# Patient Record
Sex: FEMALE | Race: BLACK OR AFRICAN AMERICAN | Employment: OTHER | URBAN - METROPOLITAN AREA
[De-identification: names, ages, dates, MRNs, and addresses within clinical notes are randomized per-mention and may not be internally consistent; named-entity substitution may affect disease eponyms.]

---

## 2017-01-12 ENCOUNTER — OFFICE VISIT (OUTPATIENT)
Dept: FAMILY MEDICINE CLINIC | Age: 62
End: 2017-01-12

## 2017-01-12 VITALS
BODY MASS INDEX: 34.41 KG/M2 | TEMPERATURE: 97.8 F | DIASTOLIC BLOOD PRESSURE: 88 MMHG | HEIGHT: 62 IN | WEIGHT: 187 LBS | SYSTOLIC BLOOD PRESSURE: 134 MMHG | RESPIRATION RATE: 16 BRPM | OXYGEN SATURATION: 96 % | HEART RATE: 78 BPM

## 2017-01-12 DIAGNOSIS — Z91.199 MEDICALLY NONCOMPLIANT: ICD-10-CM

## 2017-01-12 DIAGNOSIS — R73.03 PREDIABETES: Primary | ICD-10-CM

## 2017-01-12 DIAGNOSIS — I10 ESSENTIAL HYPERTENSION: ICD-10-CM

## 2017-01-12 DIAGNOSIS — Z23 ENCOUNTER FOR IMMUNIZATION: ICD-10-CM

## 2017-01-12 DIAGNOSIS — R91.8 PULMONARY NODULES: ICD-10-CM

## 2017-01-12 DIAGNOSIS — E03.9 ACQUIRED HYPOTHYROIDISM: ICD-10-CM

## 2017-01-12 DIAGNOSIS — Z87.891 FORMER SMOKER: ICD-10-CM

## 2017-01-12 DIAGNOSIS — E04.1 NODULAR THYROID DISEASE: ICD-10-CM

## 2017-01-12 RX ORDER — LEVOTHYROXINE SODIUM 25 UG/1
25 TABLET ORAL
Qty: 30 TAB | Refills: 1 | Status: SHIPPED | OUTPATIENT
Start: 2017-01-12 | End: 2017-03-16

## 2017-01-12 NOTE — MR AVS SNAPSHOT
Visit Information Date & Time Provider Department Dept. Phone Encounter #  
 1/12/2017  9:15 AM Shira Chapin, [address] [id number] Follow-up Instructions Return in about 9 weeks (around 3/16/2017) for thyroid. Labs due in 8 weeks. .  
  
Your Appointments 1/24/2017  8:00 AM  
Office Visit with Lo Guallpa MD  
[facility] [address] [facility]) Appt Note: return in 2  
 [address] [address]  
[phone] [facility] [id number] Upcoming Health Maintenance Date Due  
 FOOT EXAM Q1 [birth year]/8/1965 EYE EXAM RETINAL OR DILATED Q1 12/8/1965 HEMOGLOBIN A1C Q6M 6/13/2017 BREAST CANCER SCRN MAMMOGRAM 6/29/2017 MICROALBUMIN Q1 12/13/2017 LIPID PANEL Q1 12/13/2017 COLONOSCOPY 3/2/2019 DTaP/Tdap/Td series (2 - Td) 6/9/2026 Allergies as of 1/12/2017  Review Complete On: 1/12/2017 By: Jeb De La Cruz LPN Severity Noted Reaction Type Reactions Ace Inhibitors  12/14/2010    Cough Aspirin  01/20/2010    Other (comments) GI bleeding & pain  
 Nsaids (Non-steroidal Anti-inflammatory Drug)  09/26/2012    Other (comments) Makes her stomach bleed Current Immunizations  Reviewed on 6/9/2016 Name Date Influenza Vaccine 11/13/2014, 1/7/2014,  Incomplete Influenza Vaccine (Quad) PF 1/12/2017, 10/14/2015 Pneumococcal Vaccine (Unspecified Type) 11/14/2014, 10/1/2011 Not reviewed this visit You Were Diagnosed With   
  
 Codes Comments Prediabetes    -  Primary ICD-10-CM: R73.03 
ICD-9-CM: 790.29 Essential hypertension     ICD-10-CM: I10 
ICD-9-CM: 401.9 Acquired hypothyroidism     ICD-10-CM: E03.9 ICD-9-CM: 244.9 Nodular thyroid disease     ICD-10-CM: E04.1 ICD-9-CM: 241.0 Pulmonary nodules     ICD-10-CM: R91.8 ICD-9-CM: 793.19 Former smoker     ICD-10-CM: K17.324 ICD-9-CM: V15.82   
 Encounter for immunization     ICD-10-CM: N24 ICD-9-CM: V03.89 Medically noncompliant     ICD-10-CM: Z91.19 ICD-9-CM: V15.81 Vitals BP Pulse Temp Resp Height(growth percentile) Weight(growth percentile) 134/88 (BP 1 Location: Left arm, BP Patient Position: Sitting) 78 97.8 °F (36.6 °C) (Oral) 16 5' 2\" (1.575 m) 187 lb (84.8 kg) SpO2 BMI OB Status Smoking Status 96% 34.2 kg/m2 Hysterectomy Former Smoker Vitals History BMI and BSA Data Body Mass Index Body Surface Area  
 34.2 kg/m 2 1.93 m 2 Preferred Pharmacy Pharmacy Name [facility] [address] [place] [address] [phone] Your Updated Medication List  
  
   
This list is accurate as of: 1/12/17  9:54 AM.  Always use your most recent med list.  
  
  
  
  
 ACIPHEX 20 mg tablet Generic drug:  RABEprazole Take 20 mg by mouth daily. albuterol 90 mcg/actuation inhaler Commonly known as:  VENTOLIN HFA INHALE 2 PUFFS EVERY 4 HOURS AS NEEDED FOR WHEEZING  FOR SHORTNESS OFBREATH  
  
 albuterol-ipratropium 2.5 mg-0.5 mg/3 ml Nebu Commonly known as:  DUO-NEB  
3 mL by Nebulization route every six (6) hours as needed. b complex vitamins tablet Take 1 tablet by mouth daily. candesartan-hydroCHLOROthiazide 32-12.5 mg per tablet Commonly known as:  ATACAND HCT  
TAKE ONE TABLET BY MOUTH ONCE DAILY CARAFATE 100 mg/mL suspension Generic drug:  sucralfate Take 1 tsp by mouth four (4) times daily. CENTRUM COMPLETE 18-0.4 mg Tab Generic drug:  Multivit-Iron-Min-Folic Acid Take 1 Tab by mouth daily. cholecalciferol 1,000 unit Cap Commonly known as:  VITAMIN D3 Take 1,000 Units by mouth daily. diazePAM 5 mg tablet Commonly known as:  VALIUM  
1 in the late afternoon, early evening to help with muscle spasms, as needed. One at night to help with muscle spasms, pain and sleep, as needed fluticasone-vilanterol 100-25 mcg/dose inhaler Commonly known as:  BREO ELLIPTA Take 1 Puff by inhalation daily. Indications: ASTHMA PREVENTION  
  
 levothyroxine 25 mcg tablet Commonly known as:  SYNTHROID Take 1 Tab by mouth Daily (before breakfast). MIRALAX 17 gram packet Generic drug:  polyethylene glycol Take 17 g by mouth daily. mometasone 50 mcg/actuation nasal spray Commonly known as:  NASONEX  
2 Sprays by Both Nostrils route daily. montelukast 10 mg tablet Commonly known as:  SINGULAIR Take 1 Tab by mouth daily. oxyCODONE IR 15 mg immediate release tablet Commonly known as:  Chares Divine Take 1 Tab by mouth four (4) times daily as needed for Pain for up to 30 days. Max Daily Amount: 60 mg. PRILOSEC PO Take 20 mg by mouth daily. VOLTAREN 1 % Gel Generic drug:  diclofenac Apply 4 g to affected area four (4) times daily. Prescriptions Sent to Pharmacy Refills  
 levothyroxine (SYNTHROID) 25 mcg tablet 1 Sig: Take 1 Tab by mouth Daily (before breakfast). Class: Normal  
 [facility]: [facility] [address] Ph #: [phone] Route: Oral  
  
We Performed the Following ADMIN INFLUENZA VIRUS VAC [ [facility]] INFLUENZA VIRUS VAC QUAD,SPLIT,PRESV FREE SYRINGE 3/> YRS IM [id number] CPT(R)] Follow-up Instructions Return in about 9 weeks (around 3/16/2017) for thyroid. Labs due in 8 weeks. [facility] To-Do List   
 01/12/2017 Lab:  TSH 3RD GENERATION   
  
 01/12/2017 Imaging:  US THYROID/PARATHYROID/SOFT TISS Patient Instructions 1 - Please see your lung doctor due to your abnormal CT scan showing nodules and due to your recent pneumonia. 2 - Change to new dose of thyroid medication. 3 - Schedule your thyroid ultrasound due to nodules. 4 - Repeat labs for your thyroid in 8 weeks. 5 - I want to see you for follow-up in 9 weeks to see that all he loose ends are tied up. Introducing [facility] SERVICES! Dear Mark Dubois: Thank you for requesting a [employer] account. Our records indicate that you already have an active [employer] account. You can access your account anytime at [email or web address]. [email or web address] Did you know that you can access your hospital and ER discharge instructions at any time in [employer])? You can also review all of your test results from your hospital stay or ER visit. Additional Information If you have questions, please visit the Frequently Asked Questions section of the [employer] website at [email or web address] Remember, [employer]) is NOT to be used for urgent needs. For medical emergencies, dial 911. Now available from your iPhone and Android! Please provide this summary of care documentation to your next provider. Your primary care clinician is listed as [facility]. If you have any questions after today's visit, please call [phone].

## 2017-01-12 NOTE — PROGRESS NOTES
Chief Complaint   Patient presents with    Results     review    Diabetes    GERD    Thyroid Problem     per patient she has not been taking Levothyroxine inconsistently       1. Have you been to the ER, urgent care clinic since your last visit? Hospitalized since your last visit? Yes, 12/11/16 [facility] for pneumonia    2. Have you seen or consulted any other health care providers outside of the [address] [place] [address] since your last visit? Include any pap smears or colon screening. Yes, 11/28/16 Dr. Ena Armstrong (pain management)    Eye exam: 1/10/17 @ [facility] (note requested today)    Physician order obtained. Patient completed adult immunization consent form. Allergies, contraindications and recommendations reviewed with patient. Seasonal influenza vaccine administered IM left deltoid. Patient tolerated well. Patient remained in office for 15 minutes after injection and no adverse reactions were noted.

## 2017-01-12 NOTE — PATIENT INSTRUCTIONS
1 - Please see your lung doctor due to your abnormal CT scan showing nodules and due to your recent pneumonia. 2 - Change to new dose of thyroid medication. 3 - Schedule your thyroid ultrasound due to nodules. 4 - Repeat labs for your thyroid in 8 weeks. 5 - I want to see you for follow-up in 9 weeks to see that all he loose ends are tied up.

## 2017-01-16 NOTE — PROGRESS NOTES
SUBJECTIVE    Chief Complaint   Patient presents with    Results     review    Diabetes    GERD    Thyroid Problem     per patient she has not been taking Levothyroxine inconsistently      Patient here for follow-up. She has been largely noncompliant in the past and has had several cancellations and no-shows. She is here for follow-up. Patient presents for follow-up on their hypertension, prediabetes, and hypothyroidism. She reports inconsistent use of her synthroid due to side effects that are not present when she skips it. She takes it a few times per week. She is requesting a smaller dose perhaps. She does not believe it to be the size of the pill. She is fairly certain her side effects are not due to other medications. We also reviewed their cardiac risk factors. We reviewed their cardiac risk factors. In the past she admitted that she often times misses other meds as well but this history (besides the thyroid medication) was not present today. The patient denies any chest pain or chest tightness. Denies any dyspnea upon exertion. The patient did not report actively any headaches, blurred vision, polyuria, polydipsia, or polyphagia. She had a recent bout of pneumonia and never did any follow-up appts with her pulmonologist or with us. She had concerning findings on her films - an infiltrate (that needs to be tracked to resolution) and pulmonary nodules. According to ER notes, they suggested follow-up. Her ER findings also suggested thyroid enlargement / nodules for which follow-up was suggested by the radiologist.    ROS:  History obtained from the patient  · General: negative for - chills, fever, weight changes  · HEENT:  Has had voice changes and has seen ENT in the past.  She states she would not like to go back to the ENT she saw.     · Respiratory: no cough, shortness of breath, or wheezing   · Cardiovascular: no chest pain, palpitations, or dyspnea on exertion  · Gastrointestinal: no abdominal pain, N/V, change in bowel habits, or black or bloody stools. History of colon ca. OBJECTIVE  Blood pressure 134/88, pulse 78, temperature 97.8 °F (36.6 °C), temperature source Oral, resp. rate 16, height 5' 2\" (1.575 m), weight 187 lb (84.8 kg), SpO2 96 %. General:  alert, cooperative, well appearing, in no apparent distress. Neck:  Thyroid appears slightly larger on the right. CV:  The heart sounds are regular in rate and rhythm. There is a normal S1 and S2. There or no murmurs. Lungs: Inspiratory and expiratory efforts are full and unlabored. Lung sounds are clear and equal to auscultation throughout all lung fields without wheezing, rales, or rhonchi. Extremities: There is no edema. The feet are without lesions or ulcerations. Skin: no rashes, no jaundice. Psych: normal affect. Mood good. Oriented x 3. Judgement and insight intact. Results for Arlen Lea (MRN [id number]) as of 1/16/2017 08:28   Ref. Range 12/11/2016 09:40   WBC Latest Ref Range: 4.6 - 13.2 K/uL 10.1   RBC Latest Ref Range: 4.20 - 5.30 M/uL 4.00 (L)   HGB Latest Ref Range: 12.0 - 16.0 g/dL 11.0 (L)   HCT Latest Ref Range: 35.0 - 45.0 % 35.0   MCV Latest Ref Range: 74.0 - 97.0 FL 87.5   MCH Latest Ref Range: 24.0 - 34.0 PG 27.5   MCHC Latest Ref Range: 31.0 - 37.0 g/dL 31.4   RDW Latest Ref Range: 11.6 - 14.5 % 15.7 (H)   PLATELET Latest Ref Range: 135 - 420 K/uL 296   MPV Latest Ref Range: 9.2 - 11.8 FL 10.1   NEUTROPHILS Latest Ref Range: 40 - 73 % 82 (H)   LYMPHOCYTES Latest Ref Range: 21 - 52 % 9 (L)   MONOCYTES Latest Ref Range: 3 - 10 % 8   EOSINOPHILS Latest Ref Range: 0 - 5 % 1   BASOPHILS Latest Ref Range: 0 - 2 % 0   DF Latest Units:   AUTOMATED   ABS. NEUTROPHILS Latest Ref Range: 1.8 - 8.0 K/UL 8.3 (H)   ABS. LYMPHOCYTES Latest Ref Range: 0.9 - 3.6 K/UL 0.9   ABS. MONOCYTES Latest Ref Range: 0.05 - 1.2 K/UL 0.8   ABS.  EOSINOPHILS Latest Ref Range: 0.0 - 0.4 K/UL 0.1   ABS. BASOPHILS Latest Ref Range: 0.0 - 0.06 K/UL 0.0   D DIMER Latest Ref Range: <0.46 ug/ml(FEU) 0.88 (H)   Sodium Latest Ref Range: 136 - 145 mmol/L 144   Potassium Latest Ref Range: 3.5 - 5.5 mmol/L 3.6   Chloride Latest Ref Range: 100 - 108 mmol/L 106   CO2 Latest Ref Range: 21 - 32 mmol/L 30   Anion gap Latest Ref Range: 3.0 - 18 mmol/L 8   Glucose Latest Ref Range: 74 - 99 mg/dL 134 (H)   BUN Latest Ref Range: 7.0 - 18 MG/DL 6 (L)   Creatinine Latest Ref Range: 0.6 - 1.3 MG/DL 0.65   BUN/Creatinine ratio Latest Ref Range: 12 - 20   9 (L)   Calcium Latest Ref Range: 8.5 - 10.1 MG/DL 8.6   GFR est non-AA Latest Ref Range: >60 ml/min/1.73m2 >60   GFR est AA Latest Ref Range: >60 ml/min/1.73m2 >60   Troponin-I, Qt. Latest Ref Range: 0.0 - 0.045 NG/ML <0.02     Results for Isi Gathers (MRN [id number]) as of 1/16/2017 08:28   Ref. Range 12/13/2016 09:37   Triglyceride Latest Ref Range: <150 MG/DL 46   Cholesterol, total Latest Ref Range: <200 MG/   HDL Cholesterol Latest Ref Range: 40 - 60 MG/ (H)   CHOL/HDL Ratio Latest Ref Range: 0 - 5.0   1.8   LDL, calculated Latest Ref Range: 0 - 100 MG/DL 68.8   VLDL, calculated Latest Units: MG/DL 9.2   Hemoglobin A1c, (calculated) Latest Ref Range: 4.2 - 5.6 % 6.2 (H)   Creatinine, urine Latest Ref Range: 30 - 125 mg/dL 195.00 (H)   Microalbumin,urine random Latest Ref Range: 0 - 3.0 MG/DL 26.10 (H)   Microalbumin/Creat. Ratio Latest Ref Range: 0 - 30 mg/g 134 (H)   TSH Latest Ref Range: 0.36 - 3.74 uIU/mL 1.95     Final result (Exam End: 12/11/2016 11:36 AM) Open        Study Result      CT chest pulmonary embolism protocol      INDICATION: Chest pain. Shortness of breath. Question pulmonary embolism. Wheezing, shortness of breath for a week. Coughing up bright blood and last 2  days. Asthma.  History of colon cancer     TECHNIQUE: Thin collimation axial images obtained through the level of the  pulmonary arteries with additional imaging through the chest following the  uneventful administration of nonionic intravenous contrast. Images  reconstructed into three dimensional coronal and sagittal projections for  complete evaluation of the tortuous and overlapping pulmonary vascular  structures and to reduce patient radiation dose. All CT scans at this facility  are performed using dose optimization technique as appropriate to a performed  exam, to include on automated exposure control, adjustment of the mA and/or KV  according to patient's size (including appropriate matching for site-specific  examinations), or use of iterative reconstruction technique.     COMPARISON: None     FINDINGS:     Airways are patent. There is a 5 mm pulmonary nodule in the left lower lobe  (axial image 32. There is a 5 mm pulmonary nodule in the left lower lobe on  axial image 39. These appear to been present since 2012 abdomen CT. There is a  small area space opacity in the left upper lobe and possible scattered opacities  at the left lung base. There are extensive scattered airspace opacities and  consolidation in the right upper and middle lobe. There are scattered airspace  opacities in the right lower lobe. Findings are most suggestive of pneumonia. Pulmonary hemorrhage could also have this appearance. Multiple small nodular  opacities are noted scattered throughout the right lung. These are likely  related to airspace disease. Follow-up chest CT in 3 months is recommended to  exclude pulmonary nodules. No pleural effusion. Visualized osseous structures  appear intact. Heterogeneous nodular enlarged thyroid gland. Further evaluation with thyroid  ultrasound is recommended. No axillary adenopathy. No thoracic aortic aneurysm or dissection. Postsurgical  changes of the stomach are noted. Visualized infradiaphragmatic structures are  unremarkable. Indeterminate study for pulmonary embolus with poor opacification  of the segmental and subsegmental branches.  No definite pulmonary embolus in the  main, right or left pulmonary arteries. No gross pulmonary embolus in the lobar  branches.     IMPRESSION  Impression:     No evidence of pulmonary embolism or thoracic aortic dissection.     Extensive airspace opacities and consolidation throughout the right lung. Possible minimal scattered opacities in the left lung. Findings are most  suggestive of pneumonia. Pulmonary hemorrhage or contusion could also have this  appearance. Multiple small nodular opacities throughout the right lung are likely related to  airspace disease. Follow-up chest CT in 3 months is recommended to exclude  pulmonary nodules.     Enlarged heterogeneous nodular thyroid gland. Further evaluation with thyroid  ultrasound is recommended. ASSESSMENT / PLAN    ICD-10-CM ICD-9-CM    1. Prediabetes R73.03 790.29    2. Essential hypertension I10 401.9    3. Acquired hypothyroidism E03.9 244.9 US THYROID/PARATHYROID/SOFT TISS      TSH 3RD GENERATION   4. Nodular thyroid disease E04.1 241.0 US THYROID/PARATHYROID/SOFT TISS   5. Pulmonary nodules R91.8 793.19    6. Former smoker Z87.891 V15.82    7. Encounter for immunization Z23 V03.89 INFLUENZA VIRUS VAC QUAD,SPLIT,PRESV FREE SYRINGE 3/> YRS IM      ADMIN INFLUENZA VIRUS VAC   8. Medically noncompliant Z91.19 V15.81      Diabetes - Reviewed labs. Suggested health lifestyle / diet. HTN -  Refills as needed. DASH diet. Hypothyroidism - normal TSH with low compliance. We will reduce the dose per her request and check labs in 8 weeks. Nodular thyroid- imaging ordered. May need to see ENT depending on results. Asthma / COPD / pulmonary nodules / former smoker - strongly advise follow-up with pulmonologist. I emphasized the importance of always having ER follow-up and the risks she is putting herself in by not reviewing testing done in the ER after the fact. H/O colon ca - encourage follow-up with GI. Flu vaccine administered.     I had an in depth discussion with her about her noncompliance and my care philosophy. I am a bit more strict in that I want her to be more accountable and engaged in her healthcare so that she does not miss anything preventable and that all harmful diagnoses are worked up appropriately. If she continues to have no-shows and poor compliance I have advised I may discharged her from my care. She appreciated my honesty and contracts for more engagement. All chart history elements were reviewed by me at the time of the visit even though marked at time of note closure. Patient understands our medical plan. Patient has provided input and agrees with goals. Alternatives have been explained and offered. All questions answered. The patient is to call if condition worsens or fails to improve. Follow-up Disposition:  Return in about 9 weeks (around 3/16/2017) for thyroid. Labs due in 8 weeks. Soila Car

## 2017-01-24 ENCOUNTER — OFFICE VISIT (OUTPATIENT)
Dept: PAIN MANAGEMENT | Age: 62
End: 2017-01-24

## 2017-01-24 VITALS
DIASTOLIC BLOOD PRESSURE: 101 MMHG | BODY MASS INDEX: 34.6 KG/M2 | SYSTOLIC BLOOD PRESSURE: 185 MMHG | HEIGHT: 62 IN | HEART RATE: 80 BPM | WEIGHT: 188 LBS

## 2017-01-24 DIAGNOSIS — M51.37 DDD (DEGENERATIVE DISC DISEASE), LUMBOSACRAL: ICD-10-CM

## 2017-01-24 DIAGNOSIS — Z98.890 H/O LUMBOSACRAL SPINE SURGERY: ICD-10-CM

## 2017-01-24 DIAGNOSIS — Z79.899 ENCOUNTER FOR LONG-TERM (CURRENT) USE OF HIGH-RISK MEDICATION: ICD-10-CM

## 2017-01-24 DIAGNOSIS — G89.29 CHRONIC LOW BACK PAIN, UNSPECIFIED BACK PAIN LATERALITY, WITH SCIATICA PRESENCE UNSPECIFIED: Primary | ICD-10-CM

## 2017-01-24 DIAGNOSIS — F41.1 GAD (GENERALIZED ANXIETY DISORDER): ICD-10-CM

## 2017-01-24 DIAGNOSIS — M54.5 CHRONIC LOW BACK PAIN, UNSPECIFIED BACK PAIN LATERALITY, WITH SCIATICA PRESENCE UNSPECIFIED: Primary | ICD-10-CM

## 2017-01-24 DIAGNOSIS — M47.892 OTHER OSTEOARTHRITIS OF SPINE, CERVICAL REGION: ICD-10-CM

## 2017-01-24 DIAGNOSIS — M54.2 CERVICALGIA: ICD-10-CM

## 2017-01-24 DIAGNOSIS — M47.896 OTHER OSTEOARTHRITIS OF SPINE, LUMBAR REGION: ICD-10-CM

## 2017-01-24 DIAGNOSIS — M43.10 SPONDYLOLISTHESIS, GRADE 1: ICD-10-CM

## 2017-01-24 LAB
ALCOHOL UR POC: NORMAL
AMPHETAMINES UR POC: NORMAL
BARBITURATES UR POC: NORMAL
BENZODIAZEPINES UR POC: NORMAL
BUPRENORPHINE UR POC: NORMAL
CANNABINOIDS UR POC: NORMAL
CARISOPRODOL UR POC: NORMAL
COCAINE UR POC: NORMAL
FENTANYL UR POC: NORMAL
MDMA/ECSTASY UR POC: NORMAL
METHADONE UR POC: NORMAL
METHAMPHETAMINE UR POC: NORMAL
METHYLPHENIDATE UR POC: NORMAL
OPIATES UR POC: NORMAL
OXYCODONE UR POC: NORMAL
PHENCYCLIDINE UR POC: NORMAL
PROPOXYPHENE UR POC: NORMAL
TRAMADOL UR POC: NORMAL
TRICYCLICS UR POC: NORMAL

## 2017-01-24 RX ORDER — DIAZEPAM 5 MG/1
TABLET ORAL
Qty: 60 TAB | Refills: 1 | Status: SHIPPED | OUTPATIENT
Start: 2017-01-24 | End: 2017-03-23 | Stop reason: SDUPTHER

## 2017-01-24 RX ORDER — LIDOCAINE 50 MG/G
1 PATCH TOPICAL EVERY 12 HOURS
Qty: 1 EACH | Refills: 3 | Status: SHIPPED | OUTPATIENT
Start: 2017-01-24 | End: 2017-03-23 | Stop reason: SDUPTHER

## 2017-01-24 RX ORDER — OXYCODONE HYDROCHLORIDE 15 MG/1
15 TABLET ORAL
Qty: 120 TAB | Refills: 0 | Status: SHIPPED | OUTPATIENT
Start: 2017-01-24 | End: 2017-03-23 | Stop reason: SDUPTHER

## 2017-01-24 RX ORDER — TRAMADOL HYDROCHLORIDE 200 MG/1
200 TABLET, EXTENDED RELEASE ORAL DAILY
Qty: 30 TAB | Refills: 1 | Status: SHIPPED | OUTPATIENT
Start: 2017-01-24 | End: 2017-03-23 | Stop reason: SDUPTHER

## 2017-01-24 RX ORDER — PREGABALIN 75 MG/1
75 CAPSULE ORAL 2 TIMES DAILY
Qty: 60 CAP | Refills: 2 | Status: SHIPPED | OUTPATIENT
Start: 2017-01-24 | End: 2017-03-23 | Stop reason: SDUPTHER

## 2017-01-24 RX ORDER — OXYCODONE HYDROCHLORIDE 15 MG/1
15 TABLET ORAL
Qty: 120 TAB | Refills: 0 | Status: SHIPPED | OUTPATIENT
Start: 2017-02-23 | End: 2017-03-23 | Stop reason: SDUPTHER

## 2017-01-24 NOTE — PROGRESS NOTES
Nursing Notes    Patient presents to the office today in follow-up. Patient rates her pain at 0/10 on the numerical pain scale. Reviewed medications with counts as follows:    Rx Date filled Qty Dispensed Pill Count Last Dose Short   Valium 5 mg 01/11/17 60 41 today no   Tramadol  mg 01/11/17 30 19 Last night  no   Oxycodone 15 mg  01/12/17 120 73 today no                      POC UDS was performed in office today    Any new labs or imaging since last appointment? NO    Have you been to an emergency room (ER) or urgent care clinic since your last visit? NO            Have you been hospitalized since your last visit? NO     If yes, where, when, and reason for visit? Have you seen or consulted any other health care providers outside of the [facility]  since your last visit? NO     If yes, where, when, and reason for visit? HM deferred to pcp.

## 2017-01-24 NOTE — PROGRESS NOTES
HISTORY OF PRESENT ILLNESS  Adriana Maurice is a 64 y.o. female. HPI Comments: Meds help with pain control and quality of life. No new side effects reported today. No new medical problems reported today. Visit survey reviewed, and will be scanned. Recent Average pain level (out of 10). -- 8  Chief complaint, low back pain, neck pain, history of shoulder pain  Chronic pain  Using oxycodone 15 mg 4 times a day as needed  Extended release tramadol 200 mg once a day  Diazepam 5 mg in the evening as needed to help with muscle spasms and sleep  Lyrica 75 mg twice a day  Lidoderm patches  Compound cream from her rheumatologist  80% complete relief in the past 30 days  Medication helps improve general activity, mood, sleep, enjoyment of life             Review of Systems   Constitutional: Positive for malaise/fatigue. Negative for fever. HENT: Negative for sore throat. Eyes: Negative for blurred vision and double vision. Respiratory: Positive for shortness of breath. Negative for cough. Cardiovascular: Negative for chest pain and leg swelling. Gastrointestinal: Positive for heartburn. Negative for nausea. Genitourinary: Negative for dysuria and urgency. Musculoskeletal: Positive for back pain, falls and joint pain. Skin: Negative for itching and rash. Neurological: Negative for dizziness, seizures and headaches. Endo/Heme/Allergies: Negative for environmental allergies. Does not bruise/bleed easily. Psychiatric/Behavioral: Positive for depression. Negative for suicidal ideas. The patient is nervous/anxious and has insomnia. Physical Exam   Constitutional: She appears well-developed and well-nourished. She is cooperative. She does not have a sickly appearance. HENT:   Head: Normocephalic and atraumatic. Right Ear: External ear normal. No drainage. Left Ear: External ear normal. No drainage. Nose: Nose normal.   Eyes: Lids are normal. Right eye exhibits no discharge.  Left eye exhibits no discharge. Right conjunctiva has no hemorrhage. Left conjunctiva has no hemorrhage. Neck: Neck supple. No tracheal deviation present. No thyroid mass present. Pulmonary/Chest: Effort normal. No respiratory distress. Neurological: She is alert. No cranial nerve deficit. Skin: Skin is intact. No rash noted. Psychiatric: Her speech is normal. Her affect is not angry. She does not express inappropriate judgment. Nursing note and vitals reviewed. ASSESSMENT and PLAN  Encounter Diagnoses   Name Primary?  Chronic low back pain, unspecified back pain laterality, with sciatica presence unspecified Yes    DDD (degenerative disc disease), lumbosacral     Spondylolisthesis, grade 1     H/O lumbosacral spine surgery     Other osteoarthritis of spine, lumbar region     Cervicalgia     Other osteoarthritis of spine, cervical region     CAIO (generalized anxiety disorder)    No signs of addiction or diversion. The primary Dxes. ,including pain are controlled. Pain/Pain control/Meds and Quality Of Life have been reviewed. Nonpharmacologic therapy and non-opioid pharmacologic therapy are always considered. If opioid therapy is prescribed, this is only if the expected benefits for both pain and function are anticipated to outweigh risks. Possible changes to treatment plan considered. Support/education given as needed. Today-medications are as listed. No significant changes to medications. Follow up -- 2 months.  --Urine test or oral swab today. Also, the prescription monitoring program was reviewed today. The majority of today's visit was spent counseling and coordinating care. Total visit time-40 minutes. -Dragon medical dictation software was used for portions of this report. Unintended errors may occur.

## 2017-01-24 NOTE — MR AVS SNAPSHOT
Visit Information Date & Time Provider Department Dept. Phone Encounter #  
 1/24/2017  8:00 AM Tu Damon MD [address] for Pain Management [phone] Follow-up Instructions Return in about 2 months (around 3/24/2017). Follow-up and Disposition History Your Appointments 2/6/2017  8:30 AM  
Follow Up with Lion Garcias MD  
[address] ([facility]) Appt Note: FROM 3/10/16  
 [address] [place] [address], [address] [address]  
[phone]  
  
   
 [address] [place] [address], [address] [facility] [place] [address]  
  
    
 3/16/2017 11:00 AM  
ROUTINE CARE with Antoinette Escalante MD  
[facility] ([facility]) Appt Note: routine f/u 9wks [address] [facility] [address]  
[address] [facility] [place] [facility] [address] [facility] [address] Upcoming Health Maintenance Date Due  
 FOOT EXAM Q1 12[birth year]8/1965 EYE EXAM RETINAL OR DILATED Q1 12/8/1965 HEMOGLOBIN A1C Q6M 6/13/2017 BREAST CANCER SCRN MAMMOGRAM 6/29/2017 MICROALBUMIN Q1 12/13/2017 LIPID PANEL Q1 12/13/2017 COLONOSCOPY 3/2/2019 DTaP/Tdap/Td series (2 - Td) 6/9/2026 Allergies as of 1/24/2017  Review Complete On: 1/24/2017 By: Tu Damon MD  
  
 Severity Noted Reaction Type Reactions Ace Inhibitors  12/14/2010    Cough Aspirin  01/20/2010    Other (comments) GI bleeding & pain  
 Nsaids (Non-steroidal Anti-inflammatory Drug)  09/26/2012    Other (comments) Makes her stomach bleed Current Immunizations  Reviewed on 6/9/2016 Name Date Influenza Vaccine 11/13/2014, 1/7/2014,  Incomplete Influenza Vaccine (Quad) PF 1/12/2017, 10/14/2015 Pneumococcal Vaccine (Unspecified Type) 11/14/2014, 10/1/2011 Not reviewed this visit You Were Diagnosed With   
  
 Codes Comments Chronic low back pain, unspecified back pain laterality, with sciatica presence unspecified    -  Primary ICD-10-CM: M54.5, G89.29 ICD-9-CM: 724.2, 338.29   
 DDD (degenerative disc disease), lumbosacral     ICD-10-CM: M51.37 
ICD-9-CM: 722.52 Spondylolisthesis, grade 1     ICD-10-CM: M43.10 ICD-9-CM: 738.4 H/O lumbosacral spine surgery     ICD-10-CM: Z98.890 ICD-9-CM: V15.29 Other osteoarthritis of spine, lumbar region     ICD-10-CM: M47.896 Cervicalgia     ICD-10-CM: M54.2 ICD-9-CM: 723.1 Other osteoarthritis of spine, cervical region     ICD-10-CM: L49.736 CAIO (generalized anxiety disorder)     ICD-10-CM: F41.1 ICD-9-CM: 300.02 Vitals BP Pulse Height(growth percentile) Weight(growth percentile) BMI OB Status (!) 185/101 80 5' 2\" (1.575 m) 188 lb (85.3 kg) 34.39 kg/m2 Hysterectomy Smoking Status Former Smoker Vitals History BMI and BSA Data Body Mass Index Body Surface Area  
 34.39 kg/m 2 1.93 m 2 Preferred Pharmacy Pharmacy Name [facility] [address] [phone] Your Updated Medication List  
  
   
This list is accurate as of: 1/24/17  8:36 AM.  Always use your most recent med list.  
  
  
  
  
 ACIPHEX 20 mg tablet Generic drug:  RABEprazole Take 20 mg by mouth daily. albuterol 90 mcg/actuation inhaler Commonly known as:  VENTOLIN HFA INHALE 2 PUFFS EVERY 4 HOURS AS NEEDED FOR WHEEZING  FOR SHORTNESS OFBREATH  
  
 albuterol-ipratropium 2.5 mg-0.5 mg/3 ml Nebu Commonly known as:  DUO-NEB  
3 mL by Nebulization route every six (6) hours as needed. b complex vitamins tablet Take 1 tablet by mouth daily. candesartan-hydroCHLOROthiazide 32-12.5 mg per tablet Commonly known as:  ATACAND HCT  
TAKE ONE TABLET BY MOUTH ONCE DAILY CARAFATE 100 mg/mL suspension Generic drug:  sucralfate Take 1 tsp by mouth four (4) times daily. CENTRUM COMPLETE 18-0.4 mg Tab Generic drug:  Multivit-Iron-Min-Folic Acid Take 1 Tab by mouth daily. cholecalciferol 1,000 unit Cap Commonly known as:  VITAMIN D3 Take 1,000 Units by mouth daily. diazePAM 5 mg tablet Commonly known as:  VALIUM  
1 in the late afternoon, early evening to help with muscle spasms, as needed. One at night to help with muscle spasms, pain and sleep, as needed  
  
 fluticasone-vilanterol 100-25 mcg/dose inhaler Commonly known as:  BREO ELLIPTA Take 1 Puff by inhalation daily. Indications: ASTHMA PREVENTION  
  
 levothyroxine 25 mcg tablet Commonly known as:  SYNTHROID Take 1 Tab by mouth Daily (before breakfast). lidocaine 5 % Commonly known as:  LIDODERM  
1 Patch by TransDERmal route every twelve (12) hours for 30 days. Apply patch to the affected area for 12 hours a day and remove for 12 hours a day. MIRALAX 17 gram packet Generic drug:  polyethylene glycol Take 17 g by mouth daily. mometasone 50 mcg/actuation nasal spray Commonly known as:  NASONEX  
2 Sprays by Both Nostrils route daily. montelukast 10 mg tablet Commonly known as:  SINGULAIR Take 1 Tab by mouth daily. * oxyCODONE IR 15 mg immediate release tablet Commonly known as:  Chares Divine Take 1 Tab by mouth four (4) times daily as needed for Pain for up to 30 days. Max Daily Amount: 60 mg.  
  
 * oxyCODONE IR 15 mg immediate release tablet Commonly known as:  Chares Divine Take 1 Tab by mouth four (4) times daily as needed for Pain for up to 30 days. Max Daily Amount: 60 mg. Start taking on:  2/23/2017  
  
 pregabalin 75 mg capsule Commonly known as:  Margarie Chuck Take 1 Cap by mouth two (2) times a day for 30 days. Max Daily Amount: 150 mg. PRILOSEC PO Take 20 mg by mouth daily. traMADol 200 mg tablet Commonly known as:  ULTRAM-ER  
 Take 1 Tab by mouth daily for 30 days. Max Daily Amount: 200 mg. VOLTAREN 1 % Gel Generic drug:  diclofenac Apply 4 g to affected area four (4) times daily. * Notice: This list has 2 medication(s) that are the same as other medications prescribed for you. Read the directions carefully, and ask your doctor or other care provider to review them with you. Prescriptions Printed Refills  
 diazePAM (VALIUM) 5 mg tablet 1 Si in the late afternoon, early evening to help with muscle spasms, as needed. One at night to help with muscle spasms, pain and sleep, as needed Class: Print  
 oxyCODONE IR (ROXICODONE) 15 mg immediate release tablet 0 Sig: Take 1 Tab by mouth four (4) times daily as needed for Pain for up to 30 days. Max Daily Amount: 60 mg.  
 Class: Print Route: Oral  
 traMADol (ULTRAM-ER) 200 mg tablet 1 Sig: Take 1 Tab by mouth daily for 30 days. Max Daily Amount: 200 mg. Class: Print Route: Oral  
 oxyCODONE IR (ROXICODONE) 15 mg immediate release tablet 0 Starting on: 2017 Sig: Take 1 Tab by mouth four (4) times daily as needed for Pain for up to 30 days. Max Daily Amount: 60 mg.  
 Class: Print Route: Oral  
 pregabalin (LYRICA) 75 mg capsule 2 Sig: Take 1 Cap by mouth two (2) times a day for 30 days. Max Daily Amount: 150 mg.  
 Class: Print Route: Oral  
 lidocaine (LIDODERM) 5 % 3 Si Patch by TransDERmal route every twelve (12) hours for 30 days. Apply patch to the affected area for 12 hours a day and remove for 12 hours a day. Class: Print Route: TransDERmal  
  
Follow-up Instructions Return in about 2 months (around 3/24/2017). To-Do List   
 2017 7:00 AM  
  Appointment with HANANE US RM 2 at [address] [place] [address] ([phone]) OUTSIDE FILMS  - Any outside films related to the study being scheduled should be brought with you on the day of the exam.  If this cannot be done there may be a delay in the reading of the study. MEDICATIONS  - Patient must bring a complete list of all medications currently taking to include prescriptions, over-the-counter meds, herbals, vitamins & any dietary supplements Introducing \[facility]\"" & HEALTH SERVICES! Dear Shahana Mercedes: Thank you for requesting a [employer] account. Our records indicate that you already have an active My[employer] account. You can access your account anytime at [email or web address] Did you know that you can access your hospital and ER discharge instructions at any time in [employer]? You can also review all of your test results from your hospital stay or ER visit. Additional Information If you have questions, please visit the Frequently Asked Questions section of the My[employer] website at [email or web address] Remember, MyBuilder is NOT to be used for urgent needs. For medical emergencies, dial 911. Now available from your iPhone and Android! Please provide this summary of care documentation to your next provider. Your primary care clinician is listed as Yoana Mayorga. If you have any questions after today's visit, please call [phone].

## 2017-01-26 ENCOUNTER — HOSPITAL ENCOUNTER (OUTPATIENT)
Dept: ULTRASOUND IMAGING | Age: 62
Discharge: HOME | End: 2017-01-26
Attending: FAMILY MEDICINE
Payer: MEDICARE

## 2017-01-26 DIAGNOSIS — E03.9 ACQUIRED HYPOTHYROIDISM: ICD-10-CM

## 2017-01-26 DIAGNOSIS — E04.1 NODULAR THYROID DISEASE: ICD-10-CM

## 2017-01-26 PROCEDURE — 76536 US EXAM OF HEAD AND NECK: CPT

## 2017-01-27 DIAGNOSIS — E04.1 THYROID NODULE: ICD-10-CM

## 2017-01-27 DIAGNOSIS — E04.9 ENLARGED THYROID: Primary | ICD-10-CM

## 2017-01-27 NOTE — PROGRESS NOTES
Nurse to advise thyroid is enlarged and should follow-up with ENT. Referral placed to Dr. Mirta Cutler.

## 2017-01-27 NOTE — PROGRESS NOTES
[facility] [phone] ([place]) and [phone] for patient to call [facility].

## 2017-01-31 ENCOUNTER — DOCUMENTATION ONLY (OUTPATIENT)
Dept: FAMILY MEDICINE CLINIC | Age: 62
End: 2017-01-31

## 2017-01-31 NOTE — PROGRESS NOTES
Doctor: Dr. Dsouza Graft   Specialty:ENT   Address/telephone #: [address] [address] [address] [phone]  Appointment date/time: 2/15/17 @ 1:30 pm    Called patient several times to advise of results and specialist appointment. No return phone call. Letter sent to patient with result and specialist appointment information.

## 2017-02-06 ENCOUNTER — TELEPHONE (OUTPATIENT)
Dept: PULMONOLOGY | Age: 62
End: 2017-02-06

## 2017-02-06 ENCOUNTER — TELEPHONE (OUTPATIENT)
Dept: FAMILY MEDICINE CLINIC | Age: 62
End: 2017-02-06

## 2017-02-06 NOTE — TELEPHONE ENCOUNTER
Patient called this morning. She is requesting to speak to nurse [facility] regarding letter received stating her thyroid ultrasound showed her thyroid is enlarged. Please call patient back at your earliest convenience.

## 2017-02-06 NOTE — TELEPHONE ENCOUNTER
Pt miss her appt this a.m states that she's not feeling well. Pt states that she need to speak with nurse.  pls call pt [phone]

## 2017-02-06 NOTE — TELEPHONE ENCOUNTER
Patient over sleep this am and missed appt with Dr. Rafaela Mi. Has been almost 1 year since seen. Patient requesting appt. Appt given for 2/9/17.

## 2017-02-06 NOTE — TELEPHONE ENCOUNTER
Spoke with patient. She confirms that she received letter in the mail but she will have to reschedule appointment for ENT. Patient states \"my daughter has a tumor in her stomach. It looks as if she is 3 months pregnant. I leave on 2/13/17 to fly to [place] to be there for her. She will have to be on bedrest after surgery. I don't have a return date yet. \"    Contacted ENT and moved patient's appointment up to 2/9/17 @ 1:15 pm.  Patient states she will take the appt but she has to see pulmonology the same day at 2:30. Contact info given to patient to call ENT to reschedule.

## 2017-02-07 ENCOUNTER — TELEPHONE (OUTPATIENT)
Dept: FAMILY MEDICINE CLINIC | Age: 62
End: 2017-02-07

## 2017-02-07 NOTE — TELEPHONE ENCOUNTER
Left message via voicemail for Adriana to inform her that we have contacted  ENT ([facility] Dr. Delmi Villarreal) [phone] spoke with Sae Bah). Who states that Adriana was  offered several appointments to accommodate her schedule. Adriana has canceled both appointments scheduled by [facility]. Adriana was advised to contact [facility].

## 2017-02-07 NOTE — TELEPHONE ENCOUNTER
Pt states that she has to go to [facility] to help her daughter after her surgery. Pt states that she tried to get an appt with the ENT sooner but was unable to. She will schedule an appt when she gets back. She will keep her appt with Dr Rodney Perry on Thursday. FYI.

## 2017-02-09 ENCOUNTER — OFFICE VISIT (OUTPATIENT)
Dept: PULMONOLOGY | Age: 62
End: 2017-02-09

## 2017-02-09 VITALS
OXYGEN SATURATION: 97 % | WEIGHT: 188 LBS | HEART RATE: 83 BPM | HEIGHT: 62 IN | RESPIRATION RATE: 16 BRPM | DIASTOLIC BLOOD PRESSURE: 76 MMHG | TEMPERATURE: 98.3 F | BODY MASS INDEX: 34.6 KG/M2 | SYSTOLIC BLOOD PRESSURE: 158 MMHG

## 2017-02-09 DIAGNOSIS — J44.9 ASTHMA-COPD OVERLAP SYNDROME (HCC): Primary | ICD-10-CM

## 2017-02-09 DIAGNOSIS — Z87.898 HISTORY OF MULTIPLE PULMONARY NODULES: ICD-10-CM

## 2017-02-09 DIAGNOSIS — J18.9 COMMUNITY ACQUIRED PNEUMONIA: ICD-10-CM

## 2017-02-09 RX ORDER — ALBUTEROL SULFATE 90 UG/1
AEROSOL, METERED RESPIRATORY (INHALATION)
Qty: 3 INHALER | Refills: 3 | Status: SHIPPED | OUTPATIENT
Start: 2017-02-09 | End: 2017-10-31 | Stop reason: SDUPTHER

## 2017-02-09 RX ORDER — FLUTICASONE FUROATE AND VILANTEROL 100; 25 UG/1; UG/1
1 POWDER RESPIRATORY (INHALATION) DAILY
Qty: 3 INHALER | Refills: 3 | Status: SHIPPED | OUTPATIENT
Start: 2017-02-09 | End: 2017-11-03 | Stop reason: SINTOL

## 2017-02-09 RX ORDER — IPRATROPIUM BROMIDE AND ALBUTEROL SULFATE 2.5; .5 MG/3ML; MG/3ML
3 SOLUTION RESPIRATORY (INHALATION)
Qty: 120 NEBULE | Refills: 3 | Status: SHIPPED | OUTPATIENT
Start: 2017-02-09 | End: 2017-06-06 | Stop reason: SDUPTHER

## 2017-02-09 NOTE — MR AVS SNAPSHOT
Visit Information Date & Time Provider Department Dept. Phone Encounter #  
 2/9/2017  2:30 PM MD Thuy Lackey [facility] [place] [phone] Your Appointments 3/16/2017 11:00 AM  
ROUTINE CARE with Tapan Thomas MD  
[facility] ([facility]) Appt Note: routine f/u 9wks [address] [facility] [address] [place] [address] Drive [address] [place] [address]  
  
    
 3/23/2017  9:00 AM  
Follow Up with Golden Lieberman MD  
[facility]) Appt Note: Return in about 2 months (around 3/24/2017). [address] [address]  
[phone] [facility] [phone] Upcoming Health Maintenance Date Due HEMOGLOBIN A1C Q6M 6/13/2017 BREAST CANCER SCRN MAMMOGRAM 6/29/2017 MICROALBUMIN Q1 12/13/2017 LIPID PANEL Q1 12/13/2017 EYE EXAM RETINAL OR DILATED Q1 1/10/2018 FOOT EXAM Q1 1/12/2018 COLONOSCOPY 3/2/2019 DTaP/Tdap/Td series (2 - Td) 6/9/2026 Allergies as of 2/9/2017  Review Complete On: 2/9/2017 By: Roro Lema MD  
  
 Severity Noted Reaction Type Reactions Ace Inhibitors  12/14/2010    Cough Aspirin  01/20/2010    Other (comments) GI bleeding & pain  
 Nsaids (Non-steroidal Anti-inflammatory Drug)  09/26/2012    Other (comments) Makes her stomach bleed Current Immunizations  Reviewed on 6/9/2016 Name Date Influenza Vaccine 11/13/2014, 1/7/2014,  Incomplete Influenza Vaccine (Quad) PF 1/12/2017, 10/14/2015 Pneumococcal Vaccine (Unspecified Type) 11/14/2014, 10/1/2011 Not reviewed this visit You Were Diagnosed With   
  
 Codes Comments [facility]hma[facility]COPD overlap syndrome ([facility]    -  Primary ICD-10-CM: J44.9 ICD-9-CM: 493.20 History of multiple pulmonary nodules     ICD-10-CM: Z87.898 ICD-9-CM: V12.69   
 Community acquired pneumonia     ICD-10-CM: J18.9 ICD-9-CM: 918 Vitals BP Pulse Temp Resp Height(growth percentile) Weight(growth percentile) 158/76 (BP 1 Location: Left arm, BP Patient Position: Sitting) 83 98.3 °F (36.8 °C) (Oral) 16 5' 2\" (1.575 m) 188 lb (85.3 kg) SpO2 BMI OB Status Smoking Status 97% 34.39 kg/m2 Hysterectomy Former Smoker BMI and BSA Data Body Mass Index Body Surface Area  
 34.39 kg/m 2 1.93 m 2 Preferred Pharmacy [facility], [address] [place] [address],[address] [place] [address] [phone] Your Updated Medication List  
  
   
This list is accurate as of: 2/9/17  2:40 PM.  Always use your most recent med list.  
  
  
  
  
 ACIPHEX 20 mg tablet Generic drug:  RABEprazole Take 20 mg by mouth daily. albuterol 90 mcg/actuation inhaler Commonly known as:  VENTOLIN HFA INHALE 2 PUFFS EVERY 4 HOURS AS NEEDED FOR WHEEZING  FOR SHORTNESS OFBREATH  
  
 albuterol-ipratropium 2.5 mg-0.5 mg/3 ml Nebu Commonly known as:  DUO-NEB  
3 mL by Nebulization route every six (6) hours as needed. b complex vitamins tablet Take 1 tablet by mouth daily. candesartan-hydroCHLOROthiazide 32-12.5 mg per tablet Commonly known as:  ATACAND HCT  
TAKE ONE TABLET BY MOUTH ONCE DAILY CARAFATE 100 mg/mL suspension Generic drug:  sucralfate Take 1 tsp by mouth four (4) times daily. CENTRUM COMPLETE 18-0.4 mg Tab Generic drug:  Multivit-Iron-Min-Folic Acid Take 1 Tab by mouth daily. cholecalciferol 1,000 unit Cap Commonly known as:  VITAMIN D3 Take 1,000 Units by mouth daily. diazePAM 5 mg tablet Commonly known as:  VALIUM  
1 in the late afternoon, early evening to help with muscle spasms, as needed. One at night to help with muscle spasms, pain and sleep, as needed  
  
 fluticasone-vilanterol 100-25 mcg/dose inhaler Commonly known as:  BREO ELLIPTA Take 1 Puff by inhalation daily. Indications: ASTHMA PREVENTION  
  
 levothyroxine 25 mcg tablet Commonly known as:  SYNTHROID Take 1 Tab by mouth Daily (before breakfast). lidocaine 5 % Commonly known as:  LIDODERM  
1 Patch by TransDERmal route every twelve (12) hours for 30 days. Apply patch to the affected area for 12 hours a day and remove for 12 hours a day. MIRALAX 17 gram packet Generic drug:  polyethylene glycol Take 17 g by mouth daily. mometasone 50 mcg/actuation nasal spray Commonly known as:  NASONEX  
2 Sprays by Both Nostrils route daily. montelukast 10 mg tablet Commonly known as:  SINGULAIR Take 1 Tab by mouth daily. * oxyCODONE IR 15 mg immediate release tablet Commonly known as:  Darin Rattler Take 1 Tab by mouth four (4) times daily as needed for Pain for up to 30 days. Max Daily Amount: 60 mg.  
  
 * oxyCODONE IR 15 mg immediate release tablet Commonly known as:  Darin Rattler Take 1 Tab by mouth four (4) times daily as needed for Pain for up to 30 days. Max Daily Amount: 60 mg. Start taking on:  2/23/2017  
  
 pregabalin 75 mg capsule Commonly known as:  Edman Speaker Take 1 Cap by mouth two (2) times a day for 30 days. Max Daily Amount: 150 mg. PRILOSEC PO Take 20 mg by mouth daily. traMADol 200 mg tablet Commonly known as:  ULTRAM-ER Take 1 Tab by mouth daily for 30 days. Max Daily Amount: 200 mg. VOLTAREN 1 % Gel Generic drug:  diclofenac Apply 4 g to affected area four (4) times daily. * Notice: This list has 2 medication(s) that are the same as other medications prescribed for you. Read the directions carefully, and ask your doctor or other care provider to review them with you. Prescriptions Printed Refills  
 fluticasone-vilanterol (BREO ELLIPTA) 100-25 mcg/dose inhaler 3 Sig: Take 1 Puff by inhalation daily. Indications: ASTHMA PREVENTION Class: Print Route: Inhalation albuterol (VENTOLIN HFA) 90 mcg/actuation inhaler 3 Sig: INHALE 2 PUFFS EVERY 4 HOURS AS NEEDED FOR WHEEZING  FOR SHORTNESS OFBREATH Class: Print  
 albuterol-ipratropium (DUO-NEB) 2.5 mg-0.5 mg/3 ml nebu 3 Sig: 3 mL by Nebulization route every six (6) hours as needed. Class: Print Route: Nebulization To-Do List   
 02/10/2017 Imaging:  CT CHEST WO CONT   
  
 08/09/2017 Procedures: AMB POC SPIROMETRY Introducing [facility] & HEALTH SERVICES! Dear [place]: Thank you for requesting a Socialtext account. Our records indicate that you already have an active Socialtext account. You can access your account anytime at [email or web address] Did you know that you can access your hospital and ER discharge instructions at any time in Socialtext? You can also review all of your test results from your hospital stay or ER visit. Additional Information If you have questions, please visit the Frequently Asked Questions section of the Socialtext website at [email or web address] Remember, Socialtext is NOT to be used for urgent needs. For medical emergencies, dial 911. Now available from your iPhone and Android! Please provide this summary of care documentation to your next provider. Your primary care clinician is listed as Betty Clancy. If you have any questions after today's visit, please call [phone].

## 2017-02-09 NOTE — PROGRESS NOTES
Ms. Grant has a reminder for a \"due or due soon\" health maintenance. I have asked that she contact her primary care provider for follow-up on this health maintenance.

## 2017-02-09 NOTE — PROGRESS NOTES
HISTORY OF PRESENT ILLNESS  Martha Silveira is a 64 y.o. female. HPI Comments: Follow  up for asthma with COPD. and pneumonia. Pt recently diagnosed with community acquired pneumonia, see CT below. Pt is now back to baseline function after completing a course of antibiotics and steroids. Pt complains of episodic SOB and wheezing usually relieved by Albuterol rescue, last use was weeks ago. She has noticed dramatically improved asthma control after starting Breo, such that only exacerbation since the last visit was 12/2016 when she had pneumonia. Pt's only other complaint is recurrent voice changes, more evident and leading to Dx of goiter. Denies stridor or dysphagia. Pt does not smoke but has extensive 2nd hand smoke exposure from her . Review of Systems   Constitutional: Positive for malaise/fatigue. Negative for chills, diaphoresis, fever and weight loss. HENT: Negative for congestion, ear discharge, ear pain, hearing loss, nosebleeds, sore throat and tinnitus. Hoarseness   Eyes: Negative for blurred vision, double vision, photophobia, pain, discharge and redness. Respiratory: Positive for cough and wheezing. Negative for hemoptysis, sputum production and stridor. Cardiovascular: Negative for palpitations, orthopnea, claudication, leg swelling and PND. Gastrointestinal: Negative for blood in stool, constipation, diarrhea, heartburn, melena, nausea and vomiting. Genitourinary: Negative for dysuria, flank pain, frequency, hematuria and urgency. Musculoskeletal: Negative for back pain, falls, joint pain, myalgias and neck pain. Skin: Negative for itching and rash. Neurological: Negative for dizziness, tingling, tremors, sensory change, speech change, focal weakness, seizures and weakness. Endo/Heme/Allergies: Negative for environmental allergies. Does not bruise/bleed easily.    Psychiatric/Behavioral: Negative for depression, hallucinations, memory loss, substance abuse and suicidal ideas. The patient is nervous/anxious. The patient does not have insomnia. Past Medical History   Diagnosis Date    Abdominal pain     Arthritis     Asthma 1/20/2010     Dr. Kristi Rivero [facility])      stage 2 colon ca    Chemotherapy     Chronic low back pain 7/12/2010    Chronic lung disease     COPD (chronic obstructive pulmonary disease) ([facility] [address] 1/20/2010     Dr. Cruz  FH: colon cancer 1/20/2010    GERD (gastroesophageal reflux disease)     Gout     H/O colon cancer, stage II      s/p resection, last colonoscopy 11/11- q3 yrs    H/O colon cancer, stage II     History of multiple pulmonary nodules 1/20/2010    Hoarse 2015     candida, sees ENT    HTN (hypertension) 1/20/2010    Hyperlipemia 1/20/2010    Hypothyroid 1/20/2010    IBS (irritable bowel syndrome)     Liver disease      cysts on liver    Mammogram declined     Nephropathy, membranous 1/20/2010    Neuropathy 1/20/2010    Nicotine use disorder 1/20/2010    Prediabetes     PUD (peptic ulcer disease)     Pulmonary nodule     Rectocele 1/20/2010    S/P cataract extraction 1/3/2011    Thyroid disease      hypothyroid    Unspecified sleep apnea      does not use CPAP    Vitamin D deficiency 1/20/2010     Current Outpatient Prescriptions on File Prior to Visit   Medication Sig Dispense Refill    diazePAM (VALIUM) 5 mg tablet 1 in the late afternoon, early evening to help with muscle spasms, as needed. One at night to help with muscle spasms, pain and sleep, as needed 60 Tab 1    oxyCODONE IR (ROXICODONE) 15 mg immediate release tablet Take 1 Tab by mouth four (4) times daily as needed for Pain for up to 30 days. Max Daily Amount: 60 mg. 120 Tab 0    traMADol (ULTRAM-ER) 200 mg tablet Take 1 Tab by mouth daily for 30 days. Max Daily Amount: 200 mg. 30 Tab 1    pregabalin (LYRICA) 75 mg capsule Take 1 Cap by mouth two (2) times a day for 30 days.  Max Daily Amount: 150 mg. 60 Cap 2    lidocaine (LIDODERM) 5 % 1 Patch by TransDERmal route every twelve (12) hours for 30 days. Apply patch to the affected area for 12 hours a day and remove for 12 hours a day. 1 Each 3    levothyroxine (SYNTHROID) 25 mcg tablet Take 1 Tab by mouth Daily (before breakfast). 30 Tab 1    candesartan-hydrochlorothiazide (ATACAND HCT) 32-12.5 mg per tablet TAKE ONE TABLET BY MOUTH ONCE DAILY 90 Tab 0    cholecalciferol (VITAMIN D3) 1,000 unit cap Take 1,000 Units by mouth daily.  montelukast (SINGULAIR) 10 mg tablet Take 1 Tab by mouth daily. 90 Tab 3    mometasone (NASONEX) 50 mcg/actuation nasal spray 2 Sprays by Both Nostrils route daily. 3 Container 3    sucralfate (CARAFATE) 100 mg/mL suspension Take 1 tsp by mouth four (4) times daily.  b complex vitamins tablet Take 1 tablet by mouth daily.  Multivit-Iron-Min-Folic Acid (CENTRUM COMPLETE) 18-0.4 mg tab Take 1 Tab by mouth daily.  diclofenac (VOLTAREN) 1 % topical gel Apply 4 g to affected area four (4) times daily.  OMEPRAZOLE (PRILOSEC PO) Take 20 mg by mouth daily.  RABEprazole (ACIPHEX) 20 mg tablet Take 20 mg by mouth daily.  polyethylene glycol (MIRALAX) 17 gram packet Take 17 g by mouth daily.  [START ON 2/23/2017] oxyCODONE IR (ROXICODONE) 15 mg immediate release tablet Take 1 Tab by mouth four (4) times daily as needed for Pain for up to 30 days. Max Daily Amount: 60 mg. 120 Tab 0     No current facility-administered medications on file prior to visit.       Allergies   Allergen Reactions    Ace Inhibitors Cough    Aspirin Other (comments)     GI bleeding & pain    Nsaids (Non-Steroidal Anti-Inflammatory Drug) Other (comments)     Makes her stomach bleed       Social History     Social History    Marital status:      Spouse name: N/A    Number of children: N/A    Years of education: N/A     Occupational History     Not Employed     Social History Main Topics    Smoking status: Former Smoker Packs/day: 0.50     Years: 18.00     Types: Cigarettes     Quit date: 8/27/1990    Smokeless tobacco: Never Used      Comment: per patient she has not smoked in over 30 years    Alcohol use 0.0 oz/week     0 Standard drinks or equivalent per week      Comment: rare    Drug use: No    Sexual activity: Yes     Partners: Male     Birth control/ protection: None, Surgical     Other Topics Concern    Not on file     Social History Narrative     Blood pressure 158/76, pulse 83, temperature 98.3 °F (36.8 °C), temperature source Oral, resp. rate 16, height 5' 2\" (1.575 m), weight 85.3 kg (188 lb), SpO2 97 %. Physical Exam   Constitutional: She is oriented to person, place, and time. She appears well-developed. No distress. overweight   HENT:   Head: Normocephalic and atraumatic. Nose: Nose normal.   Mouth/Throat: No oropharyngeal exudate. Eyes: Conjunctivae are normal. Pupils are equal, round, and reactive to light. Right eye exhibits no discharge. Left eye exhibits no discharge. No scleral icterus. Neck: No JVD present. No tracheal deviation present. Thyromegaly: both lobes. Cardiovascular: Normal rate, regular rhythm, normal heart sounds and intact distal pulses. Exam reveals no gallop and no friction rub. No murmur heard. Pulmonary/Chest: No stridor. Abdominal: Soft. She exhibits no mass. There is no tenderness. Musculoskeletal: She exhibits no edema or tenderness. Lymphadenopathy:     She has no cervical adenopathy. Neurological: She is alert and oriented to person, place, and time. Skin: Skin is warm and dry. No rash noted. She is not diaphoretic. No erythema. Psychiatric: She has a normal mood and affect. Her behavior is normal. Judgment and thought content normal.     CT Results (most recent):    Results from Hospital Encounter encounter on 12/11/16   CTA CHEST W WO CONT   Narrative CT chest pulmonary embolism protocol     INDICATION: Chest pain. Shortness of breath.  Question pulmonary embolism. Wheezing, shortness of breath for a week. Coughing up bright blood and last 2  days. Asthma. History of colon cancer    TECHNIQUE: Thin collimation axial images obtained through the level of the  pulmonary arteries with additional imaging through the chest following the  uneventful administration of nonionic intravenous contrast.  Images  reconstructed into three dimensional coronal and sagittal projections for  complete evaluation of the tortuous and overlapping pulmonary vascular  structures and to reduce patient radiation dose. All CT scans at this facility  are performed using dose optimization technique as appropriate to a performed  exam, to include on automated exposure control, adjustment of the mA and/or KV  according to patient's size (including appropriate matching for site-specific  examinations), or use of iterative reconstruction technique. COMPARISON: None    FINDINGS:    Airways are patent. There is a 5 mm pulmonary nodule in the left lower lobe  (axial image 32. There is a 5 mm pulmonary nodule in the left lower lobe on  axial image 39. These appear to been present since 2012 abdomen CT. There is a  small area space opacity in the left upper lobe and possible scattered opacities  at the left lung base. There are extensive scattered airspace opacities and  consolidation in the right upper and middle lobe. There are scattered airspace  opacities in the right lower lobe. Findings are most suggestive of pneumonia. Pulmonary hemorrhage could also have this appearance. Multiple small nodular  opacities are noted scattered throughout the right lung. These are likely  related to airspace disease. Follow-up chest CT in 3 months is recommended to  exclude pulmonary nodules. No pleural effusion. Visualized osseous structures  appear intact. Heterogeneous nodular enlarged thyroid gland. Further evaluation with thyroid  ultrasound is recommended. No axillary adenopathy.  No thoracic aortic aneurysm or dissection. Postsurgical  changes of the stomach are noted. Visualized infradiaphragmatic structures are  unremarkable. Indeterminate study for pulmonary embolus with poor opacification  of the segmental and subsegmental branches. No definite pulmonary embolus in the  main, right or left pulmonary arteries. No gross pulmonary embolus in the lobar  branches. Impression Impression:    No evidence of pulmonary embolism or thoracic aortic dissection. Extensive airspace opacities and consolidation throughout the right lung. Possible minimal scattered opacities in the left lung. Findings are most  suggestive of pneumonia. Pulmonary hemorrhage or contusion could also have this  appearance. Multiple small nodular opacities throughout the right lung are likely related to  airspace disease. Follow-up chest CT in 3 months is recommended to exclude  pulmonary nodules. Enlarged heterogeneous nodular thyroid gland. Further evaluation with thyroid  ultrasound is recommended. ASSESSMENT and PLAN  Encounter Diagnoses   Name Primary?  Asthma-COPD overlap syndrome ([address] Yes    History of multiple pulmonary nodules     Community acquired pneumonia      Pt to be seen by ENT for possible thyroidectomy. No pulmonary contraindication to this surgery. CT reviewed, will repeat to document resolution of infiltrates and to follow nodules. Continue current medications for now as listed. Refills printed. RTC 6 months, will call pt with CT results.

## 2017-03-15 ENCOUNTER — HOSPITAL ENCOUNTER (OUTPATIENT)
Dept: LAB | Age: 62
Discharge: HOME | End: 2017-03-15
Payer: MEDICARE

## 2017-03-15 DIAGNOSIS — E03.9 ACQUIRED HYPOTHYROIDISM: ICD-10-CM

## 2017-03-15 LAB — TSH SERPL DL<=0.05 MIU/L-ACNC: 23.5 UIU/ML (ref 0.36–3.74)

## 2017-03-15 PROCEDURE — 84443 ASSAY THYROID STIM HORMONE: CPT | Performed by: FAMILY MEDICINE

## 2017-03-15 PROCEDURE — 36415 COLL VENOUS BLD VENIPUNCTURE: CPT | Performed by: FAMILY MEDICINE

## 2017-03-16 ENCOUNTER — OFFICE VISIT (OUTPATIENT)
Dept: FAMILY MEDICINE CLINIC | Age: 62
End: 2017-03-16

## 2017-03-16 VITALS
RESPIRATION RATE: 16 BRPM | HEART RATE: 82 BPM | WEIGHT: 186 LBS | BODY MASS INDEX: 34.23 KG/M2 | DIASTOLIC BLOOD PRESSURE: 100 MMHG | TEMPERATURE: 98.2 F | HEIGHT: 62 IN | SYSTOLIC BLOOD PRESSURE: 158 MMHG | OXYGEN SATURATION: 97 %

## 2017-03-16 DIAGNOSIS — E04.1 THYROID NODULE: ICD-10-CM

## 2017-03-16 DIAGNOSIS — R73.01 IFG (IMPAIRED FASTING GLUCOSE): ICD-10-CM

## 2017-03-16 DIAGNOSIS — I10 ESSENTIAL HYPERTENSION: Primary | ICD-10-CM

## 2017-03-16 DIAGNOSIS — E03.9 ACQUIRED HYPOTHYROIDISM: ICD-10-CM

## 2017-03-16 DIAGNOSIS — J44.9 CHRONIC OBSTRUCTIVE PULMONARY DISEASE, UNSPECIFIED COPD TYPE (HCC): ICD-10-CM

## 2017-03-16 RX ORDER — CANDESARTAN CILEXETIL AND HYDROCHLOROTHIAZIDE 32; 12.5 MG/1; MG/1
TABLET ORAL
Qty: 90 TAB | Refills: 0 | Status: SHIPPED | OUTPATIENT
Start: 2017-03-16 | End: 2017-05-09 | Stop reason: SDUPTHER

## 2017-03-16 RX ORDER — LEVOTHYROXINE SODIUM 75 UG/1
75 TABLET ORAL
Qty: 90 TAB | Refills: 0 | Status: SHIPPED | OUTPATIENT
Start: 2017-03-16 | End: 2017-05-09 | Stop reason: SDUPTHER

## 2017-03-16 NOTE — PROGRESS NOTES
Chief Complaint   Patient presents with    Thyroid Problem    Results     TSH     1. Have you been to the ER, urgent care clinic since your last visit? Hospitalized since your last visit? No    2. Have you seen or consulted any other health care providers outside of the [address] [place] [address] since your last visit? Include any pap smears or colon screening.  No

## 2017-03-16 NOTE — MR AVS SNAPSHOT
Visit Information Date & Time Provider Department Dept. Phone Encounter #  
 3/16/2017 11:00 AM Rachel Frazier, [address] [id number] Follow-up Instructions Return in about 6 weeks (around 4/27/2017) for thyroid and prediabetes. Non-fasting labs due 3-7 days prior to appointment. Your Appointments 3/23/2017  9:00 AM  
Follow Up with Ministerio Guerra MD  
[address] [place] [address] for Pain Management [facility] Appt Note: Return in about 2 months (around 3/24/2017). [address] [address]  
[phone] [facility] [id number] Upcoming Health Maintenance Date Due  
 BREAST CANCER SCRN MAMMOGRAM 6/29/2017 HEMOGLOBIN A1C Q6M 6/13/2017 MICROALBUMIN Q1 12/13/2017 LIPID PANEL Q1 12/13/2017 EYE EXAM RETINAL OR DILATED Q1 1/10/2018 FOOT EXAM Q1 1/12/2018 COLONOSCOPY 3/2/2019 DTaP/Tdap/Td series (2 - Td) 6/9/2026 Allergies as of 3/16/2017  Review Complete On: 3/16/2017 By: Rachel Frazier MD  
  
 Severity Noted Reaction Type Reactions Ace Inhibitors  12/14/2010    Cough Aspirin  01/20/2010    Other (comments) GI bleeding & pain  
 Nsaids (Non-steroidal Anti-inflammatory Drug)  09/26/2012    Other (comments) Makes her stomach bleed Current Immunizations  Reviewed on 6/9/2016 Name Date Influenza Vaccine 11/13/2014, 1/7/2014,  Incomplete Influenza Vaccine (Quad) PF 1/12/2017, 10/14/2015 Pneumococcal Vaccine (Unspecified Type) 11/14/2014, 10/1/2011 Not reviewed this visit You Were Diagnosed With   
  
 Codes Comments Essential hypertension    -  Primary ICD-10-CM: I10 
ICD-9-CM: 401.9 Acquired hypothyroidism     ICD-10-CM: E03.9 ICD-9-CM: 505. 9 Chronic obstructive pulmonary disease, unspecified COPD type ([facility]     ICD-10-CM: J44.9 ICD-9-CM: 991 Thyroid nodule     ICD-10-CM: E04.1 ICD-9-CM: 241.0 IFG (impaired fasting glucose)     ICD-10-CM: R73.01 
ICD-9-CM: 790.21 Vitals BP Pulse Temp Resp Height(growth percentile) Weight(growth percentile) (!) 158/100 (BP 1 Location: Left arm, BP Patient Position: Sitting) 82 98.2 °F (36.8 °C) (Oral) 16 5' 2\" (1.575 m) 186 lb (84.4 kg) SpO2 BMI OB Status Smoking Status 97% 34.02 kg/m2 Hysterectomy Former Smoker BMI and BSA Data Body Mass Index Body Surface Area 34.02 kg/m 2 1.92 m 2 Preferred Pharmacy Pharmacy Name [facility] [place], [address] [place] [address] [phone] Your Updated Medication List  
  
   
This list is accurate as of: 3/16/17 11:40 AM.  Always use your most recent med list.  
  
  
  
  
 ACIPHEX 20 mg tablet Generic drug:  RABEprazole Take 20 mg by mouth daily. albuterol 90 mcg/actuation inhaler Commonly known as:  VENTOLIN HFA INHALE 2 PUFFS EVERY 4 HOURS AS NEEDED FOR WHEEZING  FOR SHORTNESS OFBREATH  
  
 albuterol-ipratropium 2.5 mg-0.5 mg/3 ml Nebu Commonly known as:  DUO-NEB  
3 mL by Nebulization route every six (6) hours as needed. b complex vitamins tablet Take 1 tablet by mouth daily. candesartan-hydroCHLOROthiazide 32-12.5 mg per tablet Commonly known as:  ATACAND HCT  
TAKE ONE TABLET BY MOUTH ONCE DAILY CARAFATE 100 mg/mL suspension Generic drug:  sucralfate Take 1 tsp by mouth four (4) times daily. CENTRUM COMPLETE 18-0.4 mg Tab Generic drug:  Multivit-Iron-Min-Folic Acid Take 1 Tab by mouth daily. cholecalciferol 1,000 unit Cap Commonly known as:  VITAMIN D3 Take 1,000 Units by mouth daily. diazePAM 5 mg tablet Commonly known as:  VALIUM  
1 in the late afternoon, early evening to help with muscle spasms, as needed. One at night to help with muscle spasms, pain and sleep, as needed  
  
 fluticasone-vilanterol 100-25 mcg/dose inhaler Commonly known as:  BREO ELLIPTA Take 1 Puff by inhalation daily. Indications: ASTHMA PREVENTION  
  
 levothyroxine 75 mcg tablet Commonly known as:  SYNTHROID Take 1 Tab by mouth Daily (before breakfast). MIRALAX 17 gram packet Generic drug:  polyethylene glycol Take 17 g by mouth daily. mometasone 50 mcg/actuation nasal spray Commonly known as:  NASONEX  
2 Sprays by Both Nostrils route daily. montelukast 10 mg tablet Commonly known as:  SINGULAIR Take 1 Tab by mouth daily. oxyCODONE IR 15 mg immediate release tablet Commonly known as:  [place] Take 1 Tab by mouth four (4) times daily as needed for Pain for up to 30 days. Max Daily Amount: 60 mg. PRILOSEC PO Take 20 mg by mouth daily. VOLTAREN 1 % Gel Generic drug:  diclofenac Apply 4 g to affected area four (4) times daily. Prescriptions Printed Refills  
 candesartan-hydroCHLOROthiazide (ATACAND HCT) 32-12.5 mg per tablet 0 Sig: TAKE ONE TABLET BY MOUTH ONCE DAILY Class: Print  
 levothyroxine (SYNTHROID) 75 mcg tablet 0 Sig: Take 1 Tab by mouth Daily (before breakfast). Class: Print Route: Oral  
  
Follow-up Instructions Return in about 6 weeks (around 4/27/2017) for thyroid and prediabetes. Non-fasting labs due 3-7 days prior to appointment. To-Do List   
 03/16/2017 Lab:  HEMOGLOBIN A1C WITH EAG   
  
 03/16/2017 Lab:  TSH 3RD GENERATION   
  
 03/22/2017 9:30 AM  
  Appointment with HBV CT RM 1 at [facility] RAD CT ([phone]) GENERAL INSTRUCTIONS  This study does not require you to drink contrast prior to your study. RELATED STUDY INFORMATION  Bring any films, CDs, and reports related with you on the day of your exam.  This only includes studies done outside of [facility], [facility], [facility], and [facility]. QUESTIONS  Notify the CT Department if you have any questions concerning your study.   [place] - [phone] [facility] - [phone] [facility] - [phone] [facility] [phone] Patient Instructions DASH Diet: Care Instructions Your Care Instructions The DASH diet is an eating plan that can help lower your blood pressure. DASH stands for Dietary Approaches to Stop Hypertension. Hypertension is high blood pressure. The DASH diet focuses on eating foods that are high in calcium, potassium, and magnesium. These nutrients can lower blood pressure. The foods that are highest in these nutrients are fruits, vegetables, low-fat dairy products, nuts, seeds, and legumes. But taking calcium, potassium, and magnesium supplements instead of eating foods that are high in those nutrients does not have the same effect. The DASH diet also includes whole grains, fish, and poultry. The DASH diet is one of several lifestyle changes your doctor may recommend to lower your high blood pressure. Your doctor may also want you to decrease the amount of sodium in your diet. Lowering sodium while following the DASH diet can lower blood pressure even further than just the DASH diet alone. Follow-up care is a key part of your treatment and safety. Be sure to make and go to all appointments, and call your doctor if you are having problems. It's also a good idea to know your test results and keep a list of the medicines you take. How can you care for yourself at home? Following the DASH diet · Eat 4 to 5 servings of fruit each day. A serving is 1 medium-sized piece of fruit, ½ cup chopped or canned fruit, 1/4 cup dried fruit, or 4 ounces (½ cup) of fruit juice. Choose fruit more often than fruit juice. · Eat 4 to 5 servings of vegetables each day. A serving is 1 cup of lettuce or raw leafy vegetables, ½ cup of chopped or cooked vegetables, or 4 ounces (½ cup) of vegetable juice. Choose vegetables more often than vegetable juice. · Get 2 to 3 servings of low-fat and fat-free dairy each day.  A serving is 8 ounces of milk, 1 cup of yogurt, or 1 ½ ounces of cheese. · Eat 6 to 8 servings of grains each day. A serving is 1 slice of bread, 1 ounce of dry cereal, or ½ cup of cooked rice, pasta, or cooked cereal. Try to choose whole-grain products as much as possible. · Limit lean meat, poultry, and fish to 2 servings each day. A serving is 3 ounces, about the size of a deck of cards. · Eat 4 to 5 servings of nuts, seeds, and legumes (cooked dried beans, lentils, and split peas) each week. A serving is 1/3 cup of nuts, 2 tablespoons of seeds, or ½ cup of cooked beans or peas. · Limit fats and oils to 2 to 3 servings each day. A serving is 1 teaspoon of vegetable oil or 2 tablespoons of salad dressing. · Limit sweets and added sugars to 5 servings or less a week. A serving is 1 tablespoon jelly or jam, ½ cup sorbet, or 1 cup of lemonade. · Eat less than 2,300 milligrams (mg) of sodium a day. If you limit your sodium to 1,500 mg a day, you can lower your blood pressure even more. Tips for success · Start small. Do not try to make dramatic changes to your diet all at once. You might feel that you are missing out on your favorite foods and then be more likely to not follow the plan. Make small changes, and stick with them. Once those changes become habit, add a few more changes. · Try some of the following: ¨ Make it a goal to eat a fruit or vegetable at every meal and at snacks. This will make it easy to get the recommended amount of fruits and vegetables each day. ¨ Try yogurt topped with fruit and nuts for a snack or healthy dessert. ¨ Add lettuce, tomato, cucumber, and onion to sandwiches. ¨ Combine a ready-made pizza crust with low-fat mozzarella cheese and lots of vegetable toppings. Try using tomatoes, squash, spinach, broccoli, carrots, cauliflower, and onions. ¨ Have a variety of cut-up vegetables with a low-fat dip as an appetizer instead of chips and dip. ¨ Sprinkle sunflower seeds or chopped almonds over salads. Or try adding chopped walnuts or almonds to cooked vegetables. ¨ Try some vegetarian meals using beans and peas. Add garbanzo or kidney beans to salads. Make burritos and tacos with mashed li beans or black beans. Where can you learn more? Go to http://danae-naif.info/. Enter O626 in the search box to learn more about \"DASH Diet: Care Instructions. \" Current as of: March 23, 2016 Content Version: 11.1 © 4304-8135 [employer] Care instructions adapted under license by [employer] (which disclaims liability or warranty for this information). If you have questions about a medical condition or this instruction, always ask your healthcare professional. [address] any warranty or liability for your use of this information. Introducing [facility]! Dear Alda Collier: Thank you for requesting a [employer] account. Our records indicate that you already have an active [employer] account. You can access your account anytime at [email or web address] Did you know that you can access your hospital and ER discharge instructions at any time in [employer]? You can also review all of your test results from your hospital stay or ER visit. Additional Information If you have questions, please visit the Frequently Asked Questions section of the [employer] website at [email or web address] Remember, [employer] is NOT to be used for urgent needs. For medical emergencies, dial 911. Now available from your iPhone and Android! Please provide this summary of care documentation to your next provider. Your primary care clinician is listed as Leidy Bhandari. If you have any questions after today's visit, please call [phone].

## 2017-03-20 NOTE — PROGRESS NOTES
SUBJECTIVE    Chief Complaint   Patient presents with    Thyroid Problem    Results     TSH      Patient here for follow-up. Patient presents for follow-up on their hypertension, prediabetes, and hypothyroidism. We also reviewed their cardiac risk factors. We reviewed their cardiac risk factors. The patient has not been taking medications as prescribed. In the past she said she often times missed meds. She is not taking Synthroid because she is fearful it causes her a side effect of cough. She has seen the ENT for her thyroid nodules. The patient denies any chest pain or chest tightness. Denies any dyspnea upon exertion. Denies any polyuria, polydipsia, or polyphagia. OBJECTIVE  Blood pressure (!) 158/100, pulse 82, temperature 98.2 °F (36.8 °C), temperature source Oral, resp. rate 16, height 5' 2\" (1.575 m), weight 186 lb (84.4 kg), SpO2 97 %. General:  alert, cooperative, well appearing, in no apparent distress. CV:  The heart sounds are regular in rate and rhythm. There is a normal S1 and S2. There or no murmurs. Lungs: Inspiratory and expiratory efforts are full and unlabored. Lung sounds are clear and equal to auscultation throughout all lung fields without wheezing, rales, or rhonchi. Extremities: There is no edema. The feet are without lesions or ulcerations. Skin: no rashes, no jaundice. Psych: normal affect. Mood good. Oriented x 3. Judgement and insight intact. Results for orders placed or performed during the hospital encounter of 03/15/17   TSH 3RD GENERATION   Result Value Ref Range    TSH 23.50 (H) 0.36 - 3.74 uIU/mL     ASSESSMENT / PLAN    ICD-10-CM ICD-9-CM    1. Essential hypertension I10 401.9 candesartan-hydroCHLOROthiazide (ATACAND HCT) 32-12.5 mg per tablet   2. Acquired hypothyroidism E03.9 244.9 levothyroxine (SYNTHROID) 75 mcg tablet      TSH 3RD GENERATION   3. Chronic obstructive pulmonary disease, unspecified COPD type ([facility] [address] J44.9 496    4. Thyroid nodule E04.1 241.0    5. IFG (impaired fasting glucose) R73.01 790.21 HEMOGLOBIN A1C WITH EAG     HTN - mild elevation today. She is reportedly under a lot of stress so we will check this again at her next visit. Refills as needed. DASH diet. Hypothyroidism - elevated TSH. Encourage compliance with Synthroid. IFG -  Diet and exercise as tolerated. Asthma / COPD - continue follow-up with pulmonologist.     Thyroid nodules - cont follow-up with ENT. H/O colon ca - encourage follow-up with GI. All chart history elements were reviewed by me at the time of the visit even though marked at time of note closure. Patient understands our medical plan. Patient has provided input and agrees with goals. Alternatives have been explained and offered. All questions answered. The patient is to call if condition worsens or fails to improve. Follow-up Disposition:  Return in about 6 weeks (around 4/27/2017) for thyroid and prediabetes. Non-fasting labs due 3-7 days prior to appointment.

## 2017-03-23 ENCOUNTER — OFFICE VISIT (OUTPATIENT)
Dept: PAIN MANAGEMENT | Age: 62
End: 2017-03-23

## 2017-03-23 VITALS — SYSTOLIC BLOOD PRESSURE: 171 MMHG | DIASTOLIC BLOOD PRESSURE: 97 MMHG | HEART RATE: 72 BPM

## 2017-03-23 DIAGNOSIS — M51.37 DDD (DEGENERATIVE DISC DISEASE), LUMBOSACRAL: ICD-10-CM

## 2017-03-23 DIAGNOSIS — Z98.890 H/O LUMBOSACRAL SPINE SURGERY: ICD-10-CM

## 2017-03-23 DIAGNOSIS — M47.896 OTHER OSTEOARTHRITIS OF SPINE, LUMBAR REGION: ICD-10-CM

## 2017-03-23 DIAGNOSIS — F41.1 GAD (GENERALIZED ANXIETY DISORDER): ICD-10-CM

## 2017-03-23 DIAGNOSIS — M54.17 LUMBOSACRAL NEURITIS: ICD-10-CM

## 2017-03-23 DIAGNOSIS — G89.4 CHRONIC PAIN SYNDROME: ICD-10-CM

## 2017-03-23 DIAGNOSIS — M54.5 CHRONIC LOW BACK PAIN, UNSPECIFIED BACK PAIN LATERALITY, WITH SCIATICA PRESENCE UNSPECIFIED: Primary | ICD-10-CM

## 2017-03-23 DIAGNOSIS — M43.10 SPONDYLOLISTHESIS, GRADE 1: ICD-10-CM

## 2017-03-23 DIAGNOSIS — G89.29 CHRONIC LOW BACK PAIN, UNSPECIFIED BACK PAIN LATERALITY, WITH SCIATICA PRESENCE UNSPECIFIED: Primary | ICD-10-CM

## 2017-03-23 DIAGNOSIS — M54.2 CERVICALGIA: ICD-10-CM

## 2017-03-23 RX ORDER — DIAZEPAM 5 MG/1
TABLET ORAL
Qty: 60 TAB | Refills: 1 | Status: SHIPPED | OUTPATIENT
Start: 2017-03-23 | End: 2017-05-17 | Stop reason: SDUPTHER

## 2017-03-23 RX ORDER — OXYCODONE HYDROCHLORIDE 15 MG/1
15 TABLET ORAL
Qty: 120 TAB | Refills: 0 | Status: SHIPPED | OUTPATIENT
Start: 2017-03-23 | End: 2017-05-17 | Stop reason: SDUPTHER

## 2017-03-23 RX ORDER — TRAMADOL HYDROCHLORIDE 200 MG/1
200 TABLET, EXTENDED RELEASE ORAL DAILY
Qty: 30 TAB | Refills: 1 | Status: SHIPPED | OUTPATIENT
Start: 2017-03-23 | End: 2017-05-17 | Stop reason: SDUPTHER

## 2017-03-23 RX ORDER — PREGABALIN 75 MG/1
75 CAPSULE ORAL 2 TIMES DAILY
Qty: 60 CAP | Refills: 2 | Status: SHIPPED | OUTPATIENT
Start: 2017-03-23 | End: 2017-05-17 | Stop reason: SDUPTHER

## 2017-03-23 RX ORDER — LIDOCAINE 50 MG/G
1 PATCH TOPICAL EVERY 12 HOURS
Qty: 1 EACH | Refills: 3 | Status: SHIPPED | OUTPATIENT
Start: 2017-03-23 | End: 2017-09-07 | Stop reason: SDUPTHER

## 2017-03-23 RX ORDER — OXYCODONE HYDROCHLORIDE 15 MG/1
15 TABLET ORAL
Qty: 120 TAB | Refills: 0 | Status: SHIPPED | OUTPATIENT
Start: 2017-04-22 | End: 2017-05-17 | Stop reason: SDUPTHER

## 2017-03-23 RX ORDER — NALOXONE HYDROCHLORIDE 4 MG/.1ML
4 SPRAY NASAL AS NEEDED
Qty: 4 MG | Refills: 0 | Status: SHIPPED | OUTPATIENT
Start: 2017-03-23 | End: 2018-05-09

## 2017-03-23 RX ORDER — DIAZEPAM 5 MG/1
TABLET ORAL
Qty: 60 TAB | Refills: 1 | Status: SHIPPED | OUTPATIENT
Start: 2017-03-23 | End: 2017-03-23 | Stop reason: SDUPTHER

## 2017-03-23 NOTE — MR AVS SNAPSHOT
Visit Information Date & Time Provider Department Dept. Phone Encounter #  
 3/23/2017  9:00 AM Landy Henry MD [address] [place] [address] Pain Management [phone] [id number] Follow-up Instructions Return in about 2 months (around 5/23/2017). Follow-up and Disposition History Your Appointments 5/1/2017 10:45 AM  
ROUTINE CARE with Evelina Edwards MD  
[address] [facility] ([address]) Appt Note: 6 week followup 8 [facility] [address] [facility] [address]  
[address] [facility] [address] [facility] [address] Upcoming Health Maintenance Date Due  
 BREAST CANCER SCRN MAMMOGRAM 6/29/2017 HEMOGLOBIN A1C Q6M 6/13/2017 MICROALBUMIN Q1 12/13/2017 LIPID PANEL Q1 12/13/2017 EYE EXAM RETINAL OR DILATED Q1 1/10/2018 FOOT EXAM Q1 1/12/2018 COLONOSCOPY 3/2/2019 DTaP/Tdap/Td series (2 - Td) 6/9/2026 Allergies as of 3/23/2017  Review Complete On: 3/23/2017 By: Landy Henry MD  
  
 Severity Noted Reaction Type Reactions Ace Inhibitors  12/14/2010    Cough Aspirin  01/20/2010    Other (comments) GI bleeding & pain  
 Nsaids (Non-steroidal Anti-inflammatory Drug)  09/26/2012    Other (comments) Makes her stomach bleed Current Immunizations  Reviewed on 6/9/2016 Name Date Influenza Vaccine 11/13/2014, 1/7/2014,  Incomplete Influenza Vaccine (Quad) PF 1/12/2017, 10/14/2015 Pneumococcal Vaccine (Unspecified Type) 11/14/2014, 10/1/2011 Not reviewed this visit You Were Diagnosed With   
  
 Codes Comments Chronic low back pain, unspecified back pain laterality, with sciatica presence unspecified    -  Primary ICD-10-CM: M54.5, G89.29 ICD-9-CM: 724.2, 338.29   
 DDD (degenerative disc disease), lumbosacral     ICD-10-CM: M51.37 
ICD-9-CM: 722.52 Spondylolisthesis, grade 1     ICD-10-CM: M43.10 ICD-9-CM: 738.4 H/O lumbosacral spine surgery     ICD-10-CM: Z98.890 ICD-9-CM: V15.29 Other osteoarthritis of spine, lumbar region     ICD-10-CM: M47.896 Cervicalgia     ICD-10-CM: M54.2 ICD-9-CM: 723.1 Chronic pain syndrome     ICD-10-CM: G89.4 ICD-9-CM: 338.4 CAIO (generalized anxiety disorder)     ICD-10-CM: F41.1 ICD-9-CM: 300.02 Lumbosacral neuritis     ICD-10-CM: M54.17 ICD-9-CM: 724.4 Vitals BP Pulse OB Status Smoking Status (!) 171/97 (BP 1 Location: Right arm, BP Patient Position: Sitting) 72 Hysterectomy Former Smoker Vitals History Preferred Pharmacy Pharmacy Name [facility], [address] [place] [address],[facility] [address] [place] [address] [phone] Your Updated Medication List  
  
   
This list is accurate as of: 3/23/17  9:41 AM.  Always use your most recent med list.  
  
  
  
  
 ACIPHEX 20 mg tablet Generic drug:  RABEprazole Take 20 mg by mouth daily. albuterol 90 mcg/actuation inhaler Commonly known as:  VENTOLIN HFA INHALE 2 PUFFS EVERY 4 HOURS AS NEEDED FOR WHEEZING  FOR SHORTNESS OFBREATH  
  
 albuterol-ipratropium 2.5 mg-0.5 mg/3 ml Nebu Commonly known as:  DUO-NEB  
3 mL by Nebulization route every six (6) hours as needed. b complex vitamins tablet Take 1 tablet by mouth daily. candesartan-hydroCHLOROthiazide 32-12.5 mg per tablet Commonly known as:  ATACAND HCT  
TAKE ONE TABLET BY MOUTH ONCE DAILY CARAFATE 100 mg/mL suspension Generic drug:  sucralfate Take 1 tsp by mouth four (4) times daily. CENTRUM COMPLETE 18-0.4 mg Tab Generic drug:  Multivit-Iron-Min-Folic Acid Take 1 Tab by mouth daily. cholecalciferol 1,000 unit Cap Commonly known as:  VITAMIN D3 Take 1,000 Units by mouth daily. diazePAM 5 mg tablet Commonly known as:  VALIUM  
1 bid prn to help with muscle spasms, as needed. fluticasone-vilanterol 100-25 mcg/dose inhaler Commonly known as:  BREO ELLIPTA Take 1 Puff by inhalation daily. Indications: ASTHMA PREVENTION  
  
 levothyroxine 75 mcg tablet Commonly known as:  SYNTHROID Take 1 Tab by mouth Daily (before breakfast). lidocaine 5 % Commonly known as:  LIDODERM  
1 Patch by TransDERmal route every twelve (12) hours for 30 days. Apply patch to the affected area for 12 hours a day and remove for 12 hours a day. MIRALAX 17 gram packet Generic drug:  polyethylene glycol Take 17 g by mouth daily. mometasone 50 mcg/actuation nasal spray Commonly known as:  NASONEX  
2 Sprays by Both Nostrils route daily. montelukast 10 mg tablet Commonly known as:  SINGULAIR Take 1 Tab by mouth daily. naloxone 4 mg/actuation Spry Commonly known as:  NARCAN  
4 mg by Nasal route as needed. * oxyCODONE IR 15 mg immediate release tablet Commonly known as:  [place] Take 1 Tab by mouth four (4) times daily as needed for Pain for up to 30 days. Max Daily Amount: 60 mg.  
  
 * oxyCODONE IR 15 mg immediate release tablet Commonly known as:  [place]ban [place] Take 1 Tab by mouth four (4) times daily as needed for Pain for up to 30 days. Max Daily Amount: 60 mg. Start taking on:  4/22/2017  
  
 pregabalin 75 mg capsule Commonly known as:  Bevelry Bard Take 1 Cap by mouth two (2) times a day for 30 days. Max Daily Amount: 150 mg. PRILOSEC PO Take 20 mg by mouth daily. traMADol 200 mg tablet Commonly known as:  ULTRAM-ER Take 1 Tab by mouth daily for 30 days. Max Daily Amount: 200 mg. VOLTAREN 1 % Gel Generic drug:  diclofenac Apply 4 g to affected area four (4) times daily. * Notice: This list has 2 medication(s) that are the same as other medications prescribed for you. Read the directions carefully, and ask your doctor or other care provider to review them with you. Prescriptions Printed Refills oxyCODONE IR (ROXICODONE) 15 mg immediate release tablet 0 Sig: Take 1 Tab by mouth four (4) times daily as needed for Pain for up to 30 days. Max Daily Amount: 60 mg.  
 Class: Print Route: Oral  
 oxyCODONE IR (ROXICODONE) 15 mg immediate release tablet 0 Starting on: 2017 Sig: Take 1 Tab by mouth four (4) times daily as needed for Pain for up to 30 days. Max Daily Amount: 60 mg.  
 Class: Print Route: Oral  
 traMADol (ULTRAM-ER) 200 mg tablet 1 Sig: Take 1 Tab by mouth daily for 30 days. Max Daily Amount: 200 mg. Class: Print Route: Oral  
 pregabalin (LYRICA) 75 mg capsule 2 Sig: Take 1 Cap by mouth two (2) times a day for 30 days. Max Daily Amount: 150 mg.  
 Class: Print Route: Oral  
 lidocaine (LIDODERM) 5 % 3 Si Patch by TransDERmal route every twelve (12) hours for 30 days. Apply patch to the affected area for 12 hours a day and remove for 12 hours a day. Class: Print Route: TransDERmal  
 naloxone (NARCAN) 4 mg/actuation spry 0 Si mg by Nasal route as needed. Class: Print Route: Nasal  
 diazePAM (VALIUM) 5 mg tablet 1 Si bid prn to help with muscle spasms, as needed. Class: Print Follow-up Instructions Return in about 2 months (around 2017). To-Do List   
 2017 10:00 AM  
  Appointment with [facility] CT RM 1 at [facility] CT ([phone]) GENERAL INSTRUCTIONS  This study does not require you to drink contrast prior to your study. RELATED STUDY INFORMATION  Bring any films, CDs, and reports related with you on the day of your exam.  This only includes studies done outside of [address] [place] [address], [facility], [facility]jeev and Anahy Ross. QUESTIONS  Notify the CT Department if you have any questions concerning your study. [facility]jeev - [phone] [facility] Anahy Ross - [phone] Introducing \[facility] [place] [facility]\"" & HEALTH SERVICES! Dear Madonna Payton: Thank you for requesting a [employer] account. Our records indicate that you already have an active [employer] account. You can access your account anytime at [email or web address] Did you know that you can access your hospital and ER discharge instructions at any time in [employer]? You can also review all of your test results from your hospital stay or ER visit. Additional Information If you have questions, please visit the Frequently Asked Questions section of the [employer] website at [email or web address] Remember, [employer] is NOT to be used for urgent needs. For medical emergencies, dial 911. Now available from your iPhone and Android! Please provide this summary of care documentation to your next provider. Your primary care clinician is listed as Rommel Betancourt. If you have any questions after today's visit, please call [phone].

## 2017-03-23 NOTE — PROGRESS NOTES
HISTORY OF PRESENT ILLNESS  Artemio Cadena is a 64 y.o. female. HPI Comments: Meds help with pain control and quality of life. No new side effects reported today. Visit survey reviewed and will be scanned. Recent average level of pain(out of 10)-7  -Chief complaint low back pain  Recently not much problems with her shoulder bursitis  Chronic pain  Using tramadol extended release, 200 mg once a day  Oxycodone 15 mg 4 times a day as needed  Diazepam 5 mg, this was prescribed 1 3 times a day as needed. She uses this to help with muscle tightness, spasm, back pain. At times it leaves her sleepy. She typically uses up to 2 a day not 3. I will change the prescription. Also, she is continuing to take classes and the diazepam interferes with her ability to concentrate. Using Lidoderm patch, Lyrica 75 mg twice a day  Compound cream from Dr. Jada Prescott  She reports a recent follow-up with her spine surgeon. She states she was told that the hardware was intact, no further recommendations. They encouraged her to follow-up with our clinic. 80% complete relief in the past 30 days  Medication helps improve general activity, mood, walking, sleep                Review of Systems   Constitutional: Positive for malaise/fatigue. Negative for fever. HENT: Negative for sore throat. Eyes: Negative for blurred vision and double vision. Respiratory: Positive for shortness of breath. Negative for cough. Cardiovascular: Negative for chest pain and leg swelling. Gastrointestinal: Positive for heartburn. Negative for nausea. Genitourinary: Negative for dysuria and urgency. Musculoskeletal: Positive for back pain, falls and joint pain. Skin: Negative for itching and rash. Neurological: Negative for dizziness, seizures and headaches. Endo/Heme/Allergies: Negative for environmental allergies. Does not bruise/bleed easily. Psychiatric/Behavioral: Positive for depression. Negative for suicidal ideas.  The patient is nervous/anxious and has insomnia. Physical Exam   Constitutional: She appears well-developed and well-nourished. She is cooperative. She does not have a sickly appearance. HENT:   Head: Normocephalic and atraumatic. Right Ear: External ear normal. No drainage. Left Ear: External ear normal. No drainage. Nose: Nose normal.   Eyes: Lids are normal. Right eye exhibits no discharge. Left eye exhibits no discharge. Right conjunctiva has no hemorrhage. Left conjunctiva has no hemorrhage. Neck: Neck supple. No tracheal deviation present. No thyroid mass present. Pulmonary/Chest: Effort normal. No respiratory distress. Neurological: She is alert. No cranial nerve deficit. Skin: Skin is intact. No rash noted. Psychiatric: Her speech is normal. Her affect is not angry. She does not express inappropriate judgment. Nursing note and vitals reviewed. ASSESSMENT and PLAN  Encounter Diagnoses   Name Primary?  Chronic low back pain, unspecified back pain laterality, with sciatica presence unspecified Yes    DDD (degenerative disc disease), lumbosacral     Spondylolisthesis, grade 1     H/O lumbosacral spine surgery     Other osteoarthritis of spine, lumbar region     Cervicalgia     Chronic pain syndrome     CAIO (generalized anxiety disorder)     Lumbosacral neuritis    No indicators for medication misuse. Because the patient's current regimen places him/her at increased risk for possible overdose, a prescription for naloxone nasal spray is being provided. The patient understands that this medication is only to be used in the setting of a possible overdose and that inadvertent use of this medication could precipitate overt withdrawal.  I reviewed Narcan with the patient and she was given our instruction sheet. Pain Meds and Quality Of Life have been reviewed. Nonpharmacologic therapy and non-opioid pharmacologic therapy were considered.   If opioid therapy is prescribed, this is only if the expected benefits are anticipated to outweigh risks. Possible changes to treatment plan considered. Support/education given as needed. Today-medications are as listed. No significant changes to medications. Follow up -- 2 months.

## 2017-03-23 NOTE — PROGRESS NOTES
Nursing Notes    Patient presents to the office today in follow-up. Patient rates her pain at 9/10 on the numerical pain scale. Reviewed medications with counts as follows:    Rx Date filled Qty Dispensed Pill Count Last Dose Short   TRAMADOL 200MG 3/11/2017 30 19 3/22/2017 NO   DIAZEPAM 5MG 3/11/2017 90 93 3/22/2017 NO   OXYCODONE 15MG 3/11/2017 120 82 3/23/2017 NO                          Comments:     POC UDS was not performed in office today    Any new labs or imaging since last appointment? NO    Have you been to an emergency room (ER) or urgent care clinic since your last visit? NO            Have you been hospitalized since your last visit? NO     If yes, where, when, and reason for visit? Have you seen or consulted any other health care providers outside of the [address] [place] [address]  since your last visit? NO     If yes, where, when, and reason for visit?

## 2017-03-28 ENCOUNTER — HOSPITAL ENCOUNTER (OUTPATIENT)
Dept: CT IMAGING | Age: 62
Discharge: HOME | End: 2017-03-28
Attending: INTERNAL MEDICINE
Payer: MEDICARE

## 2017-03-28 DIAGNOSIS — Z87.898 HISTORY OF MULTIPLE PULMONARY NODULES: ICD-10-CM

## 2017-03-28 DIAGNOSIS — J18.9 COMMUNITY ACQUIRED PNEUMONIA: ICD-10-CM

## 2017-03-28 PROCEDURE — 71250 CT THORAX DX C-: CPT

## 2017-03-29 RX ORDER — MONTELUKAST SODIUM 10 MG/1
TABLET ORAL
Qty: 90 TAB | Refills: 3 | Status: SHIPPED | OUTPATIENT
Start: 2017-03-29 | End: 2019-04-15 | Stop reason: SDUPTHER

## 2017-03-30 ENCOUNTER — TELEPHONE (OUTPATIENT)
Dept: PULMONOLOGY | Age: 62
End: 2017-03-30

## 2017-03-30 ENCOUNTER — TELEPHONE (OUTPATIENT)
Dept: FAMILY MEDICINE CLINIC | Age: 62
End: 2017-03-30

## 2017-03-30 NOTE — TELEPHONE ENCOUNTER
Called pt and discussed CT results, including new small effusion. Will call Dr. Vincent Todd and inform him of findings.

## 2017-03-30 NOTE — TELEPHONE ENCOUNTER
Pt states that she had a cat scan done on her chest. She was told that there were not any nodules but there is fluid around her heart and she would like to know what Dr Susanne Buck would like her to do. She was very upset and in tears. Please advise.

## 2017-03-31 NOTE — TELEPHONE ENCOUNTER
Spoke with patient. She was advised that Dr. Adriano Hou is  looking into who to send her to for further work-up. She voices understanding.

## 2017-03-31 NOTE — TELEPHONE ENCOUNTER
Please advise patient that I am looking into who to send her to for further work-up. I have a call in the cardiology for starters.

## 2017-04-04 NOTE — TELEPHONE ENCOUNTER
I have heard from cardiology. I recommend that she sees me this week so we can discuss management and order the recommended testing.

## 2017-04-05 DIAGNOSIS — J45.30 MILD PERSISTENT ASTHMA WITHOUT COMPLICATION: ICD-10-CM

## 2017-04-05 DIAGNOSIS — J44.9 COPD (CHRONIC OBSTRUCTIVE PULMONARY DISEASE) (HCC): ICD-10-CM

## 2017-04-05 RX ORDER — MOMETASONE FUROATE 50 MCG
AEROSOL, SPRAY WITH PUMP (GRAM) NASAL
Qty: 1 CONTAINER | Refills: 2 | Status: SHIPPED | OUTPATIENT
Start: 2017-04-05 | End: 2018-04-19 | Stop reason: SDUPTHER

## 2017-04-06 ENCOUNTER — OFFICE VISIT (OUTPATIENT)
Dept: FAMILY MEDICINE CLINIC | Age: 62
End: 2017-04-06

## 2017-04-06 VITALS
WEIGHT: 181.25 LBS | TEMPERATURE: 98.5 F | HEIGHT: 61 IN | HEART RATE: 72 BPM | RESPIRATION RATE: 16 BRPM | SYSTOLIC BLOOD PRESSURE: 158 MMHG | DIASTOLIC BLOOD PRESSURE: 88 MMHG | OXYGEN SATURATION: 98 % | BODY MASS INDEX: 34.22 KG/M2

## 2017-04-06 DIAGNOSIS — I10 ESSENTIAL HYPERTENSION: ICD-10-CM

## 2017-04-06 DIAGNOSIS — I31.39 PERICARDIAL EFFUSION: Primary | ICD-10-CM

## 2017-04-06 RX ORDER — AMLODIPINE BESYLATE 10 MG/1
10 TABLET ORAL DAILY
Qty: 30 TAB | Refills: 0 | Status: SHIPPED | OUTPATIENT
Start: 2017-04-06 | End: 2017-04-06 | Stop reason: CLARIF

## 2017-04-06 RX ORDER — AMLODIPINE BESYLATE 10 MG/1
10 TABLET ORAL DAILY
Qty: 90 TAB | Refills: 0 | Status: SHIPPED | OUTPATIENT
Start: 2017-04-06 | End: 2017-05-09 | Stop reason: SDUPTHER

## 2017-04-06 NOTE — MR AVS SNAPSHOT
Visit Information Date & Time Provider Department Dept. Phone Encounter #  
 4/6/2017  1:15 PM Elizabeth Beckman, [address] [id number] Follow-up Instructions Return Monday, May 1, 2017 10:45 AM for routine care (already scheduled). Your Appointments 5/1/2017 10:45 AM  
ROUTINE CARE with Elizabeth Beckman MD  
[facility] ([facility]) Appt Note: [facility] [address] [facility] [address]  
[address] [facility] [address] [facility] [address] 5/17/2017  9:00 AM  
Follow Up with Lane Hendrix MD  
[facility]) Appt Note: return in [address] [place] [id number]  
[phone] [facility] [address]39 [id number] Upcoming Health Maintenance Date Due  
 BREAST CANCER SCRN MAMMOGRAM 6/29/2017 HEMOGLOBIN A1C Q6M 6/13/2017 MICROALBUMIN Q1 12/13/2017 LIPID PANEL Q1 12/13/2017 EYE EXAM RETINAL OR DILATED Q1 1/10/2018 FOOT EXAM Q1 1/12/2018 COLONOSCOPY 3/2/2019 DTaP/Tdap/Td series (2 - Td) 6/9/2026 Allergies as of 4/6/2017  Review Complete On: 4/6/2017 By: Elizabeth Beckman MD  
  
 Severity Noted Reaction Type Reactions Ace Inhibitors  12/14/2010    Cough Aspirin  01/20/2010    Other (comments) GI bleeding & pain  
 Nsaids (Non-steroidal Anti-inflammatory Drug)  09/26/2012    Other (comments) Makes her stomach bleed Current Immunizations  Reviewed on 4/6/2017 Name Date Influenza Vaccine 11/13/2014, 1/7/2014,  Incomplete Influenza Vaccine (Quad) PF 1/12/2017, 10/14/2015 Pneumococcal Vaccine (Unspecified Type) 11/14/2014, 10/1/2011 Reviewed by Dorothea Álvarez on 4/6/2017 at 12:56 PM  
You Were Diagnosed With   
  
 Codes Comments Essential hypertension    -  Primary ICD-10-CM: I10 
ICD-9-CM: 401.9 Pericardial effusion     ICD-10-CM: I31.3 ICD-9-CM: 423.9 Vitals BP Pulse Temp Resp Height(growth percentile) Weight(growth percentile) 158/88 (BP 1 Location: Left arm, BP Patient Position: Sitting) 72 98.5 °F (36.9 °C) (Oral) 16 5' 1\" (1.549 m) 181 lb 4 oz (82.2 kg) SpO2 BMI OB Status Smoking Status 98% 34.25 kg/m2 Hysterectomy Former Smoker Vitals History BMI and BSA Data Body Mass Index Body Surface Area  
 34.25 kg/m 2 1.88 m 2 Preferred Pharmacy Pharmacy Name [facility]  Lyudmila Leonard, [address] [place] [address] [phone] Your Updated Medication List  
  
   
This list is accurate as of: 4/6/17  1:35 PM.  Always use your most recent med list.  
  
  
  
  
 ACIPHEX 20 mg tablet Generic drug:  RABEprazole Take 20 mg by mouth daily. albuterol 90 mcg/actuation inhaler Commonly known as:  VENTOLIN HFA INHALE 2 PUFFS EVERY 4 HOURS AS NEEDED FOR WHEEZING  FOR SHORTNESS OFBREATH  
  
 albuterol-ipratropium 2.5 mg-0.5 mg/3 ml Nebu Commonly known as:  DUO-NEB  
3 mL by Nebulization route every six (6) hours as needed. amLODIPine 10 mg tablet Commonly known as:  Grace Arnt Take 1 Tab by mouth daily. b complex vitamins tablet Take 1 tablet by mouth daily. candesartan-hydroCHLOROthiazide 32-12.5 mg per tablet Commonly known as:  ATACAND HCT  
TAKE ONE TABLET BY MOUTH ONCE DAILY CARAFATE 100 mg/mL suspension Generic drug:  sucralfate Take 1 tsp by mouth four (4) times daily. CENTRUM COMPLETE 18-0.4 mg Tab Generic drug:  Multivit-Iron-Min-Folic Acid Take 1 Tab by mouth daily. cholecalciferol 1,000 unit Cap Commonly known as:  VITAMIN D3 Take 1,000 Units by mouth daily. diazePAM 5 mg tablet Commonly known as:  VALIUM  
1 bid prn to help with muscle spasms, as needed. fluticasone-vilanterol 100-25 mcg/dose inhaler Commonly known as:  BREO ELLIPTA Take 1 Puff by inhalation daily. Indications: ASTHMA PREVENTION  
  
 levothyroxine 75 mcg tablet Commonly known as:  SYNTHROID Take 1 Tab by mouth Daily (before breakfast). lidocaine 5 % Commonly known as:  LIDODERM  
1 Patch by TransDERmal route every twelve (12) hours for 30 days. Apply patch to the affected area for 12 hours a day and remove for 12 hours a day. MIRALAX 17 gram packet Generic drug:  polyethylene glycol Take 17 g by mouth daily. montelukast 10 mg tablet Commonly known as:  SINGULAIR  
TAKE ONE TABLET BY MOUTH ONCE DAILY  
  
 naloxone 4 mg/actuation Spry Commonly known as:  NARCAN  
4 mg by Nasal route as needed. NASONEX 50 mcg/actuation nasal spray Generic drug:  mometasone USE 2 SPRAYS INTO EACH NOSTRIL ONCE DAILY  
  
 * oxyCODONE IR 15 mg immediate release tablet Commonly known as:  Elsa Meadows Take 1 Tab by mouth four (4) times daily as needed for Pain for up to 30 days. Max Daily Amount: 60 mg.  
  
 * oxyCODONE IR 15 mg immediate release tablet Commonly known as:  Elsa Meadows Take 1 Tab by mouth four (4) times daily as needed for Pain for up to 30 days. Max Daily Amount: 60 mg. Start taking on:  4/22/2017  
  
 pregabalin 75 mg capsule Commonly known as:  [address] Take 1 Cap by mouth two (2) times a day for 30 days. Max Daily Amount: 150 mg. PRILOSEC PO Take 20 mg by mouth daily. traMADol 200 mg tablet Commonly known as:  ULTRAM-ER Take 1 Tab by mouth daily for 30 days. Max Daily Amount: 200 mg. VOLTAREN 1 % Gel Generic drug:  diclofenac Apply 4 g to affected area four (4) times daily. * Notice: This list has 2 medication(s) that are the same as other medications prescribed for you. Read the directions carefully, and ask your doctor or other care provider to review them with you. Prescriptions Sent to Pharmacy  Refills  
 amLODIPine (NORVASC) 10 mg tablet 0  
 Sig: Take 1 Tab by mouth daily. Class: Normal  
 Pharmacy: [facility] [address] #: [phone] Route: Oral  
  
Follow-up Instructions Return Monday, May 1, 2017 10:45 AM for routine care (already scheduled). To-Do List   
 04/06/2017 Cardiac Services:  2D ECHO LIMITED ADULT Patient Instructions DASH Diet: Care Instructions Your Care Instructions The DASH diet is an eating plan that can help lower your blood pressure. DASH stands for Dietary Approaches to Stop Hypertension. Hypertension is high blood pressure. The DASH diet focuses on eating foods that are high in calcium, potassium, and magnesium. These nutrients can lower blood pressure. The foods that are highest in these nutrients are fruits, vegetables, low-fat dairy products, nuts, seeds, and legumes. But taking calcium, potassium, and magnesium supplements instead of eating foods that are high in those nutrients does not have the same effect. The DASH diet also includes whole grains, fish, and poultry. The DASH diet is one of several lifestyle changes your doctor may recommend to lower your high blood pressure. Your doctor may also want you to decrease the amount of sodium in your diet. Lowering sodium while following the DASH diet can lower blood pressure even further than just the DASH diet alone. Follow-up care is a key part of your treatment and safety. Be sure to make and go to all appointments, and call your doctor if you are having problems. It's also a good idea to know your test results and keep a list of the medicines you take. How can you care for yourself at home? Following the DASH diet · Eat 4 to 5 servings of fruit each day. A serving is 1 medium-sized piece of fruit, ½ cup chopped or canned fruit, 1/4 cup dried fruit, or 4 ounces (½ cup) of fruit juice. Choose fruit more often than fruit juice. · Eat 4 to 5 servings of vegetables each day. A serving is 1 cup of lettuce or raw leafy vegetables, ½ cup of chopped or cooked vegetables, or 4 ounces (½ cup) of vegetable juice. Choose vegetables more often than vegetable juice. · Get 2 to 3 servings of low-fat and fat-free dairy each day. A serving is 8 ounces of milk, 1 cup of yogurt, or 1 ½ ounces of cheese. · Eat 6 to 8 servings of grains each day. A serving is 1 slice of bread, 1 ounce of dry cereal, or ½ cup of cooked rice, pasta, or cooked cereal. Try to choose whole-grain products as much as possible. · Limit lean meat, poultry, and fish to 2 servings each day. A serving is 3 ounces, about the size of a deck of cards. · Eat 4 to 5 servings of nuts, seeds, and legumes (cooked dried beans, lentils, and split peas) each week. A serving is 1/3 cup of nuts, 2 tablespoons of seeds, or ½ cup of cooked beans or peas. · Limit fats and oils to 2 to 3 servings each day. A serving is 1 teaspoon of vegetable oil or 2 tablespoons of salad dressing. · Limit sweets and added sugars to 5 servings or less a week. A serving is 1 tablespoon jelly or jam, ½ cup sorbet, or 1 cup of lemonade. · Eat less than 2,300 milligrams (mg) of sodium a day. If you limit your sodium to 1,500 mg a day, you can lower your blood pressure even more. Tips for success · Start small. Do not try to make dramatic changes to your diet all at once. You might feel that you are missing out on your favorite foods and then be more likely to not follow the plan. Make small changes, and stick with them. Once those changes become habit, add a few more changes. · Try some of the following: ¨ Make it a goal to eat a fruit or vegetable at every meal and at snacks. This will make it easy to get the recommended amount of fruits and vegetables each day. ¨ Try yogurt topped with fruit and nuts for a snack or healthy dessert. ¨ Add lettuce, tomato, cucumber, and onion to sandwiches. ¨ Combine a ready-made pizza crust with low-fat mozzarella cheese and lots of vegetable toppings. Try using tomatoes, squash, spinach, broccoli, carrots, cauliflower, and onions. ¨ Have a variety of cut-up vegetables with a low-fat dip as an appetizer instead of chips and dip. ¨ Sprinkle sunflower seeds or chopped almonds over salads. Or try adding chopped walnuts or almonds to cooked vegetables. ¨ Try some vegetarian meals using beans and peas. Add garbanzo or kidney beans to salads. Make burritos and tacos with mashed li beans or black beans. Where can you learn more? Go to http://danae-naif.info/. Enter N143 in the search box to learn more about \"DASH Diet: Care Instructions. \" Current as of: March 23, 2016 Content Version: 11.2 © 9566-1631 [employer]. Care instructions adapted under license by [employer] (which disclaims liability or warranty for this information). If you have questions about a medical condition or this instruction, always ask your healthcare professional. [place] [address] any warranty or liability for your use of this information. Introducing [facility]! Dear Tresa Gomes: Thank you for requesting a [employer] account. Our records indicate that you already have an active [employer] account. You can access your account anytime at [email or web address] Did you know that you can access your hospital and ER discharge instructions at any time in [employer]? You can also review all of your test results from your hospital stay or ER visit. Additional Information If you have questions, please visit the Frequently Asked Questions section of the [employer] website at [email or web address] Remember, [employer] is NOT to be used for urgent needs. For medical emergencies, dial 911. Now available from your iPhone and Android! Please provide this summary of care documentation to your next provider. Your primary care clinician is listed as Emelia Handy. If you have any questions after today's visit, please call [phone].

## 2017-04-06 NOTE — PROGRESS NOTES
SUBJECTIVE  Chief Complaint   Patient presents with    Results     discuss CT Scan Results     Patient presents for follow up on their abnormal chest CT showing an incidental new pleural effusion. She has longstanding difficult to control hypertension. We reviewed their cardiac risk factors. The patient has been taking medications as prescribed regularly and denies any side effects with the medication. The patient denies any chest pain or chest tightness. Denies any dyspnea upon exertion that is new from her baseline due to asthma. OBJECTIVE    Blood pressure 158/88, pulse 72, temperature 98.5 °F (36.9 °C), temperature source Oral, resp. rate 16, height 5' 1\" (1.549 m), weight 181 lb 4 oz (82.2 kg), SpO2 98 %. General:  Alert, cooperative, well appearing, in no apparent distress. CV:  The heart sounds are regular in rate and rhythm. There is a normal S1 and S2. There or no murmurs. Lungs: Inspiratory and expiratory efforts are full and unlabored. Ext: no swelling. Psych: normal affect. Mood good. Oriented x 3. Judgement and insight intact. ASSESSMENT / PLAN    ICD-10-CM ICD-9-CM    1. Pericardial effusion I31.3 423.9 2D ECHO LIMITED ADULT   2. Essential hypertension I10 401.9      Pleural effusion - 2d echo ordered. We will refer to cardiology if needed. HTN - uncontrolled. Add Norvasc 10mg daily. DASH diet handout. All chart history elements were reviewed by me at the time of the visit even though marked at time of note closure. Patient understands our medical plan. Patient has provided input and agrees with goals. Alternatives have been explained and offered. All questions answered. The patient is to call if condition worsens or fails to improve. Follow-up Disposition:  Return Monday, May 1, 2017 10:45 AM for routine care (already scheduled).

## 2017-04-06 NOTE — PROGRESS NOTES
1. Have you been to the ER, urgent care clinic since your last visit? Hospitalized since your last visit? No    2. Have you seen or consulted any other health care providers outside of the [address] [place] [address] since your last visit? Include any pap smears or colon screening.  No     Depression screening completed on 03/23/2017

## 2017-04-06 NOTE — PATIENT INSTRUCTIONS

## 2017-04-27 ENCOUNTER — HOSPITAL ENCOUNTER (OUTPATIENT)
Dept: LAB | Age: 62
Discharge: HOME | End: 2017-04-27
Payer: MEDICARE

## 2017-04-27 LAB
EST. AVERAGE GLUCOSE BLD GHB EST-MCNC: 143 MG/DL
HBA1C MFR BLD: 6.6 % (ref 4.2–5.6)
TSH SERPL DL<=0.05 MIU/L-ACNC: 1.68 UIU/ML (ref 0.36–3.74)

## 2017-04-27 PROCEDURE — 84443 ASSAY THYROID STIM HORMONE: CPT | Performed by: FAMILY MEDICINE

## 2017-04-27 PROCEDURE — 83036 HEMOGLOBIN GLYCOSYLATED A1C: CPT | Performed by: FAMILY MEDICINE

## 2017-04-27 PROCEDURE — 36415 COLL VENOUS BLD VENIPUNCTURE: CPT | Performed by: FAMILY MEDICINE

## 2017-05-01 ENCOUNTER — TELEPHONE (OUTPATIENT)
Dept: FAMILY MEDICINE CLINIC | Age: 62
End: 2017-05-01

## 2017-05-01 NOTE — TELEPHONE ENCOUNTER
Nurse to advise that we are appreciative for the information. We look forward to seeing her next week.

## 2017-05-01 NOTE — TELEPHONE ENCOUNTER
Pt states that she missed her appt today because her ride didn't show up and her car has broke down. She states that it was too late to call [employer]. She has rescheduled for 5/8/2017. She is requesting that Dr Patel Maldonado not drop her from the practice.

## 2017-05-09 ENCOUNTER — OFFICE VISIT (OUTPATIENT)
Dept: FAMILY MEDICINE CLINIC | Age: 62
End: 2017-05-09

## 2017-05-09 VITALS
SYSTOLIC BLOOD PRESSURE: 130 MMHG | BODY MASS INDEX: 34.74 KG/M2 | HEIGHT: 61 IN | RESPIRATION RATE: 16 BRPM | HEART RATE: 73 BPM | OXYGEN SATURATION: 99 % | WEIGHT: 184 LBS | TEMPERATURE: 97.7 F | DIASTOLIC BLOOD PRESSURE: 74 MMHG

## 2017-05-09 DIAGNOSIS — E53.8 VITAMIN B12 DEFICIENCY: ICD-10-CM

## 2017-05-09 DIAGNOSIS — E04.2 MULTINODULAR THYROID: ICD-10-CM

## 2017-05-09 DIAGNOSIS — E55.9 VITAMIN D DEFICIENCY: ICD-10-CM

## 2017-05-09 DIAGNOSIS — I10 ESSENTIAL HYPERTENSION: ICD-10-CM

## 2017-05-09 DIAGNOSIS — I31.39 PERICARDIAL EFFUSION: ICD-10-CM

## 2017-05-09 DIAGNOSIS — E11.9 CONTROLLED TYPE 2 DIABETES MELLITUS WITHOUT COMPLICATION, WITHOUT LONG-TERM CURRENT USE OF INSULIN (HCC): ICD-10-CM

## 2017-05-09 DIAGNOSIS — E03.9 ACQUIRED HYPOTHYROIDISM: Primary | ICD-10-CM

## 2017-05-09 RX ORDER — CANDESARTAN CILEXETIL AND HYDROCHLOROTHIAZIDE 32; 12.5 MG/1; MG/1
TABLET ORAL
Qty: 90 TAB | Refills: 1 | Status: SHIPPED | OUTPATIENT
Start: 2017-05-09 | End: 2018-06-13 | Stop reason: SDUPTHER

## 2017-05-09 RX ORDER — AMLODIPINE BESYLATE 10 MG/1
10 TABLET ORAL DAILY
Qty: 90 TAB | Refills: 1 | Status: SHIPPED | OUTPATIENT
Start: 2017-05-09 | End: 2018-06-13 | Stop reason: SDUPTHER

## 2017-05-09 RX ORDER — LEVOTHYROXINE SODIUM 75 UG/1
75 TABLET ORAL
Qty: 90 TAB | Refills: 1 | Status: SHIPPED | OUTPATIENT
Start: 2017-05-09 | End: 2017-11-09 | Stop reason: SDUPTHER

## 2017-05-09 NOTE — MR AVS SNAPSHOT
Visit Information Date & Time Provider Department Dept. Phone Encounter #  
 5/9/2017 10:15 AM Madan Reyes, [address] [id number] Follow-up Instructions Return in about 1 month (around 6/9/2017) for Medicare Wellness exam.  Follow up in 6 months for routine care (fasting labs due prior to appt). Your Appointments 5/17/2017  9:00 AM  
Follow Up with Justyn Garg MD  
[facility] [address] [facility]) Appt Note: return in 2  
 [address] [address]  
[phone] [facility] [id number] Upcoming Health Maintenance Date Due  
 BREAST CANCER SCRN MAMMOGRAM 6/29/2017 INFLUENZA AGE 9 TO ADULT 8/1/2017 COLONOSCOPY 3/2/2019 DTaP/Tdap/Td series (2 - Td) 6/9/2026 Allergies as of 5/9/2017  Review Complete On: 5/9/2017 By: Madan Reyes MD  
  
 Severity Noted Reaction Type Reactions Ace Inhibitors  12/14/2010    Cough Aspirin  01/20/2010    Other (comments) GI bleeding & pain  
 Nsaids (Non-steroidal Anti-inflammatory Drug)  09/26/2012    Other (comments) Makes her stomach bleed Current Immunizations  Reviewed on 5/9/2017 Name Date Influenza Vaccine 11/13/2014, 1/7/2014,  Incomplete Influenza Vaccine (Quad) PF 1/12/2017, 10/14/2015 Pneumococcal Vaccine (Unspecified Type) 11/14/2014, 10/1/2011 Reviewed by Stan Sandhu on 5/9/2017 at 10:02 AM  
You Were Diagnosed With   
  
 Codes Comments Acquired hypothyroidism    -  Primary ICD-10-CM: E03.9 ICD-9-CM: 244.9 Multinodular thyroid     ICD-10-CM: E04.2 ICD-9-CM: 241.1 Controlled type 2 diabetes mellitus without complication, without long-term current use of insulin ([facility]     ICD-10-CM: E11.9 ICD-9-CM: 250.00 Essential hypertension     ICD-10-CM: I10 
ICD-9-CM: 401.9 Pericardial effusion     ICD-10-CM: I31.3 ICD-9-CM: 423.9 Vitamin D deficiency     ICD-10-CM: E55.9 ICD-9-CM: 268.9 Vitamin B12 deficiency     ICD-10-CM: E53.8 ICD-9-CM: 266.2 Vitals BP Pulse Temp Resp Height(growth percentile) Weight(growth percentile) 130/74 73 97.7 °F (36.5 °C) (Oral) 16 5' 1\" (1.549 m) 184 lb (83.5 kg) SpO2 BMI OB Status Smoking Status 99% 34.77 kg/m2 Hysterectomy Former Smoker Vitals History BMI and BSA Data Body Mass Index Body Surface Area 34.77 kg/m 2 1.9 m 2 Preferred Pharmacy Pharmacy Name Phone [facility] - [address] [facility] [place] [address] [phone] Your Updated Medication List  
  
   
This list is accurate as of: 5/9/17 10:52 AM.  Always use your most recent med list.  
  
  
  
  
 ACIPHEX 20 mg tablet Generic drug:  RABEprazole Take 20 mg by mouth daily. albuterol 90 mcg/actuation inhaler Commonly known as:  VENTOLIN HFA INHALE 2 PUFFS EVERY 4 HOURS AS NEEDED FOR WHEEZING  FOR SHORTNESS OFBREATH  
  
 albuterol-ipratropium 2.5 mg-0.5 mg/3 ml Nebu Commonly known as:  DUO-NEB  
3 mL by Nebulization route every six (6) hours as needed. amLODIPine 10 mg tablet Commonly known as:  Claudell Hesselbach Take 1 Tab by mouth daily. b complex vitamins tablet Take 1 tablet by mouth daily. candesartan-hydroCHLOROthiazide 32-12.5 mg per tablet Commonly known as:  ATACAND HCT  
TAKE ONE TABLET BY MOUTH ONCE DAILY CARAFATE 100 mg/mL suspension Generic drug:  sucralfate Take 1 tsp by mouth four (4) times daily. CENTRUM COMPLETE 18-0.4 mg Tab Generic drug:  Multivit-Iron-Min-Folic Acid Take 1 Tab by mouth daily. cholecalciferol 1,000 unit Cap Commonly known as:  VITAMIN D3 Take 1,000 Units by mouth daily. diazePAM 5 mg tablet Commonly known as:  VALIUM  
1 bid prn to help with muscle spasms, as needed. fluticasone-vilanterol 100-25 mcg/dose inhaler Commonly known as:  BREO ELLIPTA Take 1 Puff by inhalation daily. Indications: ASTHMA PREVENTION  
  
 levothyroxine 75 mcg tablet Commonly known as:  SYNTHROID Take 1 Tab by mouth Daily (before breakfast). MIRALAX 17 gram packet Generic drug:  polyethylene glycol Take 17 g by mouth daily. montelukast 10 mg tablet Commonly known as:  SINGULAIR  
TAKE ONE TABLET BY MOUTH ONCE DAILY  
  
 naloxone 4 mg/actuation Spry Commonly known as:  NARCAN  
4 mg by Nasal route as needed. NASONEX 50 mcg/actuation nasal spray Generic drug:  mometasone USE 2 SPRAYS INTO EACH NOSTRIL ONCE DAILY  
  
 oxyCODONE IR 15 mg immediate release tablet Commonly known as:  John Muster Take 1 Tab by mouth four (4) times daily as needed for Pain for up to 30 days. Max Daily Amount: 60 mg. PRILOSEC PO Take 20 mg by mouth daily. VOLTAREN 1 % Gel Generic drug:  diclofenac Apply 4 g to affected area four (4) times daily. Prescriptions Printed Refills  
 levothyroxine (SYNTHROID) 75 mcg tablet 1 Sig: Take 1 Tab by mouth Daily (before breakfast). Class: Print Route: Oral  
 candesartan-hydroCHLOROthiazide (ATACAND HCT) 32-12.5 mg per tablet 1 Sig: TAKE ONE TABLET BY MOUTH ONCE DAILY Class: Print  
 amLODIPine (NORVASC) 10 mg tablet 1 Sig: Take 1 Tab by mouth daily. Class: Print Route: Oral  
  
Follow-up Instructions Return in about 1 month (around 6/9/2017) for Medicare Wellness exam.  Follow up in 6 months for routine care (fasting labs due prior to appt). To-Do List   
 05/09/2017 ECHO:  2D ECHO LIMTED ADULT W OR WO CONTR   
  
 05/09/2017 Lab:  CBC W/O DIFF   
  
 05/09/2017 Lab:  HEMOGLOBIN A1C W/O EAG   
  
 05/09/2017 Lab:  LIPID PANEL   
  
 05/09/2017 Lab:  METABOLIC PANEL, COMPREHENSIVE   
  
 05/09/2017 Lab:  MICROALBUMIN, UR, RAND W/ MICROALBUMIN/CREA RATIO   
  
 05/09/2017 Lab:  TSH 3RD GENERATION   
  
 05/09/2017 Lab: VITAMIN B12   
  
 05/09/2017 Lab:  VITAMIN D, 25 HYDROXY   
  
 05/16/2017 1:30 PM  
  Appointment with [facility]- IE33 MACHINE (WT ) at [facility] NON-INVASIVE CARD ([phone]) Age Limit for ALL Heart procedures @ all [facility] facilities: 18 yrs and older only. Under the age of 25, refer to [address] ([phone]). Wt Limit: 350lbs. This study requires patient to bring a written physician's order or MD office may fax the order to Central Scheduling at [phone]. Patient needs to bring a current list of all medications. No preparation is required for this study. Patients should report 15 minutes prior to their appointment time to the [address]. Patient Instructions DASH Diet: Care Instructions Your Care Instructions The DASH diet is an eating plan that can help lower your blood pressure. DASH stands for Dietary Approaches to Stop Hypertension. Hypertension is high blood pressure. The DASH diet focuses on eating foods that are high in calcium, potassium, and magnesium. These nutrients can lower blood pressure. The foods that are highest in these nutrients are fruits, vegetables, low-fat dairy products, nuts, seeds, and legumes. But taking calcium, potassium, and magnesium supplements instead of eating foods that are high in those nutrients does not have the same effect. The DASH diet also includes whole grains, fish, and poultry. The DASH diet is one of several lifestyle changes your doctor may recommend to lower your high blood pressure. Your doctor may also want you to decrease the amount of sodium in your diet. Lowering sodium while following the DASH diet can lower blood pressure even further than just the DASH diet alone. Follow-up care is a key part of your treatment and safety.  Be sure to make and go to all appointments, and call your doctor if you are having problems. It's also a good idea to know your test results and keep a list of the medicines you take. How can you care for yourself at home? Following the DASH diet · Eat 4 to 5 servings of fruit each day. A serving is 1 medium-sized piece of fruit, ½ cup chopped or canned fruit, 1/4 cup dried fruit, or 4 ounces (½ cup) of fruit juice. Choose fruit more often than fruit juice. · Eat 4 to 5 servings of vegetables each day. A serving is 1 cup of lettuce or raw leafy vegetables, ½ cup of chopped or cooked vegetables, or 4 ounces (½ cup) of vegetable juice. Choose vegetables more often than vegetable juice. · Get 2 to 3 servings of low-fat and fat-free dairy each day. A serving is 8 ounces of milk, 1 cup of yogurt, or 1 ½ ounces of cheese. · Eat 6 to 8 servings of grains each day. A serving is 1 slice of bread, 1 ounce of dry cereal, or ½ cup of cooked rice, pasta, or cooked cereal. Try to choose whole-grain products as much as possible. · Limit lean meat, poultry, and fish to 2 servings each day. A serving is 3 ounces, about the size of a deck of cards. · Eat 4 to 5 servings of nuts, seeds, and legumes (cooked dried beans, lentils, and split peas) each week. A serving is 1/3 cup of nuts, 2 tablespoons of seeds, or ½ cup of cooked beans or peas. · Limit fats and oils to 2 to 3 servings each day. A serving is 1 teaspoon of vegetable oil or 2 tablespoons of salad dressing. · Limit sweets and added sugars to 5 servings or less a week. A serving is 1 tablespoon jelly or jam, ½ cup sorbet, or 1 cup of lemonade. · Eat less than 2,300 milligrams (mg) of sodium a day. If you limit your sodium to 1,500 mg a day, you can lower your blood pressure even more. Tips for success · Start small. Do not try to make dramatic changes to your diet all at once. You might feel that you are missing out on your favorite foods and then be more likely to not follow the plan.  Make small changes, and stick with them. Once those changes become habit, add a few more changes. · Try some of the following: ¨ Make it a goal to eat a fruit or vegetable at every meal and at snacks. This will make it easy to get the recommended amount of fruits and vegetables each day. ¨ Try yogurt topped with fruit and nuts for a snack or healthy dessert. ¨ Add lettuce, tomato, cucumber, and onion to sandwiches. ¨ Combine a ready-made pizza crust with low-fat mozzarella cheese and lots of vegetable toppings. Try using tomatoes, squash, spinach, broccoli, carrots, cauliflower, and onions. ¨ Have a variety of cut-up vegetables with a low-fat dip as an appetizer instead of chips and dip. ¨ Sprinkle sunflower seeds or chopped almonds over salads. Or try adding chopped walnuts or almonds to cooked vegetables. ¨ Try some vegetarian meals using beans and peas. Add garbanzo or kidney beans to salads. Make burritos and tacos with mashed li beans or black beans. Where can you learn more? Go to http://danae-naif.info/. Enter F595 in the search box to learn more about \"DASH Diet: Care Instructions. \" Current as of: March 23, 2016 Content Version: 11.2 © 8037-6353 [employer]. Care instructions adapted under license by [employer] (which disclaims liability or warranty for this information). If you have questions about a medical condition or this instruction, always ask your healthcare professional. [address] [place] [address] any warranty or liability for your use of this information. Introducing [facility]! Dear Jeramy Thomas: Thank you for requesting a Secure-24 account. Our records indicate that you already have an active Secure-24 account. You can access your account anytime at [email or web address] Did you know that you can access your hospital and ER discharge instructions at any time in Secure-24?   You can also review all of your test results from your hospital stay or ER visit. Additional Information If you have questions, please visit the Frequently Asked Questions section of the In[employer] website at [email or web address] Remember, In[employer]Box is NOT to be used for urgent needs. For medical emergencies, dial 911. Now available from your iPhone and Android! Please provide this summary of care documentation to your next provider. Your primary care clinician is listed as Priscilla Vigil. If you have any questions after today's visit, please call [phone].

## 2017-05-09 NOTE — PATIENT INSTRUCTIONS

## 2017-05-09 NOTE — PROGRESS NOTES
SUBJECTIVE    Chief Complaint   Patient presents with    Diabetes    Hypertension    Results     discuss lab results      Patient here for follow-up. Patient presents for follow-up on a few untied loose ends with respect to her chronic illnesses. We added an antihypertensive which she says she is 100% compliant with. She denies any side effects. We also reviewed their cardiac risk factors. We reviewed their cardiac risk factors. She has a small pericardial effusion but has yet to have her 2D ECHO. She says she never got a call. She is now taking Synthroid and denies side effects like she did in the past.  She says that she has had more energy. Her repeat labs now show a normal TSH. She has seen the ENT for her thyroid nodules and is due for repeat imaging in Nov 2017. She says she is occasionally checking her blood sugars. She says that they are okay. The patient denies any chest pain or chest tightness. Denies any dyspnea upon exertion. Denies any polyuria, polydipsia, or polyphagia. OBJECTIVE  Blood pressure 130/74, pulse 73, temperature 97.7 °F (36.5 °C), temperature source Oral, resp. rate 16, height 5' 1\" (1.549 m), weight 184 lb (83.5 kg), SpO2 99 %. General:  alert, cooperative, well appearing, in no apparent distress. CV:  The heart sounds are regular in rate and rhythm. There is a normal S1 and S2. There or no murmurs. Lungs: Inspiratory and expiratory efforts are full and unlabored. Lung sounds are clear and equal to auscultation throughout all lung fields without wheezing, rales, or rhonchi. Extremities: There is no edema. The feet are without lesions or ulcerations. Skin: no rashes, no jaundice. Psych: normal affect. Mood good. Oriented x 3. Judgement and insight intact.      Results for orders placed or performed during the hospital encounter of 04/27/17   TSH 3RD GENERATION   Result Value Ref Range    TSH 1.68 0.36 - 3.74 uIU/mL   HEMOGLOBIN A1C Result Value Ref Range    Hemoglobin A1c 6.6 (H) 4.2 - 5.6 %    Est. average glucose 143 mg/dL     ASSESSMENT / PLAN    ICD-10-CM ICD-9-CM    1. Acquired hypothyroidism E03.9 244.9 TSH 3RD GENERATION      levothyroxine (SYNTHROID) 75 mcg tablet   2. Multinodular thyroid E04.2 241.1    3. Controlled type 2 diabetes mellitus without complication, without long-term current use of insulin (HCC) E11.9 250.00 CBC W/O DIFF      METABOLIC PANEL, COMPREHENSIVE      LIPID PANEL      MICROALBUMIN, UR, RAND W/ MICROALBUMIN/CREA RATIO      HEMOGLOBIN A1C W/O EAG   4. Essential hypertension I10 401.9 CBC W/O DIFF      METABOLIC PANEL, COMPREHENSIVE      LIPID PANEL      candesartan-hydroCHLOROthiazide (ATACAND HCT) 32-12.5 mg per tablet   5. Pericardial effusion I31.3 423.9 2D ECHO LIMTED ADULT W OR WO CONTR      CANCELED: 2D ECHO COMPLETE ADULT (TTE)   6. Vitamin D deficiency E55.9 268.9 VITAMIN D, 25 HYDROXY   7. Vitamin B12 deficiency E53.8 266.2 VITAMIN B12     Reviewed labs. Hypothyroidism - now normal TSH. Encourage compliance with Synthroid. Multinodular thyroid - repeat imaging with ENT in November. I reminded her of this again. Diabetes -  Diet and exercise as tolerated. Low carb dieting. Monitor home blood sugars. A1c in 6 months. HTN -  Continue current care. Refills as needed. Diet and exercise. Pericardial effusion - ECHO reordered. Vit B12 and D deficiencies - repeat labs in 6 months. All chart history elements were reviewed by me at the time of the visit even though marked at time of note closure. Patient understands our medical plan. Patient has provided input and agrees with goals. Alternatives have been explained and offered. All questions answered. The patient is to call if condition worsens or fails to improve.      Follow-up Disposition:  Return in about 1 month (around 6/9/2017) for Medicare Wellness exam.  Follow up in 6 months for routine care (fasting labs due prior to appt).

## 2017-05-09 NOTE — PROGRESS NOTES
Chief Complaint   Patient presents with    Diabetes    Hypertension    Results     discuss lab results     1. Have you been to the ER, urgent care clinic since your last visit? Hospitalized since your last visit? No    2. Have you seen or consulted any other health care providers outside of the [address] [place] [address] since your last visit? Include any pap smears or colon screening.  No

## 2017-05-16 ENCOUNTER — HOSPITAL ENCOUNTER (OUTPATIENT)
Dept: NON INVASIVE DIAGNOSTICS | Age: 62
Discharge: HOME | End: 2017-05-16
Attending: FAMILY MEDICINE
Payer: MEDICARE

## 2017-05-16 DIAGNOSIS — I31.39 PERICARDIAL EFFUSION: ICD-10-CM

## 2017-05-16 PROCEDURE — 93306 TTE W/DOPPLER COMPLETE: CPT

## 2017-05-17 ENCOUNTER — OFFICE VISIT (OUTPATIENT)
Dept: PAIN MANAGEMENT | Age: 62
End: 2017-05-17

## 2017-05-17 VITALS — DIASTOLIC BLOOD PRESSURE: 79 MMHG | RESPIRATION RATE: 17 BRPM | HEART RATE: 74 BPM | SYSTOLIC BLOOD PRESSURE: 153 MMHG

## 2017-05-17 DIAGNOSIS — Z98.890 H/O LUMBOSACRAL SPINE SURGERY: ICD-10-CM

## 2017-05-17 DIAGNOSIS — M25.511 CHRONIC RIGHT SHOULDER PAIN: ICD-10-CM

## 2017-05-17 DIAGNOSIS — L90.5 SCAR TISSUE: ICD-10-CM

## 2017-05-17 DIAGNOSIS — G89.29 CHRONIC LOW BACK PAIN, UNSPECIFIED BACK PAIN LATERALITY, WITH SCIATICA PRESENCE UNSPECIFIED: Primary | ICD-10-CM

## 2017-05-17 DIAGNOSIS — M51.37 DDD (DEGENERATIVE DISC DISEASE), LUMBOSACRAL: ICD-10-CM

## 2017-05-17 DIAGNOSIS — Z79.899 ENCOUNTER FOR LONG-TERM (CURRENT) USE OF MEDICATIONS: ICD-10-CM

## 2017-05-17 DIAGNOSIS — G89.29 CHRONIC RIGHT SHOULDER PAIN: ICD-10-CM

## 2017-05-17 DIAGNOSIS — Z98.84 HISTORY OF GASTRIC BYPASS: ICD-10-CM

## 2017-05-17 DIAGNOSIS — M79.601 RIGHT ARM PAIN: ICD-10-CM

## 2017-05-17 DIAGNOSIS — M54.17 LUMBOSACRAL NEURITIS: ICD-10-CM

## 2017-05-17 DIAGNOSIS — M54.2 CERVICALGIA: ICD-10-CM

## 2017-05-17 DIAGNOSIS — M43.10 SPONDYLOLISTHESIS, GRADE 1: ICD-10-CM

## 2017-05-17 DIAGNOSIS — M54.5 CHRONIC LOW BACK PAIN, UNSPECIFIED BACK PAIN LATERALITY, WITH SCIATICA PRESENCE UNSPECIFIED: Primary | ICD-10-CM

## 2017-05-17 DIAGNOSIS — F41.1 GAD (GENERALIZED ANXIETY DISORDER): ICD-10-CM

## 2017-05-17 LAB
ALCOHOL UR POC: NORMAL
AMPHETAMINES UR POC: NEGATIVE
BARBITURATES UR POC: NEGATIVE
BENZODIAZEPINES UR POC: NORMAL
BUPRENORPHINE UR POC: NORMAL
CANNABINOIDS UR POC: NEGATIVE
CARISOPRODOL UR POC: NORMAL
COCAINE UR POC: NEGATIVE
FENTANYL UR POC: NORMAL
MDMA/ECSTASY UR POC: NEGATIVE
METHADONE UR POC: NEGATIVE
METHAMPHETAMINE UR POC: NEGATIVE
METHYLPHENIDATE UR POC: NEGATIVE
OPIATES UR POC: NEGATIVE
OXYCODONE UR POC: NORMAL
PHENCYCLIDINE UR POC: NEGATIVE
PROPOXYPHENE UR POC: NORMAL
TRAMADOL UR POC: NORMAL
TRICYCLICS UR POC: NEGATIVE

## 2017-05-17 RX ORDER — PREGABALIN 75 MG/1
75 CAPSULE ORAL 2 TIMES DAILY
Qty: 60 CAP | Refills: 2 | Status: SHIPPED | OUTPATIENT
Start: 2017-05-17 | End: 2017-07-12 | Stop reason: SDUPTHER

## 2017-05-17 RX ORDER — OXYCODONE HYDROCHLORIDE 15 MG/1
15 TABLET ORAL
Qty: 120 TAB | Refills: 0 | Status: SHIPPED | OUTPATIENT
Start: 2017-06-16 | End: 2017-07-12 | Stop reason: SDUPTHER

## 2017-05-17 RX ORDER — TRAMADOL HYDROCHLORIDE 200 MG/1
200 TABLET, EXTENDED RELEASE ORAL DAILY
Qty: 30 TAB | Refills: 1 | Status: SHIPPED | OUTPATIENT
Start: 2017-05-17 | End: 2017-07-12 | Stop reason: SDUPTHER

## 2017-05-17 RX ORDER — OXYCODONE HYDROCHLORIDE 15 MG/1
15 TABLET ORAL
Qty: 120 TAB | Refills: 0 | Status: SHIPPED | OUTPATIENT
Start: 2017-05-17 | End: 2017-07-12 | Stop reason: SDUPTHER

## 2017-05-17 RX ORDER — DIAZEPAM 5 MG/1
TABLET ORAL
Qty: 60 TAB | Refills: 1 | Status: SHIPPED | OUTPATIENT
Start: 2017-05-17 | End: 2017-07-12 | Stop reason: SDUPTHER

## 2017-05-17 NOTE — PROGRESS NOTES
HISTORY OF PRESENT ILLNESS  Lanny Dandy is a 64 y.o. female. HPI Comments: Meds help with pain control and quality of life. No new side effects reported today. Visit survey reviewed and will be scanned.  reviewed. Recent average level of pain(out of 10)-7  Chief complaint, low back pain, neck pain, neuropathic pain to her hands and left arm  Chronic pain  Using oxycodone 15 mg 4 times a day as needed  Diazepam 5 mg twice a day as needed for spasms and pain  Tramadol extended release 200 mg every day  She uses Lyrica 75 mg twice a day as needed. She uses this for the neuropathic pain on an as-needed basis. Some days she will not use it during the day because it does leave her sleepy. Gabapentin also left her sleepy. When she uses it it is helpful and effective. 80% complete relief in the past 30 days  Medication helps improve general activity, mood, walking, sleep, enjoyment of life      Measuring clinical outcomes of chronic pain patients. This was reviewed today. The survey will be scanned. Please see the survey for details. Total score-7      Review of Systems   Constitutional: Positive for malaise/fatigue. Negative for fever. HENT: Negative for sore throat. Eyes: Negative for blurred vision and double vision. Respiratory: Positive for shortness of breath. Negative for cough. Cardiovascular: Negative for chest pain and leg swelling. Gastrointestinal: Positive for heartburn. Negative for nausea. Genitourinary: Negative for dysuria and urgency. Musculoskeletal: Positive for back pain, falls and joint pain. Skin: Negative for itching and rash. Neurological: Negative for dizziness, seizures and headaches. Endo/Heme/Allergies: Negative for environmental allergies. Does not bruise/bleed easily. Psychiatric/Behavioral: Positive for depression. Negative for suicidal ideas. The patient is nervous/anxious and has insomnia.         Physical Exam   Constitutional: She appears well-developed and well-nourished. She is cooperative. She does not have a sickly appearance. HENT:   Head: Normocephalic and atraumatic. Right Ear: External ear normal. No drainage. Left Ear: External ear normal. No drainage. Nose: Nose normal.   Eyes: Lids are normal. Right eye exhibits no discharge. Left eye exhibits no discharge. Right conjunctiva has no hemorrhage. Left conjunctiva has no hemorrhage. Neck: Neck supple. No tracheal deviation present. No thyroid mass present. Pulmonary/Chest: Effort normal. No respiratory distress. Neurological: She is alert. No cranial nerve deficit. Skin: Skin is intact. No rash noted. Psychiatric: Her speech is normal. Her affect is not angry. She does not express inappropriate judgment. Nursing note and vitals reviewed. ASSESSMENT and PLAN  Encounter Diagnoses   Name Primary?  Chronic low back pain, unspecified back pain laterality, with sciatica presence unspecified Yes    Encounter for long-term (current) use of medications     DDD (degenerative disc disease), lumbosacral     Spondylolisthesis, grade 1     H/O lumbosacral spine surgery     Cervicalgia     Scar tissue     History of gastric bypass     CAIO (generalized anxiety disorder)     Lumbosacral neuritis     Chronic right shoulder pain     Right arm pain    No indicators for recent medication misuse.  reviewed. Pain Meds and Quality Of Life have been reviewed. Nonpharmacologic therapy and non-opioid pharmacologic therapy were considered. If opioid therapy is prescribed, this is only if the expected benefits are anticipated to outweigh risks. Possible changes to treatment plan considered. Support/education given as needed. Today-medications are as listed. No significant changes to medications. Follow up -- 2 months.    --Urine test or oral swab today. Also, the prescription monitoring program was reviewed today. The majority of today's visit was spent counseling and coordinating care. Total visit time-40 minutes. -Dragon medical dictation software was used for portions of this report. Unintended errors may occur.

## 2017-05-17 NOTE — PROGRESS NOTES
Nursing Notes    Patient presents to the office today in follow-up. Reviewed medications with counts as follows:    Rx Date filled Qty Dispensed Pill Count Last Dose Short   Tramadol er 200 mg 5/14/17 30 27 This am no   Oxycodone 15 mg 5/7/17 120 76 yesterday no   Diazepam 5 mg 5/7/17 60 49 This am No. 1 refill remaining   Ms. Lucila Meza has a reminder for a \"due or due soon\" health maintenance. I have asked that she contact her primary care provider for follow-up on this health maintenance. POC UDS was performed in office today    Any new labs or imaging since last appointment? YES  EKG    Have you been to an emergency room (ER) or urgent care clinic since your last visit? NO            Have you been hospitalized since your last visit? NO     If yes, where, when, and reason for visit? Have you seen or consulted any other health care providers outside of the [address] [place] [address]  since your last visit?   YES     If yes, where, when, and reason for visit?   pcp

## 2017-05-17 NOTE — MR AVS SNAPSHOT
Visit Information Date & Time Provider Department Dept. Phone Encounter #  
 5/17/2017  9:00 AM Gilma Montejo MD [facility] [phone] Follow-up Instructions Return in about 2 months (around 7/17/2017). Your Appointments 6/6/2017 10:15 AM  
Office Visit with Phyllis Brown MD  
[address] [facility] ([address]) Appt Note: AWV  [address] [facility] [address] [facility]  
  
    
 11/9/2017  9:30 AM  
ROUTINE CARE with Phyllis Brown MD  
[address] [facility] ([address]) Appt Note: routine f/u 6mo [address] [facility] [address]  
[address] [facility] [address] [facility] [address] Upcoming Health Maintenance Date Due  
 BREAST CANCER SCRN MAMMOGRAM 6/29/2017 INFLUENZA AGE 9 TO ADULT 8/1/2017 HEMOGLOBIN A1C Q6M 10/27/2017 MICROALBUMIN Q1 12/13/2017 LIPID PANEL Q1 12/13/2017 EYE EXAM RETINAL OR DILATED Q1 1/10/2018 FOOT EXAM Q1 1/12/2018 COLONOSCOPY 3/2/2019 DTaP/Tdap/Td series (2 - Td) 6/9/2026 Allergies as of 5/17/2017  Review Complete On: 5/17/2017 By: Gilma Montejo MD  
  
 Severity Noted Reaction Type Reactions Ace Inhibitors  12/14/2010    Cough Aspirin  01/20/2010    Other (comments) GI bleeding & pain  
 Nsaids (Non-steroidal Anti-inflammatory Drug)  09/26/2012    Other (comments) Makes her stomach bleed Current Immunizations  Reviewed on 5/9/2017 Name Date Influenza Vaccine 11/13/2014, 1/7/2014,  Incomplete Influenza Vaccine (Quad) PF 1/12/2017, 10/14/2015 Pneumococcal Vaccine (Unspecified Type) 11/14/2014, 10/1/2011 Not reviewed this visit You Were Diagnosed With   
  
 Codes Comments Chronic low back pain, unspecified back pain laterality, with sciatica presence unspecified    -  Primary ICD-10-CM: M54.5, G89.29 ICD-9-CM: 724.2, 338.29 Encounter for long-term (current) use of medications     ICD-10-CM: Z79.899 ICD-9-CM: V58.69   
 DDD (degenerative disc disease), lumbosacral     ICD-10-CM: M51.37 
ICD-9-CM: 722.52 Spondylolisthesis, grade 1     ICD-10-CM: M43.10 ICD-9-CM: 738.4 H/O lumbosacral spine surgery     ICD-10-CM: Z98.890 ICD-9-CM: V15.29 Cervicalgia     ICD-10-CM: M54.2 ICD-9-CM: 723.1 Scar tissue     ICD-10-CM: L90.5 ICD-9-CM: 709.2 History of gastric bypass     ICD-10-CM: Z98.890 ICD-9-CM: V45.86 CAIO (generalized anxiety disorder)     ICD-10-CM: F41.1 ICD-9-CM: 300.02 Lumbosacral neuritis     ICD-10-CM: M54.17 ICD-9-CM: 724.4 Chronic right shoulder pain     ICD-10-CM: M25.511, G89.29 ICD-9-CM: 719.41, 338.29 Right arm pain     ICD-10-CM: E08.181 ICD-9-CM: 729.5 Vitals BP Pulse Resp OB Status Smoking Status 153/79 76 17 Hysterectomy Former Smoker Vitals History Preferred Pharmacy Pharmacy Name [facility] [address] [place] [address] [phone] Your Updated Medication List  
  
   
This list is accurate as of: 5/17/17  9:24 AM.  Always use your most recent med list.  
  
  
  
  
 ACIPHEX 20 mg tablet Generic drug:  RABEprazole Take 20 mg by mouth daily. albuterol 90 mcg/actuation inhaler Commonly known as:  VENTOLIN HFA INHALE 2 PUFFS EVERY 4 HOURS AS NEEDED FOR WHEEZING  FOR SHORTNESS OFBREATH  
  
 albuterol-ipratropium 2.5 mg-0.5 mg/3 ml Nebu Commonly known as:  DUO-NEB  
3 mL by Nebulization route every six (6) hours as needed. amLODIPine 10 mg tablet Commonly known as:  Lennox Muñoz Take 1 Tab by mouth daily. b complex vitamins tablet Take 1 tablet by mouth daily. candesartan-hydroCHLOROthiazide 32-12.5 mg per tablet Commonly known as:  ATACAND HCT  
TAKE ONE TABLET BY MOUTH ONCE DAILY CARAFATE 100 mg/mL suspension Generic drug:  sucralfate Take 1 tsp by mouth four (4) times daily. CENTRUM COMPLETE 18-0.4 mg Tab Generic drug:  Multivit-Iron-Min-Folic Acid Take 1 Tab by mouth daily. cholecalciferol 1,000 unit Cap Commonly known as:  VITAMIN D3 Take 1,000 Units by mouth daily. diazePAM 5 mg tablet Commonly known as:  VALIUM  
1 bid prn to help with muscle spasms, as needed. fluticasone-vilanterol 100-25 mcg/dose inhaler Commonly known as:  BREO ELLIPTA Take 1 Puff by inhalation daily. Indications: ASTHMA PREVENTION  
  
 levothyroxine 75 mcg tablet Commonly known as:  SYNTHROID Take 1 Tab by mouth Daily (before breakfast). MIRALAX 17 gram packet Generic drug:  polyethylene glycol Take 17 g by mouth daily. montelukast 10 mg tablet Commonly known as:  SINGULAIR  
TAKE ONE TABLET BY MOUTH ONCE DAILY  
  
 naloxone 4 mg/actuation Spry Commonly known as:  NARCAN  
4 mg by Nasal route as needed. NASONEX 50 mcg/actuation nasal spray Generic drug:  mometasone USE 2 SPRAYS INTO EACH NOSTRIL ONCE DAILY  
  
 * oxyCODONE IR 15 mg immediate release tablet Commonly known as:  Gar Spine Take 1 Tab by mouth four (4) times daily as needed for Pain for up to 30 days. Max Daily Amount: 60 mg.  
  
 * oxyCODONE IR 15 mg immediate release tablet Commonly known as:  Gar Spine Take 1 Tab by mouth four (4) times daily as needed for Pain for up to 30 days. Max Daily Amount: 60 mg. Start taking on:  6/16/2017  
  
 pregabalin 75 mg capsule Commonly known as:  Yoel Sever Take 1 Cap by mouth two (2) times a day for 30 days. Max Daily Amount: 150 mg. PRILOSEC PO Take 20 mg by mouth daily. traMADol 200 mg tablet Commonly known as:  ULTRAM-ER Take 1 Tab by mouth daily for 30 days. Max Daily Amount: 200 mg. VOLTAREN 1 % Gel Generic drug:  diclofenac Apply 4 g to affected area four (4) times daily. * Notice: This list has 2 medication(s) that are the same as other medications prescribed for you. Read the directions carefully, and ask your doctor or other care provider to review them with you. Prescriptions Printed Refills  
 oxyCODONE IR (ROXICODONE) 15 mg immediate release tablet 0 Sig: Take 1 Tab by mouth four (4) times daily as needed for Pain for up to 30 days. Max Daily Amount: 60 mg.  
 Class: Print Route: Oral  
 diazePAM (VALIUM) 5 mg tablet 1 Si bid prn to help with muscle spasms, as needed. Class: Print  
 oxyCODONE IR (ROXICODONE) 15 mg immediate release tablet 0 Starting on: 2017 Sig: Take 1 Tab by mouth four (4) times daily as needed for Pain for up to 30 days. Max Daily Amount: 60 mg.  
 Class: Print Route: Oral  
 traMADol (ULTRAM-ER) 200 mg tablet 1 Sig: Take 1 Tab by mouth daily for 30 days. Max Daily Amount: 200 mg. Class: Print Route: Oral  
 pregabalin (LYRICA) 75 mg capsule 2 Sig: Take 1 Cap by mouth two (2) times a day for 30 days. Max Daily Amount: 150 mg.  
 Class: Print Route: Oral  
  
We Performed the Following AMB POC DRUG SCREEN () [ [facility]] DRUG SCREEN [[id number] Custom] Follow-up Instructions Return in about 2 months (around 2017). Introducing hospitals & [facility] SERVICES! Dear Kristine Felix: Thank you for requesting a [employer] account. Our records indicate that you already have an active [employer] account. You can access your account anytime at [email or web address] Did you know that you can access your hospital and ER discharge instructions at any time in [employer]? You can also review all of your test results from your hospital stay or ER visit. Additional Information If you have questions, please visit the Frequently Asked Questions section of the [employer] website at [email or web address] Remember, [employer] is NOT to be used for urgent needs. For medical emergencies, dial 911. Now available from your iPhone and Android! Please provide this summary of care documentation to your next provider. Your primary care clinician is listed as Hannah Black. If you have any questions after today's visit, please call [phone].

## 2017-06-06 ENCOUNTER — OFFICE VISIT (OUTPATIENT)
Dept: FAMILY MEDICINE CLINIC | Age: 62
End: 2017-06-06

## 2017-06-06 VITALS
DIASTOLIC BLOOD PRESSURE: 80 MMHG | TEMPERATURE: 98.3 F | HEART RATE: 72 BPM | WEIGHT: 181 LBS | HEIGHT: 61 IN | BODY MASS INDEX: 34.17 KG/M2 | OXYGEN SATURATION: 98 % | RESPIRATION RATE: 16 BRPM | SYSTOLIC BLOOD PRESSURE: 136 MMHG

## 2017-06-06 DIAGNOSIS — Z71.89 ADVANCED DIRECTIVES, COUNSELING/DISCUSSION: ICD-10-CM

## 2017-06-06 DIAGNOSIS — Z13.31 SCREENING FOR DEPRESSION: ICD-10-CM

## 2017-06-06 DIAGNOSIS — Z53.20 MAMMOGRAM DECLINED: ICD-10-CM

## 2017-06-06 DIAGNOSIS — Z00.00 ROUTINE GENERAL MEDICAL EXAMINATION AT A HEALTH CARE FACILITY: Primary | ICD-10-CM

## 2017-06-06 DIAGNOSIS — Z53.20 COLONOSCOPY REFUSED: ICD-10-CM

## 2017-06-06 RX ORDER — IPRATROPIUM BROMIDE 0.5 MG/2.5ML
SOLUTION RESPIRATORY (INHALATION)
Qty: 360 ML | Refills: 3 | Status: SHIPPED | OUTPATIENT
Start: 2017-06-06 | End: 2017-06-06 | Stop reason: SDUPTHER

## 2017-06-06 RX ORDER — IPRATROPIUM BROMIDE AND ALBUTEROL SULFATE 2.5; .5 MG/3ML; MG/3ML
3 SOLUTION RESPIRATORY (INHALATION)
Qty: 120 NEBULE | Refills: 3 | Status: SHIPPED | OUTPATIENT
Start: 2017-06-06 | End: 2018-08-03 | Stop reason: SDUPTHER

## 2017-06-06 RX ORDER — ALBUTEROL SULFATE 0.83 MG/ML
2.5 SOLUTION RESPIRATORY (INHALATION)
Qty: 360 EACH | Refills: 3 | Status: SHIPPED | OUTPATIENT
Start: 2017-06-06 | End: 2017-06-06 | Stop reason: SDUPTHER

## 2017-06-06 RX ORDER — IPRATROPIUM BROMIDE AND ALBUTEROL SULFATE 2.5; .5 MG/3ML; MG/3ML
3 SOLUTION RESPIRATORY (INHALATION)
Qty: 120 NEBULE | Refills: 3 | Status: CANCELLED | OUTPATIENT
Start: 2017-06-06

## 2017-06-06 NOTE — PATIENT INSTRUCTIONS
Medicare Wellness Visit, Female    The best way to live healthy is to have a healthy lifestyle by eating a well-balanced diet, exercising regularly, limiting alcohol and stopping smoking. Regular physical exams and screening tests are another way to keep healthy. Preventive exams provided by your health care provider can find health problems before they become diseases or illnesses. Preventive services including immunizations, screening tests, monitoring and exams can help you take care of your own health. All people over age 72 should have a pneumovax  and and a prevnar shot to prevent pneumonia. These are once in a lifetime unless you and your provider decide differently. All people over 65 should have a yearly flu shot and a tetanus vaccine every 10 years. A bone mass density to screen for osteoporosis or thinning of the bones should be done every 2 years after 65. Screening for diabetes mellitus with a blood sugar test should be done every year. Glaucoma is a disease of the eye due to increased ocular pressure that can lead to blindness and it should be done every year by an eye professional.    Cardiovascular screening tests that check for elevated lipids (fatty part of blood) which can lead to heart disease and strokes should be done every 5 years. Colorectal screening that evaluates for blood or polyps in your colon should be done yearly as a stool test or every five years as a flexible sigmoidoscope or every 10 years as a colonoscopy up to age 76. Breast cancer screening with a mammogram is recommended biennially  for women age 54-69. Screening for cervical cancer with a pap smear and pelvic exam is recommended for women after age 72 years every 2 years up to age 79 or when the provider and patient decide to stop. If there is a history of cervical abnormalities or other increased risk for cancer then the test is recommended yearly.     Hepatitis C screening is also recommended for anyone born between [birth year] through [address]. A shingles vaccine is also recommended once in a lifetime after age 61. Your Medicare Wellness Exam is recommended annually. Here is a list of your current Health Maintenance items with a due date:  Health Maintenance Due   Topic Date Due    Breast Cancer Screening  06/29/2017          Advance Care Planning: Care Instructions  Your Care Instructions  It can be hard to live with an illness that cannot be cured. But if your health is getting worse, you may want to make decisions about end-of-life care. Planning for the end of your life does not mean that you are giving up. It is a way to make sure that your wishes are met. Clearly stating your wishes can make it easier for your loved ones. Making plans while you are still able may also ease your mind and make your final days less stressful and more meaningful. Follow-up care is a key part of your treatment and safety. Be sure to make and go to all appointments, and call your doctor if you are having problems. It's also a good idea to know your test results and keep a list of the medicines you take. What can you do to plan for the end of life? · You can bring these issues up with your doctor. You do not need to wait until your doctor starts the conversation. You might start with \"I would not be willing to live with . Lizzeth Ambrosio \" When you complete this sentence it helps your doctor understand your wishes. · Talk openly and honestly with your doctor. This is the best way to understand the decisions you will need to make as your health changes. Know that you can always change your mind. · Ask your doctor about commonly used life-support measures. These include tube feedings, breathing machines, and fluids given through a vein (IV). Understanding these treatments will help you decide whether you want them. · You may choose to have these life-supporting treatments for a limited time.  This allows a trial period to see whether they will help you. You may also decide that you want your doctor to take only certain measures to keep you alive. It is important to spell out these conditions so that your doctor and family understand them. · Talk to your doctor about how long you are likely to live. He or she may be able to give you an idea of what usually happens with your specific illness. · Think about preparing papers that state your wishes. This way there will not be any confusion about what you want. You can change your instructions at any time. Which papers should you prepare? Advance directives are legal papers that tell doctors how you want to be cared for at the end of your life. You do not need a  to write these papers. Ask your doctor or your state [facility] department for information on how to write your advance directives. They may have the forms for each of these types of papers. Make sure your doctor has a copy of these on file, and give a copy to a family member or close friend. · Consider a do-not-resuscitate order (DNR). This order asks that no extra treatments be done if your heart stops or you stop breathing. Extra treatments may include cardiopulmonary resuscitation (CPR), electrical shock to restart your heart, or a machine to breathe for you. If you decide to have a DNR order, ask your doctor to explain and write it. Place the order in your home where everyone can easily see it. · Consider a living will. A living will explains your wishes about life support and other treatments at the end of your life if you become unable to speak for yourself. Living craig tell doctors to use or not use treatments that would keep you alive. You must have one or two witnesses or a notary present when you sign this form. · Consider a durable power of  for health care. This allows you to name a person to make decisions about your care if you are not able to. Most people ask a close friend or family member.  Talk to this person about the kinds of treatments you want and those that you do not want. Make sure this person understands your wishes. These legal papers are simple to change. Tell your doctor what you want to change, and ask him or her to make a note in your medical file. Give your family updated copies of the papers. Where can you learn more? Go to http://danae-naif.info/. Enter P184 in the search box to learn more about \"Advance Care Planning: Care Instructions. \"  Current as of: November 17, 2016  Content Version: 11.2  © 8878-9229 [employer]. Care instructions adapted under license by [employer] (which disclaims liability or warranty for this information). If you have questions about a medical condition or this instruction, always ask your healthcare professional. [address] any warranty or liability for your use of this information.     Please keep scheduled appointment on 11- for routine care and have 10 hour fasting labs done 3-7 days prior

## 2017-06-06 NOTE — PROGRESS NOTES
This is a Subsequent Medicare Annual Wellness Visit providing Personalized Prevention Plan Services (PPPS) (Performed 12 months after initial AWV and PPPS )    I have reviewed the patient's medical history in detail and updated the computerized patient record. History     Past Medical History:   Diagnosis Date    Abdominal pain     Arthritis     Asthma 1/20/2010    Dr. Miracle [facility])     stage 2 colon ca    Chemotherapy     Chronic low back pain 7/12/2010    Chronic lung disease     COPD (chronic obstructive pulmonary disease) ([address] 1/20/2010    Dr. Angelica Tesfaye FH: colon cancer 1/20/2010    GERD (gastroesophageal reflux disease)     Gout     H/O colon cancer, stage II     s/p resection, last colonoscopy 11/11- q3 yrs    History of multiple pulmonary nodules 1/20/2010    Hoarse 2015    candida, sees ENT    HTN (hypertension) 1/20/2010    Hyperlipemia 1/20/2010    Hypothyroid 1/20/2010    IBS (irritable bowel syndrome)     Liver disease     cysts on liver    Mammogram declined     Multinodular thyroid     sees ENT.   Due for repeat U/S with ENT in Nov 2017    Nephropathy, membranous 1/20/2010    Neuropathy 1/20/2010    Nicotine use disorder 1/20/2010    Prediabetes     PUD (peptic ulcer disease)     Pulmonary nodule     sees pulmonary    Rectocele 1/20/2010    S/P cataract extraction 1/3/2011    Thyroid disease     hypothyroid    Unspecified sleep apnea     does not use CPAP    Vitamin D deficiency 1/20/2010      Past Surgical History:   Procedure Laterality Date    ABDOMEN SURGERY PROC UNLISTED      colon resection 8/2005- Dr. Bernardino Price- colon ca   Padmini Hollingsworth      gastric bypass 12/2/09    HX GASTRIC BYPASS      HX GI      HX GYN      hysterectomy and BSO in 11/2006    HX HEENT      cataract sx tee    HX HYSTERECTOMY      HX LUMBAR FUSION  June 2013    L4 to L 5    HX TONSILLECTOMY       Current Outpatient Prescriptions   Medication Sig Dispense Refill    oxyCODONE IR (ROXICODONE) 15 mg immediate release tablet Take 1 Tab by mouth four (4) times daily as needed for Pain for up to 30 days. Max Daily Amount: 60 mg. 120 Tab 0    traMADol (ULTRAM-ER) 200 mg tablet Take 1 Tab by mouth daily for 30 days. Max Daily Amount: 200 mg. 30 Tab 1    levothyroxine (SYNTHROID) 75 mcg tablet Take 1 Tab by mouth Daily (before breakfast). 90 Tab 1    candesartan-hydroCHLOROthiazide (ATACAND HCT) 32-12.5 mg per tablet TAKE ONE TABLET BY MOUTH ONCE DAILY 90 Tab 1    amLODIPine (NORVASC) 10 mg tablet Take 1 Tab by mouth daily. 90 Tab 1    montelukast (SINGULAIR) 10 mg tablet TAKE ONE TABLET BY MOUTH ONCE DAILY 90 Tab 3    fluticasone-vilanterol (BREO ELLIPTA) 100-25 mcg/dose inhaler Take 1 Puff by inhalation daily. Indications: ASTHMA PREVENTION 3 Inhaler 3    albuterol (VENTOLIN HFA) 90 mcg/actuation inhaler INHALE 2 PUFFS EVERY 4 HOURS AS NEEDED FOR WHEEZING  FOR SHORTNESS OFBREATH (Patient taking differently: 2 Puffs every six (6) hours as needed. INHALE 2 PUFFS EVERY 4 HOURS AS NEEDED FOR WHEEZING  FOR SHORTNESS OFBREATH) 3 Inhaler 3    cholecalciferol (VITAMIN D3) 1,000 unit cap Take 1,000 Units by mouth daily.  sucralfate (CARAFATE) 100 mg/mL suspension Take 1 tsp by mouth four (4) times daily.  b complex vitamins tablet Take 1 tablet by mouth daily.  Multivit-Iron-Min-Folic Acid (CENTRUM COMPLETE) 18-0.4 mg tab Take 1 Tab by mouth daily.  diclofenac (VOLTAREN) 1 % topical gel Apply 4 g to affected area four (4) times daily.  OMEPRAZOLE (PRILOSEC PO) Take 20 mg by mouth daily.  RABEprazole (ACIPHEX) 20 mg tablet Take 20 mg by mouth daily.  diazePAM (VALIUM) 5 mg tablet 1 bid prn to help with muscle spasms, as needed. 60 Tab 1    [START ON 6/16/2017] oxyCODONE IR (ROXICODONE) 15 mg immediate release tablet Take 1 Tab by mouth four (4) times daily as needed for Pain for up to 30 days.  Max Daily Amount: 60 mg. 120 Tab 0    pregabalin (LYRICA) 75 mg capsule Take 1 Cap by mouth two (2) times a day for 30 days. Max Daily Amount: 150 mg. 60 Cap 2    NASONEX 50 mcg/actuation nasal spray USE 2 SPRAYS INTO EACH NOSTRIL ONCE DAILY 1 Container 2    naloxone (NARCAN) 4 mg/actuation spry 4 mg by Nasal route as needed. 4 mg 0    albuterol-ipratropium (DUO-NEB) 2.5 mg-0.5 mg/3 ml nebu 3 mL by Nebulization route every six (6) hours as needed. 120 Nebule 3    polyethylene glycol (MIRALAX) 17 gram packet Take 17 g by mouth daily.        Allergies   Allergen Reactions    Ace Inhibitors Cough    Aspirin Other (comments)     GI bleeding & pain    Nsaids (Non-Steroidal Anti-Inflammatory Drug) Other (comments)     Makes her stomach bleed       Family History   Problem Relation Age of Onset    Asthma Mother     Diabetes Mother     Hypertension Mother    Simón Yo Elevated Lipids Mother     Elevated Lipids Father     Hypertension Father     Heart Disease Father      chf    Heart Disease Sister      older     Social History   Substance Use Topics    Smoking status: Former Smoker     Packs/day: 0.50     Years: 18.00     Types: Cigarettes     Quit date: 8/27/1990    Smokeless tobacco: Never Used      Comment: per patient she has not smoked in over 30 years    Alcohol use 0.0 oz/week     0 Standard drinks or equivalent per week      Comment: rare     Patient Active Problem List   Diagnosis Code    HTN (hypertension) I10    Asthma J45.909    COPD (chronic obstructive pulmonary disease) ([facility]) J44.9    Neuropathy G62.9    Vitamin D deficiency E55.9    Nephropathy, membranous     History of multiple pulmonary nodules Z87.898    FH: colon cancer Z80.0    Rectocele N81.6    Chronic low back pain M54.5, G89.29    S/P cataract extraction Z98.49    Vitamin B12 deficiency E53.8    H/O colon cancer, stage II Z85.038    DDD (degenerative disc disease), lumbosacral M51.37    Spondylolisthesis, grade 1 M43.10    Encounter for long-term (current) use of other medications Z79.899    H/O lumbosacral spine surgery Z98.890    Chronic pain syndrome G89.4    DJD (degenerative joint disease), lumbar M47.816    Scar tissue L90.5    History of gastric bypass Z98.890    CAP (community acquired pneumonia) J18.9    Asthma exacerbation J45. 0    CAIO (generalized anxiety disorder) F41.1    Lumbosacral neuritis M54.17    Cervicalgia M54.2    Shoulder pain, right M25.511    Cervical spondylosis M47.812    Advanced care planning/counseling discussion Z71.89    Right arm pain M79.601       Depression Risk Factor Screening:     PHQ over the last two weeks 6/6/2017   PHQ Not Done Active Diagnosis of Depression or Bipolar Disorder   Little interest or pleasure in doing things -   Feeling down, depressed or hopeless -   Total Score PHQ 2 -     Alcohol Risk Factor Screening: On any occasion during the past 3 months, have you had more than 3 drinks containing alcohol? No    Do you average more than 7 drinks per week? No      Functional Ability and Level of Safety:     Hearing Loss   none    Activities of Daily Living   Self-care. Requires assistance with: no ADLs    Fall Risk     Fall Risk Assessment, last 12 mths 6/6/2017   Able to walk? Yes   Fall in past 12 months? No     Review of Systems   A comprehensive review of systems was negative except for that written in the HPI. Physical Examination     Evaluation of Cognitive Function:  Mood/affect:  neutral  Appearance: age appropriate and casually dressed  Family member/caregiver input: none present today    Blood pressure 136/80, pulse 72, temperature 98.3 °F (36.8 °C), temperature source Oral, resp. rate 16, height 5' 1\" (1.549 m), weight 181 lb (82.1 kg), SpO2 98 %.     Patient Care Team:  Clary García MD as PCP - General (Family Practice)  Karey Harrison MD (General Surgery)  Mahesh Francois MD (Gastroenterology)  Rivas Reyna MD (Orthopedic Surgery)  Sandra Brenner MD (Pulmonary Disease)  Abdoul Burger MD (Otolaryngology)  Corinne Egan MD (Pain Management)  Caroline Gaytan MD (Rheumatology)  Tal Mojica MD (Ophthalmology)    Advice/Referrals/Counseling   Education and counseling provided:  Are appropriate based on today's review and evaluation    Assessment/Plan       ICD-10-CM ICD-9-CM    1. Routine general medical examination at a health care facility Z00.00 V70.0    2. Advanced directives, counseling/discussion Z71.89 V65.49 ADVANCE CARE PLANNING FIRST 30 MINS   3. Screening for depression Z13.89 V79.0 DEPRESSION SCREEN ANNUAL   4. Mammogram declined Z53.20 V64.2    5. Colonoscopy refused Z53.20 V64.2      Age and sex specific counseling. Screening for depression and alcoholism. She is going to start counseling for depression. She is already set up for this. Advanced directives / end of life planning discussion and packet provided for review at home. See ACP documentation. Care team updated. Medicare recommended screening and prevention test table is reviewed and provided to patient. Screening and prevention is not up to date. She declines mammography and colonoscopy. Patient understands our medical plan. Patient has provided input. All questions answered.

## 2017-06-06 NOTE — PROGRESS NOTES
1. Have you been to the ER, urgent care clinic since your last visit? Hospitalized since your last visit? No    2. Have you seen or consulted any other health care providers outside of the [address] [place] [address] since your last visit? Include any pap smears or colon screening. No     Abuse Screening Questionnaire 6/6/2017   Do you ever feel afraid of your partner? N   Are you in a relationship with someone who physically or mentally threatens you? N   Is it safe for you to go home? Y     Fall Risk Assessment, last 12 mths 6/6/2017   Able to walk? Yes   Fall in past 12 months?  No             PHQ over the last two weeks 3/23/2017   Little interest or pleasure in doing things Nearly every day   Feeling down, depressed or hopeless Nearly every day   Total Score PHQ 2 6

## 2017-06-06 NOTE — MR AVS SNAPSHOT
Visit Information Date & Time Provider Department Dept. Phone Encounter #  
 6/6/2017 10:15 AM Adam Pal, [address] [id number] Follow-up Instructions Return in about 5 months (around 11/9/2017) for outine care and have 10 hour fasting labs done 3-7 days prior . Your Appointments 7/12/2017  8:30 AM  
Follow Up with Tonya Whitfield MD  
[facility] [address] [place] [address]) Appt Note: Return in about 2 months (around 7/17/2017). [address] [id number]  
[phone]  [address] [id number]  
  
    
 11/9/2017  9:30 AM  
ROUTINE CARE with Adam Pal MD  
[facility] ([address]) Appt Note: routine f/u 6mo [address] [facility] [address]  
[address] [facility] [address] [facility] [address] Upcoming Health Maintenance Date Due  
 BREAST CANCER SCRN MAMMOGRAM 6/29/2017 INFLUENZA AGE 9 TO ADULT 8/1/2017 HEMOGLOBIN A1C Q6M 10/27/2017 MICROALBUMIN Q1 12/13/2017 LIPID PANEL Q1 12/13/2017 EYE EXAM RETINAL OR DILATED Q1 1/10/2018 FOOT EXAM Q1 1/12/2018 COLONOSCOPY 3/2/2019 DTaP/Tdap/Td series (2 - Td) 6/9/2026 Allergies as of 6/6/2017  Review Complete On: 6/6/2017 By: Adam Pal MD  
  
 Severity Noted Reaction Type Reactions Ace Inhibitors  12/14/2010    Cough Aspirin  01/20/2010    Other (comments) GI bleeding & pain  
 Nsaids (Non-steroidal Anti-inflammatory Drug)  09/26/2012    Other (comments) Makes her stomach bleed Current Immunizations  Reviewed on 6/6/2017 Name Date Influenza Vaccine 11/13/2014, 1/7/2014 Influenza Vaccine (Quad) PF 1/12/2017, 10/14/2015 Pneumococcal Vaccine (Unspecified Type) 11/14/2014, 10/1/2011  Reviewed by Samy Dubois on 6/6/2017 at 10:07 AM  
 Reviewed by Bonnie Marquez MD on 6/6/2017 at 10:31 AM  
You Were Diagnosed With   
  
 Codes Comments Routine general medical examination at a health care facility    -  Primary ICD-10-CM: Z00.00 ICD-9-CM: V70.0 Advanced directives, counseling/discussion     ICD-10-CM: Z71.89 ICD-9-CM: V65.49 Screening for depression     ICD-10-CM: Z13.89 ICD-9-CM: V79.0 Mammogram declined     ICD-10-CM: Z53.20 ICD-9-CM: V64.2 Colonoscopy refused     ICD-10-CM: Z53.20 ICD-9-CM: V64.2 Vitals BP Pulse Temp Resp Height(growth percentile) Weight(growth percentile) 136/80 (BP 1 Location: Left arm, BP Patient Position: Sitting) 72 98.3 °F (36.8 °C) (Oral) 16 5' 1\" (1.549 m) 181 lb (82.1 kg) SpO2 BMI OB Status Smoking Status 98% 34.2 kg/m2 Hysterectomy Former Smoker Vitals History BMI and BSA Data Body Mass Index Body Surface Area  
 34.2 kg/m 2 1.88 m 2 Preferred Pharmacy Pharm[facility] [address] [place] [address],[facility] [address] [place] [address] [phone] Your Updated Medication List  
  
   
This list is accurate as of: 6/6/17 10:40 AM.  Always use your most recent med list.  
  
  
  
  
 ACIPHEX 20 mg tablet Generic drug:  RABEprazole Take 20 mg by mouth daily. albuterol 90 mcg/actuation inhaler Commonly known as:  VENTOLIN HFA INHALE 2 PUFFS EVERY 4 HOURS AS NEEDED FOR WHEEZING  FOR SHORTNESS OFBREATH  
  
 albuterol-ipratropium 2.5 mg-0.5 mg/3 ml Nebu Commonly known as:  DUO-NEB  
3 mL by Nebulization route every six (6) hours as needed. amLODIPine 10 mg tablet Commonly known as:  Rubin Staggers Take 1 Tab by mouth daily. b complex vitamins tablet Take 1 tablet by mouth daily. candesartan-hydroCHLOROthiazide 32-12.5 mg per tablet Commonly known as:  ATACAND HCT  
TAKE ONE TABLET BY MOUTH ONCE DAILY CARAFATE 100 mg/mL suspension Generic drug:  sucralfate Take 1 tsp by mouth four (4) times daily. CENTRUM COMPLETE 18-0.4 mg Tab Generic drug:  Multivit-Iron-Min-Folic Acid Take 1 Tab by mouth daily. cholecalciferol 1,000 unit Cap Commonly known as:  VITAMIN D3 Take 1,000 Units by mouth daily. diazePAM 5 mg tablet Commonly known as:  VALIUM  
1 bid prn to help with muscle spasms, as needed. fluticasone-vilanterol 100-25 mcg/dose inhaler Commonly known as:  BREO ELLIPTA Take 1 Puff by inhalation daily. Indications: ASTHMA PREVENTION  
  
 levothyroxine 75 mcg tablet Commonly known as:  SYNTHROID Take 1 Tab by mouth Daily (before breakfast). MIRALAX 17 gram packet Generic drug:  polyethylene glycol Take 17 g by mouth daily. montelukast 10 mg tablet Commonly known as:  SINGULAIR  
TAKE ONE TABLET BY MOUTH ONCE DAILY  
  
 naloxone 4 mg/actuation Spry Commonly known as:  NARCAN  
4 mg by Nasal route as needed. NASONEX 50 mcg/actuation nasal spray Generic drug:  mometasone USE 2 SPRAYS INTO EACH NOSTRIL ONCE DAILY  
  
 * oxyCODONE IR 15 mg immediate release tablet Commonly known as:  Nya Lance Take 1 Tab by mouth four (4) times daily as needed for Pain for up to 30 days. Max Daily Amount: 60 mg.  
  
 * oxyCODONE IR 15 mg immediate release tablet Commonly known as:  Neossa Lance Take 1 Tab by mouth four (4) times daily as needed for Pain for up to 30 days. Max Daily Amount: 60 mg. Start taking on:  6/16/2017  
  
 pregabalin 75 mg capsule Commonly known as:  Kadie Home Take 1 Cap by mouth two (2) times a day for 30 days. Max Daily Amount: 150 mg. PRILOSEC PO Take 20 mg by mouth daily. traMADol 200 mg tablet Commonly known as:  ULTRAM-ER Take 1 Tab by mouth daily for 30 days. Max Daily Amount: 200 mg. VOLTAREN 1 % Gel Generic drug:  diclofenac Apply 4 g to affected area four (4) times daily. * Notice:   This list has 2 medication(s) that are the same as other medications prescribed for you. Read the directions carefully, and ask your doctor or other care provider to review them with you. We Performed the Following ADVANCE CARE PLANNING FIRST 30 MINS [84162 CPT(R)] Corrie Lowe [[id number] [facility]] Follow-up Instructions Return in about 5 months (around 11/9/2017) for outine care and have 10 hour fasting labs done 3-7 days prior . Patient Instructions Medicare Wellness Visit, Female The best way to live healthy is to have a healthy lifestyle by eating a well-balanced diet, exercising regularly, limiting alcohol and stopping smoking. Regular physical exams and screening tests are another way to keep healthy. Preventive exams provided by your health care provider can find health problems before they become diseases or illnesses. Preventive services including immunizations, screening tests, monitoring and exams can help you take care of your own health. All people over age 72 should have a pneumovax  and and a prevnar shot to prevent pneumonia. These are once in a lifetime unless you and your provider decide differently. All people over 65 should have a yearly flu shot and a tetanus vaccine every 10 years. A bone mass density to screen for osteoporosis or thinning of the bones should be done every 2 years after 65. Screening for diabetes mellitus with a blood sugar test should be done every year. Glaucoma is a disease of the eye due to increased ocular pressure that can lead to blindness and it should be done every year by an eye professional. 
 
Cardiovascular screening tests that check for elevated lipids (fatty part of blood) which can lead to heart disease and strokes should be done every 5 years.  
 
Colorectal screening that evaluates for blood or polyps in your colon should be done yearly as a stool test or every five years as a flexible sigmoidoscope or every 10 years as a colonoscopy up to age 76. Breast cancer screening with a mammogram is recommended biennially  for women age 54-69. Screening for cervical cancer with a pap smear and pelvic exam is recommended for women after age 72 years every 2 years up to age 79 or when the provider and patient decide to stop. If there is a history of cervical abnormalities or other increased risk for cancer then the test is recommended yearly. Hepatitis C screening is also recommended for anyone born between [address] [place] [address] through [place] [address]. A shingles vaccine is also recommended once in a lifetime after age 61. Your Medicare Wellness Exam is recommended annually. Here is a list of your current Health Maintenance items with a due date: 
Health Maintenance Due Topic Date Due  
 Breast Cancer Screening  06/29/2017 Advance Care Planning: Care Instructions Your Care Instructions It can be hard to live with an illness that cannot be cured. But if your health is getting worse, you may want to make decisions about end-of-life care. Planning for the end of your life does not mean that you are giving up. It is a way to make sure that your wishes are met. Clearly stating your wishes can make it easier for your loved ones. Making plans while you are still able may also ease your mind and make your final days less stressful and more meaningful. Follow-up care is a key part of your treatment and safety. Be sure to make and go to all appointments, and call your doctor if you are having problems. It's also a good idea to know your test results and keep a list of the medicines you take. What can you do to plan for the end of life? · You can bring these issues up with your doctor. You do not need to wait until your doctor starts the conversation. You might start with \"I would not be willing to live with . Roxy Haque \" When you complete this sentence it helps your doctor understand your wishes. · Talk openly and honestly with your doctor. This is the best way to understand the decisions you will need to make as your health changes. Know that you can always change your mind. · Ask your doctor about commonly used life-support measures. These include tube feedings, breathing machines, and fluids given through a vein (IV). Understanding these treatments will help you decide whether you want them. · You may choose to have these life-supporting treatments for a limited time. This allows a trial period to see whether they will help you. You may also decide that you want your doctor to take only certain measures to keep you alive. It is important to spell out these conditions so that your doctor and family understand them. · Talk to your doctor about how long you are likely to live. He or she may be able to give you an idea of what usually happens with your specific illness. · Think about preparing papers that state your wishes. This way there will not be any confusion about what you want. You can change your instructions at any time. Which papers should you prepare? Advance directives are legal papers that tell doctors how you want to be cared for at the end of your life. You do not need a  to write these papers. Ask your doctor or your state [facility] department for information on how to write your advance directives. They may have the forms for each of these types of papers. Make sure your doctor has a copy of these on file, and give a copy to a family member or close friend. · Consider a do-not-resuscitate order (DNR). This order asks that no extra treatments be done if your heart stops or you stop breathing. Extra treatments may include cardiopulmonary resuscitation (CPR), electrical shock to restart your heart, or a machine to breathe for you. If you decide to have a DNR order, ask your doctor to explain and write it. Place the order in your home where everyone can easily see it. · Consider a living will. A living will explains your wishes about life support and other treatments at the end of your life if you become unable to speak for yourself. Living craig tell doctors to use or not use treatments that would keep you alive. You must have one or two witnesses or a notary present when you sign this form. · Consider a durable power of  for health care. This allows you to name a person to make decisions about your care if you are not able to. Most people ask a close friend or family member. Talk to this person about the kinds of treatments you want and those that you do not want. Make sure this person understands your wishes. These legal papers are simple to change. Tell your doctor what you want to change, and ask him or her to make a note in your medical file. Give your family updated copies of the papers. Where can you learn more? Go to http://danae-naif.info/. Enter P184 in the search box to learn more about \"Advance Care Planning: Care Instructions. \" Current as of: November 17, 2016 Content Version: 11.2 © 6752-7368 [employer]. Care instructions adapted under license by [employer] (which disclaims liability or warranty for this information). If you have questions about a medical condition or this instruction, always ask your healthcare professional. [address] any warranty or liability for your use of this information. Please keep scheduled appointment on 11- for routine care and have 10 hour fasting labs done 3-7 days prior Introducing \[facility] [place] [facility]\"" & HEALTH SERVICES! Dear Virgie Smith: Thank you for requesting a [employer] account. Our records indicate that you already have an active [employer] account. You can access your account anytime at [email or web address] Did you know that you can access your hospital and ER discharge instructions at any time in [email or web address]? You can also review all of your test results from your hospital stay or ER visit. Additional Information If you have questions, please visit the Frequently Asked Questions section of the [email or web address] website at [email or web address] Remember, [email or web address] is NOT to be used for urgent needs. For medical emergencies, dial 911. Now available from your iPhone and Android! Please provide this summary of care documentation to your next provider. Your primary care clinician is listed as Priscilla Vigil. If you have any questions after today's visit, please call [phone].

## 2017-06-06 NOTE — ACP (ADVANCE CARE PLANNING)
Advance Care Planning    Advance Care Planning (ACP) Provider Note - Comprehensive     Date of ACP Conversation: 06/06/17  Persons included in Conversation:  patient  Length of ACP Conversation in minutes:  16 minutes    Authorized Decision Maker (if patient is incapable of making informed decisions): This person is:  not yet legally identified but states this will be her son          General ACP for ALL Patients with Decision Making Capacity:   Importance of advance care planning, including choosing a healthcare agent to communicate patient's healthcare decisions if patient lost the ability to make decisions, such as after a sudden illness or accident  Understanding of the healthcare agent role was assessed and information provided    Review of Existing Advance Directive:  none formally exists at this time    For Serious or Chronic Illness:  no serious illnesses at this time. does have some chronic illnesses like diabetes, etc.    Interventions Provided:  Recommended completion of Advance Directive form after review of ACP materials and conversation with prospective healthcare agent   Recommended review of completed ACP document annually or upon change in health status  Provided with ACP reading literature and an application. Offered our nurse navigator as a resource for her as well.

## 2017-06-06 NOTE — TELEPHONE ENCOUNTER
[facility] called, pt usually gets Duoneb. Prescriptions sent in for albuterol sol and iprotropium separate. Can they give pt Duoneb? Please call [phone].

## 2017-06-14 NOTE — TELEPHONE ENCOUNTER
Pt would like to get an refill on (prednisone) states that her chest feels tight.  pls call pt @ [phone] pharmacy she use is [facility] on [address]

## 2017-06-14 NOTE — TELEPHONE ENCOUNTER
Spoke with pt. She has been using INH's, Nebulizer txs and she is till having tightness at times. Feels she needs prednisone. Appt offered for today. Pt declines and wishes for appt tomorrow am. Appt given for early am tomorrow. If sxs worsen to go to ER.

## 2017-06-15 ENCOUNTER — OFFICE VISIT (OUTPATIENT)
Dept: PULMONOLOGY | Age: 62
End: 2017-06-15

## 2017-06-15 VITALS
HEART RATE: 72 BPM | WEIGHT: 185 LBS | SYSTOLIC BLOOD PRESSURE: 140 MMHG | DIASTOLIC BLOOD PRESSURE: 90 MMHG | HEIGHT: 62 IN | BODY MASS INDEX: 34.04 KG/M2 | TEMPERATURE: 98.5 F | RESPIRATION RATE: 20 BRPM | OXYGEN SATURATION: 96 %

## 2017-06-15 DIAGNOSIS — J45.901 ASTHMA EXACERBATION: Primary | ICD-10-CM

## 2017-06-15 DIAGNOSIS — J44.9 CHRONIC OBSTRUCTIVE PULMONARY DISEASE, UNSPECIFIED COPD TYPE (HCC): ICD-10-CM

## 2017-06-15 DIAGNOSIS — Z87.898 HISTORY OF MULTIPLE PULMONARY NODULES: ICD-10-CM

## 2017-06-15 DIAGNOSIS — G89.4 CHRONIC PAIN SYNDROME: ICD-10-CM

## 2017-06-15 DIAGNOSIS — I10 ESSENTIAL HYPERTENSION: ICD-10-CM

## 2017-06-15 RX ORDER — PREDNISONE 10 MG/1
TABLET ORAL
Qty: 18 TAB | Refills: 0 | Status: SHIPPED | OUTPATIENT
Start: 2017-06-15 | End: 2017-08-18 | Stop reason: SDUPTHER

## 2017-06-15 NOTE — MR AVS SNAPSHOT
Visit Information Date & Time Provider Department Dept. Phone Encounter #  
 6/15/2017  8:30 AM Josias Godoy NP [facility] Kevin De Souza [id number] Follow-up Instructions Return in about 4 weeks (around 7/13/2017). Your Appointments 7/12/2017  8:30 AM  
Follow Up with Jerson Hammer MD  
[address] [facility] [address] [place] [address]) Appt Note: Return in about 2 months (around 7/17/2017). [address] [facility] [address]  
[phone] [facility] [address] [address]  
  
    
 11/9/2017  9:30 AM  
ROUTINE CARE with Maki Tam MD  
[facility] ([address]) Appt Note: routine f/u 6mo [address] [facility] [address] [facility] [address]  
[address] [facility] [address] [facility] [address] Upcoming Health Maintenance Date Due  
 BREAST CANCER SCRN MAMMOGRAM 6/29/2017 INFLUENZA AGE 9 TO ADULT 8/1/2017 COLONOSCOPY 3/2/2019 DTaP/Tdap/Td series (2 - Td) 6/9/2026 Allergies as of 6/15/2017  Review Complete On: 6/15/2017 By: Josias Godoy NP Severity Noted Reaction Type Reactions Ace Inhibitors  12/14/2010    Cough Aspirin  01/20/2010    Other (comments) GI bleeding & pain  
 Nsaids (Non-steroidal Anti-inflammatory Drug)  09/26/2012    Other (comments) Makes her stomach bleed Current Immunizations  Reviewed on 6/6/2017 Name Date Influenza Vaccine 11/13/2014, 1/7/2014 Influenza Vaccine (Quad) PF 1/12/2017, 10/14/2015 Pneumococcal Vaccine (Unspecified Type) 11/14/2014, 10/1/2011 Not reviewed this visit You Were Diagnosed With   
  
 Codes Comments Asthma exacerbation    -  Primary ICD-10-CM: W73.484 ICD-9-CM: 147.45 Chronic obstructive pulmonary disease, unspecified COPD type [facility]     ICD-10-CM: J44.9 ICD-9-CM: 985 History of multiple pulmonary nodules     ICD-10-CM: Z87.898 ICD-9-CM: V12.69 Chronic pain syndrome     ICD-10-CM: G89.4 ICD-9-CM: 338.4 Essential hypertension     ICD-10-CM: I10 
ICD-9-CM: 401.9 Vitals BP Pulse Temp Resp Height(growth percentile) Weight(growth percentile) 140/90 (BP 1 Location: Left arm, BP Patient Position: Sitting) 72 98.5 °F (36.9 °C) (Oral) 20 5' 2\" (1.575 m) 185 lb (83.9 kg) SpO2 BMI OB Status Smoking Status 96% 33.84 kg/m2 Hysterectomy Former Smoker BMI and BSA Data Body Mass Index Body Surface Area  
 33.84 kg/m 2 1.92 m 2 Preferred Pharmacy Pharmacy Name Phone [address], [address] [place] [address] 22 [phone] [facility] [phone] Your Updated Medication List  
  
   
This list is accurate as of: 6/15/17  9:04 AM.  Always use your most recent med list.  
  
  
  
  
 ACIPHEX 20 mg tablet Generic drug:  RABEprazole Take 20 mg by mouth daily. albuterol 90 mcg/actuation inhaler Commonly known as:  VENTOLIN HFA INHALE 2 PUFFS EVERY 4 HOURS AS NEEDED FOR WHEEZING  FOR SHORTNESS OFBREATH  
  
 albuterol-ipratropium 2.5 mg-0.5 mg/3 ml Nebu Commonly known as:  DUO-NEB  
3 mL by Nebulization route every six (6) hours as needed. amLODIPine 10 mg tablet Commonly known as:  [place] Take 1 Tab by mouth daily. b complex vitamins tablet Take 1 tablet by mouth daily. candesartan-hydroCHLOROthiazide 32-12.5 mg per tablet Commonly known as:  ATACAND HCT  
TAKE ONE TABLET BY MOUTH ONCE DAILY CARAFATE 100 mg/mL suspension Generic drug:  sucralfate Take 1 tsp by mouth four (4) times daily. CENTRUM COMPLETE 18-0.4 mg Tab Generic drug:  Multivit-Iron-Min-Folic Acid Take 1 Tab by mouth daily. cholecalciferol 1,000 unit Cap Commonly known as:  VITAMIN D3 Take 1,000 Units by mouth daily. diazePAM 5 mg tablet Commonly known as:  VALIUM  
 1 bid prn to help with muscle spasms, as needed. fluticasone-vilanterol 100-25 mcg/dose inhaler Commonly known as:  BREO ELLIPTA Take 1 Puff by inhalation daily. Indications: ASTHMA PREVENTION  
  
 levothyroxine 75 mcg tablet Commonly known as:  SYNTHROID Take 1 Tab by mouth Daily (before breakfast). MIRALAX 17 gram packet Generic drug:  polyethylene glycol Take 17 g by mouth daily. montelukast 10 mg tablet Commonly known as:  SINGULAIR  
TAKE ONE TABLET BY MOUTH ONCE DAILY  
  
 naloxone 4 mg/actuation [place]ry Commonly known as:  NARCAN  
4 mg by Nasal route as needed. NASONEX 50 mcg/actuation nasal spray Generic drug:  mometasone USE 2 SPRAYS INTO EACH NOSTRIL ONCE DAILY  
  
 * oxyCODONE IR 15 mg immediate release tablet Commonly known as:  Huey Bienenstock Take 1 Tab by mouth four (4) times daily as needed for Pain for up to 30 days. Max Daily Amount: 60 mg.  
  
 * oxyCODONE IR 15 mg immediate release tablet Commonly known as:  Huey Bienenstock Take 1 Tab by mouth four (4) times daily as needed for Pain for up to 30 days. Max Daily Amount: 60 mg. Start taking on:  6/16/2017  
  
 predniSONE 10 mg tablet Commonly known as:  DELTASONE  
30 mg po dailyx 3 days 20 mg po daily x 3 days 10 mg po daily x3 days  
  
 pregabalin 75 mg capsule Commonly known as:  Isa  Take 1 Cap by mouth two (2) times a day for 30 days. Max Daily Amount: 150 mg. PRILOSEC PO Take 20 mg by mouth daily. traMADol 200 mg tablet Commonly known as:  ULTRAM-ER Take 1 Tab by mouth daily for 30 days. Max Daily Amount: 200 mg. VOLTAREN 1 % Gel Generic drug:  diclofenac Apply 4 g to affected area four (4) times daily. * Notice: This list has 2 medication(s) that are the same as other medications prescribed for you. Read the directions carefully, and ask your doctor or other care provider to review them with you. Prescriptions Sent to Pharmacy Refills  
 predniSONE (DELTASONE) 10 mg tablet 0 Si mg po dailyx 3 days 20 mg po daily x 3 days 10 mg po daily x3 days Class: Normal  
 Pharmacy: [address] [place] [address] #: [phone] Follow-up Instructions Return in about 4 weeks (around 2017). Patient Instructions Gastroesophageal Reflux Disease (GERD): Care Instructions Your Care Instructions Gastroesophageal reflux disease (GERD) is the backward flow of stomach acid into the esophagus. The esophagus is the tube that leads from your throat to your stomach. A one-way valve prevents the stomach acid from moving up into this tube. When you have GERD, this valve does not close tightly enough. If you have mild GERD symptoms including heartburn, you may be able to control the problem with antacids or over-the-counter medicine. Changing your diet, losing weight, and making other lifestyle changes can also help reduce symptoms. Follow-up care is a key part of your treatment and safety. Be sure to make and go to all appointments, and call your doctor if you are having problems. Its also a good idea to know your test results and keep a list of the medicines you take. How can you care for yourself at home? · Take your medicines exactly as prescribed. Call your doctor if you think you are having a problem with your medicine. · Your doctor may recommend over-the-counter medicine. For mild or occasional indigestion, antacids, such as Tums, Gaviscon, Mylanta, or Maalox, may help. Your doctor also may recommend over-the-counter acid reducers, such as Pepcid AC, Tagamet HB, Zantac 75, or Prilosec. Read and follow all instructions on the label. If you use these medicines often, talk with your doctor. · Change your eating habits. ¨ Its best to eat several small meals instead of two or three large meals. ¨ After you eat, wait 2 to 3 hours before you lie down. ¨ Chocolate, mint, and alcohol can make GERD worse. ¨ Spicy foods, foods that have a lot of acid (like tomatoes and oranges), and coffee can make GERD symptoms worse in some people. If your symptoms are worse after you eat a certain food, you may want to stop eating that food to see if your symptoms get better. · Do not smoke or chew tobacco. Smoking can make GERD worse. If you need help quitting, talk to your doctor about stop-smoking programs and medicines. These can increase your chances of quitting for good. · If you have GERD symptoms at night, raise the head of your bed 6 to 8 inches by putting the frame on blocks or placing a foam wedge under the head of your mattress. (Adding extra pillows does not work.) · Do not wear tight clothing around your middle. · Lose weight if you need to. Losing just 5 to 10 pounds can help. When should you call for help? Call your doctor now or seek immediate medical care if: 
· You have new or different belly pain. · Your stools are black and tarlike or have streaks of blood. Watch closely for changes in your health, and be sure to contact your doctor if: 
· Your symptoms have not improved after 2 days. · Food seems to catch in your throat or chest. 
Where can you learn more? Go to http://danae-naif.info/. Enter U067 in the search box to learn more about \"Gastroesophageal Reflux Disease (GERD): Care Instructions. \" Current as of: August 9, 2016 Content Version: 11.2 © 0372-5969 [employer]. Care instructions adapted under license by [employer] (which disclaims liability or warranty for this information). If you have questions about a medical condition or this instruction, always ask your healthcare professional. [place] [address] any warranty or liability for your use of this information. Introducing [facility]! Dear Dianna Garcia: Thank you for requesting a [employer] account. Our records indicate that you already have an active [employer] account. You can access your account anytime at [email or web address] Did you know that you can access your hospital and ER discharge instructions at any time in [employer]? You can also review all of your test results from your hospital stay or ER visit. Additional Information If you have questions, please visit the Frequently Asked Questions section of the [employer] website at [email or web address] Remember, [employer] is NOT to be used for urgent needs. For medical emergencies, dial 911. Now available from your iPhone and Android! Please provide this summary of care documentation to your next provider. Your primary care clinician is listed as Lotus Short. If you have any questions after today's visit, please call [phone].

## 2017-06-15 NOTE — PROGRESS NOTES
[facility]  Pulmonary, Critical Care, and Sleep Medicine      Pulmonary Office -[address] [facility] Call    Name: Bobby Arita     : 1955     Date: 6/15/2017        Subjective:   06/15/17    Patient is a 64 y.o. female requested to be seen today for asthma exacerbation. She was last seen in the clinic by Dr Prince Zaragoza on 2017 for asthma-COPD overlap syndrome and hx pulmonary nodules, symptoms was compensated/baseline then. Interval history: Today she reports chest tightness started 2 wk ago-noticed after son helps vacuuming the house . She report still with episodic wheezing and SOB-usually after activity such as walking after 1 block, which she report her baseline. She denies nocturnal symptoms, she report take pain med's ( chronic pain) that helps her sleep well at night. She report able to do activities of daily living, but need to paced herself. She report appetite is good, no problem with swallowing solid food and drinking liquids. She denies cough or mucus, hemoptysis, fever or chills, PND or orthopnea. Pt report she's in a lot of stress-currently on the process of divorce with  who is a chain smoker. She report compliance with Breo Ellipta 100/25 1 inh daily, Singulair 10 mg daily. She report use Duo neb via neb almost q 4 hrs lately -for chest tightness but not well controlled. She report use Ventolin a lot lately when she's out.      She report no thyroidectomy done, goiter followed by Endocrinologist.    Smoking hx: Not currently  Pets: None   Home: No molds, carpets ( carpet 4 yrs old-renting)  GERD: yes, being treated-Prilosec/Aciphex-currently asymptomatic, follow by GI     Past Medical History:   Diagnosis Date    Abdominal pain     Arthritis     Asthma 2010    Dr. Bert [facility])     stage 2 colon ca    Chemotherapy     Chronic low back pain 2010    Chronic lung disease     COPD (chronic obstructive pulmonary disease) ([facility] [address] 2010 Dr. Jalen Acosta FH: colon cancer 1/20/2010    GERD (gastroesophageal reflux disease)     Gout     H/O colon cancer, stage II     s/p resection, last colonoscopy 11/11- q3 yrs    History of multiple pulmonary nodules 1/20/2010    Hoarse 2015    candida, sees ENT    HTN (hypertension) 1/20/2010    Hyperlipemia 1/20/2010    Hypothyroid 1/20/2010    IBS (irritable bowel syndrome)     Liver disease     cysts on liver    Mammogram declined     Multinodular thyroid     sees ENT.   Due for repeat U/S with ENT in Nov 2017    Nephropathy, membranous 1/20/2010    Neuropathy 1/20/2010    Nicotine use disorder 1/20/2010    Prediabetes     PUD (peptic ulcer disease)     Pulmonary nodule     sees pulmonary    Rectocele 1/20/2010    S/P cataract extraction 1/3/2011    Thyroid disease     hypothyroid    Unspecified sleep apnea     does not use CPAP    Vitamin D deficiency 1/20/2010     Social History     Social History    Marital status:      Spouse name: N/A    Number of children: N/A    Years of education: N/A     Occupational History     Not Employed     Social History Main Topics    Smoking status: Former Smoker     Packs/day: 0.50     Years: 18.00     Types: Cigarettes     Quit date: 8/27/1990    Smokeless tobacco: Never Used      Comment: per patient she has not smoked in over 30 years    Alcohol use 0.0 oz/week     0 Standard drinks or equivalent per week      Comment: rare    Drug use: No    Sexual activity: Yes     Partners: Male     Birth control/ protection: None, Surgical     Other Topics Concern    Not on file     Social History Narrative      Past Surgical History:   Procedure Laterality Date    ABDOMEN SURGERY PROC UNLISTED      colon resection 8/2005- Dr. Wiley Feliciano- colon ca   Orvil Sox      gastric bypass 12/2/09    HX GASTRIC BYPASS      HX GI      HX GYN      hysterectomy and BSO in 11/2006    HX HEENT      cataract sx tee    HX HYSTERECTOMY      HX LUMBAR FUSION  June 2013    L4 to L 5    HX TONSILLECTOMY          Allergies   Allergen Reactions    Ace Inhibitors Cough    Aspirin Other (comments)     GI bleeding & pain    Nsaids (Non-Steroidal Anti-Inflammatory Drug) Other (comments)     Makes her stomach bleed         Current Outpatient Prescriptions   Medication Sig Dispense Refill    albuterol-ipratropium (DUO-NEB) 2.5 mg-0.5 mg/3 ml nebu 3 mL by Nebulization route every six (6) hours as needed. 120 Nebule 3    diazePAM (VALIUM) 5 mg tablet 1 bid prn to help with muscle spasms, as needed. 60 Tab 1    candesartan-hydroCHLOROthiazide (ATACAND HCT) 32-12.5 mg per tablet TAKE ONE TABLET BY MOUTH ONCE DAILY 90 Tab 1    amLODIPine (NORVASC) 10 mg tablet Take 1 Tab by mouth daily. 90 Tab 1    fluticasone-vilanterol (BREO ELLIPTA) 100-25 mcg/dose inhaler Take 1 Puff by inhalation daily. Indications: ASTHMA PREVENTION 3 Inhaler 3    albuterol (VENTOLIN HFA) 90 mcg/actuation inhaler INHALE 2 PUFFS EVERY 4 HOURS AS NEEDED FOR WHEEZING  FOR SHORTNESS OFBREATH (Patient taking differently: 2 Puffs every six (6) hours as needed. INHALE 2 PUFFS EVERY 4 HOURS AS NEEDED FOR WHEEZING  FOR SHORTNESS OFBREATH) 3 Inhaler 3    oxyCODONE IR (ROXICODONE) 15 mg immediate release tablet Take 1 Tab by mouth four (4) times daily as needed for Pain for up to 30 days. Max Daily Amount: 60 mg. 120 Tab 0    [START ON 6/16/2017] oxyCODONE IR (ROXICODONE) 15 mg immediate release tablet Take 1 Tab by mouth four (4) times daily as needed for Pain for up to 30 days. Max Daily Amount: 60 mg. 120 Tab 0    traMADol (ULTRAM-ER) 200 mg tablet Take 1 Tab by mouth daily for 30 days. Max Daily Amount: 200 mg. 30 Tab 1    pregabalin (LYRICA) 75 mg capsule Take 1 Cap by mouth two (2) times a day for 30 days. Max Daily Amount: 150 mg. 60 Cap 2    levothyroxine (SYNTHROID) 75 mcg tablet Take 1 Tab by mouth Daily (before breakfast).  90 Tab 1    NASONEX 50 mcg/actuation nasal spray USE 2 SPRAYS INTO EACH NOSTRIL ONCE DAILY 1 Container 2    montelukast (SINGULAIR) 10 mg tablet TAKE ONE TABLET BY MOUTH ONCE DAILY 90 Tab 3    naloxone (NARCAN) 4 mg/actuation spry 4 mg by Nasal route as needed. 4 mg 0    cholecalciferol (VITAMIN D3) 1,000 unit cap Take 1,000 Units by mouth daily.  sucralfate (CARAFATE) 100 mg/mL suspension Take 1 tsp by mouth four (4) times daily.  b complex vitamins tablet Take 1 tablet by mouth daily.  Multivit-Iron-Min-Folic Acid (CENTRUM COMPLETE) 18-0.4 mg tab Take 1 Tab by mouth daily.  diclofenac (VOLTAREN) 1 % topical gel Apply 4 g to affected area four (4) times daily.  OMEPRAZOLE (PRILOSEC PO) Take 20 mg by mouth daily.  RABEprazole (ACIPHEX) 20 mg tablet Take 20 mg by mouth daily.  polyethylene glycol (MIRALAX) 17 gram packet Take 17 g by mouth daily. Patient Active Problem List   Diagnosis Code    HTN (hypertension) I10    Asthma J45.909    COPD (chronic obstructive pulmonary disease) ([facility] [address] J44.9    Neuropathy G62.9    Vitamin D deficiency E55.9    Nephropathy, membranous     History of multiple pulmonary nodules Z87.898    FH: colon cancer Z80.0    Rectocele N81.6    Chronic low back pain M54.5, G89.29    S/P cataract extraction Z98.49    Vitamin B12 deficiency E53.8    H/O colon cancer, stage II Z85.038    DDD (degenerative disc disease), lumbosacral M51.37    Spondylolisthesis, grade 1 M43.10    Encounter for long-term (current) use of other medications Z79.899    H/O lumbosacral spine surgery Z98.890    Chronic pain syndrome G89.4    DJD (degenerative joint disease), lumbar M47.816    Scar tissue L90.5    History of gastric bypass Z98.890    CAP (community acquired pneumonia) J18.9    Asthma exacerbation J45. 0    CAIO (generalized anxiety disorder) F41.1    Lumbosacral neuritis M54.17    Cervicalgia M54.2    Shoulder pain, right M25.511    Cervical spondylosis C36.037    Advanced care planning/counseling discussion Z71.89    Right arm pain M79.601        Review of Systems:  Constitutional: No fever, no chills, no weight loss, no night sweats   HEENT: No epistaxis, no nasal drainage, no difficulty in swallowing, no redness in eyes  Respiratory: as above  Cardiovascular: Chest tightness-relieved by Duo neb via neb but not controlled , no palpitations, no chronic leg edema, no syncope  Gastrointestinal: no abd pain, no vomiting, no diarrhea, no bleeding symptoms  Genitourinary: No urinary symptoms or hematuria  Integument/breast: No ulcers or rashes  Musculoskeletal:Neg  Neurological: No focal weakness, no seizures, no headaches  Behvioral/Psych: No anxiety, no depression     Objective:     Visit Vitals    /90 (BP 1 Location: Left arm, BP Patient Position: Sitting)    Pulse 72    Temp 98.5 °F (36.9 °C) (Oral)    Resp 20    Ht 5' 2\" (1.575 m)    Wt 83.9 kg (185 lb)    SpO2 96%    BMI 33.84 kg/m2     Physical Exam:   General: Appear comfortable, no acute distress, BMI 33  HEENT: Pupils reactive, sclera anicteric, EOM intact  Neck: No adenopathy or thyroid swelling, no JVD, supple  CVS: S1S2, no murmurs  RS: Mod AE bilaterally, no tactile fremitus or egophony, no accessory muscle use, BS decrease bilaterally, no wheezing. Abd: Soft, non tender, no hepatosplenomegaly  Neuro: Non focal, awake, alert  Extrm: + leg edema(takes HCTZ), no clubbing or cyanosis.   Skin: No rash, warm and dry    Data review:     Office Visit on 05/17/2017   Component Date Value Ref Range Status    AMPHETAMINES UR POC 05/17/2017 Negative   Final    COCAINE UR POC 05/17/2017 Negative   Final    MDMA/ECSTASY UR POC 05/17/2017 Negative   Final    METHADONE UR POC 05/17/2017 Negative   Final    METHAMPHETAMINE UR POC 05/17/2017 Negative   Final    METHYLPHENIDATE UR POC 05/17/2017 Negative   Final    OPIATES UR POC 05/17/2017 Negative   Final    OXYCODONE UR POC 05/17/2017 Presumptive Positive   Final    PHENCYCLIDINE UR POC 05/17/2017 Negative   Final    TRICYCLICS UR POC 25/40/5958 Negative   Final    BARBITURATES UR POC 05/17/2017 Negative   Final    BENZODIAZEPINES UR POC 05/17/2017 Presumptive Positive   Final    CANNABINOIDS UR POC 05/17/2017 Negative   Final   Hospital Outpatient Visit on 04/27/2017   Component Date Value Ref Range Status    TSH 04/27/2017 1.68  0.36 - 3.74 uIU/mL Final    Hemoglobin A1c 04/27/2017 6.6* 4.2 - 5.6 % Final    Comment: (NOTE)  HbA1C Interpretive Ranges  <5.7              Normal  5.7 - 6.4         Consider Prediabetes  >6.5              Consider Diabetes      Est. average glucose 04/27/2017 143  mg/dL Final    Comment: (NOTE)  The eAG should be interpreted with patient characteristics in mind   since ethnicity, interindividual differences, red cell lifespan,   variation in rates of glycation, etc. may affect the validity of the   calculation.      Hospital Outpatient Visit on 03/15/2017   Component Date Value Ref Range Status    TSH 03/15/2017 23.50* 0.36 - 3.74 uIU/mL Final   Office Visit on 01/24/2017   Component Date Value Ref Range Status    OXYCODONE UR POC 01/24/2017 Presumptive Positive   Final    BENZODIAZEPINES UR POC 01/24/2017 Presumptive Positive   Final       PFT's:  Date FEV1/FVC FEV1 Best FVC best TLC RV VC DLCO   3/10/16 76 86 112                                       Pulmonary Function Test 3/10/2016  FLOWS:  Maximal Mid Expiratory Flow rate is reduced to 35 % predicted  Forced Expiratory Volume in one second is reduced to 76 %  predicted  FEV 1% is reduced     Volumes:  NA    Flow Volume Loop:  Nonspecific obstructive pattern in Flow Volume Loop    Bronchodilator:  Significant improvement with bronchodilator    Diffusion:  NA    Impression:  Mild obstructive defect, Predominately small airways      [address] [facility] [address]  PULMONARY FUNCTION TEST:6/17/2003  COMMENTS:  Examination of the expiratory spirogram reveals a smooth curve and good  effort. The forced expiratory time is greater than six seconds and the  terminal plateau is reached. The data appears to be reproducible. Forced vital capacity is normal. FEV1 and the FEV1/FVC ratio are moderately  reduced. After the administration of an inhaled bronchodilator, there is  significant improvement in forced vital capacity and FEV1. Moderate  airflow obstruction remains. IMPRESSION:  Moderate airflow obstruction that does improve with inhaled  bronchodilator. Imaging:      Results from [place] encounter on 12/11/16   XR CHEST PA LAT   Narrative 2 view chest    HISTORY: Wheezing, shortness of breath for a week. Coughing up blood and last 2  days. Cough with blood-tinged sputum    COMPARISON: Multiple prior studies with the most recent from August 27, 2016    FINDINGS:           Extensive patchy opacity overlies the right mid and lower lung fields. Findings  are worrisome for pneumonia. Follow-up films to document resolution and exclude  underlying mass are recommended. No pleural effusion or pneumothorax. Cardiac  mediastinal silhouette is unremarkable. Left lung is clear. Impression Impression:     Extensive patchy airspace opacities in the right mid and lower lung field  suggestive of pneumonia. Results from Hospital Encounter encounter on 03/28/17   CT CHEST WO CONT   Narrative CT CHEST WITHOUT ENHANCEMENT    INDICATION: History of asthma, COPD, pneumonia completed antibiotics and  steroids with persistent episodic shortness of breath and wheezing. TECHNIQUE: Axial images obtained from the thoracic inlet to the level of the  diaphragm without intravenous contrast. Coronal and sagittal reformatted images  were obtained.     All CT scans at this facility are performed using dose optimization technique as  appropriate to a performed exam, to include automated exposure control,  adjustment of the mA and/or kV according to patient size (including appropriate  matching first site-specific examinations), or use of iterative reconstruction  technique. COMPARISON: CT chest 12/11/2016; 8/30/2006. CHEST FINDINGS:   The evaluation of the mediastinum, hilum and vascular structures is limited in  the absence of intravenous contrast.      Thyroid: Unremarkable in its visualized aspects. Pericardium/ Heart: Decreased density in the cardiac chambers. Multi cardiac  chamber enlargement. New pericardial effusion since prior exam with a transverse  diameter of 15 mm anteriorly, previously 5 mm. Effusion is near water density. No calcified coronary arteriosclerosis. Aorta/ Vessels: No aneurysm. Lymph Nodes: Unremarkable. .    Lungs: Resolved right lung with scattered bronchovascular consolidative and  groundglass opacities. Milder opacities in the left lung have also resolved. Decreased size of a triangular 5 mm nodular density associated with the right  major fissure (series 4, image 34), previously 7 mm, likely a lymph node. Calcified granuloma moderate. Similar 4 mm subpleural nodular density left lower  lobe (41). Decreased density of 0.4 cm groundglass subpleural nodule left lower  lobe (35). Pleura: No effusion. Upper Abdomen: IVC and hepatic veins appear dilated. Similar 0.9 cm  well-circumscribed hypodensity in the right posterior hepatic lobe, too small to  characterize. Similar 0.8 cm right posterior hepatic lobe lesion. Postsurgical  changes partial gastric resection. Diverticular disease. Bilateral symmetric  adrenal thickening. Bones/soft tissues: Unremarkable. Impression IMPRESSION:    1. Resolved findings of pneumonia. 2.  New simple appearing small pericardial effusion. 3.  A few scattered pulmonary nodular densities which have been stable since  2006 compatible with benign etiology. 4.  Multi cardiac chamber enlargement with dilated IVC and hepatic veins is  suggestive of right heart dysfunction. 5.  Diverticulosis.   6. Anemia. Results for orders placed or performed during the hospital encounter of 12/11/16   EKG, 12 LEAD, INITIAL   Result Value Ref Range    Ventricular Rate 70 BPM    Atrial Rate 70 BPM    P-R Interval 130 ms    QRS Duration 102 ms    Q-T Interval 416 ms    QTC Calculation (Bezet) 449 ms    Calculated P Axis 49 degrees    Calculated R Axis -51 degrees    Calculated T Axis 74 degrees    Diagnosis       Normal sinus rhythm  Left anterior fascicular block  Nonspecific T wave abnormality  Abnormal ECG  When compared with ECG of 12-JUN-2013 07:09,  premature atrial complexes are no longer present  T wave inversion no longer evident in Inferior leads  Confirmed by Rosa Cottrell ([id number]) on 12/11/2016 1:14:37 PM     Results for orders placed or performed in visit on 06/14/13   AMB POC EKG ROUTINE W/ 12 LEADS, INTER & REP    Impression    See progress note. US Results (most recent):    Results from [place] encounter on 01/26/17   US THYROID/PARATHYROID/SOFT TISS   Narrative PROCEDURE:  Ultrasound Thyroid Scan. INDICATION:  Thyroid nodules seen on recent CTA chest.    COMPARISON:  CTA chest 12/11/16. FINDINGS:    The thyroid measurements are as follows. Right lobe:  5.7 x 1.9 x 2.6 cm  Left lobe:  5.6 x 2.7 x 2.8 cm  Isthmus:  0.61 cm. Both lobes of the thyroid appear diffusely heterogeneous with numerous nodules  of varying sizes. No dominant thyroid nodule is detected. Both lobes  demonstrate diffusely increased vascular flow. Impression IMPRESSION:    Diffusely heterogeneous thyroid lobes with numerous thyroid nodules of varying  sizes and varying echogenicity. No dominant thyroid nodule. With increased  vascular flow, the diffusely heterogeneous thyroid with numerous nodules may  represent a Hashimoto's thyroiditis with pseudonodules. Recommend correlation  with clinical data.       Lab Results   Component Value Date/Time    D DIMER 0.88 12/11/2016 09:40 AM Ms. Vicky Resendiz has a reminder for a \"due or due soon\" health maintenance. I have asked that she contact her primary care provider for follow-up on this health maintenance. IMPRESSION:   · Asthma/COPD exacerbation likely trigger by exposure to allergens (Vacuuming/ is a smoker) and stress (going thru divorce). · Asthma-COPD overlap syndrome. · Hx of multiple pulmonary nodules-stable per last CT chest 3/28/2017. · Former Smoker, quit long time ago. · Obesity, BMI 33, hx gastric bypass      RECOMMENDATIONS:   · Prednisone Taper,script send. · Continue Breo Ellipta, Duo neb via neb/Ventolin inh PRN, reinforced to gargle mouth after treatment  · Continue Singulair as px. · CT chest 3/28/2017 reviewed showed A few scattered pulmonary nodular densities which have been stable since 2006 compatible with benign etiology. · Warning signs of respiratory distress were reviewed with the patient. · Discussed avoidance of precipitants/allergens. · Pulmonary toilette, encouraged deep breathing and  reinforced to gargle mouth after treatment  · Cont on wt loss. diet and activity as tolerated  · Reviewed all medications and discussed concerns, answered questions. · Follow-up in 4 wk, or come back sooner should new symptoms or problems arise. · Instructed for worsening symptoms and respiratory distress to call 911.        Irma Scott NP

## 2017-07-12 ENCOUNTER — OFFICE VISIT (OUTPATIENT)
Dept: PAIN MANAGEMENT | Age: 62
End: 2017-07-12

## 2017-07-12 VITALS
SYSTOLIC BLOOD PRESSURE: 143 MMHG | WEIGHT: 186 LBS | RESPIRATION RATE: 16 BRPM | HEIGHT: 62 IN | DIASTOLIC BLOOD PRESSURE: 94 MMHG | HEART RATE: 70 BPM | BODY MASS INDEX: 34.23 KG/M2

## 2017-07-12 DIAGNOSIS — L90.5 SCAR TISSUE: ICD-10-CM

## 2017-07-12 DIAGNOSIS — Z98.890 H/O LUMBOSACRAL SPINE SURGERY: ICD-10-CM

## 2017-07-12 DIAGNOSIS — M54.2 CERVICALGIA: ICD-10-CM

## 2017-07-12 DIAGNOSIS — M54.5 CHRONIC LOW BACK PAIN, UNSPECIFIED BACK PAIN LATERALITY, WITH SCIATICA PRESENCE UNSPECIFIED: ICD-10-CM

## 2017-07-12 DIAGNOSIS — M51.37 DDD (DEGENERATIVE DISC DISEASE), LUMBOSACRAL: ICD-10-CM

## 2017-07-12 DIAGNOSIS — M43.10 SPONDYLOLISTHESIS, GRADE 1: ICD-10-CM

## 2017-07-12 DIAGNOSIS — Z98.84 HISTORY OF GASTRIC BYPASS: ICD-10-CM

## 2017-07-12 DIAGNOSIS — G89.29 CHRONIC RIGHT SHOULDER PAIN: Primary | ICD-10-CM

## 2017-07-12 DIAGNOSIS — M47.812 SPONDYLOSIS OF CERVICAL REGION WITHOUT MYELOPATHY OR RADICULOPATHY: ICD-10-CM

## 2017-07-12 DIAGNOSIS — M25.511 CHRONIC RIGHT SHOULDER PAIN: Primary | ICD-10-CM

## 2017-07-12 DIAGNOSIS — G89.29 CHRONIC LOW BACK PAIN, UNSPECIFIED BACK PAIN LATERALITY, WITH SCIATICA PRESENCE UNSPECIFIED: ICD-10-CM

## 2017-07-12 DIAGNOSIS — G89.4 CHRONIC PAIN SYNDROME: ICD-10-CM

## 2017-07-12 RX ORDER — OXYCODONE HYDROCHLORIDE 15 MG/1
15 TABLET ORAL
Qty: 120 TAB | Refills: 0 | Status: SHIPPED | OUTPATIENT
Start: 2017-08-11 | End: 2017-09-07 | Stop reason: SDUPTHER

## 2017-07-12 RX ORDER — OXYCODONE HYDROCHLORIDE 15 MG/1
15 TABLET ORAL
Qty: 120 TAB | Refills: 0 | Status: SHIPPED | OUTPATIENT
Start: 2017-07-12 | End: 2017-09-07 | Stop reason: SDUPTHER

## 2017-07-12 RX ORDER — DIAZEPAM 5 MG/1
TABLET ORAL
Qty: 60 TAB | Refills: 1 | Status: SHIPPED | OUTPATIENT
Start: 2017-07-12 | End: 2017-09-07 | Stop reason: SDUPTHER

## 2017-07-12 RX ORDER — PREGABALIN 75 MG/1
75 CAPSULE ORAL 2 TIMES DAILY
Qty: 60 CAP | Refills: 2 | Status: SHIPPED | OUTPATIENT
Start: 2017-07-12 | End: 2017-09-07 | Stop reason: SDUPTHER

## 2017-07-12 RX ORDER — TRAMADOL HYDROCHLORIDE 200 MG/1
200 TABLET, EXTENDED RELEASE ORAL DAILY
Qty: 30 TAB | Refills: 1 | Status: SHIPPED | OUTPATIENT
Start: 2017-07-12 | End: 2017-09-07 | Stop reason: SDUPTHER

## 2017-07-12 NOTE — PROGRESS NOTES
HISTORY OF PRESENT ILLNESS  Kisha Serrato is a 64 y.o. female. HPI Comments: Meds help with pain control and quality of life. No new side effects reported today. Visit survey reviewed and will be scanned.  reviewed. Recent average level of pain(out of 10)-8  Chief complaint shoulder pain, neck pain, back pain  Chronic pain syndrome  Using extended release tramadol 200 mg once a day  Oxycodone 15 mg 4 times a day as needed  Lyrica 75 mg twice a day  Diazepam 5 mg twice a day as needed for muscle spasms  Medication helps improve general activity, mood, walking, sleep, enjoyment of life          Measuring clinical outcomes of chronic pain patients. This was reviewed today. The survey will be scanned. Please see the survey for details. Total score6      Review of Systems   Constitutional: Positive for malaise/fatigue. Negative for fever. HENT: Negative for sore throat. Eyes: Negative for blurred vision and double vision. Respiratory: Positive for shortness of breath. Negative for cough. Cardiovascular: Negative for chest pain and leg swelling. Gastrointestinal: Positive for heartburn. Negative for nausea. Genitourinary: Negative for dysuria and urgency. Musculoskeletal: Positive for back pain, falls and joint pain. Skin: Negative for itching and rash. Neurological: Negative for dizziness, seizures and headaches. Endo/Heme/Allergies: Negative for environmental allergies. Does not bruise/bleed easily. Psychiatric/Behavioral: Positive for depression. Negative for suicidal ideas. The patient is nervous/anxious and has insomnia. Physical Exam   Constitutional: She appears well-developed and well-nourished. She is cooperative. She does not have a sickly appearance. HENT:   Head: Normocephalic and atraumatic. Right Ear: External ear normal. No drainage. Left Ear: External ear normal. No drainage. Nose: Nose normal.   Eyes: Lids are normal. Right eye exhibits no discharge. Left eye exhibits no discharge. Right conjunctiva has no hemorrhage. Left conjunctiva has no hemorrhage. Neck: Neck supple. No tracheal deviation present. No thyroid mass present. Pulmonary/Chest: Effort normal. No respiratory distress. Neurological: She is alert. No cranial nerve deficit. Skin: Skin is intact. No rash noted. Psychiatric: Her speech is normal. Her affect is not angry. She does not express inappropriate judgment. Nursing note and vitals reviewed. ASSESSMENT and PLAN  Encounter Diagnoses   Name Primary?  Chronic right shoulder pain Yes    Cervicalgia     DDD (degenerative disc disease), lumbosacral     Spondylolisthesis, grade 1     H/O lumbosacral spine surgery     Spondylosis of cervical region without myelopathy or radiculopathy     Chronic pain syndrome     Scar tissue     History of gastric bypass     Chronic low back pain, unspecified back pain laterality, with sciatica presence unspecified    No indicators for recent medication misuse.  reviewed. Pain Meds and Quality Of Life have been reviewed. Nonpharmacologic therapy and non-opioid pharmacologic therapy were considered. If opioid therapy is prescribed, this is only if the expected benefits are anticipated to outweigh risks. Possible changes to treatment plan considered. Support/education given as needed. Today-medications are as listed. No significant changes to medications. Follow up -- 2 months.

## 2017-07-12 NOTE — PROGRESS NOTES
Nursing Notes    Patient presents to the office today in follow-up. Reviewed medications with counts as follows:    Rx Date filled Qty Dispensed Pill Count Last Dose Short   Oxycodone 15 mg 7/3/17 120 93 Last night no   Tramadol er 200 mg 7/3/17 30 23 Last night no   Ms. Becky Lira has a reminder for a \"due or due soon\" health maintenance. I have asked that she contact her primary care provider for follow-up on this health maintenance. POC UDS was not performed in office today    Any new labs or imaging since last appointment? NO    Have you been to an emergency room (ER) or urgent care clinic since your last visit? NO            Have you been hospitalized since your last visit? NO     If yes, where, when, and reason for visit? Have you seen or consulted any other health care providers outside of the [address] [place] [address]  since your last visit? NO     If yes, where, when, and reason for visit?    Pulmonologist

## 2017-07-12 NOTE — MR AVS SNAPSHOT
Visit Information Date & Time Provider Department Dept. Phone Encounter #  
 7/12/2017  8:30 AM Ana Kay MD [address] [facility] [phone] Follow-up Instructions Return in about 2 months (around 9/12/2017). Follow-up and Disposition History Your Appointments 7/13/2017  8:30 AM  
Follow Up with Rosalind Murdock MD  
4600  [address] ([address]) Appt Note: 4wk fu from 06-15-17  
 [address] [place] [address], [address] [address]  
[phone]  
  
   
 [address] [place] [address], [address] [place] [address]  
  
    
 11/9/2017  9:30 AM  
ROUTINE CARE with Pete Wilson MD  
920 [facility] ([address]) Appt Note: routine f/u 6mo [address] [facility] [address]  
[address] [facility] [address] Upcoming Health Maintenance Date Due  
 BREAST CANCER SCRN MAMMOGRAM 6/29/2017 INFLUENZA AGE 9 TO ADULT 8/1/2017 COLONOSCOPY 3/2/2019 DTaP/Tdap/Td series (2 - Td) 6/9/2026 Allergies as of 7/12/2017  Review Complete On: 7/12/2017 By: Ana Kay MD  
  
 Severity Noted Reaction Type Reactions Ace Inhibitors  12/14/2010    Cough Aspirin  01/20/2010    Other (comments) GI bleeding & pain  
 Nsaids (Non-steroidal Anti-inflammatory Drug)  09/26/2012    Other (comments) Makes her stomach bleed Current Immunizations  Reviewed on 6/6/2017 Name Date Influenza Vaccine 11/13/2014, 1/7/2014 Influenza Vaccine (Quad) PF 1/12/2017, 10/14/2015 Pneumococcal Vaccine (Unspecified Type) 11/14/2014, 10/1/2011 Not reviewed this visit You Were Diagnosed With   
  
 Codes Comments Chronic right shoulder pain    -  Primary ICD-10-CM: M25.511, G89.29 ICD-9-CM: 719.41, 338.29 Cervicalgia     ICD-10-CM: M54.2 ICD-9-CM: 723.1 DDD (degenerative disc disease), lumbosacral     ICD-10-CM: M51.37 
ICD-9-CM: 722.52 Spondylolisthesis, grade 1     ICD-10-CM: M43.10 ICD-9-CM: 738.4 H/O lumbosacral spine surgery     ICD-10-CM: Z98.890 ICD-9-CM: V15.29 Spondylosis of cervical region without myelopathy or radiculopathy     ICD-10-CM: M47.812 ICD-9-CM: 721.0 Chronic pain syndrome     ICD-10-CM: G89.4 ICD-9-CM: 338.4 Scar tissue     ICD-10-CM: L90.5 ICD-9-CM: 709.2 History of gastric bypass     ICD-10-CM: Z98.890 ICD-9-CM: V45.86 Chronic low back pain, unspecified back pain laterality, with sciatica presence unspecified     ICD-10-CM: M54.5, G89.29 ICD-9-CM: 724.2, 338.29 Vitals BP Pulse Resp Height(growth percentile) Weight(growth percentile) BMI  
 (!) 143/94 70 16 5' 2\" (1.575 m) 186 lb (84.4 kg) 34.02 kg/m2 OB Status Smoking Status Hysterectomy Former Smoker Vitals History BMI and BSA Data Body Mass Index Body Surface Area 34.02 kg/m 2 1.92 m 2 Preferred Pharmacy Pharmacy Name Phone [address], [place] [address] [address] [facility] [phone] Your Updated Medication List  
  
   
This list is accurate as of: 7/12/17  8:53 AM.  Always use your most recent med list.  
  
  
  
  
 ACIPHEX 20 mg tablet Generic drug:  RABEprazole Take 20 mg by mouth daily. albuterol 90 mcg/actuation inhaler Commonly known as:  VENTOLIN HFA INHALE 2 PUFFS EVERY 4 HOURS AS NEEDED FOR WHEEZING  FOR SHORTNESS OFBREATH  
  
 albuterol-ipratropium 2.5 mg-0.5 mg/3 ml Nebu Commonly known as:  DUO-NEB  
3 mL by Nebulization route every six (6) hours as needed. amLODIPine 10 mg tablet Commonly known as:  Yen Winchester Take 1 Tab by mouth daily. b complex vitamins tablet Take 1 tablet by mouth daily. candesartan-hydroCHLOROthiazide 32-12.5 mg per tablet Commonly known as:  ATACAND HCT  
 TAKE ONE TABLET BY MOUTH ONCE DAILY CARAFATE 100 mg/mL suspension Generic drug:  sucralfate Take 1 tsp by mouth four (4) times daily. CENTRUM COMPLETE 18-0.4 mg Tab Generic drug:  Multivit-Iron-Min-Folic Acid Take 1 Tab by mouth daily. cholecalciferol 1,000 unit Cap Commonly known as:  VITAMIN D3 Take 1,000 Units by mouth daily. diazePAM 5 mg tablet Commonly known as:  VALIUM  
1 bid prn to help with muscle spasms, as needed. fluticasone-vilanterol 100-25 mcg/dose inhaler Commonly known as:  BREO ELLIPTA Take 1 Puff by inhalation daily. Indications: ASTHMA PREVENTION  
  
 levothyroxine 75 mcg tablet Commonly known as:  SYNTHROID Take 1 Tab by mouth Daily (before breakfast). MIRALAX 17 gram packet Generic drug:  polyethylene glycol Take 17 g by mouth daily. montelukast 10 mg tablet Commonly known as:  SINGULAIR  
TAKE ONE TABLET BY MOUTH ONCE DAILY  
  
 naloxone 4 mg/actuation Spry Commonly known as:  NARCAN  
4 mg by Nasal route as needed. NASONEX 50 mcg/actuation nasal spray Generic drug:  mometasone USE 2 SPRAYS INTO EACH NOSTRIL ONCE DAILY  
  
 * oxyCODONE IR 15 mg immediate release tablet Commonly known as:  Uday Kays Take 1 Tab by mouth four (4) times daily as needed for Pain for up to 30 days. Max Daily Amount: 60 mg.  
  
 * oxyCODONE IR 15 mg immediate release tablet Commonly known as:  Uday Kays Take 1 Tab by mouth four (4) times daily as needed for Pain for up to 30 days. Max Daily Amount: 60 mg. Start taking on:  8/11/2017  
  
 predniSONE 10 mg tablet Commonly known as:  DELTASONE  
30 mg po dailyx 3 days 20 mg po daily x 3 days 10 mg po daily x3 days  
  
 pregabalin 75 mg capsule Commonly known as:  Brooks Dub Take 1 Cap by mouth two (2) times a day for 30 days. Max Daily Amount: 150 mg. PRILOSEC PO Take 20 mg by mouth daily. traMADol 200 mg tablet Commonly known as:  ULTRAM-ER  
 Take 1 Tab by mouth daily for 30 days. Max Daily Amount: 200 mg. VOLTAREN 1 % Gel Generic drug:  diclofenac Apply 4 g to affected area four (4) times daily. * Notice: This list has 2 medication(s) that are the same as other medications prescribed for you. Read the directions carefully, and ask your doctor or other care provider to review them with you. Prescriptions Printed Refills  
 diazePAM (VALIUM) 5 mg tablet 1 Si bid prn to help with muscle spasms, as needed. Class: Print  
 oxyCODONE IR (ROXICODONE) 15 mg immediate release tablet 0 Sig: Take 1 Tab by mouth four (4) times daily as needed for Pain for up to 30 days. Max Daily Amount: 60 mg.  
 Class: Print Route: Oral  
 oxyCODONE IR (ROXICODONE) 15 mg immediate release tablet 0 Starting on: 2017 Sig: Take 1 Tab by mouth four (4) times daily as needed for Pain for up to 30 days. Max Daily Amount: 60 mg.  
 Class: Print Route: Oral  
 traMADol (ULTRAM-ER) 200 mg tablet 1 Sig: Take 1 Tab by mouth daily for 30 days. Max Daily Amount: 200 mg. Class: Print Route: Oral  
 pregabalin (LYRICA) 75 mg capsule 2 Sig: Take 1 Cap by mouth two (2) times a day for 30 days. Max Daily Amount: 150 mg.  
 Class: Print Route: Oral  
  
Follow-up Instructions Return in about 2 months (around 2017). Introducing [facility]! Dear Bev Holcomb: Thank you for requesting a Private Outlet account. Our records indicate that you already have an active Private Outlet account. You can access your account anytime at [email or web address] Did you know that you can access your hospital and ER discharge instructions at any time in Private Outlet? You can also review all of your test results from your hospital stay or ER visit. Additional Information If you have questions, please visit the Frequently Asked Questions section of the Private Outlet website at [email or web address] Remember, MyChart is NOT to be used for urgent needs. For medical emergencies, dial 911. Now available from your iPhone and Android! Please provide this summary of care documentation to your next provider. Your primary care clinician is listed as Vita Kulkarni. If you have any questions after today's visit, please call [phone].

## 2017-08-18 ENCOUNTER — TELEPHONE (OUTPATIENT)
Dept: PULMONOLOGY | Age: 62
End: 2017-08-18

## 2017-08-18 ENCOUNTER — OFFICE VISIT (OUTPATIENT)
Dept: PULMONOLOGY | Age: 62
End: 2017-08-18

## 2017-08-18 VITALS
HEART RATE: 74 BPM | SYSTOLIC BLOOD PRESSURE: 160 MMHG | HEIGHT: 62 IN | TEMPERATURE: 97.5 F | OXYGEN SATURATION: 97 % | BODY MASS INDEX: 35.15 KG/M2 | DIASTOLIC BLOOD PRESSURE: 80 MMHG | RESPIRATION RATE: 18 BRPM | WEIGHT: 191 LBS

## 2017-08-18 DIAGNOSIS — J44.9 CHRONIC OBSTRUCTIVE PULMONARY DISEASE, UNSPECIFIED COPD TYPE (HCC): ICD-10-CM

## 2017-08-18 DIAGNOSIS — Z87.898 HISTORY OF MULTIPLE PULMONARY NODULES: Primary | ICD-10-CM

## 2017-08-18 DIAGNOSIS — J45.901 ASTHMA EXACERBATION: ICD-10-CM

## 2017-08-18 RX ORDER — PREDNISONE 10 MG/1
TABLET ORAL
Qty: 18 TAB | Refills: 0 | Status: SHIPPED | OUTPATIENT
Start: 2017-08-18 | End: 2017-10-04 | Stop reason: SDUPTHER

## 2017-08-18 RX ORDER — AZITHROMYCIN 250 MG/1
TABLET, FILM COATED ORAL
Qty: 6 TAB | Refills: 0 | Status: SHIPPED | OUTPATIENT
Start: 2017-08-18 | End: 2017-10-04 | Stop reason: SDUPTHER

## 2017-08-18 RX ORDER — NYSTATIN 100000 [USP'U]/ML
SUSPENSION ORAL
Qty: 60 ML | Refills: 0 | Status: SHIPPED | OUTPATIENT
Start: 2017-08-18 | End: 2017-11-03 | Stop reason: SDUPTHER

## 2017-08-18 NOTE — TELEPHONE ENCOUNTER
Pt states that she's very SOB having problems breathing states that she has \"mold\" in her house and think that is why she's having problems breathing.

## 2017-08-18 NOTE — PROGRESS NOTES
[facility]  Pulmonary, Critical Care, and Sleep Medicine      Pulmonary Office -Sick call    Name: Shelley Chandler     : 1955     Date: 2017        Subjective:   17    Patient is a 64 y.o. female requested to be seen today for  Chief Complaint   Patient presents with    Wheezing   She was last seen in the office 6/15/2017 for asthma exacerbation, treated with taper prednisone with improvement. Interval history:  She report intermittent wheezing and chest tightness not control due something in the house- she brought a container with cloudy liquid and blackish colored small solid stuff-she report collected in the house linen closet and under the kitchen leaking. She reported to the rental property-nothing being done. She reported health dept and city . She reports await to move out once she finds a rental property. She report chronic MORGAN-her baseline. She report she's jian in her bedroom due to added humidifier and air circulator device, but report once she goes out to go to other rooms in the house she will start wheezing with chest tightness. She denies cough or mucus or hemoptysis and no PND. She report chronic LE edema-on HCTZ,  She report appetite is ok, no dysphagia or choking. She report using Duo neb via neb q 4 HR for the last few day, and ventolin inhaler when she's not home. She report no recent travel or sick contact.       Past Medical History:   Diagnosis Date    Abdominal pain     Arthritis     Asthma 2010    Dr. Digna Serrano [facility])     stage 2 colon ca    Chemotherapy     Chronic low back pain 2010    Chronic lung disease     COPD (chronic obstructive pulmonary disease) [facility] [address] 2010    Dr. Rachel Craft FH: colon cancer 2010    GERD (gastroesophageal reflux disease)     Gout     H/O colon cancer, stage II     s/p resection, last colonoscopy - q3 yrs    History of multiple pulmonary nodules 1/20/2010    Hoarse 2015    candida, sees ENT    HTN (hypertension) 1/20/2010    Hyperlipemia 1/20/2010    Hypothyroid 1/20/2010    IBS (irritable bowel syndrome)     Liver disease     cysts on liver    Mammogram declined     Multinodular thyroid     sees ENT.   Due for repeat U/S with ENT in Nov 2017    Nephropathy, membranous 1/20/2010    Neuropathy ([address] 1/20/2010    Nicotine use disorder 1/20/2010    Prediabetes     PUD (peptic ulcer disease)     Pulmonary nodule     sees pulmonary    Rectocele 1/20/2010    S/P cataract extraction 1/3/2011    Thyroid disease     hypothyroid    Unspecified sleep apnea     does not use CPAP    Vitamin D deficiency 1/20/2010     Social History     Social History    Marital status:      Spouse name: N/A    Number of children: N/A    Years of education: N/A     Occupational History     Not Employed     Social History Main Topics    Smoking status: Former Smoker     Packs/day: 0.50     Years: 18.00     Types: Cigarettes     Quit date: 8/27/1990    Smokeless tobacco: Never Used      Comment: per patient she has not smoked in over 30 years    Alcohol use 0.0 oz/week     0 Standard drinks or equivalent per week      Comment: rare    Drug use: No    Sexual activity: Yes     Partners: Male     Birth control/ protection: None, Surgical     Other Topics Concern    Not on file     Social History Narrative      Past Surgical History:   Procedure Laterality Date    ABDOMEN SURGERY PROC UNLISTED      colon resection 8/2005- Dr. Petty Santiago- colon ca   Jeovanny Barrera      gastric bypass 12/2/09    HX GASTRIC BYPASS      HX GI      HX GYN      hysterectomy and BSO in 11/2006    HX HEENT      cataract sx tee    HX HYSTERECTOMY      HX LUMBAR FUSION  June 2013    L4 to L 5    HX TONSILLECTOMY          Allergies   Allergen Reactions    Ace Inhibitors Cough    Aspirin Other (comments)     GI bleeding & pain    Nsaids (Non-Steroidal Anti-Inflammatory Drug) Other (comments)     Makes her stomach bleed         Current Outpatient Prescriptions   Medication Sig Dispense Refill    predniSONE (DELTASONE) 10 mg tablet 30 mg po dailyx 3 days 20 mg po daily x 3 days 10 mg po daily x3 days  Indications: trush 18 Tab 0    nystatin (MYCOSTATIN) 100,000 unit/mL suspension 5 ml QID po,  swish and swallow  Indications: ORAL CANDIDIASIS 60 mL 0    azithromycin (ZITHROMAX) 250 mg tablet 2 tabs po today then 1 tab po daily for 4 days. 6 Tab 0    albuterol-ipratropium (DUO-NEB) 2.5 mg-0.5 mg/3 ml nebu 3 mL by Nebulization route every six (6) hours as needed. 120 Nebule 3    levothyroxine (SYNTHROID) 75 mcg tablet Take 1 Tab by mouth Daily (before breakfast). 90 Tab 1    candesartan-hydroCHLOROthiazide (ATACAND HCT) 32-12.5 mg per tablet TAKE ONE TABLET BY MOUTH ONCE DAILY 90 Tab 1    amLODIPine (NORVASC) 10 mg tablet Take 1 Tab by mouth daily. 90 Tab 1    montelukast (SINGULAIR) 10 mg tablet TAKE ONE TABLET BY MOUTH ONCE DAILY 90 Tab 3    fluticasone-vilanterol (BREO ELLIPTA) 100-25 mcg/dose inhaler Take 1 Puff by inhalation daily. Indications: ASTHMA PREVENTION 3 Inhaler 3    albuterol (VENTOLIN HFA) 90 mcg/actuation inhaler INHALE 2 PUFFS EVERY 4 HOURS AS NEEDED FOR WHEEZING  FOR SHORTNESS OFBREATH (Patient taking differently: 2 Puffs every six (6) hours as needed. INHALE 2 PUFFS EVERY 4 HOURS AS NEEDED FOR WHEEZING  FOR SHORTNESS OFBREATH) 3 Inhaler 3    cholecalciferol (VITAMIN D3) 1,000 unit cap Take 1,000 Units by mouth daily.  sucralfate (CARAFATE) 100 mg/mL suspension Take 1 tsp by mouth four (4) times daily.  b complex vitamins tablet Take 1 tablet by mouth daily.  OMEPRAZOLE (PRILOSEC PO) Take 20 mg by mouth daily.  RABEprazole (ACIPHEX) 20 mg tablet Take 20 mg by mouth daily.  polyethylene glycol (MIRALAX) 17 gram packet Take 17 g by mouth daily.       diazePAM (VALIUM) 5 mg tablet 1 bid prn to help with muscle spasms, as needed. 60 Tab 1    oxyCODONE IR (ROXICODONE) 15 mg immediate release tablet Take 1 Tab by mouth four (4) times daily as needed for Pain for up to 30 days. Max Daily Amount: 60 mg. 120 Tab 0    NASONEX 50 mcg/actuation nasal spray USE 2 SPRAYS INTO EACH NOSTRIL ONCE DAILY 1 Container 2    naloxone (NARCAN) 4 mg/actuation spry 4 mg by Nasal route as needed. 4 mg 0    Multivit-Iron-Min-Folic Acid (CENTRUM COMPLETE) 18-0.4 mg tab Take 1 Tab by mouth daily.  diclofenac (VOLTAREN) 1 % topical gel Apply 4 g to affected area four (4) times daily. Patient Active Problem List   Diagnosis Code    HTN (hypertension) I10    Asthma J45.909    COPD (chronic obstructive pulmonary disease) ([facility] [address] J44.9    Neuropathy (HCC) G62.9    Vitamin D deficiency E55.9    Nephropathy, membranous     History of multiple pulmonary nodules Z87.898    FH: colon cancer Z80.0    Rectocele N81.6    Chronic low back pain M54.5, G89.29    S/P cataract extraction Z98.49    Vitamin B12 deficiency E53.8    H/O colon cancer, stage II Z85.038    DDD (degenerative disc disease), lumbosacral M51.37    Spondylolisthesis, grade 1 M43.10    Encounter for long-term (current) use of other medications Z79.899    H/O lumbosacral spine surgery Z98.890    Chronic pain syndrome G89.4    DJD (degenerative joint disease), lumbar M47.816    Scar tissue L90.5    History of gastric bypass Z98.890    CAP (community acquired pneumonia) J18.9    Asthma exacerbation J45. 0    CAIO (generalized anxiety disorder) F41.1    Lumbosacral neuritis M54.17    Cervicalgia M54.2    Shoulder pain, right M25.511    Cervical spondylosis M47.812    Advanced care planning/counseling discussion Z71.89    Right arm pain M79.601        Review of Systems:  Constitutional: No fever, no chills, no weight loss, no night sweats   HEENT: No epistaxis, no nasal drainage, no difficulty in swallowing, no redness in eyes  Respiratory: as above  Cardiovascular: chest tightness,  no palpitations, chronic leg edema, no syncope  Gastrointestinal: no abd pain, no vomiting, no diarrhea, no bleeding symptoms  Genitourinary: No urinary symptoms or hematuria  Integument/breast: No ulcers or rashes  Musculoskeletal:Neg  Neurological: No focal weakness, no seizures, no headaches  Behvioral/Psych: No anxiety, no depression     Objective:     Visit Vitals    /80    Pulse 74    Temp 97.5 °F (36.4 °C) (Oral)    Resp 18    Ht 5' 2\" (1.575 m)    Wt 86.6 kg (191 lb)    SpO2 97%    BMI 34.93 kg/m2        Physical Exam:   General: Appear comfortable, no acute distress  HEENT: Pupils reactive, sclera anicteric, EOM intact  Neck: No adenopathy or thyroid swelling, no JVD, supple  CVS: S1S2, no murmurs  RS: Mod AE bilaterally, no tactile fremitus or egophony, no accessory muscle use, Bilateral insp/exp wheezing and decrease BS.   Abd: Soft, non tender, no hepatosplenomegaly  Neuro: Non focal, awake, alert  Extrm: + leg edema, no clubbing or cyanosis  Skin: No rash, warm and dry    Data review:     Office Visit on 05/17/2017   Component Date Value Ref Range Status    AMPHETAMINES UR POC 05/17/2017 Negative   Final    COCAINE UR POC 05/17/2017 Negative   Final    MDMA/ECSTASY UR POC 05/17/2017 Negative   Final    METHADONE UR POC 05/17/2017 Negative   Final    METHAMPHETAMINE UR POC 05/17/2017 Negative   Final    METHYLPHENIDATE UR POC 05/17/2017 Negative   Final    OPIATES UR POC 05/17/2017 Negative   Final    OXYCODONE UR POC 05/17/2017 Presumptive Positive   Final    PHENCYCLIDINE UR POC 05/17/2017 Negative   Final    TRICYCLICS UR POC 52/63/2177 Negative   Final    BARBITURATES UR POC 05/17/2017 Negative   Final    BENZODIAZEPINES UR POC 05/17/2017 Presumptive Positive   Final    CANNABINOIDS UR POC 05/17/2017 Negative   Final   Hospital Outpatient Visit on 04/27/2017   Component Date Value Ref Range Status    TSH 04/27/2017 1.68  0.36 - 3.74 uIU/mL Final    Hemoglobin A1c 04/27/2017 6.6* 4.2 - 5.6 % Final    Comment: (NOTE)  HbA1C Interpretive Ranges  <5.7              Normal  5.7 - 6.4         Consider Prediabetes  >6.5              Consider Diabetes      Est. average glucose 04/27/2017 143  mg/dL Final    Comment: (NOTE)  The eAG should be interpreted with patient characteristics in mind   since ethnicity, interindividual differences, red cell lifespan,   variation in rates of glycation, etc. may affect the validity of the   calculation. Hospital Outpatient Visit on 03/15/2017   Component Date Value Ref Range Status    TSH 03/15/2017 23.50* 0.36 - 3.74 uIU/mL Final       PFT's:  Date FEV1/FVC FEV1 Best FVC best TLC RV VC DLCO   3/10/16 76 86 112                                       Pulmonary Function Test 3/10/2016  FLOWS:  Maximal Mid Expiratory Flow rate is reduced to 35 % predicted  Forced Expiratory Volume in one second is reduced to 76 %  predicted  FEV 1% is reduced     Volumes:  NA    Flow Volume Loop:  Nonspecific obstructive pattern in Flow Volume Loop    Bronchodilator:  Significant improvement with bronchodilator    Diffusion:  NA    Impression:  Mild obstructive defect, Predominately small airways        [facility]  PULMONARY FUNCTION TEST:6/17/2003  COMMENTS:  Examination of the expiratory spirogram reveals a smooth curve and good  effort. The forced expiratory time is greater than six seconds and the  terminal plateau is reached. The data appears to be reproducible. Forced vital capacity is normal. FEV1 and the FEV1/FVC ratio are moderately  reduced. After the administration of an inhaled bronchodilator, there is  significant improvement in forced vital capacity and FEV1. Moderate  airflow obstruction remains. IMPRESSION:  Moderate airflow obstruction that does improve with inhaled  bronchodilator.      Imaging:  I have personally reviewed the patients radiographs and have reviewed the reports:       Results from Hospital Encounter encounter on 12/11/16   XR CHEST PA LAT   Narrative 2 view chest    HISTORY: Wheezing, shortness of breath for a week. Coughing up blood and last 2  days. Cough with blood-tinged sputum    COMPARISON: Multiple prior studies with the most recent from August 27, 2016    FINDINGS:           Extensive patchy opacity overlies the right mid and lower lung fields. Findings  are worrisome for pneumonia. Follow-up films to document resolution and exclude  underlying mass are recommended. No pleural effusion or pneumothorax. Cardiac  mediastinal silhouette is unremarkable. Left lung is clear. Impression Impression:     Extensive patchy airspace opacities in the right mid and lower lung field  suggestive of pneumonia. Results from Hospital Encounter encounter on 03/28/17   CT CHEST WO CONT   Narrative CT CHEST WITHOUT ENHANCEMENT    INDICATION: History of asthma, COPD, pneumonia completed antibiotics and  steroids with persistent episodic shortness of breath and wheezing. TECHNIQUE: Axial images obtained from the thoracic inlet to the level of the  diaphragm without intravenous contrast. Coronal and sagittal reformatted images  were obtained. All CT scans at this facility are performed using dose optimization technique as  appropriate to a performed exam, to include automated exposure control,  adjustment of the mA and/or kV according to patient size (including appropriate  matching first site-specific examinations), or use of iterative reconstruction  technique. COMPARISON: CT chest 12/11/2016; 8/30/2006. CHEST FINDINGS:   The evaluation of the mediastinum, hilum and vascular structures is limited in  the absence of intravenous contrast.      Thyroid: Unremarkable in its visualized aspects. Pericardium/ Heart: Decreased density in the cardiac chambers. Multi cardiac  chamber enlargement.  New pericardial effusion since prior exam with a transverse  diameter of 15 mm anteriorly, previously 5 mm. Effusion is near water density. No calcified coronary arteriosclerosis. Aorta/ Vessels: No aneurysm. Lymph Nodes: Unremarkable. .    Lungs: Resolved right lung with scattered bronchovascular consolidative and  groundglass opacities. Milder opacities in the left lung have also resolved. Decreased size of a triangular 5 mm nodular density associated with the right  major fissure (series 4, image 34), previously 7 mm, likely a lymph node. Calcified granuloma moderate. Similar 4 mm subpleural nodular density left lower  lobe (41). Decreased density of 0.4 cm groundglass subpleural nodule left lower  lobe (35). Pleura: No effusion. Upper Abdomen: IVC and hepatic veins appear dilated. Similar 0.9 cm  well-circumscribed hypodensity in the right posterior hepatic lobe, too small to  characterize. Similar 0.8 cm right posterior hepatic lobe lesion. Postsurgical  changes partial gastric resection. Diverticular disease. Bilateral symmetric  adrenal thickening. Bones/soft tissues: Unremarkable. Impression IMPRESSION:    1. Resolved findings of pneumonia. 2.  New simple appearing small pericardial effusion. 3.  A few scattered pulmonary nodular densities which have been stable since  2006 compatible with benign etiology. 4.  Multi cardiac chamber enlargement with dilated IVC and hepatic veins is  suggestive of right heart dysfunction. 5.  Diverticulosis. 6.  Anemia.           Results for orders placed or performed during the hospital encounter of 12/11/16   EKG, 12 LEAD, INITIAL   Result Value Ref Range    Ventricular Rate 70 BPM    Atrial Rate 70 BPM    P-R Interval 130 ms    QRS Duration 102 ms    Q-T Interval 416 ms    QTC Calculation (Bezet) 449 ms    Calculated P Axis 49 degrees    Calculated R Axis -51 degrees    Calculated T Axis 74 degrees    Diagnosis       Normal sinus rhythm  Left anterior fascicular block  Nonspecific T wave abnormality  Abnormal ECG  When compared with ECG of 12-JUN-2013 07:09,  premature atrial complexes are no longer present  T wave inversion no longer evident in Inferior leads  Confirmed by Edd Morley ([id number]) on 12/11/2016 1:14:37 PM     Results for orders placed or performed in visit on 06/14/13   AMB POC EKG ROUTINE W/ 12 LEADS, INTER & REP    Impression    See progress note. Results from [place] encounter on 01/26/17   US THYROID/PARATHYROID/SOFT TISS   Narrative PROCEDURE:  Ultrasound Thyroid Scan. INDICATION:  Thyroid nodules seen on recent CTA chest.    COMPARISON:  CTA chest 12/11/16. FINDINGS:    The thyroid measurements are as follows. Right lobe:  5.7 x 1.9 x 2.6 cm  Left lobe:  5.6 x 2.7 x 2.8 cm  Isthmus:  0.61 cm. Both lobes of the thyroid appear diffusely heterogeneous with numerous nodules  of varying sizes. No dominant thyroid nodule is detected. Both lobes  demonstrate diffusely increased vascular flow. Impression IMPRESSION:    Diffusely heterogeneous thyroid lobes with numerous thyroid nodules of varying  sizes and varying echogenicity. No dominant thyroid nodule. With increased  vascular flow, the diffusely heterogeneous thyroid with numerous nodules may  represent a Hashimoto's thyroiditis with pseudonodules. Recommend correlation  with clinical data. Lab Results   Component Value Date/Time    D DIMER 0.88 12/11/2016 09:40 AM            Ms. Laya Jimenez has a reminder for a \"due or due soon\" health maintenance. I have asked that she contact her primary care provider for follow-up on this health maintenance. IMPRESSION:   · Intermittent Asthma/COPD exacerbation, not controlled likely trigger by continued exposure to allergens ( is a smoker and possible allergens in the house) and stress (going thru divorce-still live with )). · [place] thr[place] likely due [place] [facility]a  · Asthma-COPD overlap syndrome.   · Hx of multiple pulmonary nodules-stable per last CT chest 3/28/2017. · Former Smoker, quit long time ago. · Obesity, BMI 33, hx gastric bypassOPD      RECOMMENDATIONS:     · Prednisone Taper, instructed on s/s hyperglycemia-call PCP for management and monitoring, script send. · Nystatin swish and swallow, instructed on med's actions and adverse reaction, script send  · Z pack-empiric antibiotic for now for hx bronchitis/URI with exacerbation. · Continue Breo Ellipta, Duo neb via neb/Ventolin inh PRN, reinforced to gargle mouth after treatment  · Continue Duo neb via neb/Ventolin inh PRN. · Continue Singulair as px. · CT chest 3/28/2017 reviewed showed A few scattered pulmonary nodular densities which have been stable since 2006 compatible with benign etiology. · Discussed avoidance of precipitants/allergens-pt plans to move out once she finds a place to rent. · Annual preventive vaccination discussed, she report gets yearly Flu vaccine  · Pulmonary toilette, encouraged mouth care, well balanced meal and activity as tolerated  · Plan repeat PFT once symptoms compensated  · Reviewed all medications and discussed concerns, answered questions. · Follow-up as scheduled with Dr Juancho Bell or come back sooner should new symptoms or problems arise.        Janiya Bennett, CHERYL

## 2017-08-18 NOTE — PATIENT INSTRUCTIONS
Candidiasis: Care Instructions  Your Care Instructions  Candidiasis (say \"vbu-nnj-CW-uh-randi\") is a yeast infection. Yeast normally lives in your body. But it can cause problems if your body's defenses don't work as they should. Some medicines can increase your chance of getting a yeast infection. These include antibiotics, steroids, and cancer drugs. And some diseases like AIDS and diabetes can make you more likely to get yeast infections. There are different types of yeast infections. Delfina Ganong is a yeast infection in the mouth. It usually occurs in people with weak immune systems. It causes white patches inside the mouth and throat. Yeast infections of the skin usually occur in skin folds where the skin stays moist. They cause red, oozing patches on your skin. Babies can get these infections under the diaper. People who often wear gloves can get them on their hands. Many women get vaginal yeast infections. They are most common when women take antibiotics. These infections can cause the vagina to itch and burn. They also cause white discharge that looks like cottage cheese. In rare cases, yeast infects the blood. This can cause serious disease. This kind of infection is treated with medicine given through a needle into a vein (IV). After you start treatment, a yeast infection usually goes away quickly. But if your immune system is weak, the infection may come back. Tell your doctor if you get yeast infections often. Follow-up care is a key part of your treatment and safety. Be sure to make and go to all appointments, and call your doctor if you are having problems. It's also a good idea to know your test results and keep a list of the medicines you take. How can you care for yourself at home? · Take your medicines exactly as prescribed. Call your doctor if you think you are having a problem with your medicine. · Use antibiotics only as directed by your doctor. · Eat yogurt with live cultures.  It has bacteria called lactobacillus. It may help prevent some types of yeast infections. · Keep your skin clean and dry. Put powder on moist places. · If you are using a cream or suppository to treat a vaginal yeast infection, don't use condoms or a diaphragm. Use a different type of birth control. · Eat a healthy diet and get regular exercise. This will help keep your immune system strong. When should you call for help? Call your doctor now or seek immediate medical care if:  · You have a fever. · You are pregnant and have signs of a vaginal or urinary tract infection such as:  ¨ Severe itching in your vagina. ¨ Pain during sex or when you urinate. ¨ Unusual discharge from your vagina. ¨ A frequent urge to urinate. ¨ Urine that is cloudy or smells bad. Watch closely for changes in your health, and be sure to contact your doctor if:  · You do not get better as expected. Where can you learn more? Go to http://danae-naif.info/. Enter S736 in the search box to learn more about \"Candidiasis: Care Instructions. \"  Current as of: October 13, 2016  Content Version: 11.3  © 2728-8760 [employer]. Care instructions adapted under license by [employer] (which disclaims liability or warranty for this information). If you have questions about a medical condition or this instruction, always ask your healthcare professional. [address] any warranty or liability for your use of this information.

## 2017-08-18 NOTE — MR AVS SNAPSHOT
Visit Information Date & Time Provider Department Dept. Phone Encounter #  
 8/18/2017  1:00 PM Heaven Smalls NP [address] [phone] [id number] Follow-up Instructions Return in about 2 weeks (around 9/1/2017). Your Appointments 8/31/2017 10:00 AM  
Follow Up with Sergio Sosa MD  
[address]00  [facility] ([facility]) Appt Note: from 8/18/17 sv  
 235 [facility], [address] [address]  
[phone]  
  
   
 [address] [place] [address], 110[address] [facility] [address]  
  
    
 9/7/2017  8:15 AM  
Follow Up with Judith Ibarra MD  
[facility]) Appt Note: Return in about 2 months (around 9/12/2017). [address] [address]  
[phone] [facility] [address] [address]  
  
    
 11/9/2017  9:30 AM  
ROUTINE CARE with Tere Schmitz MD  
2056 [facility] ([facility]) Appt Note: routine f/u 6mo [address] [facility] [address]  
[address] [facility] [address] [facility] [address] Upcoming Health Maintenance Date Due  
 BREAST CANCER SCRN MAMMOGRAM 6/29/2017 INFLUENZA AGE 9 TO ADULT 8/1/2017 COLONOSCOPY 3/2/2019 DTaP/Tdap/Td series (2 - Td) 6/9/2026 Allergies as of 8/18/2017  Review Complete On: 8/18/2017 By: Heaven Smalls NP Severity Noted Reaction Type Reactions Ace Inhibitors  12/14/2010    Cough Aspirin  01/20/2010    Other (comments) GI bleeding & pain  
 Nsaids (Non-steroidal Anti-inflammatory Drug)  09/26/2012    Other (comments) Makes her stomach bleed Current Immunizations  Reviewed on 6/6/2017 Name Date Influenza Vaccine 11/13/2014, 1/7/2014 Influenza Vaccine (Quad) PF 1/12/2017, 10/14/2015 Pneumococcal Vaccine (Unspecified Type) 11/14/2014, 10/1/2011 Not reviewed this visit You Were Diagnosed With   
  
 Codes Comments Asthma exacerbation     ICD-10-CM: V00.628 ICD-9-CM: 725.25 Chronic obstructive pulmonary disease, unspecified COPD type ([facility] 75.)     ICD-10-CM: J44.9 ICD-9-CM: 882 Vitals BP Pulse Temp Resp Height(growth percentile) Weight(growth percentile) 160/80 74 97.5 °F (36.4 °C) (Oral) 18 5' 2\" (1.575 m) 191 lb (86.6 kg) SpO2 BMI OB Status Smoking Status 97% 34.93 kg/m2 Hysterectomy Former Smoker BMI and BSA Data Body Mass Index Body Surface Area 34.93 kg/m 2 1.95 m 2 Preferred Pharmacy Pharmacy Name Phone [address], Nelson Sharma [address] [place] [address] 22 1700 [facility] [phone] Your Updated Medication List  
  
   
This list is accurate as of: 8/18/17  1:44 PM.  Always use your most recent med list.  
  
  
  
  
 ACIPHEX 20 mg tablet Generic drug:  RABEprazole Take 20 mg by mouth daily. albuterol 90 mcg/actuation inhaler Commonly known as:  VENTOLIN HFA INHALE 2 PUFFS EVERY 4 HOURS AS NEEDED FOR WHEEZING  FOR SHORTNESS OFBREATH  
  
 albuterol-ipratropium 2.5 mg-0.5 mg/3 ml Nebu Commonly known as:  DUO-NEB  
3 mL by Nebulization route every six (6) hours as needed. amLODIPine 10 mg tablet Commonly known as:  Bren Condon Take 1 Tab by mouth daily. azithromycin 250 mg tablet Commonly known as:  Re Flatness 2 tabs po today then 1 tab po daily for 4 days. b complex vitamins tablet Take 1 tablet by mouth daily. candesartan-hydroCHLOROthiazide 32-12.5 mg per tablet Commonly known as:  ATACAND HCT  
TAKE ONE TABLET BY MOUTH ONCE DAILY CARAFATE 100 mg/mL suspension Generic drug:  sucralfate Take 1 tsp by mouth four (4) times daily. CENTRUM COMPLETE 18-0.4 mg Tab Generic drug:  Multivit-Iron-Min-Folic Acid Take 1 Tab by mouth daily. cholecalciferol 1,000 unit Cap Commonly known as:  VITAMIN D3 Take 1,000 Units by mouth daily. diazePAM 5 mg tablet Commonly known as:  VALIUM  
1 bid prn to help with muscle spasms, as needed. fluticasone-vilanterol 100-25 mcg/dose inhaler Commonly known as:  BREO ELLIPTA Take 1 Puff by inhalation daily. Indications: ASTHMA PREVENTION  
  
 levothyroxine 75 mcg tablet Commonly known as:  SYNTHROID Take 1 Tab by mouth Daily (before breakfast). MIRALAX 17 gram packet Generic drug:  polyethylene glycol Take 17 g by mouth daily. montelukast 10 mg tablet Commonly known as:  SINGULAIR  
TAKE ONE TABLET BY MOUTH ONCE DAILY  
  
 naloxone 4 mg/actuation Spry Commonly known as:  NARCAN  
4 mg by Nasal route as needed. NASONEX 50 mcg/actuation nasal spray Generic drug:  mometasone USE 2 SPRAYS INTO EACH NOSTRIL ONCE DAILY  
  
 nystatin 100,000 unit/mL suspension Commonly known as:  MYCOSTATIN  
5 ml QID po,  swish and swallow  Indications: ORAL CANDIDIASIS  
  
 oxyCODONE IR 15 mg immediate release tablet Commonly known as:  Conchetta Little Take 1 Tab by mouth four (4) times daily as needed for Pain for up to 30 days. Max Daily Amount: 60 mg.  
  
 predniSONE 10 mg tablet Commonly known as:  DELTASONE  
30 mg po dailyx 3 days 20 mg po daily x 3 days 10 mg po daily x3 days  Indications: trus PRILOSEC PO Take 20 mg by mouth daily. VOLTAREN 1 % Gel Generic drug:  diclofenac Apply 4 g to affected area four (4) times daily. Prescriptions Sent to Pharmacy Refills  
 predniSONE (DELTASONE) 10 mg tablet 0 Si mg po dailyx 3 days 20 mg po daily x 3 days 10 mg po daily x3 days  Indications: trush Class: Normal  
 Pharm[facility]: [facility] Ph #: [phone]  
 nystatin (MYCOSTATIN) 100,000 unit/mL suspension 0  Si ml QID po,  swish and swallow  Indications: ORAL CANDIDIASIS  
 Class: Normal  
 Pharmacy: [facility] [address] [facility] Ph #: [phone]  
 azithromycin (ZITHROMAX) 250 mg tablet 0 Si tabs po today then 1 tab po daily for 4 days. Class: Normal  
 Pharmacy: [address] [place] [address] 22 73 [facility] Ph #: [phone] Follow-up Instructions Return in about 2 weeks (around 2017). Patient Instructions Candidiasis: Care Instructions Your Care Instructions Candidiasis (say \"gdl-yqq-MR-uh-randi\") is a yeast infection. Yeast normally lives in your body. But it can cause problems if your body's defenses don't work as they should. Some medicines can increase your chance of getting a yeast infection. These include antibiotics, steroids, and cancer drugs. And some diseases like AIDS and diabetes can make you more likely to get yeast infections. There are different types of yeast infections. Daryel Kelch is a yeast infection in the mouth. It usually occurs in people with weak immune systems. It causes white patches inside the mouth and throat. Yeast infections of the skin usually occur in skin folds where the skin stays moist. They cause red, oozing patches on your skin. Babies can get these infections under the diaper. People who often wear gloves can get them on their hands. Many women get vaginal yeast infections. They are most common when women take antibiotics. These infections can cause the vagina to itch and burn. They also cause white discharge that looks like cottage cheese. In rare cases, yeast infects the blood. This can cause serious disease. This kind of infection is treated with medicine given through a needle into a vein (IV). After you start treatment, a yeast infection usually goes away quickly. But if your immune system is weak, the infection may come back. Tell your doctor if you get yeast infections often. Follow-up care is a key part of your treatment and safety. Be sure to make and go to all appointments, and call your doctor if you are having problems. It's also a good idea to know your test results and keep a list of the medicines you take. How can you care for yourself at home? · Take your medicines exactly as prescribed. Call your doctor if you think you are having a problem with your medicine. · Use antibiotics only as directed by your doctor. · Eat yogurt with live cultures. It has bacteria called lactobacillus. It may help prevent some types of yeast infections. · Keep your skin clean and dry. Put powder on moist places. · If you are using a cream or suppository to treat a vaginal yeast infection, don't use condoms or a diaphragm. Use a different type of birth control. · Eat a healthy diet and get regular exercise. This will help keep your immune system strong. When should you call for help? Call your doctor now or seek immediate medical care if: 
· You have a fever. · You are pregnant and have signs of a vaginal or urinary tract infection such as: ¨ Severe itching in your vagina. ¨ Pain during sex or when you urinate. ¨ Unusual discharge from your vagina. ¨ A frequent urge to urinate. ¨ Urine that is cloudy or smells bad. Watch closely for changes in your health, and be sure to contact your doctor if: 
· You do not get better as expected. Where can you learn more? Go to http://danae-naif.info/. Enter E103 in the search box to learn more about \"Candidiasis: Care Instructions. \" Current as of: October 13, 2016 Content Version: 11.3 © 7135-9355 [employer]. Care instructions adapted under license by [employer] (which disclaims liability or warranty for this information).  If you have questions about a medical condition or this instruction, always ask your healthcare professional. [employer] disclaims any warranty or liability for your use of this information. Introducing [facility]! Dear Luba Dye: Thank you for requesting a [employer] account. Our records indicate that you already have an active [employer] account. You can access your account anytime at [email or web address] Did you know that you can access your hospital and ER discharge instructions at any time in [employer]? You can also review all of your test results from your hospital stay or ER visit. Additional Information If you have questions, please visit the Frequently Asked Questions section of the [employer] website at [email or web address] Remember, [employer] is NOT to be used for urgent needs. For medical emergencies, dial 911. Now available from your iPhone and Android! Please provide this summary of care documentation to your next provider. Your primary care clinician is listed as Kay Mcclendon. If you have any questions after today's visit, please call [phone].

## 2017-09-07 ENCOUNTER — OFFICE VISIT (OUTPATIENT)
Dept: PAIN MANAGEMENT | Age: 62
End: 2017-09-07

## 2017-09-07 VITALS
SYSTOLIC BLOOD PRESSURE: 164 MMHG | BODY MASS INDEX: 33.86 KG/M2 | RESPIRATION RATE: 18 BRPM | TEMPERATURE: 98.1 F | HEIGHT: 62 IN | HEART RATE: 66 BPM | DIASTOLIC BLOOD PRESSURE: 87 MMHG | WEIGHT: 184 LBS

## 2017-09-07 DIAGNOSIS — Z98.890 H/O LUMBOSACRAL SPINE SURGERY: ICD-10-CM

## 2017-09-07 DIAGNOSIS — M47.812 SPONDYLOSIS OF CERVICAL REGION WITHOUT MYELOPATHY OR RADICULOPATHY: ICD-10-CM

## 2017-09-07 DIAGNOSIS — G89.29 CHRONIC LOW BACK PAIN, UNSPECIFIED BACK PAIN LATERALITY, WITH SCIATICA PRESENCE UNSPECIFIED: Primary | ICD-10-CM

## 2017-09-07 DIAGNOSIS — G89.4 CHRONIC PAIN SYNDROME: ICD-10-CM

## 2017-09-07 DIAGNOSIS — G89.29 CHRONIC RIGHT SHOULDER PAIN: ICD-10-CM

## 2017-09-07 DIAGNOSIS — M25.511 CHRONIC RIGHT SHOULDER PAIN: ICD-10-CM

## 2017-09-07 DIAGNOSIS — F41.1 GAD (GENERALIZED ANXIETY DISORDER): ICD-10-CM

## 2017-09-07 DIAGNOSIS — M47.816 OSTEOARTHRITIS OF LUMBAR SPINE, UNSPECIFIED SPINAL OSTEOARTHRITIS COMPLICATION STATUS: ICD-10-CM

## 2017-09-07 DIAGNOSIS — M43.10 SPONDYLOLISTHESIS, GRADE 1: ICD-10-CM

## 2017-09-07 DIAGNOSIS — M54.5 CHRONIC LOW BACK PAIN, UNSPECIFIED BACK PAIN LATERALITY, WITH SCIATICA PRESENCE UNSPECIFIED: Primary | ICD-10-CM

## 2017-09-07 DIAGNOSIS — L90.5 SCAR TISSUE: ICD-10-CM

## 2017-09-07 DIAGNOSIS — M51.37 DDD (DEGENERATIVE DISC DISEASE), LUMBOSACRAL: ICD-10-CM

## 2017-09-07 DIAGNOSIS — M54.2 CERVICALGIA: ICD-10-CM

## 2017-09-07 DIAGNOSIS — M79.601 RIGHT ARM PAIN: ICD-10-CM

## 2017-09-07 DIAGNOSIS — Z98.84 HISTORY OF GASTRIC BYPASS: ICD-10-CM

## 2017-09-07 RX ORDER — OXYCODONE HYDROCHLORIDE 15 MG/1
15 TABLET ORAL
Qty: 120 TAB | Refills: 0 | Status: SHIPPED | OUTPATIENT
Start: 2017-10-06 | End: 2017-11-02 | Stop reason: SDUPTHER

## 2017-09-07 RX ORDER — DIAZEPAM 5 MG/1
TABLET ORAL
Qty: 60 TAB | Refills: 1 | Status: SHIPPED | OUTPATIENT
Start: 2017-09-07 | End: 2017-11-02 | Stop reason: SDUPTHER

## 2017-09-07 RX ORDER — TRAMADOL HYDROCHLORIDE 200 MG/1
200 TABLET, EXTENDED RELEASE ORAL DAILY
Qty: 30 TAB | Refills: 1 | Status: SHIPPED | OUTPATIENT
Start: 2017-09-07 | End: 2017-11-02 | Stop reason: SDUPTHER

## 2017-09-07 RX ORDER — OXYCODONE HYDROCHLORIDE 15 MG/1
15 TABLET ORAL
Qty: 120 TAB | Refills: 0 | Status: SHIPPED | OUTPATIENT
Start: 2017-09-07 | End: 2017-11-02 | Stop reason: SDUPTHER

## 2017-09-07 RX ORDER — PREGABALIN 75 MG/1
75 CAPSULE ORAL 2 TIMES DAILY
Qty: 60 CAP | Refills: 2 | Status: SHIPPED | OUTPATIENT
Start: 2017-09-07 | End: 2017-11-02 | Stop reason: SDUPTHER

## 2017-09-07 RX ORDER — LIDOCAINE 50 MG/G
1 PATCH TOPICAL EVERY 12 HOURS
Qty: 1 EACH | Refills: 3 | Status: SHIPPED | OUTPATIENT
Start: 2017-09-07 | End: 2018-01-03

## 2017-09-07 NOTE — PROGRESS NOTES
HISTORY OF PRESENT ILLNESS  Jonathon De Leon is a 64 y.o. female. HPI Comments: Meds help with pain control and quality of life. No new side effects reported today. Visit survey reviewed and will be scanned.  reviewed. Recent average level of pain(out of 10)-8  Chief complaint back pain, history of neck pain  Chronic pain syndrome  Using oxycodone 15 mg 4 times a day as needed  Extended release tramadol 200 mg once a day  Diazepam 5 mg twice a day as needed  Lyrica 75 mg  Lidoderm patches as needed  Compound cream-prescribed by her rheumatologist  Medication continues to help improve general activity, mood, walking, sleep, enjoyment of life        Measuring clinical outcomes of chronic pain patients. This was reviewed today. The survey will be scanned. Please see the survey for details. Total score-8    Back Pain    Pertinent negatives include no chest pain, no fever, no headaches and no dysuria. Hand Pain    Associated symptoms include back pain. Pertinent negatives include no itching. Review of Systems   Constitutional: Positive for malaise/fatigue. Negative for fever. HENT: Negative for sore throat. Eyes: Negative for blurred vision and double vision. Respiratory: Positive for shortness of breath. Negative for cough. Cardiovascular: Negative for chest pain and leg swelling. Gastrointestinal: Positive for heartburn. Negative for nausea. Genitourinary: Negative for dysuria and urgency. Musculoskeletal: Positive for back pain, falls and joint pain. Skin: Negative for itching and rash. Neurological: Negative for dizziness, seizures and headaches. Endo/Heme/Allergies: Negative for environmental allergies. Does not bruise/bleed easily. Psychiatric/Behavioral: Positive for depression. Negative for suicidal ideas. The patient is nervous/anxious and has insomnia. Physical Exam   Constitutional: She appears well-developed and well-nourished. She is cooperative.  She does not have a sickly appearance. HENT:   Head: Normocephalic and atraumatic. Right Ear: External ear normal. No drainage. Left Ear: External ear normal. No drainage. Nose: Nose normal.   Eyes: Lids are normal. Right eye exhibits no discharge. Left eye exhibits no discharge. Right conjunctiva has no hemorrhage. Left conjunctiva has no hemorrhage. Neck: Neck supple. No tracheal deviation present. No thyroid mass present. Pulmonary/Chest: Effort normal. No respiratory distress. Neurological: She is alert. No cranial nerve deficit. Skin: Skin is intact. No rash noted. Psychiatric: Her speech is normal. Her affect is not angry. She does not express inappropriate judgment. Nursing note and vitals reviewed. ASSESSMENT and PLAN  Encounter Diagnoses   Name Primary?  Chronic low back pain, unspecified back pain laterality, with sciatica presence unspecified Yes    DDD (degenerative disc disease), lumbosacral     Spondylolisthesis, grade 1     H/O lumbosacral spine surgery     Cervicalgia     Right arm pain     Chronic right shoulder pain     Spondylosis of cervical region without myelopathy or radiculopathy     CAIO (generalized anxiety disorder)     Chronic pain syndrome     Osteoarthritis of lumbar spine, unspecified spinal osteoarthritis complication status     Scar tissue     History of gastric bypass    No indicators for recent medication misuse.  reviewed. Pain Meds and Quality Of Life have been reviewed. Nonpharmacologic therapy and non-opioid pharmacologic therapy were considered. If opioid therapy is prescribed, this is only if the expected benefits are anticipated to outweigh risks. Possible changes to treatment plan considered. Support/education given as needed. Today-medications are as listed. No significant changes to medications. Follow up -- 2 months.

## 2017-09-07 NOTE — MR AVS SNAPSHOT
Visit Information Date & Time Provider Department Dept. Phone Encounter #  
 9/7/2017  8:15 AM Pilar Meza MD [address] for Pain Management [phone] Follow-up Instructions Return in about 2 months (around 11/7/2017). Follow-up and Disposition History Your Appointments 11/9/2017  9:30 AM  
ROUTINE CARE with Meridee Ganser, MD  
2056 [facility] ([address]) Appt Note: routine f/u 6mo [address] [facility] [address]  
[address] [facility] [address] [facility] [address] Upcoming Health Maintenance Date Due  
 BREAST CANCER SCRN MAMMOGRAM 6/29/2017 INFLUENZA AGE 9 TO ADULT 8/1/2017 COLONOSCOPY 3/2/2019 DTaP/Tdap/Td series (2 - Td) 6/9/2026 Allergies as of 9/7/2017  Review Complete On: 9/7/2017 By: Pilar Meza MD  
  
 Severity Noted Reaction Type Reactions Ace Inhibitors  12/14/2010    Cough Aspirin  01/20/2010    Other (comments) GI bleeding & pain  
 Nsaids (Non-steroidal Anti-inflammatory Drug)  09/26/2012    Other (comments) Makes her stomach bleed Current Immunizations  Reviewed on 6/6/2017 Name Date Influenza Vaccine 11/13/2014, 1/7/2014 Influenza Vaccine (Quad) PF 1/12/2017, 10/14/2015 Pneumococcal Vaccine (Unspecified Type) 11/14/2014, 10/1/2011 Not reviewed this visit You Were Diagnosed With   
  
 Codes Comments Chronic low back pain, unspecified back pain laterality, with sciatica presence unspecified    -  Primary ICD-10-CM: M54.5, G89.29 ICD-9-CM: 724.2, 338.29   
 DDD (degenerative disc disease), lumbosacral     ICD-10-CM: M51.37 
ICD-9-CM: 722.52 Spondylolisthesis, grade 1     ICD-10-CM: M43.10 ICD-9-CM: 738.4 H/O lumbosacral spine surgery     ICD-10-CM: Z98.890 ICD-9-CM: V15.29 Cervicalgia     ICD-10-CM: M54.2 ICD-9-CM: 723.1 Right arm pain     ICD-10-CM: M64.113 ICD-9-CM: 729.5 Chronic right shoulder pain     ICD-10-CM: M25.511, G89.29 ICD-9-CM: 719.41, 338.29 Spondylosis of cervical region without myelopathy or radiculopathy     ICD-10-CM: M47.812 ICD-9-CM: 721.0   
 CAIO (generalized anxiety disorder)     ICD-10-CM: F41.1 ICD-9-CM: 300.02 Chronic pain syndrome     ICD-10-CM: G89.4 ICD-9-CM: 338.4 Osteoarthritis of lumbar spine, unspecified spinal osteoarthritis complication status     DXM-16-NQ: M47.816 ICD-9-CM: 721.3 Scar tissue     ICD-10-CM: L90.5 ICD-9-CM: 709.2 History of gastric bypass     ICD-10-CM: Z98.890 ICD-9-CM: V45.86 Vitals BP Pulse Temp Resp Height(growth percentile) Weight(growth percentile) 164/87 66 98.1 °F (36.7 °C) 18 5' 2\" (1.575 m) 184 lb (83.5 kg) BMI OB Status Smoking Status 33.65 kg/m2 Hysterectomy Former Smoker BMI and BSA Data Body Mass Index Body Surface Area  
 33.65 kg/m 2 1.91 m 2 Preferred Pharmacy Pharmacy Name Phone [address], [address] [place] [address] 1791 [facility] [phone] Your Updated Medication List  
  
   
This list is accurate as of: 9/7/17  8:35 AM.  Always use your most recent med list.  
  
  
  
  
 ACIPHEX 20 mg tablet Generic drug:  RABEprazole Take 20 mg by mouth daily. albuterol 90 mcg/actuation inhaler Commonly known as:  VENTOLIN HFA INHALE 2 PUFFS EVERY 4 HOURS AS NEEDED FOR WHEEZING  FOR SHORTNESS OFBREATH  
  
 albuterol-ipratropium 2.5 mg-0.5 mg/3 ml Nebu Commonly known as:  DUO-NEB  
3 mL by Nebulization route every six (6) hours as needed. amLODIPine 10 mg tablet Commonly known as:  Tucker Chavarria Take 1 Tab by mouth daily. azithromycin 250 mg tablet Commonly known as:  Kobe Gist 2 tabs po today then 1 tab po daily for 4 days. b complex vitamins tablet Take 1 tablet by mouth daily. candesartan-hydroCHLOROthiazide 32-12.5 mg per tablet Commonly known as:  ATACAND HCT  
TAKE ONE TABLET BY MOUTH ONCE DAILY CARAFATE 100 mg/mL suspension Generic drug:  sucralfate Take 1 tsp by mouth four (4) times daily. CENTRUM COMPLETE 18-0.4 mg Tab Generic drug:  Multivit-Iron-Min-Folic Acid Take 1 Tab by mouth daily. cholecalciferol 1,000 unit Cap Commonly known as:  VITAMIN D3 Take 1,000 Units by mouth daily. diazePAM 5 mg tablet Commonly known as:  VALIUM  
1 bid prn to help with muscle spasms, as needed. fluticasone-vilanterol 100-25 mcg/dose inhaler Commonly known as:  BREO ELLIPTA Take 1 Puff by inhalation daily. Indications: ASTHMA PREVENTION  
  
 levothyroxine 75 mcg tablet Commonly known as:  SYNTHROID Take 1 Tab by mouth Daily (before breakfast). lidocaine 5 % Commonly known as:  LIDODERM  
1 Patch by TransDERmal route every twelve (12) hours for 30 days. Apply patch to the affected area for 12 hours a day and remove for 12 hours a day. MIRALAX 17 gram packet Generic drug:  polyethylene glycol Take 17 g by mouth daily. montelukast 10 mg tablet Commonly known as:  SINGULAIR  
TAKE ONE TABLET BY MOUTH ONCE DAILY  
  
 naloxone 4 mg/actuation Spry Commonly known as:  NARCAN  
4 mg by Nasal route as needed. NASONEX 50 mcg/actuation nasal spray Generic drug:  mometasone USE 2 SPRAYS INTO EACH NOSTRIL ONCE DAILY  
  
 nystatin 100,000 unit/mL suspension Commonly known as:  MYCOSTATIN  
5 ml QID po,  swish and swallow  Indications: ORAL CANDIDIASIS  
  
 * oxyCODONE IR 15 mg immediate release tablet Commonly known as:  Buren Lyme Take 1 Tab by mouth four (4) times daily as needed for Pain for up to 30 days. Max Daily Amount: 60 mg.  
  
 * oxyCODONE IR 15 mg immediate release tablet Commonly known as:  Traceen Lyme Take 1 Tab by mouth four (4) times daily as needed for Pain for up to 30 days. Max Daily Amount: 60 mg. Start taking on:  10/6/2017 predniSONE 10 mg tablet Commonly known as:  DELTASONE  
30 mg po dailyx 3 days 20 mg po daily x 3 days 10 mg po daily x3 days  Indications: trush  
  
 pregabalin 75 mg capsule Commonly known as:  Barbarasonia Don Take 1 Cap by mouth two (2) times a day for 30 days. Max Daily Amount: 150 mg. PRILOSEC PO Take 20 mg by mouth daily. traMADol 200 mg tablet Commonly known as:  ULTRAM-ER Take 1 Tab by mouth daily for 30 days. Max Daily Amount: 200 mg. VOLTAREN 1 % Gel Generic drug:  diclofenac Apply 4 g to affected area four (4) times daily. * Notice: This list has 2 medication(s) that are the same as other medications prescribed for you. Read the directions carefully, and ask your doctor or other care provider to review them with you. Prescriptions Printed Refills  
 diazePAM (VALIUM) 5 mg tablet 1 Si bid prn to help with muscle spasms, as needed. Class: Print  
 oxyCODONE IR (ROXICODONE) 15 mg immediate release tablet 0 Sig: Take 1 Tab by mouth four (4) times daily as needed for Pain for up to 30 days. Max Daily Amount: 60 mg.  
 Class: Print Route: Oral  
 oxyCODONE IR (ROXICODONE) 15 mg immediate release tablet 0 Starting on: 10/6/2017 Sig: Take 1 Tab by mouth four (4) times daily as needed for Pain for up to 30 days. Max Daily Amount: 60 mg.  
 Class: Print Route: Oral  
 traMADol (ULTRAM-ER) 200 mg tablet 1 Sig: Take 1 Tab by mouth daily for 30 days. Max Daily Amount: 200 mg. Class: Print Route: Oral  
 pregabalin (LYRICA) 75 mg capsule 2 Sig: Take 1 Cap by mouth two (2) times a day for 30 days. Max Daily Amount: 150 mg.  
 Class: Print Route: Oral  
 lidocaine (LIDODERM) 5 % 3 Si Patch by TransDERmal route every twelve (12) hours for 30 days. Apply patch to the affected area for 12 hours a day and remove for 12 hours a day. Class: Print Route: TransDERmal  
  
Follow-up Instructions Return in about 2 months (around 11/7/2017). Introducing [facility]! Dear Juana Carter: Thank you for requesting a [employer] account. Our records indicate that you already have an active [employer] account. You can access your account anytime at [email or web address] Did you know that you can access your hospital and ER discharge instructions at any time in [employer]? You can also review all of your test results from your hospital stay or ER visit. Additional Information If you have questions, please visit the Frequently Asked Questions section of the [employer] website at [email or web address] Remember, [employer] is NOT to be used for urgent needs. For medical emergencies, dial 911. Now available from your iPhone and Android! Please provide this summary of care documentation to your next provider. Your primary care clinician is listed as Jannet Rebolledo. If you have any questions after today's visit, please call [phone].

## 2017-09-07 NOTE — PROGRESS NOTES
Nursing Notes    Patient presents to the office today in follow-up. Patient rates her pain at 0/10 on the numerical pain scale. Reviewed medications with counts as follows:    Rx Date filled Qty Dispensed Pill Count Last Dose Short   Valium 5 mg  09/01/17 60 51 yesterday no   Tramadol  mg 09/01/17 30 25 yesterday no   Oxycodone 15 mg 09/01/17 120 94 Last night no                    POC UDS was not performed in office today    Any new labs or imaging since last appointment? NO    Have you been to an emergency room (ER) or urgent care clinic since your last visit? NO            Have you been hospitalized since your last visit? NO     If yes, where, when, and reason for visit? Have you seen or consulted any other health care providers outside of the [address] [place] [address]  since your last visit? NO     If yes, where, when, and reason for visit? HM deferred to pcp.

## 2017-10-03 NOTE — TELEPHONE ENCOUNTER
Pt was returning a call. She said that a nurse called her and she couldn't couldn't get to her phone in time.  Please call [phone]

## 2017-10-03 NOTE — TELEPHONE ENCOUNTER
PT RTRYUE([phone]). HAS COLD AND STARTED WHEEZING. WANTS TO KNOW IF LZ WILL CALL IN PREDNISONE TO [facility] [phone].

## 2017-10-04 ENCOUNTER — OFFICE VISIT (OUTPATIENT)
Dept: PULMONOLOGY | Age: 62
End: 2017-10-04

## 2017-10-04 VITALS
RESPIRATION RATE: 20 BRPM | SYSTOLIC BLOOD PRESSURE: 160 MMHG | WEIGHT: 190 LBS | HEIGHT: 62 IN | TEMPERATURE: 98.5 F | BODY MASS INDEX: 34.96 KG/M2 | OXYGEN SATURATION: 93 % | HEART RATE: 71 BPM | DIASTOLIC BLOOD PRESSURE: 90 MMHG

## 2017-10-04 DIAGNOSIS — J44.9 CHRONIC OBSTRUCTIVE PULMONARY DISEASE, UNSPECIFIED COPD TYPE (HCC): ICD-10-CM

## 2017-10-04 DIAGNOSIS — J45.21 EXACERBATION OF INTERMITTENT ASTHMA, UNSPECIFIED ASTHMA SEVERITY: ICD-10-CM

## 2017-10-04 DIAGNOSIS — Z87.898 HISTORY OF MULTIPLE PULMONARY NODULES: ICD-10-CM

## 2017-10-04 DIAGNOSIS — J20.9 BRONCHITIS, ACUTE, WITH BRONCHOSPASM: Primary | ICD-10-CM

## 2017-10-04 DIAGNOSIS — Z87.19 H/O GASTROESOPHAGEAL REFLUX (GERD): ICD-10-CM

## 2017-10-04 RX ORDER — PREDNISONE 10 MG/1
TABLET ORAL
Qty: 18 TAB | Refills: 0 | Status: SHIPPED | OUTPATIENT
Start: 2017-10-04 | End: 2017-11-09

## 2017-10-04 RX ORDER — AZITHROMYCIN 250 MG/1
TABLET, FILM COATED ORAL
Qty: 6 TAB | Refills: 0 | Status: SHIPPED | OUTPATIENT
Start: 2017-10-04 | End: 2017-11-02 | Stop reason: ALTCHOICE

## 2017-10-04 NOTE — PATIENT INSTRUCTIONS

## 2017-10-04 NOTE — PROGRESS NOTES
[facility]  Pulmonary, Critical Care, and Sleep Medicine      Pulmonary Office -[address] [facility] Call    Name: Delmis Delgado     : 1955     Date: 10/4/2017        Subjective:   10/04/17    Patient is a 64 y.o. female is here for sick call for persistent wheezing and productive cough. She was seen twice for sick call (6/15 and 2017)  for asthma exacerbation, treated with Prednisone taper. Last visit she was treated with Z pack (chronic bronchitis) and Nystatin swiss for oral thrush likely from [facility]. She report no ER or hospitalization for exacerbations since last seen. Interval history:  She report his son and daughter in law who lives in [facility] was moving to [place] visiting her with their dog. Since then for the last 3 days, she report persistent wheezing-improved with Duo Neb via nebulizer Q4HR PRN-but reoccur. Had nocturnal awakening due to wheezing, relieved by duo neb treatment for the few nights. She report SOB only when wheezing. She report had some chest tightness with coughing-took OTC Mucinex and Robitussin, coughing with clear mucus with mild improvements. She report intermittent nasal and posterior clear discharges. She denies hemoptysis, fever or chills. She report chronic reflux-on Protonix, Aciphex and Carafate, follow by GI. She report appetite is good, no dysphagia or choking. She denies chest pain PND or LE edema. She report snores and was dx with sleep apnea and home CPAP long time ago, she report unable to tolerate wearing mask at that time-all equipments was taken away then. She report run out of Ventolin while out-use grandson QVAR inhaler-noticed improvement. She report compliant with Breo Ellipta 110/25 mcg 1 puff every day-sometimes forgot to rinse mouth after inhalation. She report Adrian neb via nebulizer Q4Hr for the last 3 days. Ventolin inhaler PRN when not home.       Past Medical History:   Diagnosis Date    Abdominal pain  Arthritis     Asthma 1/20/2010    Dr. Taylor Watson [facility])     stage 2 colon ca    Chemotherapy     Chronic low back pain 7/12/2010    Chronic lung disease     COPD (chronic obstructive pulmonary disease) ([facility] [address] 1/20/2010    Dr. Lili Figueredo FH: colon cancer 1/20/2010    GERD (gastroesophageal reflux disease)     Gout     H/O colon cancer, stage II     s/p resection, last colonoscopy 11/11- q3 yrs    History of multiple pulmonary nodules 1/20/2010    Hoarse 2015    candida, sees ENT    HTN (hypertension) 1/20/2010    Hyperlipemia 1/20/2010    Hypothyroid 1/20/2010    IBS (irritable bowel syndrome)     Liver disease     cysts on liver    Mammogram declined     Multinodular thyroid     sees ENT.   Due for repeat U/S with ENT in Nov 2017    Nephropathy, membranous 1/20/2010    Neuropathy 1/20/2010    Nicotine use disorder 1/20/2010    Prediabetes     PUD (peptic ulcer disease)     Pulmonary nodule     sees pulmonary    Rectocele 1/20/2010    S/P cataract extraction 1/3/2011    Thyroid disease     hypothyroid    Unspecified sleep apnea     does not use CPAP    Vitamin D deficiency 1/20/2010     Social History     Social History    Marital status:      Spouse name: N/A    Number of children: N/A    Years of education: N/A     Occupational History     Not Employed     Social History Main Topics    Smoking status: Former Smoker     Packs/day: 0.50     Years: 18.00     Types: Cigarettes     Quit date: 8/27/1990    Smokeless tobacco: Never Used      Comment: per patient she has not smoked in over 30 years    Alcohol use 0.0 oz/week     0 Standard drinks or equivalent per week      Comment: rare    Drug use: No    Sexual activity: Yes     Partners: Male     Birth control/ protection: None, Surgical     Other Topics Concern    Not on file     Social History Narrative      Past Surgical History:   Procedure Laterality Date    ABDOMEN SURGERY PROC UNLISTED      colon resection 8/2005- Dr. Cassie Mejia- colon ca   [facility] Arredondo      gastric bypass 12/2/09    HX GASTRIC BYPASS      HX GI      HX GYN      hysterectomy and BSO in 11/2006    HX HEENT      cataract sx tee    HX HYSTERECTOMY      HX LUMBAR FUSION  June 2013    L4 to L 5    HX TONSILLECTOMY          Allergies   Allergen Reactions    Ace Inhibitors Cough    Aspirin Other (comments)     GI bleeding & pain    Nsaids (Non-Steroidal Anti-Inflammatory Drug) Other (comments)     Makes her stomach bleed         Current Outpatient Prescriptions   Medication Sig Dispense Refill    predniSONE (DELTASONE) 10 mg tablet 30 mg po dailyx 3 days 20 mg po daily x 3 days 10 mg po daily x3 days  Indications: trush 18 Tab 0    azithromycin (ZITHROMAX) 250 mg tablet 2 tabs po today then 1 tab po daily for 4 days. 6 Tab 0    traMADol (ULTRAM-ER) 200 mg tablet Take 1 Tab by mouth daily for 30 days. Max Daily Amount: 200 mg. 30 Tab 1    pregabalin (LYRICA) 75 mg capsule Take 1 Cap by mouth two (2) times a day for 30 days. Max Daily Amount: 150 mg. 60 Cap 2    albuterol-ipratropium (DUO-NEB) 2.5 mg-0.5 mg/3 ml nebu 3 mL by Nebulization route every six (6) hours as needed. 120 Nebule 3    levothyroxine (SYNTHROID) 75 mcg tablet Take 1 Tab by mouth Daily (before breakfast). 90 Tab 1    candesartan-hydroCHLOROthiazide (ATACAND HCT) 32-12.5 mg per tablet TAKE ONE TABLET BY MOUTH ONCE DAILY 90 Tab 1    amLODIPine (NORVASC) 10 mg tablet Take 1 Tab by mouth daily. 90 Tab 1    montelukast (SINGULAIR) 10 mg tablet TAKE ONE TABLET BY MOUTH ONCE DAILY 90 Tab 3    fluticasone-vilanterol (BREO ELLIPTA) 100-25 mcg/dose inhaler Take 1 Puff by inhalation daily. Indications: ASTHMA PREVENTION 3 Inhaler 3    albuterol (VENTOLIN HFA) 90 mcg/actuation inhaler INHALE 2 PUFFS EVERY 4 HOURS AS NEEDED FOR WHEEZING  FOR SHORTNESS OFBREATH (Patient taking differently: 2 Puffs every six (6) hours as needed.  INHALE 2 PUFFS EVERY 4 HOURS AS NEEDED FOR WHEEZING  FOR SHORTNESS OFBREATH) 3 Inhaler 3    cholecalciferol (VITAMIN D3) 1,000 unit cap Take 1,000 Units by mouth daily.  sucralfate (CARAFATE) 100 mg/mL suspension Take 1 tsp by mouth four (4) times daily.  b complex vitamins tablet Take 1 tablet by mouth daily.  RABEprazole (ACIPHEX) 20 mg tablet Take 20 mg by mouth daily.  diazePAM (VALIUM) 5 mg tablet 1 bid prn to help with muscle spasms, as needed. 60 Tab 1    oxyCODONE IR (ROXICODONE) 15 mg immediate release tablet Take 1 Tab by mouth four (4) times daily as needed for Pain for up to 30 days. Max Daily Amount: 60 mg. 120 Tab 0    [START ON 10/6/2017] oxyCODONE IR (ROXICODONE) 15 mg immediate release tablet Take 1 Tab by mouth four (4) times daily as needed for Pain for up to 30 days. Max Daily Amount: 60 mg. 120 Tab 0    lidocaine (LIDODERM) 5 % 1 Patch by TransDERmal route every twelve (12) hours for 30 days. Apply patch to the affected area for 12 hours a day and remove for 12 hours a day. 1 Each 3    nystatin (MYCOSTATIN) 100,000 unit/mL suspension 5 ml QID po,  swish and swallow  Indications: ORAL CANDIDIASIS 60 mL 0    NASONEX 50 mcg/actuation nasal spray USE 2 SPRAYS INTO EACH NOSTRIL ONCE DAILY 1 Container 2    naloxone (NARCAN) 4 mg/actuation spry 4 mg by Nasal route as needed. 4 mg 0    Multivit-Iron-Min-Folic Acid (CENTRUM COMPLETE) 18-0.4 mg tab Take 1 Tab by mouth daily.  diclofenac (VOLTAREN) 1 % topical gel Apply 4 g to affected area four (4) times daily.  OMEPRAZOLE (PRILOSEC PO) Take 20 mg by mouth daily.  polyethylene glycol (MIRALAX) 17 gram packet Take 17 g by mouth daily.        Patient Active Problem List   Diagnosis Code    HTN (hypertension) I10    Asthma J45.909    COPD (chronic obstructive pulmonary disease) ([facility] [address] J44.9    Neuropathy G62.9    Vitamin D deficiency E55.9    Nephropathy, membranous     History of multiple pulmonary nodules Z87.898  FH: colon cancer Z80.0    Rectocele N81.6    Chronic low back pain M54.5, G89.29    S/P cataract extraction Z98.49    Vitamin B12 deficiency E53.8    H/O colon cancer, stage II Z85.038    DDD (degenerative disc disease), lumbosacral M51.37    Spondylolisthesis, grade 1 M43.10    Encounter for long-term (current) use of other medications Z79.899    H/O lumbosacral spine surgery Z98.890    Chronic pain syndrome G89.4    DJD (degenerative joint disease), lumbar M47.816    Scar tissue L90.5    History of gastric bypass Z98.890    CAP (community acquired pneumonia) J18.9    Asthma exacerbation J45. 0    CAIO (generalized anxiety disorder) F41.1    Lumbosacral neuritis M54.17    Cervicalgia M54.2    Shoulder pain, right M25.511    Cervical spondylosis M47.812    Advanced care planning/counseling discussion Z71.89    Right arm pain M79.601        Review of Systems:  Constitutional: No fever, no chills, no weight loss, no night sweats   HEENT: No epistaxis, chronic nasal/posterior drainage, no difficulty in swallowing, no redness in eyes  Respiratory: as above  Cardiovascular: chest tightness-resolved, no palpitations, intermittent chronic leg edema-not lately, no syncope  Gastrointestinal: Reflux, no abd pain, no vomiting, no diarrhea, no bleeding symptoms  Genitourinary: No urinary symptoms or hematuria  Integument/breast: No ulcers or rashes  Musculoskeletal: Chronic pain-follow by PCP/sports medicine  Neurological: No focal weakness, no seizures, no headaches   Behvioral/Psych: Anxiety-on meds, no depression     Objective:     Visit Vitals    /90    Pulse 71    Temp 98.5 °F (36.9 °C) (Oral)    Resp 20    Ht 5' 2\" (1.575 m)    Wt 86.2 kg (190 lb)    SpO2 93%    BMI 34.75 kg/m2        Physical Exam:   General: well developed female on RA with sats 93%, no acute distress.   HEENT: Pupils reactive, sclera anicteric, EOM intact  Neck: No adenopathy or thyroid swelling, no JVD, supple  CVS: S1S2, no murmurs  RS: Mod AE bilaterally, no tactile fremitus or egophony, no accessory muscle use, exp wheezing bilaterally. Abd: Soft, non tender, no hepatosplenomegaly  Neuro: Non focal, awake, alert  Extrm: No leg edema, clubbing or cyanosis  Skin: No rash, warm and dry    Data review:     Office Visit on 05/17/2017   Component Date Value Ref Range Status    AMPHETAMINES UR POC 05/17/2017 Negative   Final    COCAINE UR POC 05/17/2017 Negative   Final    MDMA/ECSTASY UR POC 05/17/2017 Negative   Final    METHADONE UR POC 05/17/2017 Negative   Final    METHAMPHETAMINE UR POC 05/17/2017 Negative   Final    METHYLPHENIDATE UR POC 05/17/2017 Negative   Final    OPIATES UR POC 05/17/2017 Negative   Final    OXYCODONE UR POC 05/17/2017 Presumptive Positive   Final    PHENCYCLIDINE UR POC 05/17/2017 Negative   Final    TRICYCLICS UR POC 12/24/4148 Negative   Final    BARBITURATES UR POC 05/17/2017 Negative   Final    BENZODIAZEPINES UR POC 05/17/2017 Presumptive Positive   Final    CANNABINOIDS UR POC 05/17/2017 Negative   Final   Hospital Outpatient Visit on 04/27/2017   Component Date Value Ref Range Status    TSH 04/27/2017 1.68  0.36 - 3.74 uIU/mL Final    Hemoglobin A1c 04/27/2017 6.6* 4.2 - 5.6 % Final    Comment: (NOTE)  HbA1C Interpretive Ranges  <5.7              Normal  5.7 - 6.4         Consider Prediabetes  >6.5              Consider Diabetes      Est. average glucose 04/27/2017 143  mg/dL Final    Comment: (NOTE)  The eAG should be interpreted with patient characteristics in mind   since ethnicity, interindividual differences, red cell lifespan,   variation in rates of glycation, etc. may affect the validity of the   calculation.        PFT's:  Date FEV1/FVC FEV1 Best FVC best TLC RV VC DLCO   3/10/16 76 86 112                                       Pulmonary Function Test 3/10/2016  FLOWS:  Maximal Mid Expiratory Flow rate is reduced to 35 % predicted  Forced Expiratory Volume in one second is reduced to 76 %  predicted  FEV 1% is reduced     Volumes:  NA    Flow Volume Loop:  Nonspecific obstructive pattern in Flow Volume Loop    Bronchodilator:  Significant improvement with bronchodilator    Diffusion:  NA    Impression:  Mild obstructive defect, Predominately small airways          [facility]  PULMONARY FUNCTION TEST:6/17/2003  COMMENTS:  Examination of the expiratory spirogram reveals a smooth curve and good  effort. The forced expiratory time is greater than six seconds and the  terminal plateau is reached. The data appears to be reproducible. Forced vital capacity is normal. FEV1 and the FEV1/FVC ratio are moderately  reduced. After the administration of an inhaled bronchodilator, there is  significant improvement in forced vital capacity and FEV1. Moderate  airflow obstruction remains. IMPRESSION:  Moderate airflow obstruction that does improve with inhaled  bronchodilator. Imaging:  I have personally reviewed the patients radiographs and have reviewed the reports:       Results from Hospital Encounter encounter on 12/11/16   XR CHEST PA LAT   Narrative 2 view chest    HISTORY: Wheezing, shortness of breath for a week. Coughing up blood and last 2  days. Cough with blood-tinged sputum    COMPARISON: Multiple prior studies with the most recent from August 27, 2016    FINDINGS:           Extensive patchy opacity overlies the right mid and lower lung fields. Findings  are worrisome for pneumonia. Follow-up films to document resolution and exclude  underlying mass are recommended. No pleural effusion or pneumothorax. Cardiac  mediastinal silhouette is unremarkable. Left lung is clear. Impression Impression:     Extensive patchy airspace opacities in the right mid and lower lung field  suggestive of pneumonia.           Results from [place] encounter on 03/28/17   CT CHEST WO CONT   Narrative CT CHEST WITHOUT ENHANCEMENT    INDICATION: History of asthma, COPD, pneumonia completed antibiotics and  steroids with persistent episodic shortness of breath and wheezing. TECHNIQUE: Axial images obtained from the thoracic inlet to the level of the  diaphragm without intravenous contrast. Coronal and sagittal reformatted images  were obtained. All CT scans at this facility are performed using dose optimization technique as  appropriate to a performed exam, to include automated exposure control,  adjustment of the mA and/or kV according to patient size (including appropriate  matching first site-specific examinations), or use of iterative reconstruction  technique. COMPARISON: CT chest 12/11/2016; 8/30/2006. CHEST FINDINGS:   The evaluation of the mediastinum, hilum and vascular structures is limited in  the absence of intravenous contrast.      Thyroid: Unremarkable in its visualized aspects. Pericardium/ Heart: Decreased density in the cardiac chambers. Multi cardiac  chamber enlargement. New pericardial effusion since prior exam with a transverse  diameter of 15 mm anteriorly, previously 5 mm. Effusion is near water density. No calcified coronary arteriosclerosis. Aorta/ Vessels: No aneurysm. Lymph Nodes: Unremarkable. .    Lungs: Resolved right lung with scattered bronchovascular consolidative and  groundglass opacities. Milder opacities in the left lung have also resolved. Decreased size of a triangular 5 mm nodular density associated with the right  major fissure (series 4, image 34), previously 7 mm, likely a lymph node. Calcified granuloma moderate. Similar 4 mm subpleural nodular density left lower  lobe (41). Decreased density of 0.4 cm groundglass subpleural nodule left lower  lobe (35). Pleura: No effusion. Upper Abdomen: IVC and hepatic veins appear dilated. Similar 0.9 cm  well-circumscribed hypodensity in the right posterior hepatic lobe, too small to  characterize. Similar 0.8 cm right posterior hepatic lobe lesion. Postsurgical  changes partial gastric resection. Diverticular disease. Bilateral symmetric  adrenal thickening. Bones/soft tissues: Unremarkable. Impression IMPRESSION:    1. Resolved findings of pneumonia. 2.  New simple appearing small pericardial effusion. 3.  A few scattered pulmonary nodular densities which have been stable since  2006 compatible with benign etiology. 4.  Multi cardiac chamber enlargement with dilated IVC and hepatic veins is  suggestive of right heart dysfunction. 5.  Diverticulosis. 6.  Anemia. Results for orders placed or performed during the hospital encounter of 12/11/16   EKG, 12 LEAD, INITIAL   Result Value Ref Range    Ventricular Rate 70 BPM    Atrial Rate 70 BPM    P-R Interval 130 ms    QRS Duration 102 ms    Q-T Interval 416 ms    QTC Calculation (Bezet) 449 ms    Calculated P Axis 49 degrees    Calculated R Axis -51 degrees    Calculated T Axis 74 degrees    Diagnosis       Normal sinus rhythm  Left anterior fascicular block  Nonspecific T wave abnormality  Abnormal ECG  When compared with ECG of 12-JUN-2013 07:09,  premature atrial complexes are no longer present  T wave inversion no longer evident in Inferior leads  Confirmed by Layne Ek ([id number]) on 12/11/2016 1:14:37 PM     Results for orders placed or performed in visit on 06/14/13   AMB POC EKG ROUTINE W/ 12 LEADS, INTER & REP    Impression    See progress note. Results from [place] encounter on 01/26/17   US THYROID/PARATHYROID/SOFT TISS   Narrative PROCEDURE:  Ultrasound Thyroid Scan. INDICATION:  Thyroid nodules seen on recent CTA chest.    COMPARISON:  CTA chest 12/11/16. FINDINGS:    The thyroid measurements are as follows. Right lobe:  5.7 x 1.9 x 2.6 cm  Left lobe:  5.6 x 2.7 x 2.8 cm  Isthmus:  0.61 cm. Both lobes of the thyroid appear diffusely heterogeneous with numerous nodules  of varying sizes. No dominant thyroid nodule is detected.   Both lobes  demonstrate diffusely increased vascular flow. Impression IMPRESSION:    Diffusely heterogeneous thyroid lobes with numerous thyroid nodules of varying  sizes and varying echogenicity. No dominant thyroid nodule. With increased  vascular flow, the diffusely heterogeneous thyroid with numerous nodules may  represent a Hashimoto's thyroiditis with pseudonodules. Recommend correlation  with clinical data. Lab Results   Component Value Date/Time    D DIMER 0.88 12/11/2016 09:40 AM             Ms. Parth Acuña has a reminder for a \"due or due soon\" health maintenance. I have asked that she contact her primary care provider for follow-up on this health maintenance. IMPRESSION:   · Intermittent Asthma/COPD exacerbation, not controlled likely trigger by continued exposure to allergens ( is a smoker and possible allergens in the house) son dog and chronic bronchitis. · Asthma-COPD overlap syndrome. · Intermittent chronic rhinitis, likely allergens trigger. · Hx of multiple pulmonary nodules-stable since 2006, last CT chest 3/28/2017, no further imaging for per Fleischner Criteria. · Former Smoker, quit long time ago. · Obesity, BMI 34  · Hx gastric bypass,  · GERD/Reflux-follow by GI  · Hx sleep apnea,per pt      RECOMMENDATIONS:   · Prednisone taper, script send,  instructed on s/s hyperglycemia-call PCP for management and monitoring. · Z pack-empiric antibiotic for chronic bronchitis, script send. · Continue Breo Ellipta, proper use and technique reviewed, always wash/rinse gargle after inhalation. · Duo neb via neb Q 4 Hr continue for the next 2-3 days, until wheezing resolve. · Ventolin inh PRN to carry when not home all the time. · Discussed not to use grandson QVAR inhaler, defer to Dr Gregorio Carpenter if needed to switch inhaler. · Continue Singulair as px. · Saline flush and continue Flonase for intermittent nasal and posterior drainage.   · Warning signs of respiratory distress were reviewed with the patient. · Discussed avoidance of precipitant such as smoke( smoker and dog. She report recently approved to move to an apt, report no divorce for now just  with  due to smoking issue. · Annual preventive vaccination discussed, plan Flu vaccine on next visit-per pt  · Discussed PSG and follow up with Dr Chalino Lopez regarding sleep apnea to be evaluated, declined for now but will reconsider once she's feeling better. · GERD control-possible trigger, encouraged small frequent meals- sit up at least 1 Hr after and 2-3 Hr before bedtime, Keep HOB-may get support head post to keep head elevated. · Pulmonary toilette, asthma/COPD action plan reviewed, well balanced meal and activity as tolerated  · Reviewed all medications and discussed concerns, answered questions. · Follow-up with PFT on visit or come back sooner should new symptoms or problems arise.        Mi Hicks, NP

## 2017-10-04 NOTE — MR AVS SNAPSHOT
Visit Information Date & Time Provider Department Dept. Phone Encounter #  
 10/4/2017  9:30 AM [facility] [place] [phone] Follow-up Instructions Return in about 1 month (around 11/4/2017). Your Appointments 11/2/2017  8:30 AM  
Follow Up with Raúl Borges MD  
[facility]) Appt Note: return in 2 months [address] [facility] [address] [phone]  Cynthia 6669 [id number]  
  
    
 11/3/2017  9:00 AM  
Nurse Visit with PPA SPIROMETRY [address] ([facility]) Appt Note: [email or web address]  
 [address] [place] [address], [address] [address]  
[phone]  
  
   
 [address] [place] [address], [address] [place] [address],[facility] [address] [place] [address]  
  
    
 11/3/2017 10:00 AM  
Follow Up with Kathy Santos MD  
[address] ([facility]) Appt Note: FROM [email or web address] [id number] [address], [address] [address]  
[phone]  
  
   
 [address] [place] [address], [address] [place] [address],[address] [place] [address]  
  
    
 11/9/2017  9:30 AM  
ROUTINE CARE with Kaya Morales MD  
[facility] ([facility]) Appt Note: routine f/u 6mo [address] [facility] [address] [facility] [address]  
[facility] [address] [facility] [address] [facility] [address] Upcoming Health Maintenance Date Due  
 BREAST CANCER SCRN MAMMOGRAM 6/29/2017 INFLUENZA AGE 9 TO ADULT 8/1/2017 COLONOSCOPY 3/2/2019 DTaP/Tdap/Td series (2 - Td) 6/9/2026 Allergies as of 10/4/2017  Review Complete On: 9/7/2017 By: Raúl Borges MD  
  
 Severity Noted Reaction Type Reactions Ace Inhibitors  12/14/2010    Cough Aspirin  01/20/2010    Other (comments) GI bleeding & pain  
 Nsaids (Non-steroidal Anti-inflammatory Drug)  09/26/2012    Other (comments) Makes her stomach bleed Current Immunizations  Reviewed on 6/6/2017 Name Date Influenza Vaccine 11/13/2014, 1/7/2014 Influenza Vaccine (Quad) PF 1/12/2017, 10/14/2015 Pneumococcal Vaccine (Unspecified Type) 11/14/2014, 10/1/2011 Not reviewed this visit You Were Diagnosed With   
  
 Codes Comments Bronchitis, acute, with bronchospasm    -  Primary ICD-10-CM: J20.9 ICD-9-CM: 466.0 Exacerbation of intermittent asthma, unspecified asthma severity     ICD-10-CM: J45.21 ICD-9-CM: 358.77 Chronic obstructive pulmonary disease, unspecified COPD type ([facility]     ICD-10-CM: J44.9 ICD-9-CM: 273 History of multiple pulmonary nodules     ICD-10-CM: Z87.898 ICD-9-CM: V12.69   
 H/O gastroesophageal reflux (GERD)     ICD-10-CM: Z87.19 ICD-9-CM: V12.79 Vitals BP Pulse Temp Resp Height(growth percentile) Weight(growth percentile) 160/90 71 98.5 °F (36.9 °C) (Oral) 20 5' 2\" (1.575 m) 190 lb (86.2 kg) SpO2 BMI OB Status Smoking Status 93% 34.75 kg/m2 Hysterectomy Former Smoker BMI and BSA Data Body Mass Index Body Surface Area 34.75 kg/m 2 1.94 m 2 Preferred Pharmacy Pharmacy Name Phone [address], [address] [place] [address] 22 [address] [facility] [phone] Your Updated Medication List  
  
   
This list is accurate as of: 10/4/17 10:19 AM.  Always use your most recent med list.  
  
  
  
  
 ACIPHEX 20 mg tablet Generic drug:  RABEprazole Take 20 mg by mouth daily. albuterol 90 mcg/actuation inhaler Commonly known as:  VENTOLIN HFA INHALE 2 PUFFS EVERY 4 HOURS AS NEEDED FOR WHEEZING  FOR SHORTNESS OFBREATH  
  
 albuterol-ipratropium 2.5 mg-0.5 mg/3 ml Nebu Commonly known as:  DUO-NEB  
3 mL by Nebulization route every six (6) hours as needed. amLODIPine 10 mg tablet Commonly known as:  Jean [place] Eagles Take 1 Tab by mouth daily. azithromycin 250 mg tablet Commonly known as:  Nelli John 2 tabs po today then 1 tab po daily for 4 days. b complex vitamins tablet Take 1 tablet by mouth daily. candesartan-hydroCHLOROthiazide 32-12.5 mg per tablet Commonly known as:  ATACAND HCT  
TAKE ONE TABLET BY MOUTH ONCE DAILY CARAFATE 100 mg/mL suspension Generic drug:  sucralfate Take 1 tsp by mouth four (4) times daily. CENTRUM COMPLETE 18-0.4 mg Tab Generic drug:  Multivit-Iron-Min-Folic Acid Take 1 Tab by mouth daily. cholecalciferol 1,000 unit Cap Commonly known as:  VITAMIN D3 Take 1,000 Units by mouth daily. diazePAM 5 mg tablet Commonly known as:  VALIUM  
1 bid prn to help with muscle spasms, as needed. fluticasone-vilanterol 100-25 mcg/dose inhaler Commonly known as:  BREO ELLIPTA Take 1 Puff by inhalation daily. Indications: ASTHMA PREVENTION  
  
 levothyroxine 75 mcg tablet Commonly known as:  SYNTHROID Take 1 Tab by mouth Daily (before breakfast). lidocaine 5 % Commonly known as:  LIDODERM  
1 Patch by TransDERmal route every twelve (12) hours for 30 days. Apply patch to the affected area for 12 hours a day and remove for 12 hours a day. MIRALAX 17 gram packet Generic drug:  polyethylene glycol Take 17 g by mouth daily. montelukast 10 mg tablet Commonly known as:  SINGULAIR  
TAKE ONE TABLET BY MOUTH ONCE DAILY  
  
 naloxone 4 mg/actuation nasal spray Commonly known as:  NARCAN  
4 mg by Nasal route as needed. NASONEX 50 mcg/actuation nasal spray Generic drug:  mometasone USE 2 SPRAYS INTO EACH NOSTRIL ONCE DAILY  
  
 nystatin 100,000 unit/mL suspension Commonly known as:  MYCOSTATIN  
5 ml QID po,  swish and swallow  Indications: ORAL CANDIDIASIS  
  
 * oxyCODONE IR 15 mg immediate release tablet Commonly known as:  [place] Salen Take 1 Tab by mouth four (4) times daily as needed for Pain for up to 30 days. Max Daily Amount: 60 mg. * oxyCODONE IR 15 mg immediate release tablet Commonly known as:  Moshe Julita Take 1 Tab by mouth four (4) times daily as needed for Pain for up to 30 days. Max Daily Amount: 60 mg. Start taking on:  10/6/2017  
  
 predniSONE 10 mg tablet Commonly known as:  DELTASONE  
30 mg po dailyx 3 days 20 mg po daily x 3 days 10 mg po daily x3 days  Indications: trush  
  
 pregabalin 75 mg capsule Commonly known as:  Reraghu Alberto Take 1 Cap by mouth two (2) times a day for 30 days. Max Daily Amount: 150 mg. PRILOSEC PO Take 20 mg by mouth daily. traMADol 200 mg tablet Commonly known as:  ULTRAM-ER Take 1 Tab by mouth daily for 30 days. Max Daily Amount: 200 mg. VOLTAREN 1 % Gel Generic drug:  diclofenac Apply 4 g to affected area four (4) times daily. * Notice: This list has 2 medication(s) that are the same as other medications prescribed for you. Read the directions carefully, and ask your doctor or other care provider to review them with you. Prescriptions Sent to Pharmacy Refills  
 predniSONE (DELTASONE) 10 mg tablet 0 Si mg po dailyx 3 days 20 mg po daily x 3 days 10 mg po daily x3 days  Indications: trus Class: Normal  
 Pharmacy: [facility] [address] Ph #: [phone]  
 azithromycin (ZITHROMAX) 250 mg tablet 0 Si tabs po today then 1 tab po daily for 4 days. Class: Normal  
 Pharmacy: [address] [place] [address] Ph #: [phone] Follow-up Instructions Return in about 1 month (around 2017). To-Do List   
 10/05/2017 Procedures: AMB POC PFT COMPLETE W/BRONCHODILATOR   
  
 10/05/2017 Procedures: AMB POC PFT COMPLETE W/O BRONCHODILATOR   
  
 10/05/2017 Procedures:  DIFFUSING CAPACITY   
  
 10/05/2017 Procedures:  GAS DILUT/WASHOUT LUNG VOL W/WO DISTRIB VENT&VOL Patient Instructions Gastroesophageal Reflux Disease (GERD): Care Instructions Your Care Instructions Gastroesophageal reflux disease (GERD) is the backward flow of stomach acid into the esophagus. The esophagus is the tube that leads from your throat to your stomach. A one-way valve prevents the stomach acid from moving up into this tube. When you have GERD, this valve does not close tightly enough. If you have mild GERD symptoms including heartburn, you may be able to control the problem with antacids or over-the-counter medicine. Changing your diet, losing weight, and making other lifestyle changes can also help reduce symptoms. Follow-up care is a key part of your treatment and safety. Be sure to make and go to all appointments, and call your doctor if you are having problems. Its also a good idea to know your test results and keep a list of the medicines you take. How can you care for yourself at home? · Take your medicines exactly as prescribed. Call your doctor if you think you are having a problem with your medicine. · Your doctor may recommend over-the-counter medicine. For mild or occasional indigestion, antacids, such as Tums, Gaviscon, Mylanta, or Maalox, may help. Your doctor also may recommend over-the-counter acid reducers, such as Pepcid AC, Tagamet HB, Zantac 75, or Prilosec. Read and follow all instructions on the label. If you use these medicines often, talk with your doctor. · Change your eating habits. ¨ Its best to eat several small meals instead of two or three large meals. ¨ After you eat, wait 2 to 3 hours before you lie down. ¨ Chocolate, mint, and alcohol can make GERD worse. ¨ Spicy foods, foods that have a lot of acid (like tomatoes and oranges), and coffee can make GERD symptoms worse in some people. If your symptoms are worse after you eat a certain food, you may want to stop eating that food to see if your symptoms get better. · Do not smoke or chew tobacco. Smoking can make GERD worse. If you need help quitting, talk to your doctor about stop-smoking programs and medicines. These can increase your chances of quitting for good. · If you have GERD symptoms at night, raise the head of your bed 6 to 8 inches by putting the frame on blocks or placing a foam wedge under the head of your mattress. (Adding extra pillows does not work.) · Do not wear tight clothing around your middle. · Lose weight if you need to. Losing just 5 to 10 pounds can help. When should you call for help? Call your doctor now or seek immediate medical care if: 
· You have new or different belly pain. · Your stools are black and tarlike or have streaks of blood. Watch closely for changes in your health, and be sure to contact your doctor if: 
· Your symptoms have not improved after 2 days. · Food seems to catch in your throat or chest. 
Where can you learn more? Go to http://danae-naif.info/. Enter L188 in the search box to learn more about \"Gastroesophageal Reflux Disease (GERD): Care Instructions. \" Current as of: August 9, 2016 Content Version: 11.3 © 8315-6999 [employer]. Care instructions adapted under license by [employer] (which disclaims liability or warranty for this information). If you have questions about a medical condition or this instruction, always ask your healthcare professional. [place] [address] any warranty or liability for your use of this information. Introducing [facility]! Dear Alda Collier: Thank you for requesting a [employer] account. Our records indicate that you already have an active [employer] account. You can access your account anytime at [email or web address] [place] Did you know that you can access your hospital and ER discharge instructions at any time in [employer]?   You can also review all of your test results from your hospital stay or ER visit. Additional Information If you have questions, please visit the Frequently Asked Questions section of the [employer] website at [email or web address] Remember, [employer] is NOT to be used for urgent needs. For medical emergencies, dial 911. Now available from your iPhone and Android! Please provide this summary of care documentation to your next provider. Your primary care clinician is listed as Helen Zhao. If you have any questions after today's visit, please call [phone].

## 2017-10-10 ENCOUNTER — TELEPHONE (OUTPATIENT)
Dept: PULMONOLOGY | Age: 62
End: 2017-10-10

## 2017-10-10 NOTE — TELEPHONE ENCOUNTER
Pt was seen on 10/4 by [facility] and she says that she still isn't feeling any better. She was given prednisone and zithromax and doesn't feel like it has helped.  Please call [phone]

## 2017-10-10 NOTE — TELEPHONE ENCOUNTER
Pt states she is still with sxs of not feeling completely well. Still with some congestion. Pt states she thinks she hasnt given it enough time. Advised pt if sxs persist to call us back to be re-evaluated.  Pt states she understands

## 2017-11-01 RX ORDER — ALBUTEROL SULFATE 90 UG/1
AEROSOL, METERED RESPIRATORY (INHALATION)
Qty: 1 INHALER | Refills: 1 | Status: SHIPPED | OUTPATIENT
Start: 2017-11-01 | End: 2017-11-03 | Stop reason: SDUPTHER

## 2017-11-02 ENCOUNTER — OFFICE VISIT (OUTPATIENT)
Dept: PAIN MANAGEMENT | Age: 62
End: 2017-11-02

## 2017-11-02 VITALS
BODY MASS INDEX: 33.86 KG/M2 | RESPIRATION RATE: 12 BRPM | TEMPERATURE: 96.7 F | WEIGHT: 184 LBS | HEART RATE: 73 BPM | DIASTOLIC BLOOD PRESSURE: 96 MMHG | SYSTOLIC BLOOD PRESSURE: 165 MMHG | HEIGHT: 62 IN

## 2017-11-02 DIAGNOSIS — M25.511 CHRONIC RIGHT SHOULDER PAIN: ICD-10-CM

## 2017-11-02 DIAGNOSIS — G89.29 CHRONIC RIGHT SHOULDER PAIN: ICD-10-CM

## 2017-11-02 DIAGNOSIS — Z79.899 ENCOUNTER FOR LONG-TERM (CURRENT) USE OF HIGH-RISK MEDICATION: ICD-10-CM

## 2017-11-02 DIAGNOSIS — M47.812 SPONDYLOSIS OF CERVICAL REGION WITHOUT MYELOPATHY OR RADICULOPATHY: ICD-10-CM

## 2017-11-02 DIAGNOSIS — Z98.890 H/O LUMBOSACRAL SPINE SURGERY: ICD-10-CM

## 2017-11-02 DIAGNOSIS — M51.37 DDD (DEGENERATIVE DISC DISEASE), LUMBOSACRAL: ICD-10-CM

## 2017-11-02 DIAGNOSIS — F41.1 GAD (GENERALIZED ANXIETY DISORDER): ICD-10-CM

## 2017-11-02 DIAGNOSIS — M54.2 CERVICALGIA: ICD-10-CM

## 2017-11-02 DIAGNOSIS — M54.5 CHRONIC LOW BACK PAIN, UNSPECIFIED BACK PAIN LATERALITY, WITH SCIATICA PRESENCE UNSPECIFIED: Primary | ICD-10-CM

## 2017-11-02 DIAGNOSIS — M43.10 SPONDYLOLISTHESIS, GRADE 1: ICD-10-CM

## 2017-11-02 DIAGNOSIS — L90.5 SCAR TISSUE: ICD-10-CM

## 2017-11-02 DIAGNOSIS — G89.29 CHRONIC LOW BACK PAIN, UNSPECIFIED BACK PAIN LATERALITY, WITH SCIATICA PRESENCE UNSPECIFIED: Primary | ICD-10-CM

## 2017-11-02 RX ORDER — OXYCODONE HYDROCHLORIDE 15 MG/1
15 TABLET ORAL
Qty: 120 TAB | Refills: 0 | Status: SHIPPED | OUTPATIENT
Start: 2017-12-01 | End: 2018-01-03

## 2017-11-02 RX ORDER — TRAMADOL HYDROCHLORIDE 200 MG/1
1 TABLET, FILM COATED, EXTENDED RELEASE ORAL DAILY
COMMUNITY
End: 2017-11-09

## 2017-11-02 RX ORDER — OXYCODONE HYDROCHLORIDE 15 MG/1
15 TABLET ORAL
Qty: 120 TAB | Refills: 0 | Status: SHIPPED | OUTPATIENT
Start: 2017-11-02 | End: 2017-11-03 | Stop reason: SDUPTHER

## 2017-11-02 RX ORDER — PREGABALIN 75 MG/1
75 CAPSULE ORAL 2 TIMES DAILY
Qty: 60 CAP | Refills: 2 | Status: SHIPPED | OUTPATIENT
Start: 2017-11-02 | End: 2018-01-03

## 2017-11-02 RX ORDER — TRAMADOL HYDROCHLORIDE 200 MG/1
200 TABLET, EXTENDED RELEASE ORAL DAILY
Qty: 30 TAB | Refills: 1 | Status: SHIPPED | OUTPATIENT
Start: 2017-11-02 | End: 2018-01-03

## 2017-11-02 RX ORDER — DIAZEPAM 5 MG/1
TABLET ORAL
Qty: 60 TAB | Refills: 1 | Status: SHIPPED | OUTPATIENT
Start: 2017-11-02 | End: 2019-01-31

## 2017-11-02 NOTE — PROGRESS NOTES
HISTORY OF PRESENT ILLNESS  Olaf Monson is a 64 y.o. female. HPI Comments: Visit survey reviewed  Chief complaint- chronic pain syndrome- low back pain, shoulder pain  Medication helps improve general activity, mood, walking, sleep, enjoyment of life  70% complete relief in the past 30 days  She will continue with extended release tramadol 300 mg once a day  Continue oxycodone 15 mg 4 times a day as needed  Continue Valium 5 mg twice a day as needed for muscle spasms  Lyrica 75 mg twice a day    No new significant side effects reported  Medication continues to help improve quality of life. Medications reviewed including risk and benefits. Recent average level of pain-8    Measurement of clinical outcome for chronic pain patient/ SPAASMS Score Card-            Information reviewed and will be scanned. Total score today-9      Review of Systems   Constitutional: Positive for malaise/fatigue. Negative for fever. HENT: Negative for sore throat. Eyes: Negative for blurred vision and double vision. Respiratory: Positive for shortness of breath. Negative for cough. Cardiovascular: Negative for chest pain and leg swelling. Gastrointestinal: Positive for heartburn. Negative for nausea. Genitourinary: Negative for dysuria and urgency. Musculoskeletal: Positive for back pain, falls and joint pain. Skin: Negative for itching and rash. Neurological: Negative for dizziness, seizures and headaches. Endo/Heme/Allergies: Negative for environmental allergies. Does not bruise/bleed easily. Psychiatric/Behavioral: Positive for depression. Negative for suicidal ideas. The patient is nervous/anxious and has insomnia. Physical Exam   Constitutional: She appears well-developed and well-nourished. She is cooperative. She does not have a sickly appearance. HENT:   Head: Normocephalic and atraumatic. Right Ear: External ear normal. No drainage.    Left Ear: External ear normal. No drainage. Nose: Nose normal.   Eyes: Lids are normal. Right eye exhibits no discharge. Left eye exhibits no discharge. Right conjunctiva has no hemorrhage. Left conjunctiva has no hemorrhage. Neck: Neck supple. No tracheal deviation present. No thyroid mass present. Pulmonary/Chest: Effort normal. No respiratory distress. Neurological: She is alert. No cranial nerve deficit. Skin: Skin is intact. No rash noted. Psychiatric: Her speech is normal. Her affect is not angry. She does not express inappropriate judgment. Nursing note and vitals reviewed. ASSESSMENT and PLAN  Encounter Diagnoses   Name Primary?  Chronic low back pain, unspecified back pain laterality, with sciatica presence unspecified Yes    DDD (degenerative disc disease), lumbosacral     Spondylolisthesis, grade 1     H/O lumbosacral spine surgery     Cervicalgia     Chronic right shoulder pain     Spondylosis of cervical region without myelopathy or radiculopathy     CAIO (generalized anxiety disorder)     Scar tissue    No indicators for recent medication misuse.  reviewed. Pain Meds and Quality Of Life have been reviewed. Nonpharmacologic therapy and non-opioid pharmacologic therapy were considered. If opioid therapy is prescribed, this is only if the expected benefits are anticipated to outweigh risks. Possible changes to treatment plan considered. Support/education given as needed. Today-medications are as listed. No significant changes to medications. Follow up -- 2 months.    --Urine test or oral swab today. Also, the prescription monitoring program was reviewed today. The majority of today's visit was spent counseling and coordinating care. Total visit time-40 minutes. -Dragon medical dictation software was used for portions of this report. Unintended errors may occur.

## 2017-11-02 NOTE — MR AVS SNAPSHOT
Visit Information Date & Time Provider Department Dept. Phone Encounter #  
 11/2/2017  8:30 AM Ministerio Guerar MD [address] for Pain Management [phone] Follow-up Instructions Return in about 2 months (around 1/2/2018). Follow-up and Disposition History Your Appointments 11/3/2017  9:00 AM  
Nurse Visit with PPA SPIROMETRY [address] ([address]) Appt Note: APPT [email or web address]  
 [address] [place] [address], [address] [address]  
[phone]  
  
   
 [address] [place] [address], [address] [place] [address],[address] [facility] [address]  
  
    
 11/3/2017 10:00 AM  
Follow Up with Wayne Ibarra MD  
[address] ([address]) Appt Note: FROM [email or web address] [address] [address], [address] [address]  
[phone]  
  
   
 [address] [place] [address], [address] [place] [address],[address] [facility] [address]  
  
    
 11/9/2017  9:30 AM  
ROUTINE CARE with Rachel Frazier MD  
920 [facility] ([address]) Appt Note: routine f/u 6mo [facility] [address] [facility]  
[address] [facility] [address] [facility] Upcoming Health Maintenance Date Due  
 BREAST CANCER SCRN MAMMOGRAM 6/29/2017 INFLUENZA AGE 9 TO ADULT 8/1/2017 COLONOSCOPY 3/2/2019 DTaP/Tdap/Td series (2 - Td) 6/9/2026 Allergies as of 11/2/2017  Review Complete On: 11/2/2017 By: Ministerio Guerra MD  
  
 Severity Noted Reaction Type Reactions Ace Inhibitors  12/14/2010    Cough Aspirin  01/20/2010    Other (comments) GI bleeding & pain  
 Nsaids (Non-steroidal Anti-inflammatory Drug)  09/26/2012    Other (comments) Makes her stomach bleed Current Immunizations  Reviewed on 6/6/2017 Name Date Influenza Vaccine 11/13/2014, 1/7/2014 Influenza Vaccine (Quad) PF 1/12/2017, 10/14/2015 Pneumococcal Vaccine (Unspecified Type) 11/14/2014, 10/1/2011 Not reviewed this visit You Were Diagnosed With   
  
 Codes Comments Chronic low back pain, unspecified back pain laterality, with sciatica presence unspecified    -  Primary ICD-10-CM: M54.5, G89.29 ICD-9-CM: 724.2, 338.29   
 DDD (degenerative disc disease), lumbosacral     ICD-10-CM: M51.37 
ICD-9-CM: 722.52 Spondylolisthesis, grade 1     ICD-10-CM: M43.10 ICD-9-CM: 738.4 H/O lumbosacral spine surgery     ICD-10-CM: Z98.890 ICD-9-CM: V15.29 Cervicalgia     ICD-10-CM: M54.2 ICD-9-CM: 723.1 Chronic right shoulder pain     ICD-10-CM: M25.511, G89.29 ICD-9-CM: 719.41, 338.29 Spondylosis of cervical region without myelopathy or radiculopathy     ICD-10-CM: M47.812 ICD-9-CM: 721.0   
 CAIO (generalized anxiety disorder)     ICD-10-CM: F41.1 ICD-9-CM: 300.02 Scar tissue     ICD-10-CM: L90.5 ICD-9-CM: 709.2 Vitals BP Pulse Temp Resp Height(growth percentile) Weight(growth percentile) (!) 165/96 73 96.7 °F (35.9 °C) 12 5' 2\" (1.575 m) 184 lb (83.5 kg) BMI OB Status Smoking Status 33.65 kg/m2 Hysterectomy Former Smoker Vitals History BMI and BSA Data Body Mass Index Body Surface Area  
 33.65 kg/m 2 1.91 m 2 Preferred Pharmacy Pharmacy Name [facility] - [facility], [address] [place] [address],[address] [place] [address] [phone] Your Updated Medication List  
  
   
This list is accurate as of: 11/2/17  9:13 AM.  Always use your most recent med list.  
  
  
  
  
 ACIPHEX 20 mg tablet Generic drug:  RABEprazole Take 20 mg by mouth daily. albuterol-ipratropium 2.5 mg-0.5 mg/3 ml Nebu Commonly known as:  DUO-NEB  
3 mL by Nebulization route every six (6) hours as needed. amLODIPine 10 mg tablet Commonly known as:  No Pace Take 1 Tab by mouth daily. b complex vitamins tablet Take 1 tablet by mouth daily. candesartan-hydroCHLOROthiazide 32-12.5 mg per tablet Commonly known as:  ATACAND HCT  
TAKE ONE TABLET BY MOUTH ONCE DAILY CARAFATE 100 mg/mL suspension Generic drug:  sucralfate Take 1 tsp by mouth four (4) times daily. CENTRUM COMPLETE 18-0.4 mg Tab Generic drug:  Multivit-Iron-Min-Folic Acid Take 1 Tab by mouth daily. cholecalciferol 1,000 unit Cap Commonly known as:  VITAMIN D3 Take 1,000 Units by mouth daily. diazePAM 5 mg tablet Commonly known as:  VALIUM  
1 bid prn to help with muscle spasms, as needed. fluticasone-vilanterol 100-25 mcg/dose inhaler Commonly known as:  BREO ELLIPTA Take 1 Puff by inhalation daily. Indications: ASTHMA PREVENTION  
  
 levothyroxine 75 mcg tablet Commonly known as:  SYNTHROID Take 1 Tab by mouth Daily (before breakfast). MIRALAX 17 gram packet Generic drug:  polyethylene glycol Take 17 g by mouth daily. montelukast 10 mg tablet Commonly known as:  SINGULAIR  
TAKE ONE TABLET BY MOUTH ONCE DAILY  
  
 naloxone 4 mg/actuation nasal spray Commonly known as:  NARCAN  
4 mg by Nasal route as needed. NASONEX 50 mcg/actuation nasal spray Generic drug:  mometasone USE 2 SPRAYS INTO EACH NOSTRIL ONCE DAILY  
  
 nystatin 100,000 unit/mL suspension Commonly known as:  MYCOSTATIN  
5 ml QID po,  swish and swallow  Indications: ORAL CANDIDIASIS  
  
 * oxyCODONE IR 15 mg immediate release tablet Commonly known as:  Genie Davison Take 1 Tab by mouth four (4) times daily as needed for Pain for up to 30 days. Max Daily Amount: 60 mg.  
  
 * oxyCODONE IR 15 mg immediate release tablet Commonly known as:  Genie Davison Take 1 Tab by mouth four (4) times daily as needed for Pain for up to 30 days. Max Daily Amount: 60 mg. Start taking on:  12/1/2017  
  
 predniSONE 10 mg tablet Commonly known as:  DELTASONE  
30 mg po dailyx 3 days 20 mg po daily x 3 days 10 mg po daily x3 days  Indications: trus pregabalin 75 mg capsule Commonly known as:  Deneise Grandchild Take 1 Cap by mouth two (2) times a day for 30 days. Max Daily Amount: 150 mg. PRILOSEC PO Take 20 mg by mouth daily. * traMADol 200 mg Tm24 Take 1 Tab by mouth daily. * traMADol 200 mg tablet Commonly known as:  ULTRAM-ER Take 1 Tab by mouth daily for 30 days. Max Daily Amount: 200 mg. VENTOLIN HFA 90 mcg/actuation inhaler Generic drug:  albuterol INHALE 2 PUFFS EVERY 4 HOURS AS NEEDED FOR WHEEZING  FOR SHORTNESS OFBREATH  
  
 VOLTAREN 1 % Gel Generic drug:  diclofenac Apply 4 g to affected area four (4) times daily. * Notice: This list has 4 medication(s) that are the same as other medications prescribed for you. Read the directions carefully, and ask your doctor or other care provider to review them with you. Prescriptions Printed Refills  
 oxyCODONE IR (ROXICODONE) 15 mg immediate release tablet 0 Starting on: 2017 Sig: Take 1 Tab by mouth four (4) times daily as needed for Pain for up to 30 days. Max Daily Amount: 60 mg.  
 Class: Print Route: Oral  
 oxyCODONE IR (ROXICODONE) 15 mg immediate release tablet 0 Sig: Take 1 Tab by mouth four (4) times daily as needed for Pain for up to 30 days. Max Daily Amount: 60 mg.  
 Class: Print Route: Oral  
 traMADol (ULTRAM-ER) 200 mg tablet 1 Sig: Take 1 Tab by mouth daily for 30 days. Max Daily Amount: 200 mg. Class: Print Route: Oral  
 pregabalin (LYRICA) 75 mg capsule 2 Sig: Take 1 Cap by mouth two (2) times a day for 30 days. Max Daily Amount: 150 mg.  
 Class: Print Route: Oral  
 diazePAM (VALIUM) 5 mg tablet 1 Si bid prn to help with muscle spasms, as needed. Class: Print Follow-up Instructions Return in about 2 months (around 2018). Introducing [facility] & HEALTH SERVICES! Dear Karlie Colon: Thank you for requesting a [employer] account.   Our records indicate that you already have an active [employer] account. You can access your account anytime at [email or web address] Did you know that you can access your hospital and ER discharge instructions at any time in [employer]? You can also review all of your test results from your hospital stay or ER visit. Additional Information If you have questions, please visit the Frequently Asked Questions section of the [employer] website at [email or web address] Remember, [employer] is NOT to be used for urgent needs. For medical emergencies, dial 911. Now available from your iPhone and Android! Please provide this summary of care documentation to your next provider. Your primary care clinician is listed as Miranda Pal. If you have any questions after today's visit, please call [phone].

## 2017-11-02 NOTE — ACP (ADVANCE CARE PLANNING)
Pt states that she has the paperwork to review for the advanced medical directive. She is discussing this with her pcp. Pt was asked to bring in a copy of this once completed so that it can be scanned into her chart. She verbalized understanding and has no questions.

## 2017-11-02 NOTE — PROGRESS NOTES
Nursing Notes    Patient presents to the office today in follow-up. Patient rates her pain at 8/10 on the numerical pain scale. Reviewed medications with counts as follows:    Rx Date filled Qty Dispensed Pill Count Last Dose Short   Tramadol  mg 11/01/17 30 28 This a.m. no   Oxycodone 15 mg 11/01/17 120 117 yesterday no                           POC UDS was performed in office today per the verbal order of Dr. Francisco Neal. Order was RBV'd     Any new labs or imaging since last appointment? NO    Have you been to an emergency room (ER) or urgent care clinic since your last visit? NO            Have you been hospitalized since your last visit? NO     If yes, where, when, and reason for visit? Have you seen or consulted any other health care providers outside of the [facility]  since your last visit? YES     If yes, where, when, and reason for visit? pcp    HM deferred to pcp.

## 2017-11-03 ENCOUNTER — OFFICE VISIT (OUTPATIENT)
Dept: PULMONOLOGY | Age: 62
End: 2017-11-03

## 2017-11-03 VITALS
SYSTOLIC BLOOD PRESSURE: 160 MMHG | TEMPERATURE: 97.5 F | HEIGHT: 62 IN | BODY MASS INDEX: 34.78 KG/M2 | RESPIRATION RATE: 21 BRPM | DIASTOLIC BLOOD PRESSURE: 70 MMHG | HEART RATE: 66 BPM | OXYGEN SATURATION: 98 % | WEIGHT: 189 LBS

## 2017-11-03 DIAGNOSIS — J45.21 EXACERBATION OF INTERMITTENT ASTHMA, UNSPECIFIED ASTHMA SEVERITY: ICD-10-CM

## 2017-11-03 DIAGNOSIS — J44.9 CHRONIC OBSTRUCTIVE PULMONARY DISEASE, UNSPECIFIED COPD TYPE (HCC): ICD-10-CM

## 2017-11-03 RX ORDER — NYSTATIN 100000 [USP'U]/ML
SUSPENSION ORAL
Qty: 120 ML | Refills: 0 | Status: SHIPPED | OUTPATIENT
Start: 2017-11-03 | End: 2017-11-15 | Stop reason: SDUPTHER

## 2017-11-03 RX ORDER — ALBUTEROL SULFATE 90 UG/1
2 AEROSOL, METERED RESPIRATORY (INHALATION)
Qty: 1 INHALER | Refills: 5 | Status: SHIPPED | OUTPATIENT
Start: 2017-11-03 | End: 2018-04-19 | Stop reason: SDUPTHER

## 2017-11-03 NOTE — MR AVS SNAPSHOT
Visit Information Date & Time Provider Department Dept. Phone Encounter #  
 11/3/2017 10:00 AM Julio Cevallos MD [facility] [id number] Follow-up Instructions Return in about 3 months (around 2/3/2018). Your Appointments 11/9/2017  9:30 AM  
ROUTINE CARE with Almita Boone MD  
[facility] ([facility]) Appt Note: routine f/u 6mo [address] [place] [address] 1101 [facility] [address] [place] [address]  
  
    
 1/3/2018  8:45 AM  
Office Visit with Mai Sims MD  
[address] for Pain Management [facility]) Appt Note: return in 2 months 30 [facility] [address] [phone] [facility] 8161 [address] Upcoming Health Maintenance Date Due  
 BREAST CANCER SCRN MAMMOGRAM 6/29/2017 INFLUENZA AGE 9 TO ADULT 8/1/2017 COLONOSCOPY 3/2/2019 DTaP/Tdap/Td series (2 - Td) 6/9/2026 Allergies as of 11/3/2017  Review Complete On: 11/3/2017 By: Julio Cevallos MD  
  
 Severity Noted Reaction Type Reactions Ace Inhibitors  12/14/2010    Cough Aspirin  01/20/2010    Other (comments) GI bleeding & pain  
 Nsaids (Non-steroidal Anti-inflammatory Drug)  09/26/2012    Other (comments) Makes her stomach bleed Current Immunizations  Reviewed on 6/6/2017 Name Date Influenza Vaccine 11/13/2014, 1/7/2014 Influenza Vaccine (Quad) PF 1/12/2017, 10/14/2015 Pneumococcal Vaccine (Unspecified Type) 11/14/2014, 10/1/2011 Not reviewed this visit You Were Diagnosed With   
  
 Codes Comments Exacerbation of intermittent asthma, unspecified asthma severity     ICD-10-CM: J45.21 ICD-9-CM: 588.75 Chronic obstructive pulmonary disease, unspecified COPD type ([facility]     ICD-10-CM: J44.9 ICD-9-CM: 120 Vitals BP Pulse Temp Resp Height(growth percentile) Weight(growth percentile) 160/70 (BP 1 Location: Left arm, BP Patient Position: At rest) 66 97.5 °F (36.4 °C) (Oral) 21 5' 2\" (1.575 m) 189 lb (85.7 kg) SpO2 BMI OB Status Smoking Status 98% 34.57 kg/m2 Hysterectomy Former Smoker BMI and BSA Data Body Mass Index Body Surface Area 34.57 kg/m 2 1.94 m 2 Preferred Pharmacy Pharmacy Name [facility] [address] [place] [address] [phone] Your Updated Medication List  
  
   
This list is accurate as of: 11/3/17 10:28 AM.  Always use your most recent med list.  
  
  
  
  
 ACIPHEX 20 mg tablet Generic drug:  RABEprazole Take 20 mg by mouth daily. albuterol 90 mcg/actuation inhaler Commonly known as:  VENTOLIN HFA Take 2 Puffs by inhalation every six (6) hours as needed for Wheezing. albuterol-ipratropium 2.5 mg-0.5 mg/3 ml Nebu Commonly known as:  DUO-NEB  
3 mL by Nebulization route every six (6) hours as needed. amLODIPine 10 mg tablet Commonly known as:  Wes Alstrom Take 1 Tab by mouth daily. b complex vitamins tablet Take 1 tablet by mouth daily. candesartan-hydroCHLOROthiazide 32-12.5 mg per tablet Commonly known as:  ATACAND HCT  
TAKE ONE TABLET BY MOUTH ONCE DAILY CARAFATE 100 mg/mL suspension Generic drug:  sucralfate Take 1 tsp by mouth four (4) times daily. CENTRUM COMPLETE 18-0.4 mg Tab Generic drug:  Multivit-Iron-Min-Folic Acid Take 1 Tab by mouth daily. cholecalciferol 1,000 unit Cap Commonly known as:  VITAMIN D3 Take 1,000 Units by mouth daily. diazePAM 5 mg tablet Commonly known as:  VALIUM  
1 bid prn to help with muscle spasms, as needed. levothyroxine 75 mcg tablet Commonly known as:  SYNTHROID Take 1 Tab by mouth Daily (before breakfast). MIRALAX 17 gram packet Generic drug:  polyethylene glycol Take 17 g by mouth daily. * mometasone-formoterol 100-5 mcg/actuation HFA inhaler Commonly known as:  Consuella Penokee Take 2 Puffs by inhalation two (2) times a day. * mometasone-formoterol 100-5 mcg/actuation HFA inhaler Commonly known as:  Consuella Pen[place]e Take 2 Puffs by inhalation two (2) times a day. montelukast 10 mg tablet Commonly known as:  SINGULAIR  
TAKE ONE TABLET BY MOUTH ONCE DAILY  
  
 naloxone 4 mg/actuation nasal spray Commonly known as:  NARCAN  
4 mg by Nasal route as needed. NASONEX 50 mcg/actuation nasal spray Generic drug:  mometasone USE 2 SPRAYS INTO EACH NOSTRIL ONCE DAILY  
  
 nystatin 100,000 unit/mL suspension Commonly known as:  MYCOSTATIN  
5 ml QID po,  swish and swallow  Indications: oral candidiasis  
  
 oxyCODONE IR 15 mg immediate release tablet Commonly known as:  Urban Chute Take 1 Tab by mouth four (4) times daily as needed for Pain for up to 30 days. Max Daily Amount: 60 mg. Start taking on:  12/1/2017  
  
 predniSONE 10 mg tablet Commonly known as:  DELTASONE  
30 mg po dailyx 3 days 20 mg po daily x 3 days 10 mg po daily x3 days  Indications: trush  
  
 pregabalin 75 mg capsule Commonly known as:  Beverly Bard Take 1 Cap by mouth two (2) times a day for 30 days. Max Daily Amount: 150 mg. PRILOSEC PO Take 20 mg by mouth daily. * traMADol 200 mg Tm24 Take 1 Tab by mouth daily. * traMADol 200 mg tablet Commonly known as:  ULTRAM-ER Take 1 Tab by mouth daily for 30 days. Max Daily Amount: 200 mg. VOLTAREN 1 % Gel Generic drug:  diclofenac Apply 4 g to affected area four (4) times daily. * Notice: This list has 4 medication(s) that are the same as other medications prescribed for you. Read the directions carefully, and ask your doctor or other care provider to review them with you. Prescriptions Sent to Pharmacy  Refills  
 albuterol (VENTOLIN HFA) 90 mcg/actuation inhaler 5  
 Sig: Take 2 Puffs by inhalation every six (6) hours as needed for Wheezing. Class: Normal  
 [facility] [address] Ph #: [phone] Route: Inhalation  
 mometasone-formoterol (DULERA) 100-5 mcg/actuation HFA inhaler 5 Sig: Take 2 Puffs by inhalation two (2) times a day. Class: Normal  
 Pharmacy: [facility] [address] Ph #: [phone] Route: Inhalation  
 nystatin (MYCOSTATIN) 100,000 unit/mL suspension 0 Si ml QID po,  swish and swallow  Indications: oral candidiasis Class: Normal  
 Pharmacy: [facility] [address] Ph #: [phone] We Performed the Following AMB POC PFT COMPLETE W/BRONCHODILATOR [51113 CPT(R)] DIFFUSING CAPACITY [id number] CPT(R)] GAS DILUT/WASHOUT LUNG VOL W/WO DISTRIB VENT&VOL [92380 CPT(R)] Follow-up Instructions Return in about 3 months (around 2/3/2018). Introducing [facility] & HEALTH SERVICES! Dear Madonna Payton: Thank you for requesting a [employer] account. Our records indicate that you already have an active [employer] account. You can access your account anytime at [email or web address] Did you know that you can access your hospital and ER discharge instructions at any time in [employer]? You can also review all of your test results from your hospital stay or ER visit. Additional Information If you have questions, please visit the Frequently Asked Questions section of the [employer] website at [email or web address] Remember, [employer] is NOT to be used for urgent needs. For medical emergencies, dial 911. Now available from your iPhone and Android! Please provide this summary of care documentation to your next provider. Your primary care clinician is listed as Toni Horton. If you have any questions after today's visit, please call [phone].

## 2017-11-03 NOTE — PROGRESS NOTES
Chief Complaint   Patient presents with    Asthma    COPD     Patient here for routine follow up visit.

## 2017-11-03 NOTE — PROGRESS NOTES
HISTORY OF PRESENT ILLNESS  Priscilla Moreno is a 64 y.o. female. HPI Comments: Pt with mild persistent asthma with frequent exacerbations requiring sick calls and Prednisone tapers. Pt associates exacerbations with living in a jessica house with a heavy smoker who is also a hoarder. In addition, pt has also suffered from recurrent oral thrush which briefly improved with [place] [facility] Pt now complains of throat soreness with voice changes, typical of prior episodes of thrush. She denies fever or chills, no increased SOB currently. Asthma   The history is provided by the patient. This is a chronic problem. The problem occurs daily. Progression since onset: recurring. Associated symptoms include shortness of breath. Pertinent negatives include no chest pain, no abdominal pain and no headaches. Nothing aggravates the symptoms. The symptoms are relieved by medications. Treatments tried: Albuterol. The treatment provided significant relief. COPD   Associated symptoms include shortness of breath. Pertinent negatives include no chest pain, no abdominal pain and no headaches. Review of Systems   Constitutional: Negative for chills, diaphoresis, fever, malaise/fatigue and weight loss. HENT: Positive for sore throat. Negative for congestion, ear discharge, ear pain, hearing loss, nosebleeds, sinus pain and tinnitus. Odynophagia   Eyes: Negative for blurred vision, double vision, photophobia, pain, discharge and redness. Respiratory: Positive for cough, shortness of breath and wheezing. Negative for hemoptysis, sputum production and stridor. Cardiovascular: Negative for chest pain, palpitations, orthopnea, claudication, leg swelling and PND. Gastrointestinal: Negative for abdominal pain, blood in stool, constipation, diarrhea, heartburn, melena, nausea and vomiting. Genitourinary: Negative for dysuria, flank pain, frequency, hematuria and urgency.    Musculoskeletal: Negative for back pain, falls, joint pain, myalgias and neck pain. Skin: Negative for itching and rash. Neurological: Negative for dizziness, tingling, tremors, sensory change, speech change, focal weakness, seizures, loss of consciousness, weakness and headaches. Endo/Heme/Allergies: Negative for environmental allergies. Does not bruise/bleed easily. Psychiatric/Behavioral: Positive for depression. Negative for hallucinations, memory loss, substance abuse and suicidal ideas. The patient is not nervous/anxious and does not have insomnia. Past Medical History:   Diagnosis Date    Abdominal pain     Arthritis     Asthma 1/20/2010    Dr. Namrata Galeano [facility])     stage 2 colon ca    Chemotherapy     Chronic low back pain 7/12/2010    Chronic lung disease     COPD (chronic obstructive pulmonary disease) ([facility] [address] 1/20/2010    Dr. Rod Cunha [facility]: colon cancer 1/20/2010    GERD (gastroesophageal reflux disease)     Gout     H/O colon cancer, stage II     s/p resection, last colonoscopy 11/11- q3 yrs    History of multiple pulmonary nodules 1/20/2010    Hoarse 2015    candida, sees ENT    HTN (hypertension) 1/20/2010    Hyperlipemia 1/20/2010    Hypothyroid 1/20/2010    IBS (irritable bowel syndrome)     Liver disease     cysts on liver    Mammogram declined     Multinodular thyroid     sees ENT.   Due for repeat U/S with ENT in Nov 2017    Nephropathy, membranous 1/20/2010    Neuropathy 1/20/2010    Nicotine use disorder 1/20/2010    Prediabetes     PUD (peptic ulcer disease)     Pulmonary nodule     sees pulmonary    Rectocele 1/20/2010    S/P cataract extraction 1/3/2011    Thyroid disease     hypothyroid    Unspecified sleep apnea     does not use CPAP    Vitamin D deficiency 1/20/2010     Past Surgical History:   Procedure Laterality Date    ABDOMEN SURGERY PROC UNLISTED      colon resection 8/2005- Dr. Nydia Irizarry- colon ca   [facility]iyana Glassing      gastric bypass 12/2/09    HX GASTRIC BYPASS      HX GI      HX GYN      hysterectomy and BSO in 11/2006    HX HEENT      cataract sx tee    HX HYSTERECTOMY      HX LUMBAR FUSION  June 2013    L4 to L 5    HX TONSILLECTOMY       Current Outpatient Prescriptions on File Prior to Visit   Medication Sig Dispense Refill    traMADol 200 mg TM24 Take 1 Tab by mouth daily.  [START ON 12/1/2017] oxyCODONE IR (ROXICODONE) 15 mg immediate release tablet Take 1 Tab by mouth four (4) times daily as needed for Pain for up to 30 days. Max Daily Amount: 60 mg. 120 Tab 0    traMADol (ULTRAM-ER) 200 mg tablet Take 1 Tab by mouth daily for 30 days. Max Daily Amount: 200 mg. 30 Tab 1    pregabalin (LYRICA) 75 mg capsule Take 1 Cap by mouth two (2) times a day for 30 days. Max Daily Amount: 150 mg. 60 Cap 2    diazePAM (VALIUM) 5 mg tablet 1 bid prn to help with muscle spasms, as needed. 60 Tab 1    albuterol-ipratropium (DUO-NEB) 2.5 mg-0.5 mg/3 ml nebu 3 mL by Nebulization route every six (6) hours as needed. 120 Nebule 3    levothyroxine (SYNTHROID) 75 mcg tablet Take 1 Tab by mouth Daily (before breakfast). 90 Tab 1    candesartan-hydroCHLOROthiazide (ATACAND HCT) 32-12.5 mg per tablet TAKE ONE TABLET BY MOUTH ONCE DAILY 90 Tab 1    amLODIPine (NORVASC) 10 mg tablet Take 1 Tab by mouth daily. 90 Tab 1    NASONEX 50 mcg/actuation nasal spray USE 2 SPRAYS INTO EACH NOSTRIL ONCE DAILY 1 Container 2    montelukast (SINGULAIR) 10 mg tablet TAKE ONE TABLET BY MOUTH ONCE DAILY 90 Tab 3    naloxone (NARCAN) 4 mg/actuation spry 4 mg by Nasal route as needed. 4 mg 0    cholecalciferol (VITAMIN D3) 1,000 unit cap Take 1,000 Units by mouth daily.  sucralfate (CARAFATE) 100 mg/mL suspension Take 1 tsp by mouth four (4) times daily.  b complex vitamins tablet Take 1 tablet by mouth daily.  Multivit-Iron-Min-Folic Acid (CENTRUM COMPLETE) 18-0.4 mg tab Take 1 Tab by mouth daily.       diclofenac (VOLTAREN) 1 % topical gel Apply 4 g to affected area four (4) times daily.  OMEPRAZOLE (PRILOSEC PO) Take 20 mg by mouth daily.  RABEprazole (ACIPHEX) 20 mg tablet Take 20 mg by mouth daily.  polyethylene glycol (MIRALAX) 17 gram packet Take 17 g by mouth daily.  predniSONE (DELTASONE) 10 mg tablet 30 mg po dailyx 3 days 20 mg po daily x 3 days 10 mg po daily x3 days  Indications: trush 18 Tab 0     No current facility-administered medications on file prior to visit. Allergies   Allergen Reactions    Ace Inhibitors Cough    Aspirin Other (comments)     GI bleeding & pain    Nsaids (Non-Steroidal Anti-Inflammatory Drug) Other (comments)     Makes her stomach bleed       Social History     Social History    Marital status:      Spouse name: N/A    Number of children: N/A    Years of education: N/A     Occupational History     Not Employed     Social History Main Topics    Smoking status: Former Smoker     Packs/day: 0.50     Years: 18.00     Types: Cigarettes     Quit date: 8/27/1990    Smokeless tobacco: Never Used      Comment: per patient she has not smoked in over 30 years but significant second hand smoke exposure at home    Alcohol use 0.0 oz/week     0 Standard drinks or equivalent per week      Comment: rare    Drug use: No    Sexual activity: Yes     Partners: Male     Birth control/ protection: None, Surgical     Other Topics Concern    Not on file     Social History Narrative     Blood pressure 160/70, pulse 66, temperature 97.5 °F (36.4 °C), temperature source Oral, resp. rate 21, height 5' 2\" (1.575 m), weight 85.7 kg (189 lb), SpO2 98 %. Physical Exam   Constitutional: She is oriented to person, place, and time. She appears well-developed and well-nourished. No distress. HENT:   Head: Normocephalic and atraumatic. Nose: Nose normal.   Mouth/Throat: Oropharynx is clear and moist. No oropharyngeal exudate.    No oral thrush   Eyes: Conjunctivae are normal. Pupils are equal, round, and reactive to light. Right eye exhibits no discharge. Left eye exhibits no discharge. No scleral icterus. Neck: No JVD present. No tracheal deviation present. No thyromegaly present. Gravelly voice   Cardiovascular: Normal rate, regular rhythm, normal heart sounds and intact distal pulses. Exam reveals no gallop. No murmur heard. Pulmonary/Chest: Effort normal and breath sounds normal. No stridor. No respiratory distress. She has no wheezes. She has no rales. She exhibits no tenderness. Abdominal: Soft. She exhibits no mass. There is no tenderness. Musculoskeletal: She exhibits no edema or tenderness. Old L spine surgical scar   Lymphadenopathy:     She has no cervical adenopathy. Neurological: She is alert and oriented to person, place, and time. Skin: Skin is warm and dry. No rash noted. She is not diaphoretic. No erythema. Psychiatric: She has a normal mood and affect. Her behavior is normal. Judgment and thought content normal.     PFT: mild obstruction with air trapping. FEV1 77%  ASSESSMENT and PLAN  Encounter Diagnoses   Name Primary?  Exacerbation of intermittent asthma, unspecified asthma severity     Chronic obstructive pulmonary disease, unspecified COPD type (HCC)      Although no oral thrush seen on exam, symptoms are suspicious for possible pharyngeal or esophageal thrush and would start Nystatin S/S. Will change to [facility] and monitor response, if thrush recurs will consider use of LAMA or chronic Azithromycin for control. Would also consider Singulair if prominent allergic component seen on next evaluation. Would avoid Prednisone if at all possible. RTC 3 months or sooner prn.

## 2017-11-06 ENCOUNTER — TELEPHONE (OUTPATIENT)
Dept: PULMONOLOGY | Age: 62
End: 2017-11-06

## 2017-11-06 NOTE — TELEPHONE ENCOUNTER
Pt states a prior auth form was to be faxed to our office from [facility]. Pt not able to use the Breo or Advair due to causing fugal infection in her thoat. Pt wants to know if we have received the form?

## 2017-11-06 NOTE — TELEPHONE ENCOUNTER
Form obtained from internet. Form completed and in doctors box for signature  Pt states she also has  but [facility]. She wants to try the prior auth first from [place]. I called [facility] to get the [employer] # and group # due to pt now cooperative with getting me her # and advised me to call the drug store for it.

## 2017-11-07 ENCOUNTER — HOSPITAL ENCOUNTER (OUTPATIENT)
Dept: LAB | Age: 62
Discharge: HOME | End: 2017-11-07
Payer: MEDICARE

## 2017-11-07 DIAGNOSIS — E55.9 VITAMIN D DEFICIENCY: ICD-10-CM

## 2017-11-07 DIAGNOSIS — I10 ESSENTIAL HYPERTENSION: ICD-10-CM

## 2017-11-07 DIAGNOSIS — E11.9 CONTROLLED TYPE 2 DIABETES MELLITUS WITHOUT COMPLICATION, WITHOUT LONG-TERM CURRENT USE OF INSULIN (HCC): ICD-10-CM

## 2017-11-07 DIAGNOSIS — E03.9 ACQUIRED HYPOTHYROIDISM: ICD-10-CM

## 2017-11-07 DIAGNOSIS — E53.8 VITAMIN B12 DEFICIENCY: ICD-10-CM

## 2017-11-07 LAB
25(OH)D3 SERPL-MCNC: 14.3 NG/ML (ref 30–100)
ALBUMIN SERPL-MCNC: 3.5 G/DL (ref 3.4–5)
ALBUMIN/GLOB SERPL: 1.2 {RATIO} (ref 0.8–1.7)
ALP SERPL-CCNC: 115 U/L (ref 45–117)
ALT SERPL-CCNC: 21 U/L (ref 13–56)
ANION GAP SERPL CALC-SCNC: 6 MMOL/L (ref 3–18)
AST SERPL-CCNC: 16 U/L (ref 15–37)
BILIRUB SERPL-MCNC: 0.2 MG/DL (ref 0.2–1)
BUN SERPL-MCNC: 19 MG/DL (ref 7–18)
BUN/CREAT SERPL: 24 (ref 12–20)
CALCIUM SERPL-MCNC: 8.7 MG/DL (ref 8.5–10.1)
CHLORIDE SERPL-SCNC: 100 MMOL/L (ref 100–108)
CHOLEST SERPL-MCNC: 185 MG/DL
CO2 SERPL-SCNC: 34 MMOL/L (ref 21–32)
CREAT SERPL-MCNC: 0.8 MG/DL (ref 0.6–1.3)
CREAT UR-MCNC: 132 MG/DL (ref 30–125)
ERYTHROCYTE [DISTWIDTH] IN BLOOD BY AUTOMATED COUNT: 15.2 % (ref 11.6–14.5)
GLOBULIN SER CALC-MCNC: 2.9 G/DL (ref 2–4)
GLUCOSE SERPL-MCNC: 79 MG/DL (ref 74–99)
HBA1C MFR BLD: 6.2 % (ref 4.2–5.6)
HCT VFR BLD AUTO: 39.4 % (ref 35–45)
HDLC SERPL-MCNC: 119 MG/DL (ref 40–60)
HDLC SERPL: 1.6 {RATIO} (ref 0–5)
HGB BLD-MCNC: 12.6 G/DL (ref 12–16)
LDLC SERPL CALC-MCNC: 61 MG/DL (ref 0–100)
LIPID PROFILE,FLP: ABNORMAL
MCH RBC QN AUTO: 28.6 PG (ref 24–34)
MCHC RBC AUTO-ENTMCNC: 32 G/DL (ref 31–37)
MCV RBC AUTO: 89.5 FL (ref 74–97)
MICROALBUMIN UR-MCNC: 4.91 MG/DL (ref 0–3)
MICROALBUMIN/CREAT UR-RTO: 37 MG/G (ref 0–30)
PLATELET # BLD AUTO: 408 K/UL (ref 135–420)
PMV BLD AUTO: 10.4 FL (ref 9.2–11.8)
POTASSIUM SERPL-SCNC: 4 MMOL/L (ref 3.5–5.5)
PROT SERPL-MCNC: 6.4 G/DL (ref 6.4–8.2)
RBC # BLD AUTO: 4.4 M/UL (ref 4.2–5.3)
SODIUM SERPL-SCNC: 140 MMOL/L (ref 136–145)
TRIGL SERPL-MCNC: 25 MG/DL (ref ?–150)
TSH SERPL DL<=0.05 MIU/L-ACNC: 6.81 UIU/ML (ref 0.36–3.74)
VIT B12 SERPL-MCNC: 833 PG/ML (ref 211–911)
VLDLC SERPL CALC-MCNC: 5 MG/DL
WBC # BLD AUTO: 3.7 K/UL (ref 4.6–13.2)

## 2017-11-07 PROCEDURE — 36415 COLL VENOUS BLD VENIPUNCTURE: CPT | Performed by: FAMILY MEDICINE

## 2017-11-07 PROCEDURE — 82607 VITAMIN B-12: CPT | Performed by: FAMILY MEDICINE

## 2017-11-07 PROCEDURE — 85027 COMPLETE CBC AUTOMATED: CPT | Performed by: FAMILY MEDICINE

## 2017-11-07 PROCEDURE — 84443 ASSAY THYROID STIM HORMONE: CPT | Performed by: FAMILY MEDICINE

## 2017-11-07 PROCEDURE — 80061 LIPID PANEL: CPT | Performed by: FAMILY MEDICINE

## 2017-11-07 PROCEDURE — 82306 VITAMIN D 25 HYDROXY: CPT | Performed by: FAMILY MEDICINE

## 2017-11-07 PROCEDURE — 82043 UR ALBUMIN QUANTITATIVE: CPT | Performed by: FAMILY MEDICINE

## 2017-11-07 PROCEDURE — 80053 COMPREHEN METABOLIC PANEL: CPT | Performed by: FAMILY MEDICINE

## 2017-11-07 PROCEDURE — 83036 HEMOGLOBIN GLYCOSYLATED A1C: CPT | Performed by: FAMILY MEDICINE

## 2017-11-09 ENCOUNTER — OFFICE VISIT (OUTPATIENT)
Dept: FAMILY MEDICINE CLINIC | Age: 62
End: 2017-11-09

## 2017-11-09 VITALS
DIASTOLIC BLOOD PRESSURE: 76 MMHG | HEART RATE: 86 BPM | BODY MASS INDEX: 35.88 KG/M2 | RESPIRATION RATE: 16 BRPM | WEIGHT: 195 LBS | HEIGHT: 62 IN | OXYGEN SATURATION: 98 % | TEMPERATURE: 97.7 F | SYSTOLIC BLOOD PRESSURE: 128 MMHG

## 2017-11-09 DIAGNOSIS — Z23 ENCOUNTER FOR IMMUNIZATION: ICD-10-CM

## 2017-11-09 DIAGNOSIS — J44.9 CHRONIC OBSTRUCTIVE PULMONARY DISEASE, UNSPECIFIED COPD TYPE (HCC): ICD-10-CM

## 2017-11-09 DIAGNOSIS — E53.8 VITAMIN B12 DEFICIENCY: ICD-10-CM

## 2017-11-09 DIAGNOSIS — I10 ESSENTIAL HYPERTENSION: ICD-10-CM

## 2017-11-09 DIAGNOSIS — E11.29 TYPE 2 DIABETES MELLITUS WITH MICROALBUMINURIA, WITHOUT LONG-TERM CURRENT USE OF INSULIN (HCC): Primary | ICD-10-CM

## 2017-11-09 DIAGNOSIS — E55.9 VITAMIN D DEFICIENCY: ICD-10-CM

## 2017-11-09 DIAGNOSIS — Z53.20 COLONOSCOPY REFUSED: ICD-10-CM

## 2017-11-09 DIAGNOSIS — R80.9 TYPE 2 DIABETES MELLITUS WITH MICROALBUMINURIA, WITHOUT LONG-TERM CURRENT USE OF INSULIN (HCC): Primary | ICD-10-CM

## 2017-11-09 DIAGNOSIS — E03.9 ACQUIRED HYPOTHYROIDISM: ICD-10-CM

## 2017-11-09 DIAGNOSIS — Z53.20 MAMMOGRAM DECLINED: ICD-10-CM

## 2017-11-09 RX ORDER — ERGOCALCIFEROL 1.25 MG/1
50000 CAPSULE ORAL
Qty: 4 CAP | Refills: 11 | Status: SHIPPED | OUTPATIENT
Start: 2017-11-09

## 2017-11-09 RX ORDER — LEVOTHYROXINE SODIUM 100 UG/1
100 TABLET ORAL
Qty: 30 TAB | Refills: 1 | Status: SHIPPED | OUTPATIENT
Start: 2017-11-09 | End: 2018-01-09 | Stop reason: DRUGHIGH

## 2017-11-09 NOTE — PROGRESS NOTES
Chief Complaint   Patient presents with    Thyroid Problem    Diabetes    Hypertension    Vitamin D Deficiency    Vitamin B12 Deficiency     1. Have you been to the ER, urgent care clinic since your last visit? Hospitalized since your last visit? No    2. Have you seen or consulted any other health care providers outside of the [address] [place] [address] since your last visit? Include any pap smears or colon screening. Yes Where: Dr. Madina Barneyigham    Physician order obtained. Patient completed adult immunization consent form. Allergies, contraindications and recommendations reviewed with patient. Seasonal influenza vaccine administered IM left deltoid. Patient tolerated well. Patient remained in office for 15 minutes after injection and no adverse reactions were noted.

## 2017-11-09 NOTE — PROGRESS NOTES
SUBJECTIVE    Chief Complaint   Patient presents with    Thyroid Problem    Diabetes    Hypertension    Vitamin D Deficiency      Patient here for follow-up with respect to her chronic illnesses. She reports that she is taking Synthroid and denies side effects like she did in the past.   Her repeat labs now show an elevated TSH. She reassures me that she is taking her synthroid daily and thus may need a higher dose. She has seen the ENT for her thyroid nodules and is due for repeat imaging in Nov 2017. She says she is occasionally checking her blood sugars. The patient denies any chest pain or chest tightness however she has struggled lately with bronchitis and is seeing pulmonology. She says that a change in her inhalers has helped. Denies any polyuria, polydipsia, or polyphagia. Compliant with antihypertensive medications. She denies any side effects. We also reviewed their cardiac risk factors. We reviewed their cardiac risk factors. Taking vit B but not taking vit D.      OBJECTIVE  Blood pressure 128/76, pulse 86, temperature 97.7 °F (36.5 °C), temperature source Oral, resp. rate 16, height 5' 2\" (1.575 m), weight 195 lb (88.5 kg), SpO2 98 %. General:  alert, cooperative, well appearing, in no apparent distress. CV:  The heart sounds are regular in rate and rhythm. There is a normal S1 and S2. There or no murmurs. Lungs: Inspiratory and expiratory efforts are full and unlabored. Lung sounds are clear and equal to auscultation throughout all lung fields without wheezing, rales, or rhonchi. Extremities: There is no edema. The feet are without lesions or ulcerations. Skin: no rashes, no jaundice. Psych: normal affect. Mood good. Oriented x 3. Judgement and insight intact.      Results for orders placed or performed during the hospital encounter of 11/07/17   CBC W/O DIFF   Result Value Ref Range    WBC 3.7 (L) 4.6 - 13.2 K/uL    RBC 4.40 4.20 - 5.30 M/uL    HGB 12.6 12.0 - 16.0 g/dL    HCT 39.4 35.0 - 45.0 %    MCV 89.5 74.0 - 97.0 FL    MCH 28.6 24.0 - 34.0 PG    MCHC 32.0 31.0 - 37.0 g/dL    RDW 15.2 (H) 11.6 - 14.5 %    PLATELET 273 920 - 402 K/uL    MPV 10.4 9.2 - 26.9 FL   METABOLIC PANEL, COMPREHENSIVE   Result Value Ref Range    Sodium 140 136 - 145 mmol/L    Potassium 4.0 3.5 - 5.5 mmol/L    Chloride 100 100 - 108 mmol/L    CO2 34 (H) 21 - 32 mmol/L    Anion gap 6 3.0 - 18 mmol/L    Glucose 79 74 - 99 mg/dL    BUN 19 (H) 7.0 - 18 MG/DL    Creatinine 0.80 0.6 - 1.3 MG/DL    BUN/Creatinine ratio 24 (H) 12 - 20      GFR est AA >60 >60 ml/min/1.73m2    GFR est non-AA >60 >60 ml/min/1.73m2    Calcium 8.7 8.5 - 10.1 MG/DL    Bilirubin, total 0.2 0.2 - 1.0 MG/DL    ALT (SGPT) 21 13 - 56 U/L    AST (SGOT) 16 15 - 37 U/L    Alk. phosphatase 115 45 - 117 U/L    Protein, total 6.4 6.4 - 8.2 g/dL    Albumin 3.5 3.4 - 5.0 g/dL    Globulin 2.9 2.0 - 4.0 g/dL    A-G Ratio 1.2 0.8 - 1.7     LIPID PANEL   Result Value Ref Range    LIPID PROFILE          Cholesterol, total 185 <200 MG/DL    Triglyceride 25 <150 MG/DL    HDL Cholesterol 119 (H) 40 - 60 MG/DL    LDL, calculated 61 0 - 100 MG/DL    VLDL, calculated 5 MG/DL    CHOL/HDL Ratio 1.6 0 - 5.0     TSH 3RD GENERATION   Result Value Ref Range    TSH 6.81 (H) 0.36 - 3.74 uIU/mL   HEMOGLOBIN A1C W/O EAG   Result Value Ref Range    Hemoglobin A1c 6.2 (H) 4.2 - 5.6 %   VITAMIN D, 25 HYDROXY   Result Value Ref Range    Vitamin D 25-Hydroxy 14.3 (L) 30 - 100 ng/mL   VITAMIN B12   Result Value Ref Range    Vitamin B12 833 211 - 911 pg/mL   MICROALBUMIN, UR, RAND   Result Value Ref Range    Microalbumin,urine random 4.91 (H) 0 - 3.0 MG/DL    Creatinine, urine 132.00 (H) 30 - 125 mg/dL    Microalbumin/Creat ratio (mg/g creat) 37 (H) 0 - 30 mg/g     ASSESSMENT / PLAN    ICD-10-CM ICD-9-CM    1. Type 2 diabetes mellitus with microalbuminuria, without long-term current use of insulin ([facility]) E11.29 250.40     R80.9 791.0    2.  Essential hypertension I10 401.9    3. Vitamin D deficiency E55.9 268.9 ergocalciferol (ERGOCALCIFEROL) 50,000 unit capsule   4. Vitamin B12 deficiency E53.8 266.2    5. Acquired hypothyroidism E03.9 244.9 levothyroxine (SYNTHROID) 100 mcg tablet      TSH 3RD GENERATION   6. Chronic obstructive pulmonary disease, unspecified COPD type ([facility] [address] J44.9 496    7. Class 2 obesity with serious comorbidity and body mass index (BMI) of 35.0 to 35.9 in adult, unspecified obesity type E66.9 278.00     Z68.35 V85.35    8. Colonoscopy refused Z53.20 V64.2    9. Mammogram declined Z53.20 V64.2    10. Encounter for immunization Z23 V03.89 INFLUENZA VIRUS VAC QUAD,SPLIT,PRESV FREE SYRINGE IM      ADMIN INFLUENZA VIRUS VAC     Reviewed labs. Diabetes with microalbuminuria -  Diet and exercise as tolerated. Low carb dieting. Monitor home blood sugars. Cont ARB. A1c in 6 months. HTN -  Continue current care. Refills as needed. Diet and exercise. Vit B12 and D deficiencies - Cont vit B and start vit D 50,000iu weekly. Hypothyroidism - Increase to Synthroid 100mcg daily. Check TSH in 6 weeks. COPD - cont per pulmonology. Multinodular thyroid - repeat imaging with ENT in November. She is aware. Obesity - diet and exercise. Colonoscopy and mammograms declined. She is debating seeing her GI doctor. Flu vaccine administered. All chart history elements were reviewed by me at the time of the visit even though marked at time of note closure. Patient understands our medical plan. Patient has provided input and agrees with goals. Alternatives have been explained and offered. All questions answered. The patient is to call if condition worsens or fails to improve. Follow-up Disposition:  Return in about 7 weeks (around 12/28/2017) for thyroid. Labs due 6 weeks .

## 2017-11-09 NOTE — MR AVS SNAPSHOT
Visit Information Date & Time Provider Department Dept. Phone Encounter #  
 11/9/2017  9:30 AM Sal Lee, [address] [facility] [id number] Follow-up Instructions Return in about 7 weeks (around 12/28/2017) for thyroid. Labs due 6 weeks . Your Appointments 1/3/2018  8:45 AM  
Office Visit with Uma Denton MD  
[facility]) Appt Note: return in 2 months [address] [address]  
[phone] [facility] [phone] Upcoming Health Maintenance Date Due  
 BREAST CANCER SCRN MAMMOGRAM 6/29/2017 Influenza Age 5 to Adult 8/1/2017 EYE EXAM RETINAL OR DILATED Q1 1/10/2018 FOOT EXAM Q1 1/12/2018 HEMOGLOBIN A1C Q6M 5/7/2018 MICROALBUMIN Q1 11/7/2018 LIPID PANEL Q1 11/7/2018 COLONOSCOPY 3/2/2019 DTaP/Tdap/Td series (2 - Td) 6/9/2026 Allergies as of 11/9/2017  Review Complete On: 11/9/2017 By: Sal Lee MD  
  
 Severity Noted Reaction Type Reactions Ace Inhibitors  12/14/2010    Cough Aspirin  01/20/2010    Other (comments) GI bleeding & pain  
 Nsaids (Non-steroidal Anti-inflammatory Drug)  09/26/2012    Other (comments) Makes her stomach bleed Current Immunizations  Reviewed on 6/6/2017 Name Date Influenza Vaccine 11/13/2014, 1/7/2014 Influenza Vaccine (Quad) PF 1/12/2017, 10/14/2015 Pneumococcal Vaccine (Unspecified Type) 11/14/2014, 10/1/2011 Not reviewed this visit You Were Diagnosed With   
  
 Codes Comments Type 2 diabetes mellitus with microalbuminuria, without long-term current use of insulin (HCC)    -  Primary ICD-10-CM: E11.29, R80.9 ICD-9-CM: 250.40, 791.0 Essential hypertension     ICD-10-CM: I10 
ICD-9-CM: 401.9 Vitamin D deficiency     ICD-10-CM: E55.9 ICD-9-CM: 268.9 Vitamin B12 deficiency     ICD-10-CM: E53.8 ICD-9-CM: 266.2 Acquired hypothyroidism     ICD-10-CM: E03.9 ICD-9-CM: 541. 9 Chronic obstructive pulmonary disease, unspecified COPD type ([facility]     ICD-10-CM: J44.9 ICD-9-CM: 848 Class 2 obesity with serious comorbidity and body mass index (BMI) of 35.0 to 35.9 in adult, unspecified obesity type     ICD-10-CM: E66.9, Z68.35 ICD-9-CM: 278.00, V85.35 Colonoscopy refused     ICD-10-CM: Z53.20 ICD-9-CM: V64.2 Mammogram declined     ICD-10-CM: Z53.20 ICD-9-CM: V64.2 Vitals BP Pulse Temp Resp Height(growth percentile) Weight(growth percentile) 128/76 (BP 1 Location: Left arm, BP Patient Position: Sitting) 86 97.7 °F (36.5 °C) (Oral) 16 5' 2\" (1.575 m) 195 lb (88.5 kg) SpO2 BMI OB Status Smoking Status 98% 35.67 kg/m2 Hysterectomy Former Smoker BMI and BSA Data Body Mass Index Body Surface Area  
 35.67 kg/m 2 1.97 m 2 Preferred Pharmacy Pharmacy Name [facility] - [id number] [facility], [address] [place] [address],[address] [phone] Your Updated Medication List  
  
   
This list is accurate as of: 11/9/17  9:55 AM.  Always use your most recent med list.  
  
  
  
  
 ACIPHEX 20 mg tablet Generic drug:  RABEprazole Take 20 mg by mouth daily. albuterol 90 mcg/actuation inhaler Commonly known as:  VENTOLIN HFA Take 2 Puffs by inhalation every six (6) hours as needed for Wheezing. albuterol-ipratropium 2.5 mg-0.5 mg/3 ml Nebu Commonly known as:  DUO-NEB  
3 mL by Nebulization route every six (6) hours as needed. amLODIPine 10 mg tablet Commonly known as:  Garry Chafe Take 1 Tab by mouth daily. b complex vitamins tablet Take 1 tablet by mouth daily. candesartan-hydroCHLOROthiazide 32-12.5 mg per tablet Commonly known as:  ATACAND HCT  
TAKE ONE TABLET BY MOUTH ONCE DAILY CARAFATE 100 mg/mL suspension Generic drug:  sucralfate Take 1 tsp by mouth four (4) times daily. CENTRUM COMPLETE 18-0.4 mg Tab Generic drug:  Multivit-Iron-Min-Folic Acid Take 1 Tab by mouth daily. cholecalciferol 1,000 unit Cap Commonly known as:  VITAMIN D3 Take 1,000 Units by mouth daily. diazePAM 5 mg tablet Commonly known as:  VALIUM  
1 bid prn to help with muscle spasms, as needed. ergocalciferol 50,000 unit capsule Commonly known as:  ERGOCALCIFEROL Take 1 Cap by mouth every seven (7) days. levothyroxine 100 mcg tablet Commonly known as:  SYNTHROID Take 1 Tab by mouth Daily (before breakfast). MIRALAX 17 gram packet Generic drug:  polyethylene glycol Take 17 g by mouth daily. * mometasone-formoterol 100-5 mcg/actuation HFA inhaler Commonly known as:  Ethan Ishihara Take 2 Puffs by inhalation two (2) times a day. * mometasone-formoterol 100-5 mcg/actuation HFA inhaler Commonly known as:  Ethan Ishihara Take 2 Puffs by inhalation two (2) times a day. montelukast 10 mg tablet Commonly known as:  SINGULAIR  
TAKE ONE TABLET BY MOUTH ONCE DAILY  
  
 naloxone 4 mg/actuation nasal spray Commonly known as:  NARCAN  
4 mg by Nasal route as needed. NASONEX 50 mcg/actuation nasal spray Generic drug:  mometasone USE 2 SPRAYS INTO EACH NOSTRIL ONCE DAILY  
  
 nystatin 100,000 unit/mL suspension Commonly known as:  MYCOSTATIN  
5 ml QID po,  swish and swallow  Indications: oral candidiasis  
  
 oxyCODONE IR 15 mg immediate release tablet Commonly known as:  Patricio Pollack Take 1 Tab by mouth four (4) times daily as needed for Pain for up to 30 days. Max Daily Amount: 60 mg. Start taking on:  12/1/2017  
  
 predniSONE 10 mg tablet Commonly known as:  DELTASONE  
30 mg po dailyx 3 days 20 mg po daily x 3 days 10 mg po daily x3 days  Indications: trush  
  
 pregabalin 75 mg capsule Commonly known as:  January Creeks Take 1 Cap by mouth two (2) times a day for 30 days. Max Daily Amount: 150 mg. PRILOSEC PO Take 20 mg by mouth daily. * traMADol 200 mg Tm24 Take 1 Tab by mouth daily. * traMADol 200 mg tablet Commonly known as:  ULTRAM-ER Take 1 Tab by mouth daily for 30 days. Max Daily Amount: 200 mg. VOLTAREN 1 % Gel Generic drug:  diclofenac Apply 4 g to affected area four (4) times daily. * Notice: This list has 4 medication(s) that are the same as other medications prescribed for you. Read the directions carefully, and ask your doctor or other care provider to review them with you. Prescriptions Sent to Pharmacy Refills  
 levothyroxine (SYNTHROID) 100 mcg tablet 1 Sig: Take 1 Tab by mouth Daily (before breakfast). Class: Normal  
 Pharmacy: [facility] [address] Ph #: [phone] Route: Oral  
 ergocalciferol (ERGOCALCIFEROL) 50,000 unit capsule 11 Sig: Take 1 Cap by mouth every seven (7) days. Class: Normal  
 Pharmacy: [facility] [address] Ph #: [phone] Route: Oral  
  
Follow-up Instructions Return in about 7 weeks (around 12/28/2017) for thyroid. Labs due 6 weeks . To-Do List   
 11/09/2017 Lab:  TSH 3RD GENERATION Patient Instructions A Healthy Lifestyle: Care Instructions Your Care Instructions A healthy lifestyle can help you feel good, stay at a healthy weight, and have plenty of energy for both work and play. A healthy lifestyle is something you can share with your whole family. A healthy lifestyle also can lower your risk for serious health problems, such as high blood pressure, heart disease, and diabetes. You can follow a few steps listed below to improve your health and the health of your family. Follow-up care is a key part of your treatment and safety. Be sure to make and go to all appointments, and call your doctor if you are having problems. It's also a good idea to know your test results and keep a list of the medicines you take. How can you care for yourself at home? · Do not eat too much sugar, fat, or fast foods. You can still have dessert and treats now and then. The goal is moderation. · Start small to improve your eating habits. Pay attention to portion sizes, drink less juice and soda pop, and eat more fruits and vegetables. ¨ Eat a healthy amount of food. A 3-ounce serving of meat, for example, is about the size of a deck of cards. Fill the rest of your plate with vegetables and whole grains. ¨ Limit the amount of soda and sports drinks you have every day. Drink more water when you are thirsty. ¨ Eat at least 5 servings of fruits and vegetables every day. It may seem like a lot, but it is not hard to reach this goal. A serving or helping is 1 piece of fruit, 1 cup of vegetables, or 2 cups of leafy, raw vegetables. Have an apple or some carrot sticks as an afternoon snack instead of a candy bar. Try to have fruits and/or vegetables at every meal. 
· Make exercise part of your daily routine. You may want to start with simple activities, such as walking, bicycling, or slow swimming. Try to be active 30 to 60 minutes every day. You do not need to do all 30 to 60 minutes all at once. For example, you can exercise 3 times a day for 10 or 20 minutes. Moderate exercise is safe for most people, but it is always a good idea to talk to your doctor before starting an exercise program. 
· Keep moving. Gisell Bustoss the lawn, work in the garden, or [employer]. Take the stairs instead of the elevator at work. · If you smoke, quit. People who smoke have an increased risk for heart attack, stroke, cancer, and other lung illnesses. Quitting is hard, but there are ways to boost your chance of quitting tobacco for good. ¨ Use nicotine gum, patches, or lozenges. ¨ Ask your doctor about stop-smoking programs and medicines. ¨ Keep trying.  
In addition to reducing your risk of diseases in the future, you will notice some benefits soon after you stop using tobacco. If you have shortness of breath or asthma symptoms, they will likely get better within a few weeks after you quit. · Limit how much alcohol you drink. Moderate amounts of alcohol (up to 2 drinks a day for men, 1 drink a day for women) are okay. But drinking too much can lead to liver problems, high blood pressure, and other health problems. Family health If you have a family, there are many things you can do together to improve your health. · Eat meals together as a family as often as possible. · Eat healthy foods. This includes fruits, vegetables, lean meats and dairy, and whole grains. · Include your family in your fitness plan. Most people think of activities such as jogging or tennis as the way to fitness, but there are many ways you and your family can be more active. Anything that makes you breathe hard and gets your heart pumping is exercise. Here are some tips: 
¨ Walk to do errands or to take your child to school or the bus. ¨ Go for a family bike ride after dinner instead of watching TV. Where can you learn more? Go to http://danaeNotchnaif.info/. Enter L470 in the search box to learn more about \"A Healthy Lifestyle: Care Instructions. \" Current as of: May 12, 2017 Content Version: 11.4 © 8640-6488 [email or web address]. Care instructions adapted under license by [employer] (which disclaims liability or warranty for this information). If you have questions about a medical condition or this instruction, always ask your healthcare professional. [place] [address] any warranty or liability for your use of this information. Introducing \[facility]\"" & HEALTH SERVICES! Dear Perez Abarca: Thank you for requesting a [employer] account. Our records indicate that you already have an active [employer] account. You can access your account anytime at [email or web address] Did you know that you can access your hospital and ER discharge instructions at any time in [employer]? You can also review all of your test results from your hospital stay or ER visit. Additional Information If you have questions, please visit the Frequently Asked Questions section of the [employer] website at [email or web address] Remember, [employer] is NOT to be used for urgent needs. For medical emergencies, dial 911. Now available from your iPhone and Android! Please provide this summary of care documentation to your next provider. Your primary care clinician is listed as Pio Anguiano. If you have any questions after today's visit, please call [phone].

## 2017-11-09 NOTE — PATIENT INSTRUCTIONS

## 2017-11-28 ENCOUNTER — TELEPHONE (OUTPATIENT)
Dept: PULMONOLOGY | Age: 62
End: 2017-11-28

## 2017-11-28 NOTE — TELEPHONE ENCOUNTER
Since pt switched to [facility] from the [facility] her throat symptoms have returned after the Rx for thrush. The symptoms of hoarseness has returned. Pt not sure if the [facility] needs to be changed again or if she needs to be referred to ENT at this time.

## 2017-11-28 NOTE — TELEPHONE ENCOUNTER
Pt states that she spoke with nurse a few weeks ago stating that she is still not feeling well.  pls call pt

## 2017-11-29 RX ORDER — BUDESONIDE 0.5 MG/2ML
500 INHALANT ORAL 2 TIMES DAILY
Qty: 60 EACH | Refills: 5 | Status: SHIPPED | OUTPATIENT
Start: 2017-11-29 | End: 2018-07-20 | Stop reason: ALTCHOICE

## 2017-11-29 RX ORDER — ARFORMOTEROL TARTRATE 15 UG/2ML
15 SOLUTION RESPIRATORY (INHALATION) 2 TIMES DAILY
Qty: 60 VIAL | Refills: 5 | Status: SHIPPED | OUTPATIENT
Start: 2017-11-29 | End: 2018-07-19 | Stop reason: ALTCHOICE

## 2017-11-30 NOTE — TELEPHONE ENCOUNTER
Patient informed to stop the [facility] and start the 2 new nebulizer medications Pulmicort and Brovana.

## 2017-12-04 RX ORDER — FLUTICASONE FUROATE AND VILANTEROL TRIFENATATE 100; 25 UG/1; UG/1
POWDER RESPIRATORY (INHALATION)
Qty: 1 INHALER | Refills: 3 | Status: SHIPPED | OUTPATIENT
Start: 2017-12-04 | End: 2018-02-26 | Stop reason: SDUPTHER

## 2017-12-06 NOTE — TELEPHONE ENCOUNTER
PT [id number]([phone]). WANTS TO TALK TO CLAUDIO DENNIS REGARDING REFILL THAT WAS SENT TO [facility]. PLEASE CALL BACK.

## 2017-12-06 NOTE — TELEPHONE ENCOUNTER
Pt states the Pulmicort and Durward Fruits was sent to [facility] as requested. Now she [facility] to [facility] to [facility] b/c she has a special way to bill for her meds. She will have [facility] transferred to [facility]. Also she will have them remove the Breo off her profile since that has been d/c'ed.

## 2018-01-03 ENCOUNTER — OFFICE VISIT (OUTPATIENT)
Dept: PAIN MANAGEMENT | Age: 63
End: 2018-01-03

## 2018-01-03 ENCOUNTER — HOSPITAL ENCOUNTER (OUTPATIENT)
Dept: LAB | Age: 63
Discharge: HOME | End: 2018-01-03
Payer: MEDICARE

## 2018-01-03 VITALS
RESPIRATION RATE: 20 BRPM | TEMPERATURE: 95.5 F | WEIGHT: 195 LBS | DIASTOLIC BLOOD PRESSURE: 83 MMHG | BODY MASS INDEX: 35.67 KG/M2 | HEART RATE: 72 BPM | SYSTOLIC BLOOD PRESSURE: 137 MMHG

## 2018-01-03 DIAGNOSIS — M54.17 LUMBOSACRAL NEURITIS: Primary | ICD-10-CM

## 2018-01-03 DIAGNOSIS — Z98.890 H/O LUMBOSACRAL SPINE SURGERY: ICD-10-CM

## 2018-01-03 DIAGNOSIS — M54.2 CERVICALGIA: ICD-10-CM

## 2018-01-03 DIAGNOSIS — M47.816 OSTEOARTHRITIS OF LUMBAR SPINE, UNSPECIFIED SPINAL OSTEOARTHRITIS COMPLICATION STATUS: ICD-10-CM

## 2018-01-03 DIAGNOSIS — M25.511 CHRONIC RIGHT SHOULDER PAIN: ICD-10-CM

## 2018-01-03 DIAGNOSIS — F41.1 GAD (GENERALIZED ANXIETY DISORDER): ICD-10-CM

## 2018-01-03 DIAGNOSIS — E03.9 ACQUIRED HYPOTHYROIDISM: ICD-10-CM

## 2018-01-03 DIAGNOSIS — M47.812 SPONDYLOSIS OF CERVICAL REGION WITHOUT MYELOPATHY OR RADICULOPATHY: ICD-10-CM

## 2018-01-03 DIAGNOSIS — M43.10 SPONDYLOLISTHESIS, GRADE 1: ICD-10-CM

## 2018-01-03 DIAGNOSIS — G89.4 CHRONIC PAIN SYNDROME: ICD-10-CM

## 2018-01-03 DIAGNOSIS — L90.5 SCAR TISSUE: ICD-10-CM

## 2018-01-03 DIAGNOSIS — G89.29 CHRONIC RIGHT SHOULDER PAIN: ICD-10-CM

## 2018-01-03 DIAGNOSIS — M51.37 DDD (DEGENERATIVE DISC DISEASE), LUMBOSACRAL: ICD-10-CM

## 2018-01-03 PROBLEM — E11.40 TYPE 2 DIABETES MELLITUS WITH DIABETIC NEUROPATHY (HCC): Status: ACTIVE | Noted: 2018-01-03

## 2018-01-03 LAB — TSH SERPL DL<=0.05 MIU/L-ACNC: 8.26 UIU/ML (ref 0.36–3.74)

## 2018-01-03 PROCEDURE — 84443 ASSAY THYROID STIM HORMONE: CPT | Performed by: FAMILY MEDICINE

## 2018-01-03 PROCEDURE — 36415 COLL VENOUS BLD VENIPUNCTURE: CPT | Performed by: FAMILY MEDICINE

## 2018-01-03 RX ORDER — OXYCODONE HYDROCHLORIDE 15 MG/1
15 TABLET ORAL
Qty: 120 TAB | Refills: 0 | Status: SHIPPED | OUTPATIENT
Start: 2018-01-03 | End: 2018-02-02

## 2018-01-03 RX ORDER — DIAZEPAM 5 MG/1
TABLET ORAL
Qty: 60 TAB | Refills: 2 | Status: SHIPPED | OUTPATIENT
Start: 2018-01-03 | End: 2018-03-29 | Stop reason: SDUPTHER

## 2018-01-03 RX ORDER — LIDOCAINE 50 MG/G
1 PATCH TOPICAL EVERY 12 HOURS
Qty: 1 EACH | Refills: 3 | Status: SHIPPED | OUTPATIENT
Start: 2018-01-03 | End: 2018-03-29 | Stop reason: SDUPTHER

## 2018-01-03 RX ORDER — OXYCODONE HYDROCHLORIDE 15 MG/1
15 TABLET ORAL
Qty: 120 TAB | Refills: 0 | Status: SHIPPED | OUTPATIENT
Start: 2018-02-02 | End: 2018-03-29 | Stop reason: SDUPTHER

## 2018-01-03 RX ORDER — PREGABALIN 75 MG/1
75 CAPSULE ORAL 2 TIMES DAILY
Qty: 60 CAP | Refills: 2 | Status: SHIPPED | OUTPATIENT
Start: 2018-01-03 | End: 2018-03-29 | Stop reason: SDUPTHER

## 2018-01-03 RX ORDER — OXYCODONE HYDROCHLORIDE 15 MG/1
15 TABLET ORAL
Qty: 120 TAB | Refills: 0 | Status: SHIPPED | OUTPATIENT
Start: 2018-03-01 | End: 2018-03-29 | Stop reason: SDUPTHER

## 2018-01-03 RX ORDER — TRAMADOL HYDROCHLORIDE 200 MG/1
200 TABLET, EXTENDED RELEASE ORAL DAILY
Qty: 30 TAB | Refills: 2 | Status: SHIPPED | OUTPATIENT
Start: 2018-01-03 | End: 2018-03-29 | Stop reason: SDUPTHER

## 2018-01-03 NOTE — PROGRESS NOTES
Nursing Notes    Patient presents to the office today in follow-up. Patient rates her pain at 8/10 on the numerical pain scale. Reviewed medications with counts as follows:    Rx Date filled Qty Dispensed Pill Count Last Dose Short   Tramadol 200mg ER 01/02/18 30 28 This am  No    Oxycodone 15mg 01/02/18 120 116 Yesterday  No    Valium 5mg 01/02/18 60 57 This am  No                             Comments:     POC UDS was not performed in office today    Any new labs or imaging since last appointment? NO    Have you been to an emergency room (ER) or urgent care clinic since your last visit? NO            Have you been hospitalized since your last visit? NO     If yes, where, when, and reason for visit? Have you seen or consulted any other health care providers outside of the [address] [place] [address]  since your last visit? YES     If yes, where, when, and reason for visit? ENT    HM deferred to pcp.

## 2018-01-03 NOTE — PROGRESS NOTES
HISTORY OF PRESENT ILLNESS  Cherrie Briggs is a 58 y.o. female. HPI Comments: Visit survey reviewed  Chief complaint- chronic pain syndrome- low back pain, neck pain, pain to both hands/history peripheral neuropathy  She will continue with the Lidoderm patches, compound cream  Lyrica 75 mg twice a day  Valium 5 mg twice a day as needed for muscle spasms  Extended release tramadol 200 mg once a day  Oxycodone 15 mg 4 times a day as needed  She is quite pleased, almost completing her schoolwork/courses. She will be looking for a job in the near future.  -The visit survey indicates that medications help general activity, mood, walking, sleep, enjoyment of life. The patient will continue medications. No new significant side effects reported  Medication continues to help improve quality of life. Medications reviewed including risk and benefits. Recent average level of pain-8    Measurement of clinical outcome for chronic pain patient/ SPAASMS Score Card-            Information reviewed and will be scanned. Total score today-7    Back Pain    Pertinent negatives include no chest pain, no fever, no headaches and no dysuria. Hand Pain    Associated symptoms include back pain. Pertinent negatives include no itching. Review of Systems   Constitutional: Positive for malaise/fatigue. Negative for fever. HENT: Negative for sore throat. Eyes: Negative for blurred vision and double vision. Respiratory: Positive for shortness of breath. Negative for cough. Cardiovascular: Negative for chest pain and leg swelling. Gastrointestinal: Positive for heartburn. Negative for nausea. Genitourinary: Negative for dysuria and urgency. Musculoskeletal: Positive for back pain, falls and joint pain. Skin: Negative for itching and rash. Neurological: Negative for dizziness, seizures and headaches. Endo/Heme/Allergies: Negative for environmental allergies. Does not bruise/bleed easily. Psychiatric/Behavioral: Positive for depression. Negative for suicidal ideas. The patient is nervous/anxious and has insomnia. Physical Exam   Constitutional: She appears well-developed and well-nourished. She is cooperative. She does not have a sickly appearance. HENT:   Head: Normocephalic and atraumatic. Right Ear: External ear normal. No drainage. Left Ear: External ear normal. No drainage. Nose: Nose normal.   Eyes: Lids are normal. Right eye exhibits no discharge. Left eye exhibits no discharge. Right conjunctiva has no hemorrhage. Left conjunctiva has no hemorrhage. Neck: Neck supple. No tracheal deviation present. No thyroid mass present. Pulmonary/Chest: Effort normal. No respiratory distress. Neurological: She is alert. No cranial nerve deficit. Skin: Skin is intact. No rash noted. Psychiatric: Her speech is normal. Her affect is not angry. She does not express inappropriate judgment. Nursing note and vitals reviewed. ASSESSMENT and PLAN  Encounter Diagnoses   Name Primary?  Lumbosacral neuritis Yes    DDD (degenerative disc disease), lumbosacral     Spondylolisthesis, grade 1     H/O lumbosacral spine surgery     Cervicalgia     Chronic right shoulder pain     Spondylosis of cervical region without myelopathy or radiculopathy     CAIO (generalized anxiety disorder)     Chronic pain syndrome     Osteoarthritis of lumbar spine, unspecified spinal osteoarthritis complication status     Scar tissue    No indicators for recent medication misuse.  reviewed. Pain Meds and Quality Of Life have been reviewed. Nonpharmacologic therapy and non-opioid pharmacologic therapy were considered. If opioid therapy is prescribed, this is only if the expected benefits are anticipated to outweigh risks. Possible changes to treatment plan considered. Support/education given as needed. Today-medications are as listed. No significant changes to medications.   Follow up -- 3 months.

## 2018-01-09 ENCOUNTER — OFFICE VISIT (OUTPATIENT)
Dept: FAMILY MEDICINE CLINIC | Age: 63
End: 2018-01-09

## 2018-01-09 VITALS
WEIGHT: 198 LBS | OXYGEN SATURATION: 98 % | RESPIRATION RATE: 16 BRPM | HEART RATE: 73 BPM | DIASTOLIC BLOOD PRESSURE: 78 MMHG | SYSTOLIC BLOOD PRESSURE: 130 MMHG | HEIGHT: 62 IN | TEMPERATURE: 98.2 F | BODY MASS INDEX: 36.44 KG/M2

## 2018-01-09 DIAGNOSIS — E03.9 ACQUIRED HYPOTHYROIDISM: Primary | ICD-10-CM

## 2018-01-09 DIAGNOSIS — E66.9 OBESITY, UNSPECIFIED CLASSIFICATION, UNSPECIFIED OBESITY TYPE, UNSPECIFIED WHETHER SERIOUS COMORBIDITY PRESENT: ICD-10-CM

## 2018-01-09 RX ORDER — LEVOTHYROXINE SODIUM 125 UG/1
125 TABLET ORAL
Qty: 30 TAB | Refills: 1 | Status: SHIPPED | OUTPATIENT
Start: 2018-01-09

## 2018-01-09 NOTE — MR AVS SNAPSHOT
Visit Information Date & Time Provider Department Dept. Phone Encounter #  
 1/9/2018 10:30 AM Diamond Rascon, [address] [id number] Follow-up Instructions Return in about 6 weeks (around 2/20/2018) for thyroid. Non-fasting labs due 1 week prior. Your Appointments 3/29/2018  8:40 AM  
Follow Up with COSMO Diaz [address] [place] [address] Pain Management (DAVE SCHEDULING) Appt Note: Return in about 3 months (around 4/3/2018). [address] [address]  
[phone] [id number] Upcoming Health Maintenance Date Due  
 BREAST CANCER SCRN MAMMOGRAM 6/29/2017 EYE EXAM RETINAL OR DILATED Q1 1/10/2018 FOOT EXAM Q1 1/12/2018 HEMOGLOBIN A1C Q6M 5/7/2018 MICROALBUMIN Q1 11/7/2018 LIPID PANEL Q1 11/7/2018 COLONOSCOPY 3/2/2019 DTaP/Tdap/Td series (2 - Td) 6/9/2026 Allergies as of 1/9/2018  Review Complete On: 1/3/2018 By: Karina Pang MD  
  
 Severity Noted Reaction Type Reactions Ace Inhibitors  12/14/2010    Cough Aspirin  01/20/2010    Other (comments) GI bleeding & pain  
 Nsaids (Non-steroidal Anti-inflammatory Drug)  09/26/2012    Other (comments) Makes her stomach bleed Current Immunizations  Reviewed on 6/6/2017 Name Date Influenza Vaccine 11/13/2014, 1/7/2014 Influenza Vaccine (Quad) PF 11/9/2017, 1/12/2017, 10/14/2015 Pneumococcal Vaccine (Unspecified Type) 11/14/2014, 10/1/2011 Not reviewed this visit You Were Diagnosed With   
  
 Codes Comments Acquired hypothyroidism    -  Primary ICD-10-CM: E03.9 ICD-9-CM: 244.9 Obesity, unspecified classification, unspecified obesity type, unspecified whether serious comorbidity present     ICD-10-CM: E66.9 ICD-9-CM: 278.00 Vitals BP Pulse Temp Resp Height(growth percentile) Weight(growth percentile) 130/78 (BP 1 Location: Right arm, BP Patient Position: Sitting) 73 98.2 °F (36.8 °C) (Oral) 16 5' 2\" (1.575 m) 198 lb (89.8 kg) SpO2 BMI OB Status Smoking Status 98% 36.21 kg/m2 Hysterectomy Former Smoker Vitals History BMI and BSA Data Body Mass Index Body Surface Area  
 36.21 kg/m 2 1.98 m 2 Preferred Pharmacy Pharmacy Name [facility]  Inez Hobbsh, [facility] [address] [place] [address] [phone] Your Updated Medication List  
  
   
This list is accurate as of: 1/9/18 10:55 AM.  Always use your most recent med list.  
  
  
  
  
 ACIPHEX 20 mg tablet Generic drug:  RABEprazole Take 20 mg by mouth daily. albuterol 90 mcg/actuation inhaler Commonly known as:  VENTOLIN HFA Take 2 Puffs by inhalation every six (6) hours as needed for Wheezing. albuterol-ipratropium 2.5 mg-0.5 mg/3 ml Nebu Commonly known as:  DUO-NEB  
3 mL by Nebulization route every six (6) hours as needed. amLODIPine 10 mg tablet Commonly known as:  Fradanyell Jamshid Take 1 Tab by mouth daily. arformoterol 15 mcg/2 mL Nebu neb solution Commonly known as:  Maria Fernanda Milo 2 mL by Nebulization route two (2) times a day. File under Medicare B J44.9  
  
 b complex vitamins tablet Take 1 tablet by mouth daily. BREO ELLIPTA 100-25 mcg/dose inhaler Generic drug:  fluticasone-vilanterol TAKE 1 PUFF BY INHALATION DAILY  
  
 budesonide 0.5 mg/2 mL Nbsp Commonly known as:  PULMICORT  
2 mL by Nebulization route two (2) times a day. File under Medicare B ICD J44.9  
  
 candesartan-hydroCHLOROthiazide 32-12.5 mg per tablet Commonly known as:  ATACAND HCT  
TAKE ONE TABLET BY MOUTH ONCE DAILY CARAFATE 100 mg/mL suspension Generic drug:  sucralfate Take 1 tsp by mouth four (4) times daily. CENTRUM COMPLETE 18-0.4 mg Tab Generic drug:  Multivit-Iron-Min-Folic Acid Take 1 Tab by mouth daily. cholecalciferol 1,000 unit Cap Commonly known as:  VITAMIN D3 Take 1,000 Units by mouth daily. * diazePAM 5 mg tablet Commonly known as:  VALIUM  
1 bid prn to help with muscle spasms, as needed. * diazePAM 5 mg tablet Commonly known as:  VALIUM  
1 bid prn to help with muscle spasms, as needed. ergocalciferol 50,000 unit capsule Commonly known as:  ERGOCALCIFEROL Take 1 Cap by mouth every seven (7) days. levothyroxine 125 mcg tablet Commonly known as:  SYNTHROID Take 1 Tab by mouth Daily (before breakfast). lidocaine 5 % Commonly known as:  LIDODERM  
1 Patch by TransDERmal route every twelve (12) hours for 30 days. Apply patch to the affected area for 12 hours a day and remove for 12 hours a day. MIRALAX 17 gram packet Generic drug:  polyethylene glycol Take 17 g by mouth daily. montelukast 10 mg tablet Commonly known as:  SINGULAIR  
TAKE ONE TABLET BY MOUTH ONCE DAILY  
  
 naloxone 4 mg/actuation nasal spray Commonly known as:  NARCAN  
4 mg by Nasal route as needed. NASONEX 50 mcg/actuation nasal spray Generic drug:  mometasone USE 2 SPRAYS INTO EACH NOSTRIL ONCE DAILY  
  
 nystatin 100,000 unit/mL suspension Commonly known as:  MYCOSTATIN  
5 ml QID po,  swish and swallow  Indications: oral candidiasis * oxyCODONE IR 15 mg immediate release tablet Commonly known as:  Maryjean Cobia Take 1 Tab by mouth four (4) times daily as needed for Pain for up to 30 days. Max Daily Amount: 60 mg.  
  
 * oxyCODONE IR 15 mg immediate release tablet Commonly known as:  Maryjean Cobia Take 1 Tab by mouth four (4) times daily as needed for Pain for up to 30 days. Max Daily Amount: 60 mg.  
  
 * oxyCODONE IR 15 mg immediate release tablet Commonly known as:  Maryjean Cobia Take 1 Tab by mouth four (4) times daily as needed for Pain for up to 30 days. Max Daily Amount: 60 mg. Start taking on:  2/2/2018 * oxyCODONE IR 15 mg immediate release tablet Commonly known as:  Sophie Morataya Take 1 Tab by mouth four (4) times daily as needed for Pain for up to 30 days. Max Daily Amount: 60 mg. Start taking on:  3/1/2018  
  
 pregabalin 75 mg capsule Commonly known as:  Sonya Sheets Take 1 Cap by mouth two (2) times a day for 30 days. Max Daily Amount: 150 mg. PRILOSEC PO Take 20 mg by mouth daily. traMADol 200 mg tablet Commonly known as:  ULTRAM-ER Take 1 Tab by mouth daily for 30 days. Max Daily Amount: 200 mg. VOLTAREN 1 % Gel Generic drug:  diclofenac Apply 4 g to affected area four (4) times daily. * Notice: This list has 6 medication(s) that are the same as other medications prescribed for you. Read the directions carefully, and ask your doctor or other care provider to review them with you. Prescriptions Sent to Pharmacy Refills  
 levothyroxine (SYNTHROID) 125 mcg tablet 1 Sig: Take 1 Tab by mouth Daily (before breakfast). Class: Normal  
 [facility]: [facility] [address] Ph #: [phone] Route: Oral  
  
Follow-up Instructions Return in about 6 weeks (around 2/20/2018) for thyroid. Non-fasting labs due 1 week prior. To-Do List   
 01/09/2018 Lab:  TSH 3RD GENERATION Patient Instructions Hypothyroidism: Care Instructions Your Care Instructions You have hypothyroidism, which means that your body is not making enough thyroid hormone. This hormone helps your body use energy. If your thyroid level is low, you may feel tired, be constipated, have an increase in your blood pressure, or have dry skin or memory problems. You may also get cold easily, even when it is warm. Women with low thyroid levels may have heavy menstrual periods. A blood test to find your thyroid-stimulating hormone (TSH) level is used to check for hypothyroidism.  A high TSH level may mean that you have low thyroid. When your body is not making enough thyroid hormone, TSH levels rise in an effort to make the body produce more. The treatment for hypothyroidism is to take thyroid hormone pills. You should start to feel better in 1 to 2 weeks. But it can take several months to see changes in the TSH level. You will need regular visits with your doctor to make sure you have the right dose of medicine. Most people need treatment for the rest of their lives. You will need to see your doctor regularly to have blood tests and to make sure you are doing well. Follow-up care is a key part of your treatment and safety. Be sure to make and go to all appointments, and call your doctor if you are having problems. It's also a good idea to know your test results and keep a list of the medicines you take. How can you care for yourself at home? · Take your thyroid hormone medicine exactly as prescribed. Call your doctor if you think you are having a problem with your medicine. Most people do not have side effects if they take the right amount of medicine regularly. ¨ Take the medicine 30 minutes before breakfast, and do not take it with calcium, vitamins, or iron. ¨ Do not take extra doses of your thyroid medicine. It will not help you get better any faster, and it may cause side effects. ¨ If you forget to take a dose, do NOT take a double dose of medicine. Take your usual dose the next day. · Tell your doctor about all prescription, herbal, or over-the-counter products you take. · Take care of yourself. Eat a healthy diet, get enough sleep, and get regular exercise. When should you call for help? Call 911 anytime you think you may need emergency care. For example, call if: 
? · You passed out (lost consciousness). ? · You have severe trouble breathing. ? · You have a very slow heartbeat (less than 60 beats a minute). ? · You have a low body temperature (95°F or below). ?Call your doctor now or seek immediate medical care if: 
? · You feel tired, sluggish, or weak. ? · You have trouble remembering things or concentrating. ? · You do not begin to feel better 2 weeks after starting your medicine. ? Watch closely for changes in your health, and be sure to contact your doctor if you have any problems. Where can you learn more? Go to http://danae-naif.info/. Enter D482 in the search box to learn more about \"Hypothyroidism: Care Instructions. \" Current as of: May 12, 2017 Content Version: 11.4 © 3330-1600 [employer]. Care instructions adapted under license by [employer] (which disclaims liability or warranty for this information). If you have questions about a medical condition or this instruction, always ask your healthcare professional. [address] any warranty or liability for your use of this information. Introducing [address]! Dear Maday Flores: Thank you for requesting a [email or web address] account. Our records indicate that you already have an active [email or web address] account. You can access your account anytime at [email or web address] Did you know that you can access your hospital and ER discharge instructions at any time in [email or web address]? You can also review all of your test results from your hospital stay or ER visit. Additional Information If you have questions, please visit the Frequently Asked Questions section of the [email or web address] website at [email or web address] Remember, [email or web address] is NOT to be used for urgent needs. For medical emergencies, dial 911. Now available from your iPhone and Android! Please provide this summary of care documentation to your next provider. Your primary care clinician is listed as Jackson Li. If you have any questions after today's visit, please call [phone].

## 2018-01-09 NOTE — PROGRESS NOTES
SUBJECTIVE    Chief Complaint   Patient presents with    Thyroid Problem    Results      Patient here for follow-up of uncontrolled hypothyroidism. She reports that she is taking Synthroid at the new increased dose (100mcg) and denies side effects. Her repeat labs now show an even more elevated TSH (6-->8). She reassures me that she is taking her synthroid daily. She has seen the ENT for her thyroid nodules. She says she has had imaging in Nov but I see no evidence of that in the chart. She says she has follow-up scheduled with them. She has had difficulty losing weight and fatigue. OBJECTIVE  Blood pressure 130/78, pulse 73, temperature 98.2 °F (36.8 °C), temperature source Oral, resp. rate 16, height 5' 2\" (1.575 m), weight 198 lb (89.8 kg), SpO2 98 %. General:  alert, cooperative, well appearing, in no apparent distress. ENT: no proptosis. Thyroid:  No discrete masses palpated. CV:  The heart sounds are regular in rate and rhythm. There is a normal S1 and S2. There or no murmurs. Lungs: Inspiratory and expiratory efforts are full and unlabored. Lung sounds are clear and equal to auscultation throughout all lung fields without wheezing, rales, or rhonchi. Extremities: There is no edema. The feet are without lesions or ulcerations. Skin: no rashes, no jaundice. Psych: normal affect. Mood good. Oriented x 3. Judgement and insight intact. Results for orders placed or performed during the hospital encounter of 01/03/18   TSH 3RD GENERATION   Result Value Ref Range    TSH 8.26 (H) 0.36 - 3.74 uIU/mL     ASSESSMENT / PLAN    ICD-10-CM ICD-9-CM    1. Acquired hypothyroidism E03.9 244.9 TSH 3RD GENERATION   2. Obesity, unspecified classification, unspecified obesity type, unspecified whether serious comorbidity present E66.9 278.00      Reviewed labs. Hypothyroidism - Uncontrolled. Increase to Synthroid 125mcg daily. Check TSH in 6 weeks. Pt ed handout.   Cont follow-up with ENT. My nurse is looking into whether she has had follow-up as she stated. Obesity - diet and exercise. Colonoscopy and mammograms declined. All chart history elements were reviewed by me at the time of the visit even though marked at time of note closure. Patient understands our medical plan. Patient has provided input and agrees with goals. Alternatives have been explained and offered. All questions answered. The patient is to call if condition worsens or fails to improve. Follow-up Disposition:  Return in about 6 weeks (around 2/20/2018) for thyroid. Non-fasting labs due 1 week prior.

## 2018-01-09 NOTE — PROGRESS NOTES
Chief Complaint   Patient presents with    Thyroid Problem    Results     1. Have you been to the ER, urgent care clinic since your last visit? Hospitalized since your last visit? No    2. Have you seen or consulted any other health care providers outside of the [address] [place] [address] since your last visit? Include any pap smears or colon screening.  No    1/25/18 pt has appointment with Dr. Marcello Marti: patient declined

## 2018-01-09 NOTE — PATIENT INSTRUCTIONS

## 2018-01-15 NOTE — TELEPHONE ENCOUNTER
Pt states she needs to speak to nurse. She is having trouble getting her prescriptions. Please call [phone].

## 2018-01-15 NOTE — TELEPHONE ENCOUNTER
Pt states she is not able to get the [address] at the [email or web address]. She has never filled since given in Nov. She is not able to fill at local pharmacy due to the 20% too expensive so she has never filled. Pt has f/u in Feb and will discuss with doctor.

## 2018-02-15 ENCOUNTER — OFFICE VISIT (OUTPATIENT)
Dept: PULMONOLOGY | Age: 63
End: 2018-02-15

## 2018-02-15 VITALS
OXYGEN SATURATION: 97 % | WEIGHT: 196 LBS | RESPIRATION RATE: 18 BRPM | HEIGHT: 62 IN | HEART RATE: 71 BPM | SYSTOLIC BLOOD PRESSURE: 120 MMHG | TEMPERATURE: 97.9 F | BODY MASS INDEX: 36.07 KG/M2 | DIASTOLIC BLOOD PRESSURE: 68 MMHG

## 2018-02-15 DIAGNOSIS — Z87.891 EX-SMOKER: ICD-10-CM

## 2018-02-15 DIAGNOSIS — Z98.84 HISTORY OF GASTRIC BYPASS: ICD-10-CM

## 2018-02-15 DIAGNOSIS — F32.9 DEPRESSION, REACTIVE: ICD-10-CM

## 2018-02-15 DIAGNOSIS — J45.30 MILD PERSISTENT ASTHMA WITHOUT COMPLICATION: Primary | ICD-10-CM

## 2018-02-15 DIAGNOSIS — E11.40 TYPE 2 DIABETES MELLITUS WITH DIABETIC NEUROPATHY, UNSPECIFIED LONG TERM INSULIN USE STATUS: ICD-10-CM

## 2018-02-15 DIAGNOSIS — R91.8 PULMONARY NODULES: ICD-10-CM

## 2018-02-15 NOTE — PROGRESS NOTES
HISTORY OF PRESENT ILLNESS  Princella Essex is a 58 y.o. female. HPI Comments: Pt with mild persistent asthma with frequent exacerbations requiring sick calls and Prednisone tapers. Pt associates exacerbations with living in a jessica house with a heavy smoker who is also a hoarder. In addition, pt has also suffered from recurrent oral thrush which briefly improved with switching to [facility], however pt also developed oral and esophageal thrush with Breo. She was switched to Pulmicort without development of thrush. Pt notes improved Asthma control, including much improved exercise tolerance. Rescue inhaler use is less than 1/week and pt denies PND. Her main complaint is depression as she is in the process of  from her  and rebuilding her life. Asthma   The history is provided by the patient. This is a chronic problem. The problem occurs daily. Progression since onset: recurring. Associated symptoms include shortness of breath. Pertinent negatives include no chest pain, no abdominal pain and no headaches. Nothing aggravates the symptoms. The symptoms are relieved by medications. Treatments tried: Albuterol. The treatment provided significant relief. COPD   Associated symptoms include shortness of breath. Pertinent negatives include no chest pain, no abdominal pain and no headaches. Review of Systems   Constitutional: Negative for chills, diaphoresis, fever, malaise/fatigue and weight loss. HENT: Negative for congestion, ear discharge, ear pain, hearing loss, nosebleeds, sinus pain, sore throat and tinnitus. Odynophagia   Eyes: Negative for blurred vision, double vision, photophobia, pain, discharge and redness. Respiratory: Positive for shortness of breath and wheezing. Negative for hemoptysis, sputum production and stridor. Cough: less. Cardiovascular: Negative for chest pain, palpitations, orthopnea, claudication, leg swelling and PND.    Gastrointestinal: Negative for abdominal pain, blood in stool, constipation, diarrhea, heartburn, melena, nausea and vomiting. Genitourinary: Negative for dysuria, flank pain, frequency, hematuria and urgency. Musculoskeletal: Negative for back pain, falls, joint pain, myalgias and neck pain. Skin: Negative for itching and rash. Neurological: Negative for dizziness, tingling, tremors, sensory change, speech change, focal weakness, seizures, loss of consciousness, weakness and headaches. Endo/Heme/Allergies: Negative for environmental allergies. Does not bruise/bleed easily. Psychiatric/Behavioral: Positive for depression. Negative for hallucinations, memory loss, substance abuse and suicidal ideas. The patient is not nervous/anxious and does not have insomnia. Past Medical History:   Diagnosis Date    Abdominal pain     Arthritis     Asthma 1/20/2010    Dr. Anastasia Wong [facility])     stage 2 colon ca    Chemotherapy     Chronic low back pain 7/12/2010    Chronic lung disease     [facility]ch[facility] 1/20/2010    Dr. Peraza [place] [facility]: colon cancer 1/20/2010    GERD (gastroesophageal reflux disease)     Gout     H/O colon cancer, stage II     s/p resection, last colonoscopy 11/11- q3 yrs    History of multiple pulmonary nodules 1/20/2010    Hoarse 2015    candida, sees ENT    HTN (hypertension) 1/20/2010    Hyperlipemia 1/20/2010    Hypothyroid 1/20/2010    IBS (irritable bowel syndrome)     Liver disease     cysts on liver    Mammogram declined     Multinodular thyroid     sees ENT.   Due for repeat U/S with ENT in Nov 2017    Nephropathy, membranous 1/20/2010    Neuropathy 1/20/2010    Nicotine use disorder 1/20/2010    Prediabetes     PUD (peptic ulcer disease)     Pulmonary nodule     sees pulmonary    Rectocele 1/20/2010    S/P cataract extraction 1/3/2011    Thyroid disease     hypothyroid    Unspecified sleep apnea     does not use CPAP    Vitamin D deficiency 1/20/2010     Past Surgical History:   Procedure Laterality Date    ABDOMEN SURGERY PROC UNLISTED      colon resection 8/2005- Dr. Ildefonso Garrison- colon ca   Skip Courser      gastric bypass 12/2/09    HX GASTRIC BYPASS      HX GI      HX GYN      hysterectomy and BSO in 11/2006    HX HEENT      cataract sx tee    HX HYSTERECTOMY      HX LUMBAR FUSION  June 2013    L4 to L 5    HX TONSILLECTOMY       Current Outpatient Prescriptions on File Prior to Visit   Medication Sig Dispense Refill    levothyroxine (SYNTHROID) 125 mcg tablet Take 1 Tab by mouth Daily (before breakfast). 30 Tab 1    [START ON 3/1/2018] oxyCODONE IR (ROXICODONE) 15 mg immediate release tablet Take 1 Tab by mouth four (4) times daily as needed for Pain for up to 30 days. Max Daily Amount: 60 mg. 120 Tab 0    budesonide (PULMICORT) 0.5 mg/2 mL nbsp 2 mL by Nebulization route two (2) times a day. File under Medicare B ICD J44.9 60 Each 5    nystatin (MYCOSTATIN) 100,000 unit/mL suspension 5 ml QID po,  swish and swallow  Indications: oral candidiasis 120 mL 0    ergocalciferol (ERGOCALCIFEROL) 50,000 unit capsule Take 1 Cap by mouth every seven (7) days. 4 Cap 11    albuterol (VENTOLIN HFA) 90 mcg/actuation inhaler Take 2 Puffs by inhalation every six (6) hours as needed for Wheezing. 1 Inhaler 5    diazePAM (VALIUM) 5 mg tablet 1 bid prn to help with muscle spasms, as needed. 60 Tab 1    albuterol-ipratropium (DUO-NEB) 2.5 mg-0.5 mg/3 ml nebu 3 mL by Nebulization route every six (6) hours as needed. 120 Nebule 3    candesartan-hydroCHLOROthiazide (ATACAND HCT) 32-12.5 mg per tablet TAKE ONE TABLET BY MOUTH ONCE DAILY 90 Tab 1    amLODIPine (NORVASC) 10 mg tablet Take 1 Tab by mouth daily.  90 Tab 1    NASONEX 50 mcg/actuation nasal spray USE 2 SPRAYS INTO EACH NOSTRIL ONCE DAILY 1 Container 2    montelukast (SINGULAIR) 10 mg tablet TAKE ONE TABLET BY MOUTH ONCE DAILY 90 Tab 3    naloxone (NARCAN) 4 mg/actuation spry 4 mg by Nasal route as needed. 4 mg 0    cholecalciferol (VITAMIN D3) 1,000 unit cap Take 1,000 Units by mouth daily.  sucralfate (CARAFATE) 100 mg/mL suspension Take 1 tsp by mouth four (4) times daily.  b complex vitamins tablet Take 1 tablet by mouth daily.  Multivit-Iron-Min-Folic Acid (CENTRUM COMPLETE) 18-0.4 mg tab Take 1 Tab by mouth daily.  diclofenac (VOLTAREN) 1 % topical gel Apply 4 g to affected area four (4) times daily.  OMEPRAZOLE (PRILOSEC PO) Take 20 mg by mouth daily.  RABEprazole (ACIPHEX) 20 mg tablet Take 20 mg by mouth daily.  polyethylene glycol (MIRALAX) 17 gram packet Take 17 g by mouth daily.  oxyCODONE IR (ROXICODONE) 15 mg immediate release tablet Take 1 Tab by mouth four (4) times daily as needed for Pain for up to 30 days. Max Daily Amount: 60 mg. 120 Tab 0    diazePAM (VALIUM) 5 mg tablet 1 bid prn to help with muscle spasms, as needed. 60 Tab 2    BREO ELLIPTA 100-25 mcg/dose inhaler TAKE 1 PUFF BY INHALATION DAILY 1 Inhaler 3    arformoterol (BROVANA) 15 mcg/2 mL nebu neb solution 2 mL by Nebulization route two (2) times a day. File under Medicare B J44.9 60 Vial 5     No current facility-administered medications on file prior to visit.       Allergies   Allergen Reactions    Ace Inhibitors Cough    Aspirin Other (comments)     GI bleeding & pain    Nsaids (Non-Steroidal Anti-Inflammatory Drug) Other (comments)     Makes her stomach bleed       Social History     Social History    Marital status:      Spouse name: N/A    Number of children: N/A    Years of education: N/A     Occupational History     Not Employed     Social History Main Topics    Smoking status: Former Smoker     Packs/day: 0.50     Years: 18.00     Types: Cigarettes     Quit date: 8/27/1990    Smokeless tobacco: Never Used      Comment: per patient she has not smoked in over 30 years but significant second hand smoke exposure at home    Alcohol use 0.0 oz/week     0 Standard drinks or equivalent per week      Comment: rare    Drug use: No    Sexual activity: Yes     Partners: Male     Birth control/ protection: None, Surgical     Other Topics Concern    Not on file     Social History Narrative     Blood pressure 120/68, pulse 71, temperature 97.9 °F (36.6 °C), temperature source Oral, resp. rate 18, height 5' 2\" (1.575 m), weight 88.9 kg (196 lb), SpO2 97 %. Physical Exam   Constitutional: She is oriented to person, place, and time. She appears well-developed and well-nourished. No distress. HENT:   Head: Normocephalic and atraumatic. Nose: Nose normal.   Mouth/Throat: Oropharynx is clear and moist. No oropharyngeal exudate. No oral thrush   Eyes: Conjunctivae are normal. Pupils are equal, round, and reactive to light. Right eye exhibits no discharge. Left eye exhibits no discharge. No scleral icterus. Neck: No JVD present. No tracheal deviation present. No thyromegaly present. Gravelly voice   Cardiovascular: Normal rate, regular rhythm, normal heart sounds and intact distal pulses. Exam reveals no gallop. No murmur heard. Pulmonary/Chest: Effort normal and breath sounds normal. No stridor. No respiratory distress. She has no wheezes. She has no rales. She exhibits no tenderness. Abdominal: Soft. She exhibits no mass. There is no tenderness. Musculoskeletal: She exhibits no edema or tenderness. Old L spine surgical scar   Lymphadenopathy:     She has no cervical adenopathy. Neurological: She is alert and oriented to person, place, and time. Skin: Skin is warm and dry. No rash noted. She is not diaphoretic. No erythema. Psychiatric: She has a normal mood and affect. Her behavior is normal. Judgment and thought content normal.     PFT: mild obstruction with air trapping. FEV1 77%  ASSESSMENT and PLAN  Encounter Diagnoses   Name Primary?     Mild persistent asthma without complication Yes    Ex-smoker     Pulmonary nodules     Comment: stable since 2006    Depression, reactive     History of gastric bypass     Type 2 diabetes mellitus with diabetic neuropathy, unspecified long term insulin use status (HCC)      Pt with good control on nebulized Budesonide, will monitor control with this for now. Continue rescue Albuterol prn. Consider adding LABA if symptoms or spirometry worsen.   Encouraged pt on smoking abstinence  RTC 5 months  Spirometry on next visit

## 2018-02-15 NOTE — MR AVS SNAPSHOT
[address] [facility], [address] [place] [address] 
[phone] Patient: Princella Essex MRN: [id number] Visit Information Date & Time Provider Department Dept. Phone Encounter #  
 2/15/2018 11:30 AM MD Erik Vargas [facility] \[facility] [place] [facility]\"" [id number] Follow-up Instructions Return in about 5 months (around 7/15/2018). Your Appointments 2/27/2018 10:30 AM  
ROUTINE CARE with Sunday Ortiz MD  
[address] [facility] ([facility]) Appt Note: 7 week followup [address] [facility] [address] [facility] [address] [place] [address] [address] [place] [address]  
  
    
 3/29/2018  8:40 AM  
Follow Up with COSMO Velez [facility] (DAVE SCHEDULING) Appt Note: Return in about 3 months (around 4/3/2018). [address] [address]  
[phone] [facility] [id number] Upcoming Health Maintenance Date Due  
 BREAST CANCER SCRN MAMMOGRAM 6/29/2017 EYE EXAM RETINAL OR DILATED Q1 1/10/2018 FOOT EXAM Q1 1/12/2018 HEMOGLOBIN A1C Q6M 5/7/2018 MICROALBUMIN Q1 11/7/2018 LIPID PANEL Q1 11/7/2018 COLONOSCOPY 3/2/2019 DTaP/Tdap/Td series (2 - Td) 6/9/2026 Allergies as of 2/15/2018  Review Complete On: 2/15/2018 By: Jeana Burns LPN Severity Noted Reaction Type Reactions Ace Inhibitors  12/14/2010    Cough Aspirin  01/20/2010    Other (comments) GI bleeding & pain  
 Nsaids (Non-steroidal Anti-inflammatory Drug)  09/26/2012    Other (comments) Makes her stomach bleed Current Immunizations  Reviewed on 6/6/2017 Name Date Influenza Vaccine 11/13/2014, 1/7/2014 Influenza Vaccine (Quad) PF 11/9/2017, 1/12/2017, 10/14/2015 Pneumococcal Vaccine (Unspecified Type) 11/14/2014, 10/1/2011 Not reviewed this visit Vitals BP Pulse Temp Resp Height(growth percentile) Weight(growth percentile) 120/68 (BP 1 Location: Left arm, BP Patient Position: Sitting) 71 97.9 °F (36.6 °C) (Oral) 18 5' 2\" (1.575 m) 196 lb (88.9 kg) SpO2 BMI OB Status Smoking Status 97% 35.85 kg/m2 Hysterectomy Former Smoker BMI and BSA Data Body Mass Index Body Surface Area  
 35.85 kg/m 2 1.97 m 2 Preferred Pharmacy Pharmacy Name [facility] - [place] [facility], [address] [place] [address] [phone] Your Updated Medication List  
  
   
This list is accurate as of: 2/15/18 11:34 AM.  Always use your most recent med list.  
  
  
  
  
 ACIPHEX 20 mg tablet Generic drug:  RABEprazole Take 20 mg by mouth daily. albuterol 90 mcg/actuation inhaler Commonly known as:  VENTOLIN HFA Take 2 Puffs by inhalation every six (6) hours as needed for Wheezing. albuterol-ipratropium 2.5 mg-0.5 mg/3 ml Nebu Commonly known as:  DUO-NEB  
3 mL by Nebulization route every six (6) hours as needed. amLODIPine 10 mg tablet Commonly known as:  Lynnell Manual Take 1 Tab by mouth daily. arformoterol 15 mcg/2 mL Nebu neb solution Commonly known as:  Keri Mulligan 2 mL by Nebulization route two (2) times a day. File under Medicare B J44.9  
  
 b complex vitamins tablet Take 1 tablet by mouth daily. BREO ELLIPTA 100-25 mcg/dose inhaler Generic drug:  fluticasone-vilanterol TAKE 1 PUFF BY INHALATION DAILY  
  
 budesonide 0.5 mg/2 mL Nbsp Commonly known as:  PULMICORT  
2 mL by Nebulization route two (2) times a day. File under Medicare B ICD J44.9  
  
 candesartan-hydroCHLOROthiazide 32-12.5 mg per tablet Commonly known as:  ATACAND HCT  
TAKE ONE TABLET BY MOUTH ONCE DAILY CARAFATE 100 mg/mL suspension Generic drug:  sucralfate Take 1 tsp by mouth four (4) times daily. CENTRUM COMPLETE 18-0.4 mg Tab Generic drug:  Multivit-Iron-Min-Folic Acid Take 1 Tab by mouth daily. cholecalciferol 1,000 unit Cap Commonly known as:  VITAMIN D3 Take 1,000 Units by mouth daily. * diazePAM 5 mg tablet Commonly known as:  VALIUM  
1 bid prn to help with muscle spasms, as needed. * diazePAM 5 mg tablet Commonly known as:  VALIUM  
1 bid prn to help with muscle spasms, as needed. ergocalciferol 50,000 unit capsule Commonly known as:  ERGOCALCIFEROL Take 1 Cap by mouth every seven (7) days. levothyroxine 125 mcg tablet Commonly known as:  SYNTHROID Take 1 Tab by mouth Daily (before breakfast). MIRALAX 17 gram packet Generic drug:  polyethylene glycol Take 17 g by mouth daily. montelukast 10 mg tablet Commonly known as:  SINGULAIR  
TAKE ONE TABLET BY MOUTH ONCE DAILY  
  
 naloxone 4 mg/actuation nasal spray Commonly known as:  NARCAN  
4 mg by Nasal route as needed. NASONEX 50 mcg/actuation nasal spray Generic drug:  mometasone USE 2 SPRAYS INTO EACH NOSTRIL ONCE DAILY  
  
 nystatin 100,000 unit/mL suspension Commonly known as:  MYCOSTATIN  
5 ml QID po,  swish and swallow  Indications: oral candidiasis * oxyCODONE IR 15 mg immediate release tablet Commonly known as:  Michael Cam Take 1 Tab by mouth four (4) times daily as needed for Pain for up to 30 days. Max Daily Amount: 60 mg.  
  
 * oxyCODONE IR 15 mg immediate release tablet Commonly known as:  Rodriguez Cam Take 1 Tab by mouth four (4) times daily as needed for Pain for up to 30 days. Max Daily Amount: 60 mg. Start taking on:  3/1/2018 PRILOSEC PO Take 20 mg by mouth daily. VOLTAREN 1 % Gel Generic drug:  diclofenac Apply 4 g to affected area four (4) times daily. * Notice: This list has 4 medication(s) that are the same as other medications prescribed for you.  Read the directions carefully, and ask your doctor or other care provider to review them with you. Follow-up Instructions Return in about 5 months (around 7/15/2018). Introducing [facility]! Dear Choco Neal: Thank you for requesting a [email or web address] account. Our records indicate that you already have an active [email or web address] account. You can access your account anytime at [email or web address] Did you know that you can access your hospital and ER discharge instructions at any time in [email or web address]? You can also review all of your test results from your hospital stay or ER visit. Additional Information If you have questions, please visit the Frequently Asked Questions section of the [email or web address] website at [email or web address] Remember, [email or web address] is NOT to be used for urgent needs. For medical emergencies, dial 911. Now available from your iPhone and Android! Please provide this summary of care documentation to your next provider. Your primary care clinician is listed as Oren Bio. If you have any questions after today's visit, please call [phone].

## 2018-02-15 NOTE — PROGRESS NOTES
Ms. Meliton Oconnor has a reminder for a \"due or due soon\" health maintenance. I have asked that she contact her primary care provider for follow-up on this health maintenance. Chief Complaint   Patient presents with    COPD    Asthma     1. Have you been to the ER, urgent care clinic since your last visit? Hospitalized since your last visit? No    2. Have you seen or consulted any other health care providers outside of the [address] [place] [address] since your last visit? Include any pap smears or colon screening.  No

## 2018-02-26 ENCOUNTER — TELEPHONE (OUTPATIENT)
Dept: PULMONOLOGY | Age: 63
End: 2018-02-26

## 2018-02-26 RX ORDER — FLUTICASONE FUROATE AND VILANTEROL TRIFENATATE 100; 25 UG/1; UG/1
POWDER RESPIRATORY (INHALATION)
Qty: 1 INHALER | Refills: 3 | Status: SHIPPED | OUTPATIENT
Start: 2018-02-26 | End: 2018-07-19 | Stop reason: ALTCHOICE

## 2018-02-26 NOTE — TELEPHONE ENCOUNTER
Pt said that she thinks that she needs some prednisone because she has been having tightness in her chest the past few days. She would also like to speak with someone about her nebulizer.  Please call [phone]

## 2018-02-27 NOTE — TELEPHONE ENCOUNTER
Pt states she has been moving and her allergies have been getting to her. Pt states yesterday was worse, not bad today. She has her nebulizer if needed for wheezing. Offered appt but pt doesn't feel she needs to be seen since better today. Pt states the # we had been calling was incorrect, she had given the wrong #. The correct # is in system. Now.  Advised pt if sxs of asthma develps to call us back for ov

## 2018-02-28 ENCOUNTER — TELEPHONE (OUTPATIENT)
Dept: PULMONOLOGY | Age: 63
End: 2018-02-28

## 2018-02-28 NOTE — TELEPHONE ENCOUNTER
We received a fax from [facility] requesting authorization for [employer]. Solution. The form is filled out and placed in Dr. Tony Milian box for signature.

## 2018-03-07 ENCOUNTER — TELEPHONE (OUTPATIENT)
Dept: PULMONOLOGY | Age: 63
End: 2018-03-07

## 2018-03-07 NOTE — TELEPHONE ENCOUNTER
Bub[facility] Pharmacist calls to inform us that the [facility]. Sol. Didn't require authorization, it is covered under Medicare B.  I agreed and let her know that we did notify the pt's pharmacy of this. She mentions that she too called the pharmacy. Also, the pt. may have a slightly higher copay.

## 2018-03-22 ENCOUNTER — TELEPHONE (OUTPATIENT)
Dept: PULMONOLOGY | Age: 63
End: 2018-03-22

## 2018-03-22 NOTE — TELEPHONE ENCOUNTER
PT CALLED([phone]). WANTS TO TALK TO MOHINDER CASANOVA HER BROVANA AND HER THRUSH. PLEASE CALL BACK. PT NOT SURE BUT HER PH# MIGHT BE [phone].

## 2018-03-22 NOTE — TELEPHONE ENCOUNTER
Spoke with pt. She received a letter from her Part D plan they will not cover the [address]. Advised earlier this month our office spoke with her Part D plan and advise the rx was to be filed under Part B and this was documented on the Rx sent to [facility]. Pt states she had the Pulmicort transferred to [facility] and will have the [address] Rx transferred to [facility] also so they will file correctly under part B.  If pt has any problems she is to call us back

## 2018-03-28 ENCOUNTER — HOSPITAL ENCOUNTER (OUTPATIENT)
Dept: LAB | Age: 63
Discharge: HOME | End: 2018-03-28
Payer: MEDICARE

## 2018-03-28 LAB
T4 FREE SERPL-MCNC: 1.1 NG/DL (ref 0.7–1.5)
TSH SERPL DL<=0.05 MIU/L-ACNC: 2.26 UIU/ML (ref 0.36–3.74)

## 2018-03-28 PROCEDURE — 36415 COLL VENOUS BLD VENIPUNCTURE: CPT | Performed by: OTOLARYNGOLOGY

## 2018-03-28 PROCEDURE — 84439 ASSAY OF FREE THYROXINE: CPT | Performed by: OTOLARYNGOLOGY

## 2018-03-29 ENCOUNTER — OFFICE VISIT (OUTPATIENT)
Dept: PAIN MANAGEMENT | Age: 63
End: 2018-03-29

## 2018-03-29 VITALS
WEIGHT: 196 LBS | TEMPERATURE: 97 F | HEART RATE: 71 BPM | SYSTOLIC BLOOD PRESSURE: 125 MMHG | RESPIRATION RATE: 16 BRPM | BODY MASS INDEX: 36.07 KG/M2 | HEIGHT: 62 IN | DIASTOLIC BLOOD PRESSURE: 76 MMHG

## 2018-03-29 DIAGNOSIS — G89.4 CHRONIC PAIN SYNDROME: ICD-10-CM

## 2018-03-29 DIAGNOSIS — M54.2 CERVICALGIA: ICD-10-CM

## 2018-03-29 DIAGNOSIS — M54.17 LUMBOSACRAL NEURITIS: ICD-10-CM

## 2018-03-29 DIAGNOSIS — Z98.890 H/O LUMBOSACRAL SPINE SURGERY: ICD-10-CM

## 2018-03-29 RX ORDER — OXYCODONE HYDROCHLORIDE 15 MG/1
15 TABLET ORAL
Qty: 120 TAB | Refills: 0 | Status: SHIPPED | OUTPATIENT
Start: 2018-05-27 | End: 2018-06-26

## 2018-03-29 RX ORDER — TRAMADOL HYDROCHLORIDE 200 MG/1
200 TABLET, EXTENDED RELEASE ORAL DAILY
Qty: 30 TAB | Refills: 2 | Status: SHIPPED | OUTPATIENT
Start: 2018-04-06 | End: 2018-05-06

## 2018-03-29 RX ORDER — LIDOCAINE 50 MG/G
PATCH TOPICAL EVERY 24 HOURS
COMMUNITY

## 2018-03-29 RX ORDER — OXYCODONE HYDROCHLORIDE 15 MG/1
15 TABLET ORAL
Qty: 120 TAB | Refills: 0 | Status: SHIPPED | OUTPATIENT
Start: 2018-04-28 | End: 2018-05-28

## 2018-03-29 RX ORDER — LIDOCAINE 50 MG/G
1 PATCH TOPICAL EVERY 12 HOURS
Qty: 60 PATCH | Refills: 2 | Status: SHIPPED | OUTPATIENT
Start: 2018-03-29 | End: 2018-04-28

## 2018-03-29 RX ORDER — TRAMADOL HYDROCHLORIDE 200 MG/1
TABLET, FILM COATED, EXTENDED RELEASE ORAL
COMMUNITY
End: 2018-08-25

## 2018-03-29 RX ORDER — PREGABALIN 75 MG/1
CAPSULE ORAL
COMMUNITY
End: 2019-01-31

## 2018-03-29 RX ORDER — DIAZEPAM 5 MG/1
TABLET ORAL
Qty: 60 TAB | Refills: 1 | Status: SHIPPED | OUTPATIENT
Start: 2018-04-28 | End: 2018-10-08 | Stop reason: SDUPTHER

## 2018-03-29 RX ORDER — PREGABALIN 75 MG/1
75 CAPSULE ORAL 2 TIMES DAILY
Qty: 60 CAP | Refills: 2 | Status: SHIPPED | OUTPATIENT
Start: 2018-04-27 | End: 2018-05-27

## 2018-03-29 NOTE — PROGRESS NOTES
HISTORY OF PRESENT ILLNESS  Domitila Canada is a 58 y.o. female    HPI: Ms. Arina Oliveira  returns today for f/u of chronic low back pain related to degenerative disc disease and anterolisthesis. H/o lumbar fusion with hardware placement 2013. She also suffers from neck pain related to facet arthritis and foraminal stenosis. Today is my first visit with Ms. Arina Oliveira. She continues with pain unchanged since last visit. We discussed her current condition medications in detail today. We had a lengthy conversation about the departure of her normal physician, Dr. Ricky Arita, who is recently left our practice. She is quite upset that Dr. Ricky Arita is left and would like to continue with her care with Dr. Ricky Arita. I have asked her to make sure she signed a release of records before checking out today so that she can have her records sent over to Dr. Malorie Tripp [facility] practice. She has been doing well with her current treatment plan which has been offering significant pain control. I have recommended that she schedule appointment for 3 months for tentative appointment if she has not transition her care by them. She would also need to cancel this appointment as well as her pain management agreement before transitioning her care. She understands that she does continue her care with our practice we will no longer be able to write for benzodiazepines. We will continue with her current treatment for now until she has transition her care with Dr. Ricky Arita. I have wished her well. Current medication management includes Tramadol  mg daily and Oxycodone IR 15 mg four times a day as needed, Valium 5 mg three times a day as needed, and Lidoderm patches as needed. Medications are helping with pain control and quality of life. Her pain is 3-4/10 with medication and 10/10 without. Pt describes pain as throbbing and stabbing. Aggravating factors include standing and walking. Relieved with rest, medication, and avoiding painful activities. Current treatment is helping to improve general activity, mood, sleep, and enjoyment of life    Ms. Gail Fulton is tolerating medications well, with no side effects noted. She is able to stay more active with less discomfort with these current doses. The patient reports an average of 70% pain relief with current treatment/medications. She is informed of side effects, risks, and benefits of this regimen, and emphasizes that she derives a significant improvement in functionality and quality of life, and notes that non-opioid medications and therapies in the past have not offered significant benefit. She  is otherwise doing well with no other complaints today. She denies any adverse events including nausea, vomiting, dizziness, increased constipation, hallucinations, or seizures. Because the patient's current regimen places him/her at increased risk for possible overdose, a prescription for naloxone nasal spray has been provided. The patient understands that this medication is only to be used in the setting of a possible overdose and that inadvertent use of this medication could precipitate overt withdrawal.         Allergies   Allergen Reactions    Ace Inhibitors Cough    Aspirin Other (comments)     GI bleeding & pain    Nsaids (Non-Steroidal Anti-Inflammatory Drug) Other (comments)     Makes her stomach bleed         Past Surgical History:   Procedure Laterality Date    ABDOMEN SURGERY PROC UNLISTED      colon resection 8/2005- Dr. Milena Santana- colon ca   [facility]      gastric bypass 12/2/09    HX GASTRIC BYPASS      HX GI      HX GYN      hysterectomy and BSO in 11/2006    HX HEENT      cataract sx tee    HX HYSTERECTOMY      HX LUMBAR FUSION  June 2013    L4 to L 5    HX TONSILLECTOMY           Review of Systems   Constitutional: Negative for chills, fever and weight loss. HENT: Negative for congestion and sore throat. Eyes: Negative for blurred vision and double vision. Respiratory: Negative for cough, shortness of breath and wheezing. Cardiovascular: Negative for chest pain and palpitations. Gastrointestinal: Negative for abdominal pain, constipation, diarrhea, heartburn, nausea and vomiting. Genitourinary: Negative. Musculoskeletal: Positive for back pain and joint pain. Neurological: Negative for dizziness, seizures and loss of consciousness. Endo/Heme/Allergies: Does not bruise/bleed easily. Psychiatric/Behavioral: Positive for depression. The patient is nervous/anxious. The patient does not have insomnia. Physical Exam   Constitutional: She is oriented to person, place, and time and well-developed, well-nourished, and in no distress. No distress. HENT:   Head: Normocephalic and atraumatic. Eyes: EOM are normal.   Pulmonary/Chest: Effort normal.   Neurological: She is alert and oriented to person, place, and time. Skin: Skin is warm and dry. No rash noted. She is not diaphoretic. No erythema. Psychiatric: Mood, memory, affect and judgment normal.   Nursing note and vitals reviewed. ASSESSMENT:    1. H/O lumbosacral spine surgery    2. Cervicalgia    3. Lumbosacral neuritis    4. Chronic pain syndrome           [place] Prescription Monitoring Program was reviewed which does not demonstrate aberrancies and/or inconsistencies with regard to the historical prescribing of controlled medications to this patient by other providers. PLAN / Pt Instructions:  1. Continue current plan with no evidence of addiction or diversion. Stable on current medication without adverse events. 2. Refill oxycodone 15 mg. Please take 1 tablet up to 4 times daily on an as-needed basis only. 3. Refill tramadol  mg once daily. 2 refills. 4. Refill Lyrica 75 mg every 12 hours. 2 refills. 5. Refill lidocaine 5% patches up to 2 times daily. 2 refills. 6. Refill Valium 5 mg up to 2 times daily as needed for muscle spasm. One refill.   Currently has 1 refill left as well. Please understand that we will not be able to continue with this medication through our practice. 7. Naloxone 4 mg nasal spray for opioid induced respiratory depression emergency only. 8. Discussed risks of addiction, dependency, and opioid induced hyperalgesia. 9. Return to clinic in 3 months. Please call and cancel his appointment as well as your pain management agreement if you do decide to transition her care by this time. Medications Ordered Today   Medications    oxyCODONE IR (ROXICODONE) 15 mg immediate release tablet     Sig: Take 1 Tab by mouth four (4) times daily as needed for Pain for up to 30 days. Max Daily Amount: 60 mg. Dispense:  120 Tab     Refill:  0    oxyCODONE IR (ROXICODONE) 15 mg immediate release tablet     Sig: Take 1 Tab by mouth four (4) times daily as needed for Pain for up to 30 days. Max Daily Amount: 60 mg. Dispense:  120 Tab     Refill:  0    traMADol (ULTRAM-ER) 200 mg tablet     Sig: Take 1 Tab by mouth daily for 30 days. Max Daily Amount: 200 mg. Dispense:  30 Tab     Refill:  2    pregabalin (LYRICA) 75 mg capsule     Sig: Take 1 Cap by mouth two (2) times a day for 30 days. Max Daily Amount: 150 mg. Dispense:  60 Cap     Refill:  2    lidocaine (LIDODERM) 5 %     Si Patch by TransDERmal route every twelve (12) hours for 30 days. Apply patch to the affected area for 12 hours a day and remove for 12 hours a day. Dispense:  60 Patch     Refill:  2    diazePAM (VALIUM) 5 mg tablet     Si bid prn to help with muscle spasms, as needed. Dispense:  60 Tab     Refill:  1         DISPOSITION     Pain medications are prescribed with the objective of pain relief and improved physical and psychosocial function in this patient.  Pain Meds and Quality Of Life have been reviewed. Nonpharmacologic therapy and non-opioid pharmacologic therapy have been considered.  Opioid therapy is only prescribed if the expected benefits are anticipated to outweigh risks.  Counseled patient on proper use of prescribed medications.  Reviewed with patient self-help tools, home exercise, and lifestyle changes to assist the patient in self-management of symptoms.  Reviewed with patient the treatment plan, goals of treatment plan, and limitations of treatment plan, to include the potential for side effects from medications and procedures. If side effects occur, it is the responsibility of the patient to inform the clinic so that a change in the treatment plan can be made in a safe manner. The patient is advised that stopping prescribed medication may cause an increase in symptoms and possible medication withdrawal symptoms. The patient is informed an emergency room evaluation may be necessary if this occurs. Spent 25 minutes with patient today which more than 50% of that time was spent on counseling and coordination of care. Ashli Nevarez 3/29/2018        Note: Please excuse any typographical errors. Voice recognition software was used for this note and may cause mistakes.

## 2018-03-29 NOTE — PROGRESS NOTES
Nursing Notes    Patient presents to the office today in follow-up. Patient rates her pain at 9/10 on the numerical pain scale. Reviewed medications with counts as follows:    Rx Date filled Qty Dispensed Pill Count Last Dose Short   Diazepam 5mg 02/26/18 60 2 today no   Tramadol ER 200mg 03/07/18 30 9 today no   Oxycodone IR 15mg 02/26/18 120 0 + 1 RX yesterday no                      Pt did not bring Rx for oxycodone; counseling done and  shows:02/26/18    Comments: The pt was counseled about bringing all of our medication to their appt. POC UDS was not performed in office today    Any new labs or imaging since last appointment? YES, lab    Have you been to an emergency room (ER) or urgent care clinic since your last visit? NO            Have you been hospitalized since your last visit? NO     If yes, where, when, and reason for visit? Have you seen or consulted any other health care providers outside of the [address] [place] [address]  since your last visit? YES     If yes, where, when, and reason for visit? HM deferred to pcp.

## 2018-03-29 NOTE — MR AVS SNAPSHOT
[address] [address] 
[phone] Patient: Vinh Holly MRN: [id number] YYE:[birth year] Visit Information Date & Time Provider Department Dept. Phone Encounter #  
 3/29/2018  8:40 AM [facility] [phone] [id number] Follow-up Instructions Return in about 3 months (around 6/29/2018). Upcoming Health Maintenance Date Due  
 BREAST CANCER SCRN MAMMOGRAM 6/29/2017 EYE EXAM RETINAL OR DILATED Q1 1/10/2018 FOOT EXAM Q1 1/12/2018 HEMOGLOBIN A1C Q6M 5/7/2018 MEDICARE YEARLY EXAM 6/7/2018 MICROALBUMIN Q1 11/7/2018 LIPID PANEL Q1 11/7/2018 COLONOSCOPY 3/2/2019 DTaP/Tdap/Td series (2 - Td) 6/9/2026 Allergies as of 3/29/2018  Review Complete On: 3/29/2018 By: [facility] Severity Noted Reaction Type Reactions Ace Inhibitors  12/14/2010    Cough Aspirin  01/20/2010    Other (comments) GI bleeding & pain  
 Nsaids (Non-steroidal Anti-inflammatory Drug)  09/26/2012    Other (comments) Makes her stomach bleed Current Immunizations  Reviewed on 6/6/2017 Name Date Influenza Vaccine 11/13/2014, 1/7/2014 Influenza Vaccine (Quad) PF 11/9/2017, 1/12/2017, 10/14/2015 Pneumococcal Vaccine (Unspecified Type) 11/14/2014, 10/1/2011 Not reviewed this visit You Were Diagnosed With   
  
 Codes Comments H/O lumbosacral spine surgery     ICD-10-CM: Z98.890 ICD-9-CM: V15.29 Cervicalgia     ICD-10-CM: M54.2 ICD-9-CM: 723.1 Lumbosacral neuritis     ICD-10-CM: M54.17 ICD-9-CM: 724.4 Chronic pain syndrome     ICD-10-CM: G89.4 ICD-9-CM: 338. 4 Vitals BP Pulse Temp Resp Height(growth percentile) Weight(growth percentile) 125/76 (BP 1 Location: Right arm, BP Patient Position: Sitting) 71 97 °F (36.1 °C) (Oral) 16 5' 2\" (1.575 m) 196 lb (88.9 kg) BMI OB Status Smoking Status 35.85 kg/m2 Hysterectomy Former Smoker Vitals History BMI and BSA Data Body Mass Index Body Surface Area  
 35.85 kg/m 2 1.97 m 2 Preferred Pharmacy Pharmacy Name [facility], [address] [place] [address],[address] [place] [address] [phone] Your Updated Medication List  
  
   
This list is accurate as of 3/29/18  8:48 AM.  Always use your most recent med list.  
  
  
  
  
 ACIPHEX 20 mg tablet Generic drug:  RABEprazole Take 20 mg by mouth daily. albuterol 90 mcg/actuation inhaler Commonly known as:  VENTOLIN HFA Take 2 Puffs by inhalation every six (6) hours as needed for Wheezing. albuterol-ipratropium 2.5 mg-0.5 mg/3 ml Nebu Commonly known as:  DUO-NEB  
3 mL by Nebulization route every six (6) hours as needed. amLODIPine 10 mg tablet Commonly known as:  Allena Sebastian Take 1 Tab by mouth daily. arformoterol 15 mcg/2 mL Nebu neb solution Commonly known as:  Mclaughlin Shank 2 mL by Nebulization route two (2) times a day. File under Medicare B J44.9  
  
 b complex vitamins tablet Take 1 tablet by mouth daily. BREO ELLIPTA 100-25 mcg/dose inhaler Generic drug:  fluticasone-vilanterol INHALE 1 PUFF BY INHALATTION DAILY  
  
 budesonide 0.5 mg/2 mL Nbsp Commonly known as:  PULMICORT  
2 mL by Nebulization route two (2) times a day. File under Medicare B ICD J44.9  
  
 candesartan-hydroCHLOROthiazide 32-12.5 mg per tablet Commonly known as:  ATACAND HCT  
TAKE ONE TABLET BY MOUTH ONCE DAILY CARAFATE 100 mg/mL suspension Generic drug:  sucralfate Take 1 tsp by mouth four (4) times daily. CENTRUM COMPLETE 18-0.4 mg Tab Generic drug:  Multivit-Iron-Min-Folic Acid Take 1 Tab by mouth daily. cholecalciferol 1,000 unit Cap Commonly known as:  VITAMIN D3 Take 1,000 Units by mouth daily. * diazePAM 5 mg tablet Commonly known as:  VALIUM  
1 bid prn to help with muscle spasms, as needed. * diazePAM 5 mg tablet Commonly known as:  VALIUM  
1 bid prn to help with muscle spasms, as needed. Start taking on:  4/28/2018  
  
 ergocalciferol 50,000 unit capsule Commonly known as:  ERGOCALCIFEROL Take 1 Cap by mouth every seven (7) days. levothyroxine 125 mcg tablet Commonly known as:  SYNTHROID Take 1 Tab by mouth Daily (before breakfast). * LIDODERM 5 % Generic drug:  lidocaine  
by TransDERmal route every twenty-four (24) hours. Apply patch to the affected area for 12 hours a day and remove for 12 hours a day. * lidocaine 5 % Commonly known as:  LIDODERM  
1 Patch by TransDERmal route every twelve (12) hours for 30 days. Apply patch to the affected area for 12 hours a day and remove for 12 hours a day. * LYRICA 75 mg capsule Generic drug:  pregabalin Take  by mouth. * pregabalin 75 mg capsule Commonly known as:  Heber Chas Take 1 Cap by mouth two (2) times a day for 30 days. Max Daily Amount: 150 mg. Start taking on:  4/27/2018 MIRALAX 17 gram packet Generic drug:  polyethylene glycol Take 17 g by mouth daily. montelukast 10 mg tablet Commonly known as:  SINGULAIR  
TAKE ONE TABLET BY MOUTH ONCE DAILY  
  
 naloxone 4 mg/actuation nasal spray Commonly known as:  NARCAN  
4 mg by Nasal route as needed. NASONEX 50 mcg/actuation nasal spray Generic drug:  mometasone USE 2 SPRAYS INTO EACH NOSTRIL ONCE DAILY  
  
 nystatin 100,000 unit/mL suspension Commonly known as:  MYCOSTATIN  
5 ml QID po,  swish and swallow  Indications: oral candidiasis * oxyCODONE IR 15 mg immediate release tablet Commonly known as:  Nevaeh Laundry Take 1 Tab by mouth four (4) times daily as needed for Pain for up to 30 days. Max Daily Amount: 60 mg. Start taking on:  4/28/2018 * oxyCODONE IR 15 mg immediate release tablet Commonly known as:  Nevaeh Laundry Take 1 Tab by mouth four (4) times daily as needed for Pain for up to 30 days. Max Daily Amount: 60 mg. Start taking on:  2018 PRILOSEC PO Take 20 mg by mouth daily. * traMADol 200 mg Tm24 Take  by mouth. * traMADol 200 mg tablet Commonly known as:  ULTRAM-ER Take 1 Tab by mouth daily for 30 days. Max Daily Amount: 200 mg. Start taking on:  2018 VOLTAREN 1 % Gel Generic drug:  diclofenac Apply 4 g to affected area four (4) times daily. * Notice: This list has 10 medication(s) that are the same as other medications prescribed for you. Read the directions carefully, and ask your doctor or other care provider to review them with you. Prescriptions Printed Refills  
 oxyCODONE IR (ROXICODONE) 15 mg immediate release tablet 0 Starting on: 2018 Sig: Take 1 Tab by mouth four (4) times daily as needed for Pain for up to 30 days. Max Daily Amount: 60 mg.  
 Class: Print Route: Oral  
 oxyCODONE IR (ROXICODONE) 15 mg immediate release tablet 0 Starting on: 2018 Sig: Take 1 Tab by mouth four (4) times daily as needed for Pain for up to 30 days. Max Daily Amount: 60 mg.  
 Class: Print Route: Oral  
 traMADol (ULTRAM-ER) 200 mg tablet 2 Starting on: 2018 Sig: Take 1 Tab by mouth daily for 30 days. Max Daily Amount: 200 mg. Class: Print Route: Oral  
 pregabalin (LYRICA) 75 mg capsule 2 Starting on: 2018 Sig: Take 1 Cap by mouth two (2) times a day for 30 days. Max Daily Amount: 150 mg.  
 Class: Print Route: Oral  
 lidocaine (LIDODERM) 5 % 2 Si Patch by TransDERmal route every twelve (12) hours for 30 days. Apply patch to the affected area for 12 hours a day and remove for 12 hours a day. Class: Print Route: TransDERmal  
 diazePAM (VALIUM) 5 mg tablet 1 Starting on: 2018 Si bid prn to help with muscle spasms, as needed. Class: Print Follow-up Instructions Return in about 3 months (around 2018). Patient Instructions 1. Continue current plan with no evidence of addiction or diversion. Stable on current medication without adverse events. 2. Refill oxycodone 15 mg. Please take 1 tablet up to 4 times daily on an as-needed basis only. 3. Refill tramadol  mg once daily. 2 refills. 4. Refill Lyrica 75 mg every 12 hours. 2 refills. 5. Refill lidocaine 5% patches up to 2 times daily. 2 refills. 6. Refill Valium 5 mg up to 2 times daily as needed for muscle spasm. One refill. Currently has 1 refill left as well. Please understand that we will not be able to continue with this medication through our practice. 7. Naloxone 4 mg nasal spray for opioid induced respiratory depression emergency only. 8. Discussed risks of addiction, dependency, and opioid induced hyperalgesia. 9. Return to clinic in 3 months. Please call and cancel his appointment as well as your pain management agreement if you do decide to transition her care by this time. Introducing [facility]! Dear Sammy Farfan: Thank you for requesting a [employer] account. Our records indicate that you already have an active [employer] account. You can access your account anytime at [email or web address] Did you know that you can access your hospital and ER discharge instructions at any time in [employer]? You can also review all of your test results from your hospital stay or ER visit. Additional Information If you have questions, please visit the Frequently Asked Questions section of the [employer] website at [email or web address] Remember, [employer] is NOT to be used for urgent needs. For medical emergencies, dial 911. Now available from your iPhone and Android! Please provide this summary of care documentation to your next provider. Your primary care clinician is listed as Lorie Mosher. If you have any questions after today's visit, please call [phone].

## 2018-03-29 NOTE — PATIENT INSTRUCTIONS
1. Continue current plan with no evidence of addiction or diversion. Stable on current medication without adverse events. 2. Refill oxycodone 15 mg. Please take 1 tablet up to 4 times daily on an as-needed basis only. 3. Refill tramadol  mg once daily. 2 refills. 4. Refill Lyrica 75 mg every 12 hours. 2 refills. 5. Refill lidocaine 5% patches up to 2 times daily. 2 refills. 6. Refill Valium 5 mg up to 2 times daily as needed for muscle spasm. One refill. Currently has 1 refill left as well. Please understand that we will not be able to continue with this medication through our practice. 7. Naloxone 4 mg nasal spray for opioid induced respiratory depression emergency only. 8. Discussed risks of addiction, dependency, and opioid induced hyperalgesia. 9. Return to clinic in 3 months. Please call and cancel his appointment as well as your pain management agreement if you do decide to transition her care by this time.

## 2018-04-10 ENCOUNTER — TELEPHONE (OUTPATIENT)
Dept: PAIN MANAGEMENT | Age: 63
End: 2018-04-10

## 2018-04-11 NOTE — TELEPHONE ENCOUNTER
Patient was referring to oxycodone; patient updated to provider verbal response declining to replace lost prescriptions.

## 2018-04-19 ENCOUNTER — TELEPHONE (OUTPATIENT)
Dept: PULMONOLOGY | Age: 63
End: 2018-04-19

## 2018-04-19 NOTE — TELEPHONE ENCOUNTER
PT CALLED([phone]). PT WHEEZING. WANTS LZ TO CALL IN PREDNISONE TO [facility] [phone]. PLEASE CALL PT AND LET HER KNOW IF LZ CAN SEND TO PHARMACY.

## 2018-04-19 NOTE — TELEPHONE ENCOUNTER
Patient states she is SOB, with activity and wheezing since yesterday. Patient is requesting prednisone taper  She also needs refill on ventolin. She state she has Duo-Neb for use.

## 2018-04-20 RX ORDER — MOMETASONE FUROATE 50 UG/1
SPRAY, METERED NASAL
Qty: 1 CONTAINER | Refills: 3 | Status: SHIPPED | OUTPATIENT
Start: 2018-04-20 | End: 2019-04-15 | Stop reason: ALTCHOICE

## 2018-04-20 RX ORDER — ALBUTEROL SULFATE 90 UG/1
2 AEROSOL, METERED RESPIRATORY (INHALATION)
Qty: 1 INHALER | Refills: 5 | Status: SHIPPED | OUTPATIENT
Start: 2018-04-20 | End: 2018-05-12 | Stop reason: SDUPTHER

## 2018-04-30 NOTE — TELEPHONE ENCOUNTER
Patient left voicemail stating that she would have to reschedule her appointment with Dr. Jossie Linn on 04/26. She stated she went to the hospital recently because she tripped and fell up her stairs. She states she needs a refill on her Tramadol and that the Lyrica isn't doing any good. Her next appointment is scheduled for Wednesday, June 27, 2018 09:20 AM...she will call back to reschedule with Dr. Jossie Linn when she is feeling better.

## 2018-05-09 ENCOUNTER — HOSPITAL ENCOUNTER (EMERGENCY)
Age: 63
Discharge: HOME | End: 2018-05-09
Attending: EMERGENCY MEDICINE | Admitting: EMERGENCY MEDICINE
Payer: MEDICARE

## 2018-05-09 VITALS
HEIGHT: 62 IN | WEIGHT: 185 LBS | SYSTOLIC BLOOD PRESSURE: 160 MMHG | TEMPERATURE: 98 F | OXYGEN SATURATION: 95 % | BODY MASS INDEX: 34.04 KG/M2 | HEART RATE: 72 BPM | DIASTOLIC BLOOD PRESSURE: 100 MMHG | RESPIRATION RATE: 16 BRPM

## 2018-05-09 DIAGNOSIS — G89.4 PAIN SYNDROME, CHRONIC: ICD-10-CM

## 2018-05-09 DIAGNOSIS — M54.17 LUMBOSACRAL NEURITIS: Primary | ICD-10-CM

## 2018-05-09 DIAGNOSIS — S39.012A STRAIN OF LUMBAR PARASPINAL MUSCLE, INITIAL ENCOUNTER: ICD-10-CM

## 2018-05-09 PROCEDURE — 99282 EMERGENCY DEPT VISIT SF MDM: CPT

## 2018-05-09 PROCEDURE — 74011250636 HC RX REV CODE- 250/636: Performed by: EMERGENCY MEDICINE

## 2018-05-09 PROCEDURE — 96372 THER/PROPH/DIAG INJ SC/IM: CPT

## 2018-05-09 RX ORDER — NALOXONE HYDROCHLORIDE 4 MG/.1ML
SPRAY NASAL
Qty: 2 EACH | Refills: 0 | Status: SHIPPED | OUTPATIENT
Start: 2018-05-09 | End: 2020-03-30 | Stop reason: ALTCHOICE

## 2018-05-09 RX ORDER — MORPHINE SULFATE 4 MG/ML
8 INJECTION INTRAVENOUS ONCE
Status: COMPLETED | OUTPATIENT
Start: 2018-05-09 | End: 2018-05-09

## 2018-05-09 RX ADMIN — MORPHINE SULFATE 8 MG: 4 INJECTION INTRAVENOUS at 14:41

## 2018-05-09 NOTE — ED TRIAGE NOTES
The patient presents for evaluation of chronic back pain. States, \"I was at pain management, but they are not giving any more pills, they are only doing shots. I ran out of my Oxycodone April 26th. \"

## 2018-05-09 NOTE — ED NOTES
I have reviewed discharge instructions with the patient. The patient verbalized understanding.  Pt ambulated out of ED in stable condition with no distress noted and no complaints voiced to meed spouse in ED whom pt states will be driving her home

## 2018-05-09 NOTE — ED PROVIDER NOTES
EMERGENCY DEPARTMENT HISTORY AND PHYSICAL EXAM    1:40 PM      Date: 5/9/2018  Patient Name: Josh Mcpherson    History of Presenting Illness     Chief Complaint   Patient presents with    Back Pain         History Provided By: Patient    Chief Complaint: Back Pain   Duration: Couple Days  Timing:  Intermittent and Worsening  Location: lower back   Quality: N/A  Severity: 10 out of 10  Modifying Factors: Pain worsens with movement   Associated Symptoms: denies any other associated signs or symptoms      Additional History (Context): Josh Mcpherson is a 58 y.o. female with hx of HTN, HLD, hypothyroid, asthma, COPD, colon cancer, GERD, IBS, PUD, gout, chronic lower back pain, arthritis and lumbar fusion presenting to the ED with c/o intermittent, worsening lower back pain that began a couple days. Pt reports sx are consistent with her chronic back pain. States she is transferring to another pain management facility ([facility]) because Ranjan Ramon are not giving me any more pills, they are only doing shots and that doctor has shaky hands and I do not want him to give me shots\". Notes she has not seen a pain specialist since March 2018. States Oxycodone alleviates the pain, however she ran out a couple weeks ago. She currently is on a lot of pain medications that were prescribed by her pain management specialist. Denies any fall, trauma, CP, SOB, fever, chills, abdominal pain, nausea, vomiting, diarrhea or urinary sx. Severity is 10/10. Notes pain worsens with movement. No other sx or complaints given at this time.      PCP: Meridee Ganser, MD      Past History     Past Medical History:  Past Medical History:   Diagnosis Date    Abdominal pain     Arthritis     Asthma 1/20/2010    Dr. [facility])     stage 2 colon ca    Chemotherapy     Chronic low back pain 7/12/2010    Chronic lung disease     COPD (chronic obstructive pulmonary disease) ([facility] [address] 1/20/2010    Dr. Jean-Claude Alvarenga [facility]: colon cancer 1/20/2010    GERD (gastroesophageal reflux disease)     Gout     H/O colon cancer, stage II     s/p resection, last colonoscopy 11/11- q3 yrs    History of multiple pulmonary nodules 1/20/2010    Hoarse 2015    candida, sees ENT    HTN (hypertension) 1/20/2010    Hyperlipemia 1/20/2010    Hypothyroid 1/20/2010    IBS (irritable bowel syndrome)     Liver disease     cysts on liver    Mammogram declined     Multinodular thyroid     sees ENT.   Due for repeat U/S with ENT in Nov 2017    Nephropathy, membranous 1/20/2010    Neuropathy 1/20/2010    Nicotine use disorder 1/20/2010    Prediabetes     PUD (peptic ulcer disease)     Pulmonary nodule     sees pulmonary    Rectocele 1/20/2010    S/P cataract extraction 1/3/2011    Thyroid disease     hypothyroid    Unspecified sleep apnea     does not use CPAP    Vitamin D deficiency 1/20/2010       Past Surgical History:  Past Surgical History:   Procedure Laterality Date    ABDOMEN SURGERY PROC UNLISTED      colon resection 8/2005- Dr. Gem Ortega- colon ca   Grzegorz Orn      gastric bypass 12/2/09    HX GASTRIC BYPASS      HX GI      HX GYN      hysterectomy and BSO in 11/2006    HX HEENT      cataract sx tee    HX HYSTERECTOMY      HX LUMBAR FUSION  June 2013    L4 to L 5    HX TONSILLECTOMY         Family History:  Family History   Problem Relation Age of Onset    Asthma Mother     Diabetes Mother     Hypertension Mother     Elevated Lipids Mother     Elevated Lipids Father     Hypertension Father     Heart Disease Father      chf    Heart Disease Sister      older       Social History:  Social History   Substance Use Topics    Smoking status: Former Smoker     Packs/day: 0.50     Years: 18.00     Types: Cigarettes     Quit date: 8/27/1990    Smokeless tobacco: Never Used      Comment: per patient she has not smoked in over 30 years but significant second hand smoke exposure at home    Alcohol use 0.0 oz/week     0 Standard drinks or equivalent per week      Comment: rare       Allergies: Allergies   Allergen Reactions    Ace Inhibitors Cough    Aspirin Other (comments)     GI bleeding & pain    Nsaids (Non-Steroidal Anti-Inflammatory Drug) Other (comments)     Makes her stomach bleed           Review of Systems       Review of Systems   Constitutional: Negative for fever. HENT: Negative for sore throat. Eyes: Negative for redness. Respiratory: Negative for shortness of breath and wheezing. Cardiovascular: Negative for chest pain and leg swelling. Gastrointestinal: Negative for abdominal pain and nausea. Endocrine: Negative for polyuria. Genitourinary: Negative for decreased urine volume, difficulty urinating, dysuria, pelvic pain and urgency. Musculoskeletal: Positive for back pain. Negative for neck pain and neck stiffness. Skin: Negative for pallor. Neurological: Negative for dizziness, syncope, numbness and headaches. Hematological: Does not bruise/bleed easily. All other systems reviewed and are negative. Physical Exam     Visit Vitals    BP (!) 160/100 (BP 1 Location: Left arm, BP Patient Position: At rest)    Pulse 72    Temp 98 °F (36.7 °C)    Resp 16    Ht 5' 2\" (1.575 m)    Wt 83.9 kg (185 lb)    SpO2 95%    BMI 33.84 kg/m2         Physical Exam   Constitutional: She is oriented to person, place, and time. She appears well-developed and well-nourished. No distress. HENT:   Head: Normocephalic and atraumatic. Mouth/Throat: Oropharynx is clear and moist.   Eyes: Conjunctivae are normal. Pupils are equal, round, and reactive to light. No scleral icterus. Neck: Normal range of motion. Neck supple. Cardiovascular: Intact distal pulses. Capillary refill < 3 seconds   Pulmonary/Chest: Effort normal and breath sounds normal. No respiratory distress. She has no wheezes. Abdominal: Soft. Bowel sounds are normal. She exhibits no distension.  There is no tenderness. Musculoskeletal: Normal range of motion. She exhibits tenderness. She exhibits no edema. Bilateral paraspinal lumbar tenderness   Old midline lumbar surgical scar    Lymphadenopathy:     She has no cervical adenopathy. Neurological: She is alert and oriented to person, place, and time. No cranial nerve deficit. Skin: Skin is warm and dry. She is not diaphoretic. Nursing note and vitals reviewed. Diagnostic Study Results     Labs -  No results found for this or any previous visit (from the past 12 hour(s)). Radiologic Studies -   No orders to display         Medical Decision Making   I am the first provider for this patient. I reviewed the vital signs, available nursing notes, past medical history, past surgical history, family history and social history. Vital Signs-Reviewed the patient's vital signs. Pulse Oximetry Analysis -  95% on room air, stable     Cardiac Monitor:  Rate: 72  Rhythm:  Normal Sinus Rhythm     Records Reviewed: Nursing Notes and Old Medical Records (Time of Review: 1:40 PM)    Provider Notes (Medical Decision Making):  MDM  Number of Diagnoses or Management Options  Lumbosacral neuritis:   Pain syndrome, chronic:   Strain of lumbar paraspinal muscle, initial encounter:   Diagnosis management comments: DDX: chronic pain syndrome, No trauma or signs of chord compression. No trauma    Unfortunately pt is in pain management, but disgruntled with pain management specialist who took over Dr. Luis Miguel Lux. She states she is transferring her care Dr. Abida Moura [facility]. She is out of her Oxycodone and is currently taking Lyrica and Tramadol. Pt is in obvious pain. deniess bladder or bowel dysfunction. I dont feel any emergent imaging needed. Discussed policy on chronic pain management in the ER. Dont currently see any recent visits to ED. Will give her a shot of pain med for acute on chronic pain as well as a few pills of Oxycodone in addition to Narcan. Amount and/or Complexity of Data Reviewed  Tests in the medicine section of CPT®: ordered and reviewed        Medications   morphine injection 8 mg (8 mg IntraMUSCular Given 5/9/18 1441)       Procedures:     ED Course: Progress Notes, Reevaluation, and Consults:  I have reassessed the patient. I have discussed the workup, results and plan with the patient and patient is in agreement. Patient is feeling much better. Patient will be prescribed Oxycodone 5mg (8 tabs). Patient was discharge in stable condition. Patient was given outpatient follow up. Patient is to return to emergency department if any new or worsening condition. Diagnosis     Clinical Impression:   1. Lumbosacral neuritis    2. Strain of lumbar paraspinal muscle, initial encounter    3. Pain syndrome, chronic        Disposition: discharged    Follow-up Information     Follow up With Details Comments Sarah Mc MD Call in 2 days  [address]  [phone]      Your pain managment specialist Call in 1 day             Discharge Medication List as of 5/9/2018  3:11 PM      CONTINUE these medications which have CHANGED    Details   naloxone (NARCAN) 4 mg/actuation nasal spray Use 1 spray intranasally into 1 nostril. Use a new Narcan nasal spray for subsequent doses and administer into alternating nostrils. May repeat every 2 to 3 minutes as needed.   Indications: OPIATE-INDUCED RESPIRATORY DEPRESSION, Opioid Toxicity, Print, D isp-2 Each, R-0         CONTINUE these medications which have NOT CHANGED    Details   albuterol (VENTOLIN HFA) 90 mcg/actuation inhaler Take 2 Puffs by inhalation every six (6) hours as needed for Wheezing., Normal, Disp-1 Inhaler, R-5      mometasone (NASONEX) 50 mcg/actuation nasal spray USE 2 SPRAYS INTO EACH NOSTRIL ONCE DAILY, Normal, Disp-1 Container, R-3      traMADol 200 mg TM24 Take  by mouth., Historical Med      !! pregabalin (LYRICA) 75 mg capsule Take  by mouth., Historical Med      lidocaine (LIDODERM) 5 % by TransDERmal route every twenty-four (24) hours. Apply patch to the affected area for 12 hours a day and remove for 12 hours a day., Historical Med      !! oxyCODONE IR (ROXICODONE) 15 mg immediate release tablet Take 1 Tab by mouth four (4) times daily as needed for Pain for up to 30 days. Max Daily Amount: 60 mg., Print, Disp-120 Tab, R-0      !! oxyCODONE IR (ROXICODONE) 15 mg immediate release tablet Take 1 Tab by mouth four (4) times daily as needed for Pain for up to 30 days. Max Daily Amount: 60 mg., Print, Disp-120 Tab, R-0      !! pregabalin (LYRICA) 75 mg capsule Take 1 Cap by mouth two (2) times a day for 30 days. Max Daily Amount: 150 mg., Print, Disp-60 Cap, R-2      !! diazePAM (VALIUM) 5 mg tablet 1 bid prn to help with muscle spasms, as needed. , Print, Disp-60 Tab, R-1      BREO ELLIPTA 100-25 mcg/dose inhaler INHALE 1 PUFF BY INHALATTION DAILY, Normal, Disp-1 Inhaler, R-3      levothyroxine (SYNTHROID) 125 mcg tablet Take 1 Tab by mouth Daily (before breakfast). , Normal, Disp-30 Tab, R-1      budesonide (PULMICORT) 0.5 mg/2 mL nbsp 2 mL by Nebulization route two (2) times a day. File under Medicare B ICD J44.9, Normal, Disp-60 Each, R-5      arformoterol (BROVANA) 15 mcg/2 mL nebu neb solution 2 mL by Nebulization route two (2) times a day. File under Medicare B J44.9, Normal, Disp-60 Vial, R-5      nystatin (MYCOSTATIN) 100,000 unit/mL suspension 5 ml QID po,  swish and swallow  Indications: oral candidiasis, Normal, Disp-120 mL, R-0      ergocalciferol (ERGOCALCIFEROL) 50,000 unit capsule Take 1 Cap by mouth every seven (7) days. , Normal, Disp-4 Cap, R-11      !! diazePAM (VALIUM) 5 mg tablet 1 bid prn to help with muscle spasms, as needed. , Print, Disp-60 Tab, R-1      albuterol-ipratropium (DUO-NEB) 2.5 mg-0.5 mg/3 ml nebu 3 mL by Nebulization route every six (6) hours as needed., Normal, Disp-120 Nebule, R-3 candesartan-hydroCHLOROthiazide (ATACAND HCT) 32-12.5 mg per tablet TAKE ONE TABLET BY MOUTH ONCE DAILY, Print, Disp-90 Tab, R-1      amLODIPine (NORVASC) 10 mg tablet Take 1 Tab by mouth daily. , Print, Disp-90 Tab, R-1      montelukast (SINGULAIR) 10 mg tablet TAKE ONE TABLET BY MOUTH ONCE DAILY, Normal, Disp-90 Tab, R-3      cholecalciferol (VITAMIN D3) 1,000 unit cap Take 1,000 Units by mouth daily. , Historical Med      sucralfate (CARAFATE) 100 mg/mL suspension Take 1 tsp by mouth four (4) times daily. , Historical Med      b complex vitamins tablet Take 1 tablet by mouth daily. , Historical Med      Multivit-Iron-Min-Folic Acid (CENTRUM COMPLETE) 18-0.4 mg tab Take 1 Tab by mouth daily. , Historical Med      diclofenac (VOLTAREN) 1 % topical gel Apply 4 g to affected area four (4) times daily. , Historical Med      OMEPRAZOLE (PRILOSEC PO) Take 20 mg by mouth daily. , Historical Med      RABEprazole (ACIPHEX) 20 mg tablet Take 20 mg by mouth daily. , Historical Med      polyethylene glycol (MIRALAX) 17 gram packet Take 17 g by mouth daily. , Historical Med       !! - Potential duplicate medications found. Please discuss with provider.        _______________________________    Attestations:  Carmine [address] [place] [address] acting as a scribe for and in the presence of Juan Espinoza DO      May 09, 2018 at 1:48 PM       Provider Attestation:      I personally performed the services described in the documentation, reviewed the documentation, as recorded by the scribe in my presence, and it accurately and completely records my words and actions.  May 09, 2018 at 1:48 PM - Juan Espinoza DO    _______________________________

## 2018-05-09 NOTE — DISCHARGE INSTRUCTIONS

## 2018-05-17 ENCOUNTER — TELEPHONE (OUTPATIENT)
Dept: PAIN MANAGEMENT | Age: 63
End: 2018-05-17

## 2018-05-17 NOTE — TELEPHONE ENCOUNTER
Received call from patient stating that she wishes this office to terminate her \"contract\" (controlled substance agreement) with this office ; patient states verbatim \"I want my contract ended with you all\" and \"I need you to demolish my contract\" , and \"I don't want to be a part of your organization anymore\".

## 2018-05-18 ENCOUNTER — HOSPITAL ENCOUNTER (OUTPATIENT)
Dept: ULTRASOUND IMAGING | Age: 63
Discharge: HOME | End: 2018-05-18
Attending: OTOLARYNGOLOGY
Payer: MEDICARE

## 2018-05-18 DIAGNOSIS — E04.1 THYROID NODULE: ICD-10-CM

## 2018-05-18 PROCEDURE — 76536 US EXAM OF HEAD AND NECK: CPT

## 2018-05-21 RX ORDER — ALBUTEROL SULFATE 90 UG/1
AEROSOL, METERED RESPIRATORY (INHALATION)
Qty: 1 INHALER | Refills: 5 | Status: SHIPPED | OUTPATIENT
Start: 2018-05-21 | End: 2018-09-11 | Stop reason: SDUPTHER

## 2018-06-04 DIAGNOSIS — I10 ESSENTIAL HYPERTENSION: ICD-10-CM

## 2018-06-06 NOTE — TELEPHONE ENCOUNTER
This patient contacted office for the following prescriptions to be filled:    Medication requested :   Requested Prescriptions     Pending Prescriptions Disp Refills    amLODIPine (NORVASC) 10 mg tablet 90 Tab 1     Sig: Take 1 Tab by mouth daily.  candesartan-hydroCHLOROthiazide (ATACAND HCT) 32-12.5 mg per tablet 90 Tab 1     Sig: TAKE ONE TABLET BY MOUTH ONCE DAILY     PCP: Roxanne Goldberg or Print: [address] [place] [address]   Mail order or Local [id number]     Scheduled appointment if not seen by current providers in office: LOV 1/9/2018 No f/u up Scheduled at this time.   SORRY FORGOT TO SEND THIS WAS TRYING TO MAKE AN APPT AND DIDN'T COME BACK TO IT.

## 2018-06-07 RX ORDER — CANDESARTAN CILEXETIL AND HYDROCHLOROTHIAZIDE 32; 12.5 MG/1; MG/1
TABLET ORAL
Qty: 30 TAB | Refills: 0 | OUTPATIENT
Start: 2018-06-07

## 2018-06-07 RX ORDER — AMLODIPINE BESYLATE 10 MG/1
10 TABLET ORAL DAILY
Qty: 30 TAB | Refills: 0 | OUTPATIENT
Start: 2018-06-07

## 2018-06-11 NOTE — TELEPHONE ENCOUNTER
Left message via voicemail for Adriana that follow up is due and medications has been denied at this time.  Adriana is to call FP to schedule follow up

## 2018-06-13 RX ORDER — CANDESARTAN CILEXETIL AND HYDROCHLOROTHIAZIDE 32; 12.5 MG/1; MG/1
TABLET ORAL
Qty: 7 TAB | Refills: 0 | Status: SHIPPED | OUTPATIENT
Start: 2018-06-13

## 2018-06-13 RX ORDER — AMLODIPINE BESYLATE 10 MG/1
10 TABLET ORAL DAILY
Qty: 7 TAB | Refills: 0 | Status: SHIPPED | OUTPATIENT
Start: 2018-06-13

## 2018-06-13 NOTE — TELEPHONE ENCOUNTER
Received incoming call from [facility], stating she would like a 1 week refill on medication until appointment on June 19,2018 with Dr. Kathy Matute. Adriana states she is completely out of medication and don't understand why Dr. Kathy Matute has denied her medications. She also stated with the front office staff and I Mookie Brown) that she no longer wants to see Dr. Kathy Matute after her June 19th appointment \"stating he doesn't understand that her kids comes first\". She was scheduled with Misael Enciso and has since canceled both appointments as well.  Please advise

## 2018-06-13 NOTE — TELEPHONE ENCOUNTER
Left message for Adriana that 1 week worth of medication has been sent to local pharmacy per her request and to call FP

## 2018-06-13 NOTE — TELEPHONE ENCOUNTER
The reason for not refilling was the March no-show. If the patient does not want to see me, she is unable to see Inés  because I am Roseanna's supervising physician. Please advise the patient that she can choose another PCP within the practice. I have sent in the 1 week supply as requested.

## 2018-06-21 ENCOUNTER — TELEPHONE (OUTPATIENT)
Dept: FAMILY MEDICINE CLINIC | Age: 63
End: 2018-06-21

## 2018-06-21 NOTE — TELEPHONE ENCOUNTER
Identified myself to the patient and advised that it was my understanding she had been trying to reach me. Patient states that it's too late now and that she has already changed providers. Patient advises she felt like Dr. Enrike White did not listen to her and was not understanding of her situation. Patient just wanted me to be aware of the situation. Patient ended conversation quickly.

## 2018-07-19 RX ORDER — MOMETASONE FUROATE AND FORMOTEROL FUMARATE DIHYDRATE 100; 5 UG/1; UG/1
AEROSOL RESPIRATORY (INHALATION)
Qty: 1 INHALER | Refills: 11 | Status: SHIPPED | OUTPATIENT
Start: 2018-07-19 | End: 2018-07-20 | Stop reason: ALTCHOICE

## 2018-07-20 RX ORDER — FLUTICASONE FUROATE AND VILANTEROL 100; 25 UG/1; UG/1
1 POWDER RESPIRATORY (INHALATION) DAILY
Qty: 1 INHALER | Refills: 5 | Status: SHIPPED | OUTPATIENT
Start: 2018-07-20 | End: 2018-07-25 | Stop reason: SINTOL

## 2018-07-20 NOTE — TELEPHONE ENCOUNTER
[address] [place] formulary doesn't cover Oleksandr Kennedy. MDIs appear to all be a Tier 3 and allot #1/30 days. Formulary preferred is Advair, Breo Ellipta, and Symbicort. We will forward the need for a change of Dulera to Dr. Tiarra Gallegos.

## 2018-07-25 NOTE — TELEPHONE ENCOUNTER
I contacted the pt. In regards to [facility] being non formulary and Breo Ellipta ordered which is preferred. The pt. Tells me she cannot take any powdered MDIs due to getting a serious yeast infection in her throat every time she uses one. Even with gargling and rinsing post use. She also had a problem with Brovana and Budesonide Neb. Sols. The Ins. Will be contacted to gain authorization for [facility]. Percy Oneill is contacted. Website for PA is: [email or web address]    PA for [facility] is obtained. #[id number] Eff. 7/01/18 - 07/25/19. The pt. Is informed of same.

## 2018-07-26 NOTE — TELEPHONE ENCOUNTER
Dr. Ana Rodrigues ordered Farhad Burns. The [employer] authorization is faxed to [address].

## 2018-08-03 RX ORDER — IPRATROPIUM BROMIDE AND ALBUTEROL SULFATE 2.5; .5 MG/3ML; MG/3ML
SOLUTION RESPIRATORY (INHALATION)
Qty: 48 NEBULE | Refills: 5 | Status: SHIPPED | OUTPATIENT
Start: 2018-08-03 | End: 2018-11-15 | Stop reason: SDUPTHER

## 2018-08-25 ENCOUNTER — HOSPITAL ENCOUNTER (EMERGENCY)
Age: 63
Discharge: HOME | End: 2018-08-25
Attending: EMERGENCY MEDICINE
Payer: MEDICARE

## 2018-08-25 ENCOUNTER — APPOINTMENT (OUTPATIENT)
Dept: GENERAL RADIOLOGY | Age: 63
End: 2018-08-25
Attending: PHYSICIAN ASSISTANT
Payer: MEDICARE

## 2018-08-25 VITALS
HEIGHT: 62 IN | BODY MASS INDEX: 34.04 KG/M2 | RESPIRATION RATE: 16 BRPM | HEART RATE: 75 BPM | WEIGHT: 185 LBS | TEMPERATURE: 98 F | DIASTOLIC BLOOD PRESSURE: 80 MMHG | OXYGEN SATURATION: 100 % | SYSTOLIC BLOOD PRESSURE: 150 MMHG

## 2018-08-25 DIAGNOSIS — R06.02 SOB (SHORTNESS OF BREATH): ICD-10-CM

## 2018-08-25 DIAGNOSIS — G89.29 CHRONIC BILATERAL LOW BACK PAIN WITHOUT SCIATICA: ICD-10-CM

## 2018-08-25 DIAGNOSIS — R10.13 ABDOMINAL PAIN, EPIGASTRIC: ICD-10-CM

## 2018-08-25 DIAGNOSIS — M54.50 CHRONIC BILATERAL LOW BACK PAIN WITHOUT SCIATICA: ICD-10-CM

## 2018-08-25 DIAGNOSIS — R42 DIZZINESS: ICD-10-CM

## 2018-08-25 DIAGNOSIS — K85.30 DRUG-INDUCED ACUTE PANCREATITIS, UNSPECIFIED COMPLICATION STATUS: Primary | ICD-10-CM

## 2018-08-25 LAB
ALBUMIN SERPL-MCNC: 3.9 G/DL (ref 3.4–5)
ALBUMIN/GLOB SERPL: 1.1 {RATIO} (ref 0.8–1.7)
ALP SERPL-CCNC: 137 U/L (ref 45–117)
ALT SERPL-CCNC: 21 U/L (ref 13–56)
ANION GAP SERPL CALC-SCNC: 5 MMOL/L (ref 3–18)
APPEARANCE UR: ABNORMAL
AST SERPL-CCNC: 13 U/L (ref 15–37)
BACTERIA URNS QL MICRO: ABNORMAL /HPF
BASOPHILS # BLD: 0 K/UL (ref 0–0.1)
BASOPHILS NFR BLD: 1 % (ref 0–2)
BILIRUB SERPL-MCNC: 0.3 MG/DL (ref 0.2–1)
BILIRUB UR QL: ABNORMAL
BUN SERPL-MCNC: 7 MG/DL (ref 7–18)
BUN/CREAT SERPL: 7 (ref 12–20)
CALCIUM SERPL-MCNC: 9.1 MG/DL (ref 8.5–10.1)
CHLORIDE SERPL-SCNC: 106 MMOL/L (ref 100–108)
CK MB CFR SERPL CALC: NORMAL % (ref 0–4)
CK MB SERPL-MCNC: <1 NG/ML (ref 5–25)
CK SERPL-CCNC: 58 U/L (ref 26–192)
CO2 SERPL-SCNC: 30 MMOL/L (ref 21–32)
COLOR UR: ABNORMAL
CREAT SERPL-MCNC: 0.98 MG/DL (ref 0.6–1.3)
DIFFERENTIAL METHOD BLD: ABNORMAL
EOSINOPHIL # BLD: 0.2 K/UL (ref 0–0.4)
EOSINOPHIL NFR BLD: 3 % (ref 0–5)
EPITH CASTS URNS QL MICRO: ABNORMAL /LPF (ref 0–5)
ERYTHROCYTE [DISTWIDTH] IN BLOOD BY AUTOMATED COUNT: 15.9 % (ref 11.6–14.5)
GLOBULIN SER CALC-MCNC: 3.7 G/DL (ref 2–4)
GLUCOSE SERPL-MCNC: 96 MG/DL (ref 74–99)
GLUCOSE UR STRIP.AUTO-MCNC: NEGATIVE MG/DL
HCT VFR BLD AUTO: 47 % (ref 35–45)
HGB BLD-MCNC: 15.7 G/DL (ref 12–16)
HGB UR QL STRIP: ABNORMAL
KETONES UR QL STRIP.AUTO: 15 MG/DL
LEUKOCYTE ESTERASE UR QL STRIP.AUTO: ABNORMAL
LIPASE SERPL-CCNC: 780 U/L (ref 73–393)
LYMPHOCYTES # BLD: 1.4 K/UL (ref 0.9–3.6)
LYMPHOCYTES NFR BLD: 32 % (ref 21–52)
MAGNESIUM SERPL-MCNC: 2.3 MG/DL (ref 1.6–2.6)
MCH RBC QN AUTO: 29.7 PG (ref 24–34)
MCHC RBC AUTO-ENTMCNC: 33.4 G/DL (ref 31–37)
MCV RBC AUTO: 89 FL (ref 74–97)
MONOCYTES # BLD: 0.5 K/UL (ref 0.05–1.2)
MONOCYTES NFR BLD: 10 % (ref 3–10)
NEUTS SEG # BLD: 2.4 K/UL (ref 1.8–8)
NEUTS SEG NFR BLD: 54 % (ref 40–73)
NITRITE UR QL STRIP.AUTO: NEGATIVE
PH UR STRIP: 5 [PH] (ref 5–8)
PLATELET # BLD AUTO: 257 K/UL (ref 135–420)
PMV BLD AUTO: 10.1 FL (ref 9.2–11.8)
POTASSIUM SERPL-SCNC: 4.1 MMOL/L (ref 3.5–5.5)
PROT SERPL-MCNC: 7.6 G/DL (ref 6.4–8.2)
PROT UR STRIP-MCNC: 100 MG/DL
RBC # BLD AUTO: 5.28 M/UL (ref 4.2–5.3)
RBC #/AREA URNS HPF: ABNORMAL /HPF (ref 0–5)
SODIUM SERPL-SCNC: 141 MMOL/L (ref 136–145)
SP GR UR REFRACTOMETRY: >1.03 (ref 1–1.03)
TROPONIN I SERPL-MCNC: <0.02 NG/ML (ref 0–0.04)
UROBILINOGEN UR QL STRIP.AUTO: 1 EU/DL (ref 0.2–1)
WBC # BLD AUTO: 4.5 K/UL (ref 4.6–13.2)
WBC URNS QL MICRO: ABNORMAL /HPF (ref 0–4)

## 2018-08-25 PROCEDURE — 74011250636 HC RX REV CODE- 250/636: Performed by: PHYSICIAN ASSISTANT

## 2018-08-25 PROCEDURE — 83735 ASSAY OF MAGNESIUM: CPT | Performed by: PHYSICIAN ASSISTANT

## 2018-08-25 PROCEDURE — 99284 EMERGENCY DEPT VISIT MOD MDM: CPT

## 2018-08-25 PROCEDURE — 93005 ELECTROCARDIOGRAM TRACING: CPT

## 2018-08-25 PROCEDURE — 96374 THER/PROPH/DIAG INJ IV PUSH: CPT

## 2018-08-25 PROCEDURE — 74022 RADEX COMPL AQT ABD SERIES: CPT

## 2018-08-25 PROCEDURE — 96375 TX/PRO/DX INJ NEW DRUG ADDON: CPT

## 2018-08-25 PROCEDURE — 74011000250 HC RX REV CODE- 250: Performed by: PHYSICIAN ASSISTANT

## 2018-08-25 PROCEDURE — 83690 ASSAY OF LIPASE: CPT | Performed by: PHYSICIAN ASSISTANT

## 2018-08-25 PROCEDURE — 80053 COMPREHEN METABOLIC PANEL: CPT | Performed by: PHYSICIAN ASSISTANT

## 2018-08-25 PROCEDURE — 85025 COMPLETE CBC W/AUTO DIFF WBC: CPT | Performed by: PHYSICIAN ASSISTANT

## 2018-08-25 PROCEDURE — 74011250637 HC RX REV CODE- 250/637: Performed by: PHYSICIAN ASSISTANT

## 2018-08-25 PROCEDURE — 82550 ASSAY OF CK (CPK): CPT | Performed by: PHYSICIAN ASSISTANT

## 2018-08-25 PROCEDURE — 81001 URINALYSIS AUTO W/SCOPE: CPT | Performed by: PHYSICIAN ASSISTANT

## 2018-08-25 RX ORDER — LIDOCAINE HYDROCHLORIDE 20 MG/ML
15 SOLUTION OROPHARYNGEAL
Status: COMPLETED | OUTPATIENT
Start: 2018-08-25 | End: 2018-08-25

## 2018-08-25 RX ORDER — MAG HYDROX/ALUMINUM HYD/SIMETH 200-200-20
30 SUSPENSION, ORAL (FINAL DOSE FORM) ORAL
Status: COMPLETED | OUTPATIENT
Start: 2018-08-25 | End: 2018-08-25

## 2018-08-25 RX ORDER — MORPHINE SULFATE 4 MG/ML
4 INJECTION, SOLUTION INTRAMUSCULAR; INTRAVENOUS
Status: COMPLETED | OUTPATIENT
Start: 2018-08-25 | End: 2018-08-25

## 2018-08-25 RX ORDER — FAMOTIDINE 20 MG/1
20 TABLET, FILM COATED ORAL 2 TIMES DAILY
Qty: 20 TAB | Refills: 0 | Status: SHIPPED | OUTPATIENT
Start: 2018-08-25 | End: 2018-09-04

## 2018-08-25 RX ORDER — OXYCODONE AND ACETAMINOPHEN 5; 325 MG/1; MG/1
1 TABLET ORAL
Qty: 12 TAB | Refills: 0 | Status: SHIPPED | OUTPATIENT
Start: 2018-08-25 | End: 2018-08-29

## 2018-08-25 RX ORDER — FAMOTIDINE 10 MG/ML
20 INJECTION INTRAVENOUS
Status: COMPLETED | OUTPATIENT
Start: 2018-08-25 | End: 2018-08-25

## 2018-08-25 RX ADMIN — LIDOCAINE HYDROCHLORIDE 15 ML: 20 SOLUTION ORAL; TOPICAL at 17:25

## 2018-08-25 RX ADMIN — ALUMINUM HYDROXIDE, MAGNESIUM HYDROXIDE, AND SIMETHICONE 30 ML: 200; 200; 20 SUSPENSION ORAL at 17:25

## 2018-08-25 RX ADMIN — MORPHINE SULFATE 4 MG: 4 INJECTION, SOLUTION INTRAMUSCULAR; INTRAVENOUS at 16:26

## 2018-08-25 RX ADMIN — FAMOTIDINE 20 MG: 10 INJECTION, SOLUTION INTRAVENOUS at 16:27

## 2018-08-25 NOTE — DISCHARGE INSTRUCTIONS
Back Pain: Care Instructions  Your Care Instructions    Back pain has many possible causes. It is often related to problems with muscles and ligaments of the back. It may also be related to problems with the nerves, discs, or bones of the back. Moving, lifting, standing, sitting, or sleeping in an awkward way can strain the back. Sometimes you don't notice the injury until later. Arthritis is another common cause of back pain. Although it may hurt a lot, back pain usually improves on its own within several weeks. Most people recover in 12 weeks or less. Using good home treatment and being careful not to stress your back can help you feel better sooner. Follow-up care is a key part of your treatment and safety. Be sure to make and go to all appointments, and call your doctor if you are having problems. It's also a good idea to know your test results and keep a list of the medicines you take. How can you care for yourself at home? · Sit or lie in positions that are most comfortable and reduce your pain. Try one of these positions when you lie down:  ¨ Lie on your back with your knees bent and supported by large pillows. ¨ Lie on the floor with your legs on the seat of a sofa or chair. Gearldine Morales on your side with your knees and hips bent and a pillow between your legs. ¨ Lie on your stomach if it does not make pain worse. · Do not sit up in bed, and avoid soft couches and twisted positions. Bed rest can help relieve pain at first, but it delays healing. Avoid bed rest after the first day of back pain. · Change positions every 30 minutes. If you must sit for long periods of time, take breaks from sitting. Get up and walk around, or lie in a comfortable position. · Try using a heating pad on a low or medium setting for 15 to 20 minutes every 2 or 3 hours. Try a warm shower in place of one session with the heating pad. · You can also try an ice pack for 10 to 15 minutes every 2 to 3 hours.  Put a thin cloth between the ice pack and your skin. · Take pain medicines exactly as directed. ¨ If the doctor gave you a prescription medicine for pain, take it as prescribed. ¨ If you are not taking a prescription pain medicine, ask your doctor if you can take an over-the-counter medicine. · Take short walks several times a day. You can start with 5 to 10 minutes, 3 or 4 times a day, and work up to longer walks. Walk on level surfaces and avoid hills and stairs until your back is better. · Return to work and other activities as soon as you can. Continued rest without activity is usually not good for your back. · To prevent future back pain, do exercises to stretch and strengthen your back and stomach. Learn how to use good posture, safe lifting techniques, and proper body mechanics. When should you call for help? Call your doctor now or seek immediate medical care if:    · You have new or worsening numbness in your legs.     · You have new or worsening weakness in your legs. (This could make it hard to stand up.)     · You lose control of your bladder or bowels.    Watch closely for changes in your health, and be sure to contact your doctor if:    · You have a fever, lose weight, or don't feel well.     · You do not get better as expected. Where can you learn more? Go to http://danae-naif.info/. Enter Y094 in the search box to learn more about \"Back Pain: Care Instructions. \"  Current as of: November 29, 2017  Content Version: 11.7  © 7720-2712 [employer]. Care instructions adapted under license by [employer] (which disclaims liability or warranty for this information). If you have questions about a medical condition or this instruction, always ask your healthcare professional. [address] any warranty or liability for your use of this information. MyChart Activation    Thank you for requesting access to [employer].  Please follow the instructions below to securely access and download your online medical record. [employer] allows you to send messages to your doctor, view your test results, renew your prescriptions, schedule appointments, and more. How Do I Sign Up? 1. In your internet browser, go to [email or web address]  2. Click on the First Time User? Click Here link in the Sign In box. You will be redirect to the New Member Sign Up page. 3. Enter your [employer] Access Code exactly as it appears below. You will not need to use this code after youve completed the sign-up process. If you do not sign up before the expiration date, you must request a new code. [employer] Access Code: Activation code not generated  Current [employer] Status: Active (This is the date your [employer] access code will )    4. Enter the last four digits of your Social Security Number (xxxx) and Date of Birth (mm/dd/yyyy) as indicated and click Submit. You will be taken to the next sign-up page. 5. Create a [employer] ID. This will be your [employer] login ID and cannot be changed, so think of one that is secure and easy to remember. 6. Create a [employer] password. You can change your password at any time. 7. Enter your Password Reset Question and Answer. This can be used at a later time if you forget your password. 8. Enter your e-mail address. You will receive e-mail notification when new information is available in [address] 9. Click Sign Up. You can now view and download portions of your medical record. 10. Click the Download Summary menu link to download a portable copy of your medical information. Additional Information    If you have questions, please visit the Frequently Asked Questions section of the [employer] website at [email or web address] Remember, [employer] is NOT to be used for urgent needs. For medical emergencies, dial 911.

## 2018-08-25 NOTE — ED PROVIDER NOTES
EMERGENCY DEPARTMENT HISTORY AND PHYSICAL EXAM    Date: 8/25/2018  Patient Name: Tray John    History of Presenting Illness     Chief Complaint   Patient presents with    Abdominal Pain    Back Pain    Dizziness    Shortness of Breath         History Provided By:patient     Chief Complaint: abd pain  Duration: few days  Timing: acute  Location: epigastric  Quality: burning   Severity: severe  Modifying Factors: none  Associated Symptoms: chronic low back pain, loose stool, SOB       Additional History (Context): Tray John is a 58 y.o. female with PMH htn, hyperlipidemia, hypothyroidism, COPD, asthma, GERD, IBS, PUD, and depression who presents with complaints of epigastric burning and loose stool x a few days. PT has a hx of chronic low back pain and was recently prescribed toradol by her PCP. Pt states she is allergic to NSAIDS and her PCP did not tell her that this medication was classified as an NSAID. She states since she started taking it she has had abd pain and loose stool Pt states \"the pain is so bad it's making me short of breath. \" Denies vomiting and fever. Denies chest pain. Pt is currently awaiting an apt for pain management. No other complaints. PCP: Phil Arambula MD    Current Facility-Administered Medications   Medication Dose Route Frequency Provider Last Rate Last Dose    alum-mag hydroxide-simeth (MYLANTA) oral suspension 30 mL  30 mL Oral NOW April ELLE Carlton PA-C        lidocaine (XYLOCAINE) 2 % viscous solution 15 mL  15 mL Mouth/Throat NOW April ELLE Carlton PA-C         Current Outpatient Prescriptions   Medication Sig Dispense Refill    famotidine (PEPCID) 20 mg tablet Take 1 Tab by mouth two (2) times a day for 10 days. 20 Tab 0    oxyCODONE-acetaminophen (PERCOCET) 5-325 mg per tablet Take 1 Tab by mouth every six (6) hours as needed for Pain. Max Daily Amount: 4 Tabs.  12 Tab 0    albuterol-ipratropium (DUO-NEB) 2.5 mg-0.5 mg/3 ml nebu USE 1 VIAL WITH HAND HELD NEBULIZER EVERY 6 HOURS AS NEEDED 48 Nebule 5    mometasone-formoterol (DULERA) 100-5 mcg/actuation HFA inhaler Take 2 Puffs by inhalation two (2) times a day. 1 Inhaler 5    amLODIPine (NORVASC) 10 mg tablet Take 1 Tab by mouth daily. 7 Tab 0    candesartan-hydroCHLOROthiazide (ATACAND HCT) 32-12.5 mg per tablet TAKE ONE TABLET BY MOUTH ONCE DAILY 7 Tab 0    VENTOLIN HFA 90 mcg/actuation inhaler INHALE 2 PUFFS EVERY 4 HOURS IF NEEDED FOR WHEEZING OR  FOR SHORTNESS OF BREATH 1 Inhaler 5    naloxone (NARCAN) 4 mg/actuation nasal spray Use 1 spray intranasally into 1 nostril. Use a new Narcan nasal spray for subsequent doses and administer into alternating nostrils. May repeat every 2 to 3 minutes as needed. Indications: OPIATE-INDUCED RESPIRATORY DEPRESSION, Opioid Toxicity 2 Each 0    mometasone (NASONEX) 50 mcg/actuation nasal spray USE 2 SPRAYS INTO EACH NOSTRIL ONCE DAILY 1 Container 3    pregabalin (LYRICA) 75 mg capsule Take  by mouth.  lidocaine (LIDODERM) 5 % by TransDERmal route every twenty-four (24) hours. Apply patch to the affected area for 12 hours a day and remove for 12 hours a day.  diazePAM (VALIUM) 5 mg tablet 1 bid prn to help with muscle spasms, as needed. 60 Tab 1    levothyroxine (SYNTHROID) 125 mcg tablet Take 1 Tab by mouth Daily (before breakfast). 30 Tab 1    nystatin (MYCOSTATIN) 100,000 unit/mL suspension 5 ml QID po,  swish and swallow  Indications: oral candidiasis 120 mL 0    ergocalciferol (ERGOCALCIFEROL) 50,000 unit capsule Take 1 Cap by mouth every seven (7) days. 4 Cap 11    diazePAM (VALIUM) 5 mg tablet 1 bid prn to help with muscle spasms, as needed. 60 Tab 1    montelukast (SINGULAIR) 10 mg tablet TAKE ONE TABLET BY MOUTH ONCE DAILY 90 Tab 3    cholecalciferol (VITAMIN D3) 1,000 unit cap Take 1,000 Units by mouth daily.  sucralfate (CARAFATE) 100 mg/mL suspension Take 1 tsp by mouth four (4) times daily.       b complex vitamins tablet Take 1 tablet by mouth daily.  Multivit-Iron-Min-Folic Acid (CENTRUM COMPLETE) 18-0.4 mg tab Take 1 Tab by mouth daily.  diclofenac (VOLTAREN) 1 % topical gel Apply 4 g to affected area four (4) times daily.  polyethylene glycol (MIRALAX) 17 gram packet Take 17 g by mouth daily. Past History     Past Medical History:  Past Medical History:   Diagnosis Date    Abdominal pain     Arthritis     Asthma 1/20/2010    Dr. Mj [facility])     stage 2 colon ca    Chemotherapy     Chronic low back pain 7/12/2010    Chronic lung disease     COPD (chronic obstructive pulmonary disease) ([facility] [address] 1/20/2010    Dr. Jordy Pham [facility]: colon cancer 1/20/2010    GERD (gastroesophageal reflux disease)     Gout     H/O colon cancer, stage II     s/p resection, last colonoscopy 11/11- q3 yrs    History of multiple pulmonary nodules 1/20/2010    Hoarse 2015    candida, sees ENT    HTN (hypertension) 1/20/2010    Hyperlipemia 1/20/2010    Hypothyroid 1/20/2010    IBS (irritable bowel syndrome)     Liver disease     cysts on liver    Mammogram declined     Multinodular thyroid     sees ENT.   Due for repeat U/S with ENT in Nov 2017    Nephropathy, membranous 1/20/2010    Neuropathy 1/20/2010    Nicotine use disorder 1/20/2010    Prediabetes     PUD (peptic ulcer disease)     Pulmonary nodule     sees pulmonary    Rectocele 1/20/2010    S/P cataract extraction 1/3/2011    Thyroid disease     hypothyroid    Unspecified sleep apnea     does not use CPAP    Vitamin D deficiency 1/20/2010       Past Surgical History:  Past Surgical History:   Procedure Laterality Date    ABDOMEN SURGERY PROC UNLISTED      colon resection 8/2005- Dr. Cathi Mercado- colon ca   [facility]      gastric bypass 12/2/09    HX GASTRIC BYPASS      HX GI      HX GYN      hysterectomy and BSO in 11/2006    HX HEENT      cataract sx tee    HX HYSTERECTOMY      HX LUMBAR FUSION  June 2013    L4 to L 5  HX TONSILLECTOMY         Family History:  Family History   Problem Relation Age of Onset   Verlinda Smoker Asthma Mother     Diabetes Mother     Hypertension Mother     Elevated Lipids Mother     Elevated Lipids Father     Hypertension Father     Heart Disease Father      chf    Heart Disease Sister      older       Social History:  Social History   Substance Use Topics    Smoking status: Former Smoker     Packs/day: 0.50     Years: 18.00     Types: Cigarettes     Quit date: 8/27/1990    Smokeless tobacco: Never Used      Comment: per patient she has not smoked in over 30 years but significant second hand smoke exposure at home    Alcohol use 0.0 oz/week     0 Standard drinks or equivalent per week      Comment: rare       Allergies: Allergies   Allergen Reactions    Breo Ellipta [Fluticasone-Vilanterol] Hoarseness     Yeast problem w/Powdered MDIs    Ace Inhibitors Cough    Aspirin Other (comments)     GI bleeding & pain    Nsaids (Non-Steroidal Anti-Inflammatory Drug) Other (comments)     Makes her stomach bleed           Review of Systems   Review of Systems   Constitutional: Negative. Negative for chills and fever. HENT: Negative. Negative for congestion, ear pain and rhinorrhea. Eyes: Negative. Negative for pain and redness. Respiratory: Positive for shortness of breath. Negative for cough, wheezing and stridor. Cardiovascular: Negative. Negative for chest pain and leg swelling. Gastrointestinal: Positive for abdominal pain and diarrhea. Negative for constipation, nausea and vomiting. Genitourinary: Negative. Negative for dysuria and frequency. Musculoskeletal: Positive for back pain. Negative for neck pain. Skin: Negative. Negative for rash and wound. Neurological: Negative. Negative for dizziness, seizures, syncope and headaches. All other systems reviewed and are negative.     All Other Systems Negative  Physical Exam     Vitals:    08/25/18 1520   BP: (!) 168/97   Pulse: 77 Resp: 22   Temp: 97.2 °F (36.2 °C)   SpO2: 98%   Weight: 83.9 kg (185 lb)   Height: 5' 2\" (1.575 m)     Physical Exam   Constitutional: She is oriented to person, place, and time. She appears well-developed and well-nourished. She appears distressed. Tearful, anxious, distressed   HENT:   Head: Normocephalic and atraumatic. Eyes: Conjunctivae are normal. Right eye exhibits no discharge. Left eye exhibits no discharge. No scleral icterus. Neck: Normal range of motion. Neck supple. Cardiovascular: Normal rate, regular rhythm and normal heart sounds. Exam reveals no gallop and no friction rub. No murmur heard. Pulmonary/Chest: Effort normal and breath sounds normal. No stridor. No respiratory distress. She has no wheezes. She has no rales. Abdominal: Soft. Bowel sounds are normal. She exhibits no distension. There is tenderness. There is no guarding. Epigastric TTP noted without guarding or peritoneal signs   Musculoskeletal: Normal range of motion. Diffuse midline and bilateral lumbar spine TTP, hypertonicity noted to the bilateral lumbar erector spinae and paraspinal muscles, ROM intact without evidence of radiculopathy. Neurological: She is alert and oriented to person, place, and time. Coordination normal.   Gait is steady. Able to ambulate without difficulty. Skin: Skin is warm and dry. No rash noted. She is not diaphoretic. No erythema. Psychiatric: Her behavior is normal. Thought content normal.   Tearful and anxious appearing    Nursing note and vitals reviewed.              Diagnostic Study Results     Labs -     Recent Results (from the past 12 hour(s))   CBC WITH AUTOMATED DIFF    Collection Time: 08/25/18  3:30 PM   Result Value Ref Range    WBC 4.5 (L) 4.6 - 13.2 K/uL    RBC 5.28 4.20 - 5.30 M/uL    HGB 15.7 12.0 - 16.0 g/dL    HCT 47.0 (H) 35.0 - 45.0 %    MCV 89.0 74.0 - 97.0 FL    MCH 29.7 24.0 - 34.0 PG    MCHC 33.4 31.0 - 37.0 g/dL    RDW 15.9 (H) 11.6 - 14.5 %    PLATELET 257 135 - 420 K/uL    MPV 10.1 9.2 - 11.8 FL    NEUTROPHILS 54 40 - 73 %    LYMPHOCYTES 32 21 - 52 %    MONOCYTES 10 3 - 10 %    EOSINOPHILS 3 0 - 5 %    BASOPHILS 1 0 - 2 %    ABS. NEUTROPHILS 2.4 1.8 - 8.0 K/UL    ABS. LYMPHOCYTES 1.4 0.9 - 3.6 K/UL    ABS. MONOCYTES 0.5 0.05 - 1.2 K/UL    ABS. EOSINOPHILS 0.2 0.0 - 0.4 K/UL    ABS. BASOPHILS 0.0 0.0 - 0.1 K/UL    DF AUTOMATED     METABOLIC PANEL, COMPREHENSIVE    Collection Time: 08/25/18  3:30 PM   Result Value Ref Range    Sodium 141 136 - 145 mmol/L    Potassium 4.1 3.5 - 5.5 mmol/L    Chloride 106 100 - 108 mmol/L    CO2 30 21 - 32 mmol/L    Anion gap 5 3.0 - 18 mmol/L    Glucose 96 74 - 99 mg/dL    BUN 7 7.0 - 18 MG/DL    Creatinine 0.98 0.6 - 1.3 MG/DL    BUN/Creatinine ratio 7 (L) 12 - 20      GFR est AA >60 >60 ml/min/1.73m2    GFR est non-AA 58 (L) >60 ml/min/1.73m2    Calcium 9.1 8.5 - 10.1 MG/DL    Bilirubin, total 0.3 0.2 - 1.0 MG/DL    ALT (SGPT) 21 13 - 56 U/L    AST (SGOT) 13 (L) 15 - 37 U/L    Alk.  phosphatase 137 (H) 45 - 117 U/L    Protein, total 7.6 6.4 - 8.2 g/dL    Albumin 3.9 3.4 - 5.0 g/dL    Globulin 3.7 2.0 - 4.0 g/dL    A-G Ratio 1.1 0.8 - 1.7     MAGNESIUM    Collection Time: 08/25/18  3:30 PM   Result Value Ref Range    Magnesium 2.3 1.6 - 2.6 mg/dL   CARDIAC PANEL,(CK, CKMB & TROPONIN)    Collection Time: 08/25/18  3:30 PM   Result Value Ref Range    CK 58 26 - 192 U/L    CK - MB <1.0 <3.6 ng/ml    CK-MB Index  0.0 - 4.0 %     CALCULATION NOT PERFORMED WHEN RESULT IS BELOW LINEAR LIMIT    Troponin-I, Qt. <0.02 0.0 - 0.045 NG/ML   LIPASE    Collection Time: 08/25/18  3:30 PM   Result Value Ref Range    Lipase 780 (H) 73 - 393 U/L   EKG, 12 LEAD, INITIAL    Collection Time: 08/25/18  3:34 PM   Result Value Ref Range    Ventricular Rate 73 BPM    Atrial Rate 73 BPM    P-R Interval 134 ms    QRS Duration 100 ms    Q-T Interval 428 ms    QTC Calculation (Bezet) 471 ms    Calculated P Axis 28 degrees    Calculated R Axis -48 degrees Calculated T Axis 37 degrees    Diagnosis       Normal sinus rhythm  Left anterior fascicular block  Minimal voltage criteria for LVH, may be normal variant  Abnormal ECG     URINALYSIS W/ RFLX MICROSCOPIC    Collection Time: 08/25/18  3:45 PM   Result Value Ref Range    Color DARK YELLOW      Appearance CLOUDY      Specific gravity >1.030 (H) 1.005 - 1.030    pH (UA) 5.0 5.0 - 8.0      Protein 100 (A) NEG mg/dL    Glucose NEGATIVE  NEG mg/dL    Ketone 15 (A) NEG mg/dL    Bilirubin SMALL (A) NEG      Blood TRACE (A) NEG      Urobilinogen 1.0 0.2 - 1.0 EU/dL    Nitrites NEGATIVE  NEG      Leukocyte Esterase SMALL (A) NEG     URINE MICROSCOPIC ONLY    Collection Time: 08/25/18  3:45 PM   Result Value Ref Range    WBC 1 to 2 0 - 4 /hpf    RBC 0 to 2 0 - 5 /hpf    Epithelial cells FEW 0 - 5 /lpf    Bacteria FEW (A) NEG /hpf       Radiologic Studies -   XR ABD ACUTE W 1 V CHEST   Final Result        CT Results  (Last 48 hours)    None        CXR Results  (Last 48 hours)               08/25/18 1547  XR ABD ACUTE W 1 V CHEST Final result    Impression:  IMPRESSION:       Nonobstructive bowel gas pattern and no free air. Resolved right lung pneumonia with mild residual scarring. Narrative:  Supine and upright abdominal radiographs with chest radiograph       COMPARISON: Chest radiographs 12/11/2016       HISTORY: Epigastric and low back pain for several days, shortness of breath,   diarrhea. FINDINGS:       Single view of the chest was obtained. The cardiomediastinal silhouette is   within normal limits. The pulmonary vascular markings are unremarkable. Significantly improved right mid to lower lung zone central opacities with only   mild residual reticulations. . Evaluation of the osseous structures is stable. Supine and erect views of the abdomen. There are several nondilated gas-filled   loops of small bowel and colon. No definite findings of portal venous gas or   pneumatosis.     No organomegaly is noted. No abnormal calcifications. Lumbosacral fusion and laminectomy. .                   Medical Decision Making   I am the first provider for this patient. I reviewed the vital signs, available nursing notes, past medical history, past surgical history, family history and social history. Vital Signs-Reviewed the patient's vital signs. Records Reviewed:  April Morro Otto PA-C 4:31 PM     Procedures:  Procedures    Provider Notes (Medical Decision Making): Impression:  Chronic low back pain, drug induced pancreatitis, elevated LFTs     IV inserted saline pepcid and morphine given, GI cocktail given  Sx improved    MED RECONCILIATION:  Current Facility-Administered Medications   Medication Dose Route Frequency    alum-mag hydroxide-simeth (MYLANTA) oral suspension 30 mL  30 mL Oral NOW    lidocaine (XYLOCAINE) 2 % viscous solution 15 mL  15 mL Mouth/Throat NOW     Current Outpatient Prescriptions   Medication Sig    famotidine (PEPCID) 20 mg tablet Take 1 Tab by mouth two (2) times a day for 10 days.  oxyCODONE-acetaminophen (PERCOCET) 5-325 mg per tablet Take 1 Tab by mouth every six (6) hours as needed for Pain. Max Daily Amount: 4 Tabs.  albuterol-ipratropium (DUO-NEB) 2.5 mg-0.5 mg/3 ml nebu USE 1 VIAL WITH HAND HELD NEBULIZER EVERY 6 HOURS AS NEEDED    mometasone-formoterol (DULERA) 100-5 mcg/actuation HFA inhaler Take 2 Puffs by inhalation two (2) times a day.  amLODIPine (NORVASC) 10 mg tablet Take 1 Tab by mouth daily.  candesartan-hydroCHLOROthiazide (ATACAND HCT) 32-12.5 mg per tablet TAKE ONE TABLET BY MOUTH ONCE DAILY    VENTOLIN HFA 90 mcg/actuation inhaler INHALE 2 PUFFS EVERY 4 HOURS IF NEEDED FOR WHEEZING OR  FOR SHORTNESS OF BREATH    naloxone (NARCAN) 4 mg/actuation nasal spray Use 1 spray intranasally into 1 nostril. Use a new Narcan nasal spray for subsequent doses and administer into alternating nostrils. May repeat every 2 to 3 minutes as needed.   Indications: OPIATE-INDUCED RESPIRATORY DEPRESSION, Opioid Toxicity    mometasone (NASONEX) 50 mcg/actuation nasal spray USE 2 SPRAYS INTO EACH NOSTRIL ONCE DAILY    pregabalin (LYRICA) 75 mg capsule Take  by mouth.  lidocaine (LIDODERM) 5 % by TransDERmal route every twenty-four (24) hours. Apply patch to the affected area for 12 hours a day and remove for 12 hours a day.  diazePAM (VALIUM) 5 mg tablet 1 bid prn to help with muscle spasms, as needed.  levothyroxine (SYNTHROID) 125 mcg tablet Take 1 Tab by mouth Daily (before breakfast).  nystatin (MYCOSTATIN) 100,000 unit/mL suspension 5 ml QID po,  swish and swallow  Indications: oral candidiasis    ergocalciferol (ERGOCALCIFEROL) 50,000 unit capsule Take 1 Cap by mouth every seven (7) days.  diazePAM (VALIUM) 5 mg tablet 1 bid prn to help with muscle spasms, as needed.  montelukast (SINGULAIR) 10 mg tablet TAKE ONE TABLET BY MOUTH ONCE DAILY    cholecalciferol (VITAMIN D3) 1,000 unit cap Take 1,000 Units by mouth daily.  sucralfate (CARAFATE) 100 mg/mL suspension Take 1 tsp by mouth four (4) times daily.  b complex vitamins tablet Take 1 tablet by mouth daily.  Multivit-Iron-Min-Folic Acid (CENTRUM COMPLETE) 18-0.4 mg tab Take 1 Tab by mouth daily.  diclofenac (VOLTAREN) 1 % topical gel Apply 4 g to affected area four (4) times daily.  polyethylene glycol (MIRALAX) 17 gram packet Take 17 g by mouth daily. Disposition:  D/c    DISCHARGE NOTE:     Pt has been reexamined. Patient has no new complaints, changes, or physical findings. Care plan outlined and precautions discussed. Results of the labs and imaging were reviewed with the patient. All medications were reviewed with the patient; will d/c home with pepcid and very short course of percocet. All of pt's questions and concerns were addressed. Patient was instructed and agrees to follow up with PCP, as well as to return to the ED upon further deterioration.  Patient is ready to go home. April ELLE Carlton PA-C 5:14 PM     Follow-up Information     Follow up With Details Comments [address], [place] Schedule an appointment as soon as possible for a visit in 1 week  [address] [address]  [phone]      [facility] EMERGENCY DEPT  As needed, If symptoms worsen [address]  [phone]          Current Discharge Medication List      START taking these medications    Details   famotidine (PEPCID) 20 mg tablet Take 1 Tab by mouth two (2) times a day for 10 days. Qty: 20 Tab, Refills: 0      oxyCODONE-acetaminophen (PERCOCET) 5-325 mg per tablet Take 1 Tab by mouth every six (6) hours as needed for Pain. Max Daily Amount: 4 Tabs. Qty: 12 Tab, Refills: 0    Associated Diagnoses: Abdominal pain, epigastric; Drug-induced acute pancreatitis, unspecified complication status         CONTINUE these medications which have NOT CHANGED    Details   albuterol-ipratropium (DUO-NEB) 2.5 mg-0.5 mg/3 ml nebu USE 1 VIAL WITH HAND HELD NEBULIZER EVERY 6 HOURS AS NEEDED  Qty: 48 Nebule, Refills: 5      mometasone-formoterol (DULERA) 100-5 mcg/actuation HFA inhaler Take 2 Puffs by inhalation two (2) times a day. Qty: 1 Inhaler, Refills: 5      amLODIPine (NORVASC) 10 mg tablet Take 1 Tab by mouth daily. Qty: 7 Tab, Refills: 0      candesartan-hydroCHLOROthiazide (ATACAND HCT) 32-12.5 mg per tablet TAKE ONE TABLET BY MOUTH ONCE DAILY  Qty: 7 Tab, Refills: 0    Associated Diagnoses: Essential hypertension      VENTOLIN HFA 90 mcg/actuation inhaler INHALE 2 PUFFS EVERY 4 HOURS IF NEEDED FOR WHEEZING OR  FOR SHORTNESS OF BREATH  Qty: 1 Inhaler, Refills: 5      naloxone (NARCAN) 4 mg/actuation nasal spray Use 1 spray intranasally into 1 nostril. Use a new Narcan nasal spray for subsequent doses and administer into alternating nostrils. May repeat every 2 to 3 minutes as needed.   Indications: OPIATE-INDUCED RESPIRATORY DEPRESSION, Opioid Toxicity  Qty: 2 Each, Refills: 0      mometasone (NASONEX) 50 mcg/actuation nasal spray USE 2 SPRAYS INTO EACH NOSTRIL ONCE DAILY  Qty: 1 Container, Refills: 3      pregabalin (LYRICA) 75 mg capsule Take  by mouth.      lidocaine (LIDODERM) 5 % by TransDERmal route every twenty-four (24) hours. Apply patch to the affected area for 12 hours a day and remove for 12 hours a day. !! diazePAM (VALIUM) 5 mg tablet 1 bid prn to help with muscle spasms, as needed. Qty: 60 Tab, Refills: 1    Associated Diagnoses: H/O lumbosacral spine surgery; Cervicalgia; Lumbosacral neuritis; Chronic pain syndrome      levothyroxine (SYNTHROID) 125 mcg tablet Take 1 Tab by mouth Daily (before breakfast). Qty: 30 Tab, Refills: 1      nystatin (MYCOSTATIN) 100,000 unit/mL suspension 5 ml QID po,  swish and swallow  Indications: oral candidiasis  Qty: 120 mL, Refills: 0      ergocalciferol (ERGOCALCIFEROL) 50,000 unit capsule Take 1 Cap by mouth every seven (7) days. Qty: 4 Cap, Refills: 11    Associated Diagnoses: Vitamin D deficiency      !! diazePAM (VALIUM) 5 mg tablet 1 bid prn to help with muscle spasms, as needed. Qty: 60 Tab, Refills: 1    Associated Diagnoses: DDD (degenerative disc disease), lumbosacral; Spondylolisthesis, grade 1; H/O lumbosacral spine surgery; Cervicalgia      montelukast (SINGULAIR) 10 mg tablet TAKE ONE TABLET BY MOUTH ONCE DAILY  Qty: 90 Tab, Refills: 3      cholecalciferol (VITAMIN D3) 1,000 unit cap Take 1,000 Units by mouth daily. sucralfate (CARAFATE) 100 mg/mL suspension Take 1 tsp by mouth four (4) times daily. b complex vitamins tablet Take 1 tablet by mouth daily. Multivit-Iron-Min-Folic Acid (CENTRUM COMPLETE) 18-0.4 mg tab Take 1 Tab by mouth daily. diclofenac (VOLTAREN) 1 % topical gel Apply 4 g to affected area four (4) times daily. polyethylene glycol (MIRALAX) 17 gram packet Take 17 g by mouth daily. !! - Potential duplicate medications found.  Please discuss with provider. STOP taking these medications       traMADol 200 mg TM24 Comments:   Reason for Stopping:         OMEPRAZOLE (PRILOSEC PO) Comments:   Reason for Stopping:         RABEprazole (ACIPHEX) 20 mg tablet Comments:   Reason for Stopping:                     Diagnosis     Clinical Impression:   1. Drug-induced acute pancreatitis, unspecified complication status    2. Abdominal pain, epigastric    3. Chronic bilateral low back pain without sciatica    4. Dizziness    5.  SOB (shortness of breath)

## 2018-08-25 NOTE — ED NOTES
Reviewed discharge instructions including new medications an d suggested follow-up. Questions encouraged and answered.   Patient verbalized an understanding

## 2018-08-26 LAB
ATRIAL RATE: 73 BPM
CALCULATED P AXIS, ECG09: 28 DEGREES
CALCULATED R AXIS, ECG10: -48 DEGREES
CALCULATED T AXIS, ECG11: 37 DEGREES
DIAGNOSIS, 93000: NORMAL
P-R INTERVAL, ECG05: 134 MS
Q-T INTERVAL, ECG07: 428 MS
QRS DURATION, ECG06: 100 MS
QTC CALCULATION (BEZET), ECG08: 471 MS
VENTRICULAR RATE, ECG03: 73 BPM

## 2018-08-29 ENCOUNTER — APPOINTMENT (OUTPATIENT)
Dept: CT IMAGING | Age: 63
End: 2018-08-29
Attending: NURSE PRACTITIONER
Payer: MEDICARE

## 2018-08-29 ENCOUNTER — HOSPITAL ENCOUNTER (EMERGENCY)
Age: 63
Discharge: HOME | End: 2018-08-29
Attending: EMERGENCY MEDICINE
Payer: MEDICARE

## 2018-08-29 ENCOUNTER — APPOINTMENT (OUTPATIENT)
Dept: GENERAL RADIOLOGY | Age: 63
End: 2018-08-29
Attending: NURSE PRACTITIONER
Payer: MEDICARE

## 2018-08-29 VITALS
OXYGEN SATURATION: 94 % | WEIGHT: 185 LBS | RESPIRATION RATE: 17 BRPM | DIASTOLIC BLOOD PRESSURE: 81 MMHG | TEMPERATURE: 97.4 F | SYSTOLIC BLOOD PRESSURE: 157 MMHG | BODY MASS INDEX: 33.84 KG/M2 | HEART RATE: 52 BPM

## 2018-08-29 DIAGNOSIS — N30.00 ACUTE CYSTITIS WITHOUT HEMATURIA: ICD-10-CM

## 2018-08-29 DIAGNOSIS — K85.30 DRUG-INDUCED ACUTE PANCREATITIS, UNSPECIFIED COMPLICATION STATUS: Primary | ICD-10-CM

## 2018-08-29 LAB
ALBUMIN SERPL-MCNC: 3.9 G/DL (ref 3.4–5)
ALBUMIN/GLOB SERPL: 1.1 {RATIO} (ref 0.8–1.7)
ALP SERPL-CCNC: 127 U/L (ref 45–117)
ALT SERPL-CCNC: 17 U/L (ref 13–56)
ANION GAP SERPL CALC-SCNC: 11 MMOL/L (ref 3–18)
APPEARANCE UR: CLEAR
AST SERPL-CCNC: 13 U/L (ref 15–37)
BACTERIA URNS QL MICRO: NEGATIVE /HPF
BASOPHILS # BLD: 0 K/UL (ref 0–0.1)
BASOPHILS NFR BLD: 1 % (ref 0–2)
BILIRUB SERPL-MCNC: 0.4 MG/DL (ref 0.2–1)
BILIRUB UR QL: NEGATIVE
BUN SERPL-MCNC: 14 MG/DL (ref 7–18)
BUN/CREAT SERPL: 15 (ref 12–20)
CALCIUM SERPL-MCNC: 9.4 MG/DL (ref 8.5–10.1)
CHLORIDE SERPL-SCNC: 105 MMOL/L (ref 100–108)
CK MB CFR SERPL CALC: 1.9 % (ref 0–4)
CK MB SERPL-MCNC: 1.3 NG/ML (ref 5–25)
CK SERPL-CCNC: 69 U/L (ref 26–192)
CO2 SERPL-SCNC: 25 MMOL/L (ref 21–32)
COLOR UR: YELLOW
CREAT SERPL-MCNC: 0.93 MG/DL (ref 0.6–1.3)
DIFFERENTIAL METHOD BLD: ABNORMAL
EOSINOPHIL # BLD: 0.1 K/UL (ref 0–0.4)
EOSINOPHIL NFR BLD: 2 % (ref 0–5)
EPITH CASTS URNS QL MICRO: NORMAL /LPF (ref 0–5)
ERYTHROCYTE [DISTWIDTH] IN BLOOD BY AUTOMATED COUNT: 15.9 % (ref 11.6–14.5)
GLOBULIN SER CALC-MCNC: 3.4 G/DL (ref 2–4)
GLUCOSE SERPL-MCNC: 110 MG/DL (ref 74–99)
GLUCOSE UR STRIP.AUTO-MCNC: NEGATIVE MG/DL
HCT VFR BLD AUTO: 44.3 % (ref 35–45)
HGB BLD-MCNC: 14.9 G/DL (ref 12–16)
HGB UR QL STRIP: NEGATIVE
INR PPP: 0.9 (ref 0.8–1.2)
KETONES UR QL STRIP.AUTO: NEGATIVE MG/DL
LEUKOCYTE ESTERASE UR QL STRIP.AUTO: ABNORMAL
LIPASE SERPL-CCNC: 641 U/L (ref 73–393)
LYMPHOCYTES # BLD: 1.4 K/UL (ref 0.9–3.6)
LYMPHOCYTES NFR BLD: 27 % (ref 21–52)
MCH RBC QN AUTO: 30.1 PG (ref 24–34)
MCHC RBC AUTO-ENTMCNC: 33.6 G/DL (ref 31–37)
MCV RBC AUTO: 89.5 FL (ref 74–97)
MONOCYTES # BLD: 0.5 K/UL (ref 0.05–1.2)
MONOCYTES NFR BLD: 10 % (ref 3–10)
NEUTS SEG # BLD: 3.2 K/UL (ref 1.8–8)
NEUTS SEG NFR BLD: 60 % (ref 40–73)
NITRITE UR QL STRIP.AUTO: NEGATIVE
PH UR STRIP: 6 [PH] (ref 5–8)
PLATELET # BLD AUTO: 273 K/UL (ref 135–420)
PMV BLD AUTO: 10.1 FL (ref 9.2–11.8)
POTASSIUM SERPL-SCNC: 3.9 MMOL/L (ref 3.5–5.5)
PROT SERPL-MCNC: 7.3 G/DL (ref 6.4–8.2)
PROT UR STRIP-MCNC: 30 MG/DL
PROTHROMBIN TIME: 11.7 SEC (ref 11.5–15.2)
RBC # BLD AUTO: 4.95 M/UL (ref 4.2–5.3)
RBC #/AREA URNS HPF: NEGATIVE /HPF (ref 0–5)
SODIUM SERPL-SCNC: 141 MMOL/L (ref 136–145)
SP GR UR REFRACTOMETRY: 1.01 (ref 1–1.03)
TROPONIN I SERPL-MCNC: <0.02 NG/ML (ref 0–0.04)
UROBILINOGEN UR QL STRIP.AUTO: 1 EU/DL (ref 0.2–1)
WBC # BLD AUTO: 5.3 K/UL (ref 4.6–13.2)
WBC URNS QL MICRO: NORMAL /HPF (ref 0–4)

## 2018-08-29 PROCEDURE — 83690 ASSAY OF LIPASE: CPT | Performed by: NURSE PRACTITIONER

## 2018-08-29 PROCEDURE — 99284 EMERGENCY DEPT VISIT MOD MDM: CPT

## 2018-08-29 PROCEDURE — 74022 RADEX COMPL AQT ABD SERIES: CPT

## 2018-08-29 PROCEDURE — 82550 ASSAY OF CK (CPK): CPT | Performed by: NURSE PRACTITIONER

## 2018-08-29 PROCEDURE — 96375 TX/PRO/DX INJ NEW DRUG ADDON: CPT

## 2018-08-29 PROCEDURE — 96374 THER/PROPH/DIAG INJ IV PUSH: CPT

## 2018-08-29 PROCEDURE — 80053 COMPREHEN METABOLIC PANEL: CPT | Performed by: NURSE PRACTITIONER

## 2018-08-29 PROCEDURE — 74011250636 HC RX REV CODE- 250/636: Performed by: NURSE PRACTITIONER

## 2018-08-29 PROCEDURE — 85025 COMPLETE CBC W/AUTO DIFF WBC: CPT | Performed by: NURSE PRACTITIONER

## 2018-08-29 PROCEDURE — 96361 HYDRATE IV INFUSION ADD-ON: CPT

## 2018-08-29 PROCEDURE — 93005 ELECTROCARDIOGRAM TRACING: CPT

## 2018-08-29 PROCEDURE — 74177 CT ABD & PELVIS W/CONTRAST: CPT

## 2018-08-29 PROCEDURE — 74011636320 HC RX REV CODE- 636/320: Performed by: EMERGENCY MEDICINE

## 2018-08-29 PROCEDURE — 81001 URINALYSIS AUTO W/SCOPE: CPT | Performed by: NURSE PRACTITIONER

## 2018-08-29 PROCEDURE — 96376 TX/PRO/DX INJ SAME DRUG ADON: CPT

## 2018-08-29 PROCEDURE — 74011000250 HC RX REV CODE- 250: Performed by: NURSE PRACTITIONER

## 2018-08-29 PROCEDURE — 85610 PROTHROMBIN TIME: CPT | Performed by: NURSE PRACTITIONER

## 2018-08-29 RX ORDER — OXYCODONE AND ACETAMINOPHEN 5; 325 MG/1; MG/1
1 TABLET ORAL
Qty: 15 TAB | Refills: 0 | Status: SHIPPED | OUTPATIENT
Start: 2018-08-29 | End: 2019-01-31

## 2018-08-29 RX ORDER — MORPHINE SULFATE 2 MG/ML
4 INJECTION, SOLUTION INTRAMUSCULAR; INTRAVENOUS ONCE
Status: COMPLETED | OUTPATIENT
Start: 2018-08-29 | End: 2018-08-29

## 2018-08-29 RX ORDER — FAMOTIDINE 10 MG/ML
20 INJECTION INTRAVENOUS
Status: COMPLETED | OUTPATIENT
Start: 2018-08-29 | End: 2018-08-29

## 2018-08-29 RX ORDER — ONDANSETRON 2 MG/ML
8 INJECTION INTRAMUSCULAR; INTRAVENOUS
Status: COMPLETED | OUTPATIENT
Start: 2018-08-29 | End: 2018-08-29

## 2018-08-29 RX ORDER — NITROFURANTOIN 25; 75 MG/1; MG/1
100 CAPSULE ORAL 2 TIMES DAILY
Qty: 14 CAP | Refills: 0 | Status: SHIPPED | OUTPATIENT
Start: 2018-08-29 | End: 2018-09-05

## 2018-08-29 RX ADMIN — Medication 4 MG: at 21:21

## 2018-08-29 RX ADMIN — FAMOTIDINE 20 MG: 10 INJECTION, SOLUTION INTRAVENOUS at 19:13

## 2018-08-29 RX ADMIN — ONDANSETRON 8 MG: 2 INJECTION, SOLUTION INTRAMUSCULAR; INTRAVENOUS at 19:12

## 2018-08-29 RX ADMIN — IOPAMIDOL 75 ML: 612 INJECTION, SOLUTION INTRAVENOUS at 20:33

## 2018-08-29 RX ADMIN — SODIUM CHLORIDE 1000 ML: 900 INJECTION, SOLUTION INTRAVENOUS at 19:13

## 2018-08-29 RX ADMIN — Medication 4 MG: at 19:12

## 2018-08-29 NOTE — ED PROVIDER NOTES
HPI Comments: Pt with a recent hx of pancreatitis presents to the ed with upper and periumbilical abd pain,  Pt states she has not followed up with gi yet. Pt states it was from nsaid use and she denies nsaid use. Patient is a 58 y.o. female presenting with abdominal pain. The history is provided by the patient. No  was used. Abdominal Pain This is a recurrent problem. The current episode started 12 to 24 hours ago. The problem occurs constantly. The problem has not changed since onset. The pain is located in the epigastric region and periumbilical region. The pain is at a severity of 8/10. The pain is moderate. Pertinent negatives include no fever, no diarrhea, no nausea, no vomiting, no dysuria and no chest pain. Nothing worsens the pain. The pain is relieved by nothing. Past workup includes CT scan. Past Medical History:  
Diagnosis Date  Abdominal pain  Arthritis  Asthma 1/20/2010 Dr. Mi Hansen  Cancer ([facility] stage 2 colon ca  Chemotherapy  Chronic low back pain 7/12/2010  Chronic lung disease  COPD (chronic obstructive pulmonary disease) ([facility] 1/20/2010 Dr. Mi Hansen  Depression  FH: colon cancer 1/20/2010  GERD (gastroesophageal reflux disease)  Gout  H/O colon cancer, stage II   
 s/p resection, last colonoscopy 11/11- q3 yrs  History of multiple pulmonary nodules 1/20/2010  
 Hoarse 2015  
 candida, sees ENT  HTN (hypertension) 1/20/2010  Hyperlipemia 1/20/2010  Hypothyroid 1/20/2010  
 IBS (irritable bowel syndrome)  Liver disease   
 cysts on liver  Mammogram declined  Multinodular thyroid   
 sees ENT. Due for repeat U/S with ENT in Nov 2017  Nephropathy, membranous 1/20/2010  Neuropathy 1/20/2010  Nicotine use disorder 1/20/2010  Prediabetes  PUD (peptic ulcer disease)  Pulmonary nodule   
 sees pulmonary  Rectocele 1/20/2010  S/P cataract extraction 1/3/2011  Thyroid disease   
 hypothyroid  Unspecified sleep apnea   
 does not use CPAP  Vitamin D deficiency 1/20/2010 Past Surgical History:  
Procedure Laterality Date  ABDOMEN SURGERY PROC UNLISTED    
 colon resection 8/2005- Dr. Cathi Mercado- colon ca  ABDOMEN SURGERY PROC UNLISTED    
 gastric bypass 12/2/09  HX GASTRIC BYPASS  HX GI    
 HX GYN    
 hysterectomy and BSO in 11/2006  HX HEENT    
 cataract sx tee  HX HYSTERECTOMY [facility] LUMBAR FUSION  June 2013 L4 to L 5  
 HX TONSILLECTOMY Family History:  
Problem Relation Age of Onset  Asthma Mother  Diabetes Mother  Hypertension Mother  Elevated Lipids Mother  Elevated Lipids Father  Hypertension Father  Heart Disease Father   
  chf  
 Heart Disease Sister   
  older Social History Social History  Marital status:  Spouse name: N/A  
 Number of children: N/A  
 Years of education: N/A Occupational History   Not Employed Social History Main Topics  Smoking status: Former Smoker Packs/day: 0.50 Years: 18.00 Types: Cigarettes Quit date: 8/27/1990  Smokeless tobacco: Never Used Comment: per patient she has not smoked in over 30 years but significant second hand smoke exposure at home  Alcohol use 0.0 oz/week  
  0 Standard drinks or equivalent per week Comment: rare  Drug use: No  
 Sexual activity: Yes  
  Partners: Male Birth control/ protection: None, Surgical  
 
Other Topics Concern  Not on file Social History Narrative ALLERGIES: Breo ellipta [fluticasone-vilanterol]; Ace inhibitors; Aspirin; and Nsaids (non-steroidal anti-inflammatory drug) Review of Systems Constitutional: Negative for fever. Cardiovascular: Negative for chest pain. Gastrointestinal: Positive for abdominal pain. Negative for diarrhea, nausea and vomiting. Genitourinary: Negative for dysuria. Neurological: Negative for dizziness. All other systems reviewed and are negative. Vitals:  
 08/29/18 1831 08/29/18 1906 08/29/18 2227 BP: (!) 186/99  157/81 Pulse: 67  (!) 52 Resp: 16  17 Temp: 98.5 °F (36.9 °C)  97.4 °F (36.3 °C) SpO2: 98% 98% 94% Weight: 83.9 kg (185 lb) Physical Exam  
Constitutional: She is oriented to person, place, and time. She appears well-developed and well-nourished. She appears distressed. pain HENT:  
Head: Normocephalic and atraumatic. Eyes: Conjunctivae and EOM are normal. Pupils are equal, round, and reactive to light. Neck: Normal range of motion. Neck supple. Cardiovascular: Normal rate and regular rhythm. Pulmonary/Chest: Effort normal and breath sounds normal.  
Abdominal: Soft. Bowel sounds are normal. There is tenderness. +tenderness to epigastrium and periumbilical area Musculoskeletal: Normal range of motion. Neurological: She is alert and oriented to person, place, and time. She has normal reflexes. Skin: Skin is warm and dry. Psychiatric: She has a normal mood and affect. Her behavior is normal. Judgment and thought content normal.  
Nursing note and vitals reviewed. MDM Number of Diagnoses or Management Options Acute cystitis without hematuria: established and improving Drug-induced acute pancreatitis, unspecified complication status: established and improving Diagnosis management comments: I suspect pancreatitis or constipation,  No fever no vomiting reported Pt improved after medication, ct does not show any acute findings, lipase slightly lower than last visit. Pain is suspected from her pancreatitis that is improving and a uti, pt treated for uti. Pt admits to eating a fatty spicy diet and is advised to eat a bland diet and keep her appt with her gi doctor as scheduled. I do not suspect any other acute medical illness, and I consider her pancreatitis stable and flared mildly from her diet. Amount and/or Complexity of Data Reviewed Clinical lab tests: ordered and reviewed Tests in the radiology section of CPT®: ordered and reviewed Review and summarize past medical records: yes Independent visualization of images, tracings, or specimens: yes Risk of Complications, Morbidity, and/or Mortality Presenting problems: moderate Diagnostic procedures: moderate Management options: moderate Patient Progress Patient progress: stable ED Course Procedures Vitals: 
Patient Vitals for the past 12 hrs: 
 Temp Pulse Resp BP SpO2  
08/29/18 2227 97.4 °F (36.3 °C) (!) 52 17 157/81 94 % 08/29/18 1906 - - - - 98 % 08/29/18 1831 98.5 °F (36.9 °C) 67 16 (!) 186/99 98 % Medications ordered:  
Medications  
sodium chloride 0.9 % bolus infusion 1,000 mL (0 mL IntraVENous IV Completed 8/29/18 1943) morphine injection 4 mg (4 mg IntraVENous Given 8/29/18 1912) ondansetron [facility]) injection 8 mg (8 mg IntraVENous Given 8/29/18 1912) famotidine (PF) (PEPCID) injection 20 mg (20 mg IntraVENous Given 8/29/18 1913) iopamidol (ISOVUE 300) 61 % contrast injection  mL (75 mL IntraVENous Given 8/29/18 2033) morphine injection 4 mg (4 mg IntraVENous Given 8/29/18 2121) Lab findings: 
Recent Results (from the past 12 hour(s)) CBC WITH AUTOMATED DIFF Collection Time: 08/29/18  6:50 PM  
Result Value Ref Range WBC 5.3 4.6 - 13.2 K/uL  
 RBC 4.95 4.20 - 5.30 M/uL  
 HGB 14.9 12.0 - 16.0 g/dL HCT 44.3 35.0 - 45.0 % MCV 89.5 74.0 - 97.0 FL  
 MCH 30.1 24.0 - 34.0 PG  
 MCHC 33.6 31.0 - 37.0 g/dL  
 RDW 15.9 (H) 11.6 - 14.5 % PLATELET 459 180 - 688 K/uL MPV 10.1 9.2 - 11.8 FL  
 NEUTROPHILS 60 40 - 73 % LYMPHOCYTES 27 21 - 52 % MONOCYTES 10 3 - 10 % EOSINOPHILS 2 0 - 5 % BASOPHILS 1 0 - 2 %  
 ABS. NEUTROPHILS 3.2 1.8 - 8.0 K/UL  
 ABS. LYMPHOCYTES 1.4 0.9 - 3.6 K/UL  
 ABS. MONOCYTES 0.5 0.05 - 1.2 K/UL ABS. EOSINOPHILS 0.1 0.0 - 0.4 K/UL  
 ABS. BASOPHILS 0.0 0.0 - 0.1 K/UL  
 DF AUTOMATED METABOLIC PANEL, COMPREHENSIVE Collection Time: 08/29/18  6:50 PM  
Result Value Ref Range Sodium 141 136 - 145 mmol/L Potassium 3.9 3.5 - 5.5 mmol/L Chloride 105 100 - 108 mmol/L  
 CO2 25 21 - 32 mmol/L Anion gap 11 3.0 - 18 mmol/L Glucose 110 (H) 74 - 99 mg/dL BUN 14 7.0 - 18 MG/DL Creatinine 0.93 0.6 - 1.3 MG/DL  
 BUN/Creatinine ratio 15 12 - 20 GFR est AA >60 >60 ml/min/1.73m2 GFR est non-AA >60 >60 ml/min/1.73m2 Calcium 9.4 8.5 - 10.1 MG/DL Bilirubin, total 0.4 0.2 - 1.0 MG/DL  
 ALT (SGPT) 17 13 - 56 U/L  
 AST (SGOT) 13 (L) 15 - 37 U/L Alk. phosphatase 127 (H) 45 - 117 U/L Protein, total 7.3 6.4 - 8.2 g/dL Albumin 3.9 3.4 - 5.0 g/dL Globulin 3.4 2.0 - 4.0 g/dL A-G Ratio 1.1 0.8 - 1.7 PROTHROMBIN TIME + INR Collection Time: 08/29/18  6:50 PM  
Result Value Ref Range Prothrombin time 11.7 11.5 - 15.2 sec INR 0.9 0.8 - 1.2 CARDIAC PANEL,(CK, CKMB & TROPONIN) Collection Time: 08/29/18  6:50 PM  
Result Value Ref Range CK 69 26 - 192 U/L  
 CK - MB 1.3 <3.6 ng/ml CK-MB Index 1.9 0.0 - 4.0 % Troponin-I, Qt. <0.02 0.0 - 0.045 NG/ML  
LIPASE Collection Time: 08/29/18  6:50 PM  
Result Value Ref Range Lipase 641 (H) 73 - 393 U/L  
URINALYSIS W/ RFLX MICROSCOPIC Collection Time: 08/29/18  7:00 PM  
Result Value Ref Range Color YELLOW Appearance CLEAR Specific gravity 1.014 1.005 - 1.030    
 pH (UA) 6.0 5.0 - 8.0 Protein 30 (A) NEG mg/dL Glucose NEGATIVE  NEG mg/dL Ketone NEGATIVE  NEG mg/dL Bilirubin NEGATIVE  NEG Blood NEGATIVE  NEG Urobilinogen 1.0 0.2 - 1.0 EU/dL Nitrites NEGATIVE  NEG Leukocyte Esterase MODERATE (A) NEG URINE MICROSCOPIC ONLY Collection Time: 08/29/18  7:00 PM  
Result Value Ref Range  WBC 5 to 10 0 - 4 /hpf  
 RBC NEGATIVE  0 - 5 /hpf  
 Epithelial cells 1+ 0 - 5 /lpf Bacteria NEGATIVE  NEG /hpf  
EKG, 12 LEAD, INITIAL Collection Time: 08/29/18  7:05 PM  
Result Value Ref Range Ventricular Rate 55 BPM  
 Atrial Rate 55 BPM  
 P-R Interval 150 ms QRS Duration 94 ms Q-T Interval 462 ms QTC Calculation (Bezet) 441 ms Calculated P Axis 18 degrees Calculated R Axis -41 degrees Calculated T Axis -6 degrees Diagnosis Sinus bradycardia Left axis deviation Moderate voltage criteria for LVH, may be normal variant Abnormal ECG When compared with ECG of 25-AUG-2018 15:34, 
T wave inversion now evident in Inferior leads EKG: 
 Sinus bradycardia Left axis deviation Moderate voltage criteria for LVH, may be normal variant Abnormal ECG When compared with ECG of 25-AUG-2018 15:34,  
T wave inversion now evident in Inferior leads X-Ray, CT or other radiology findings or impressions: 
CT ABD PELV W CONT Final Result Impression: No acute intra-abdominal abnormality noted. Postsurgical changes of the stomach, small bowel and colon are noted. No  
evidence to suggest bowel obstruction. XR ABD ACUTE W 1 V CHEST    (Results Pending) Reevaluation of patient:  
I have reassessed the patient. Patient is feeling better and is asking to go home Disposition: 
 
Diagnosis: 1. Drug-induced acute pancreatitis, unspecified complication status 2. Acute cystitis without hematuria Disposition: to Home Follow-up Information Follow up With Details Comments Contact Info Alejandra Mirza MD In 2 days  [address] Angelique Elias MD 
[facility] [address] [phone] Patient's Medications Start Taking NITROFURANTOIN, MACROCRYSTAL-MONOHYDRATE, (MACROBID) 100 MG CAPSULE    Take 1 Cap by mouth two (2) times a day for 7 days.   
 OXYCODONE-ACETAMINOPHEN (PERCOCET) 5-325 MG PER TABLET    Take 1 Tab by mouth every four (4) hours as needed for Pain for up to 20 doses. Max Daily Amount: 6 Tabs. Continue Taking ALBUTEROL-IPRATROPIUM (DUO-NEB) 2.5 MG-0.5 MG/3 ML NEBU    USE 1 VIAL WITH HAND HELD NEBULIZER EVERY 6 HOURS AS NEEDED AMLODIPINE (NORVASC) 10 MG TABLET    Take 1 Tab by mouth daily. B COMPLEX VITAMINS TABLET    Take 1 tablet by mouth daily. CANDESARTAN-HYDROCHLOROTHIAZIDE (ATACAND HCT) 32-12.5 MG PER TABLET    TAKE ONE TABLET BY MOUTH ONCE DAILY CHOLECALCIFEROL (VITAMIN D3) 1,000 UNIT CAP    Take 1,000 Units by mouth daily. DIAZEPAM (VALIUM) 5 MG TABLET    1 bid prn to help with muscle spasms, as needed. DIAZEPAM (VALIUM) 5 MG TABLET    1 bid prn to help with muscle spasms, as needed. DICLOFENAC (VOLTAREN) 1 % TOPICAL GEL    Apply 4 g to affected area four (4) times daily. ERGOCALCIFEROL (ERGOCALCIFEROL) 50,000 UNIT CAPSULE    Take 1 Cap by mouth every seven (7) days. FAMOTIDINE (PEPCID) 20 MG TABLET    Take 1 Tab by mouth two (2) times a day for 10 days. LEVOTHYROXINE (SYNTHROID) 125 MCG TABLET    Take 1 Tab by mouth Daily (before breakfast). LIDOCAINE (LIDODERM) 5 %    by TransDERmal route every twenty-four (24) hours. Apply patch to the affected area for 12 hours a day and remove for 12 hours a day. MOMETASONE (NASONEX) 50 MCG/ACTUATION NASAL SPRAY    USE 2 SPRAYS INTO EACH NOSTRIL ONCE DAILY  
 MOMETASONE-FORMOTEROL (DULERA) 100-5 MCG/ACTUATION HFA INHALER    Take 2 Puffs by inhalation two (2) times a day. MONTELUKAST (SINGULAIR) 10 MG TABLET    TAKE ONE TABLET BY MOUTH ONCE DAILY MULTIVIT-IRON-MIN-FOLIC ACID (CENTRUM COMPLETE) 18-0.4 MG TAB    Take 1 Tab by mouth daily. NALOXONE (NARCAN) 4 MG/ACTUATION NASAL SPRAY    Use 1 spray intranasally into 1 nostril. Use a new Narcan nasal spray for subsequent doses and administer into alternating nostrils. May repeat every 2 to 3 minutes as needed.   Indications: OPIATE-INDUCED RESPIRATORY DEPRESSION, Opioid Toxicity NYSTATIN (MYCOSTATIN) 100,000 UNIT/ML SUSPENSION    5 ml QID po,  swish and swallow  Indications: oral candidiasis POLYETHYLENE GLYCOL (MIRALAX) 17 GRAM PACKET    Take 17 g by mouth daily. PREGABALIN (LYRICA) 75 MG CAPSULE    Take  by mouth. SUCRALFATE (CARAFATE) 100 MG/ML SUSPENSION    Take 1 tsp by mouth four (4) times daily. VENTOLIN HFA 90 MCG/ACTUATION INHALER    INHALE 2 PUFFS EVERY 4 HOURS IF NEEDED FOR WHEEZING OR  FOR SHORTNESS OF BREATH These Medications have changed No medications on file Stop Taking OXYCODONE-ACETAMINOPHEN (PERCOCET) 5-325 MG PER TABLET    Take 1 Tab by mouth every six (6) hours as needed for Pain. Max Daily Amount: 4 Tabs. Return to the ER if you are unable to obtain referral as directed. Adriana Urbina's  results have been reviewed with her. She has been counseled regarding her diagnosis, treatment, and plan. She verbally conveys understanding and agreement of the signs, symptoms, diagnosis, treatment and prognosis and additionally agrees to follow up as discussed. She also agrees with the care-plan and conveys that all of her questions have been answered. I have also provided discharge instructions for her that include: educational information regarding their diagnosis and treatment, and list of reasons why they would want to return to the ED prior to their follow-up appointment, should her condition change.  
 
 
 
Ana RACHEL,FNP-C

## 2018-08-30 LAB
ATRIAL RATE: 55 BPM
CALCULATED P AXIS, ECG09: 18 DEGREES
CALCULATED R AXIS, ECG10: -41 DEGREES
CALCULATED T AXIS, ECG11: -6 DEGREES
DIAGNOSIS, 93000: NORMAL
P-R INTERVAL, ECG05: 150 MS
Q-T INTERVAL, ECG07: 462 MS
QRS DURATION, ECG06: 94 MS
QTC CALCULATION (BEZET), ECG08: 441 MS
VENTRICULAR RATE, ECG03: 55 BPM

## 2018-09-11 RX ORDER — ALBUTEROL SULFATE 90 UG/1
AEROSOL, METERED RESPIRATORY (INHALATION)
Qty: 1 INHALER | Refills: 5 | Status: SHIPPED | OUTPATIENT
Start: 2018-09-11 | End: 2018-10-08 | Stop reason: SDUPTHER

## 2018-10-08 ENCOUNTER — OFFICE VISIT (OUTPATIENT)
Dept: PULMONOLOGY | Age: 63
End: 2018-10-08

## 2018-10-08 VITALS
DIASTOLIC BLOOD PRESSURE: 90 MMHG | OXYGEN SATURATION: 98 % | SYSTOLIC BLOOD PRESSURE: 160 MMHG | RESPIRATION RATE: 21 BRPM | HEIGHT: 62 IN | BODY MASS INDEX: 33.86 KG/M2 | HEART RATE: 67 BPM | WEIGHT: 184 LBS | TEMPERATURE: 98.2 F

## 2018-10-08 DIAGNOSIS — E66.9 OBESITY (BMI 30.0-34.9): ICD-10-CM

## 2018-10-08 DIAGNOSIS — E11.29 TYPE 2 DIABETES MELLITUS WITH MICROALBUMINURIA, WITHOUT LONG-TERM CURRENT USE OF INSULIN (HCC): ICD-10-CM

## 2018-10-08 DIAGNOSIS — J44.9 CHRONIC OBSTRUCTIVE PULMONARY DISEASE, UNSPECIFIED COPD TYPE (HCC): Primary | ICD-10-CM

## 2018-10-08 DIAGNOSIS — G89.29 OTHER CHRONIC PAIN: ICD-10-CM

## 2018-10-08 DIAGNOSIS — Z23 ENCOUNTER FOR IMMUNIZATION: ICD-10-CM

## 2018-10-08 DIAGNOSIS — R80.9 TYPE 2 DIABETES MELLITUS WITH MICROALBUMINURIA, WITHOUT LONG-TERM CURRENT USE OF INSULIN (HCC): ICD-10-CM

## 2018-10-08 DIAGNOSIS — J45.40 MODERATE PERSISTENT REACTIVE AIRWAY DISEASE WITHOUT COMPLICATION: ICD-10-CM

## 2018-10-08 RX ORDER — ALBUTEROL SULFATE 90 UG/1
AEROSOL, METERED RESPIRATORY (INHALATION)
Qty: 1 INHALER | Refills: 5 | Status: SHIPPED | OUTPATIENT
Start: 2018-10-08

## 2018-10-08 NOTE — PROGRESS NOTES
HISTORY OF PRESENT ILLNESS Abelardo Keller is a 58 y.o. female. HPI Comments: Pt with mild persistent asthma previously with frequent exacerbations requiring sick calls and Prednisone tapers which pt associates with living in a [place] house with a heavy smoker who is also a hoarder. Pt was placed on Dulera without thrush seen when she was on Breo. Pt notes improved Asthma control, including much improved exercise tolerance. Rescue inhaler use is less than 1/week and pt denies PND. Pt notes dramatic improvement in SOB and wheezing after  from her  who is a heavy smoker and hoarder. She denies any other new respiratory symptoms. Pt is requesting flu vaccination today. COPD Pertinent negatives include no chest pain, no abdominal pain and no headaches. Shortness of breath: rare. Asthma The history is provided by the patient. This is a chronic problem. The problem occurs daily. Progression since onset: recurring. Pertinent negatives include no chest pain, no abdominal pain and no headaches. Shortness of breath: rare. Nothing aggravates the symptoms. The symptoms are relieved by medications. Treatments tried: Albuterol. The treatment provided significant relief. Review of Systems Constitutional: Negative for chills, diaphoresis, fever, malaise/fatigue and weight loss. HENT: Negative for congestion, ear discharge, ear pain, hearing loss, nosebleeds, sinus pain, sore throat and tinnitus. Odynophagia Eyes: Negative for blurred vision, double vision, photophobia, pain, discharge and redness. Respiratory: Negative for hemoptysis, sputum production and stridor. Cough: resolved. Shortness of breath: rare. Wheezing: rare. Cardiovascular: Negative for chest pain, palpitations, orthopnea, claudication, leg swelling and PND. Gastrointestinal: Negative for abdominal pain, blood in stool, constipation, diarrhea, heartburn, melena, nausea and vomiting. Genitourinary: Negative for dysuria, flank pain, frequency, hematuria and urgency. Musculoskeletal: Negative for back pain, falls, joint pain, myalgias and neck pain. Skin: Positive for rash. Negative for itching. Neurological: Negative for dizziness, tingling, tremors, sensory change, speech change, focal weakness, seizures, loss of consciousness, weakness and headaches. Endo/Heme/Allergies: Negative for environmental allergies. Does not bruise/bleed easily. Psychiatric/Behavioral: Negative for depression, hallucinations, memory loss, substance abuse and suicidal ideas. The patient is not nervous/anxious and does not have insomnia. Past Medical History:  
Diagnosis Date  Abdominal pain  Arthritis  Asthma 1/20/2010 Dr. Briseyda Gomez  [facility] stage 2 colon ca  Chemotherapy  Chronic low back pain 7/12/2010  Chronic lung disease  COPD (chronic obstructive pulmonary disease) ([facility] 1/20/2010 Dr. Briseyda Gomez  Depression  FH: colon cancer 1/20/2010  GERD (gastroesophageal reflux disease)  Gout  H/O colon cancer, stage II   
 s/p resection, last colonoscopy 11/11- q3 yrs  History of multiple pulmonary nodules 1/20/2010  
 Hoarse 2015  
 candida, sees ENT  HTN (hypertension) 1/20/2010  Hyperlipemia 1/20/2010  Hypothyroid 1/20/2010  
 IBS (irritable bowel syndrome)  Liver disease   
 cysts on liver  Mammogram declined  Multinodular thyroid   
 sees ENT. Due for repeat U/S with ENT in Nov 2017  Nephropathy, membranous 1/20/2010  Neuropathy 1/20/2010  Nicotine use disorder 1/20/2010  Pancreatitis 08/2018  Prediabetes  PUD (peptic ulcer disease)  Pulmonary nodule   
 sees pulmonary  Rectocele 1/20/2010  S/P cataract extraction 1/3/2011  Thyroid disease   
 hypothyroid  Unspecified sleep apnea   
 does not use CPAP  Vitamin D deficiency 1/20/2010 Past Surgical History:  
Procedure Laterality Date  ABDOMEN SURGERY PROC UNLISTED    
 colon resection 8/2005- Dr. Kim Richardson- colon ca  ABDOMEN SURGERY PROC UNLISTED    
 gastric bypass 12/2/09  HX GASTRIC BYPASS  HX GI    
 HX GYN    
 hysterectomy and BSO in 11/2006  HX HEENT    
 cataract sx tee  HX HYSTERECTOMY [facility] LUMBAR FUSION  June 2013 L4 to L 5  
 HX TONSILLECTOMY Current Outpatient Prescriptions on File Prior to Visit Medication Sig Dispense Refill  oxyCODONE-acetaminophen (PERCOCET) 5-325 mg per tablet Take 1 Tab by mouth every four (4) hours as needed for Pain for up to 20 doses. Max Daily Amount: 6 Tabs. 15 Tab 0  
 albuterol-ipratropium (DUO-NEB) 2.5 mg-0.5 mg/3 ml nebu USE 1 VIAL WITH HAND HELD NEBULIZER EVERY 6 HOURS AS NEEDED 48 Nebule 5  
 amLODIPine (NORVASC) 10 mg tablet Take 1 Tab by mouth daily. 7 Tab 0  
 candesartan-hydroCHLOROthiazide (ATACAND HCT) 32-12.5 mg per tablet TAKE ONE TABLET BY MOUTH ONCE DAILY 7 Tab 0  
 naloxone (NARCAN) 4 mg/actuation nasal spray Use 1 spray intranasally into 1 nostril. Use a new Narcan nasal spray for subsequent doses and administer into alternating nostrils. May repeat every 2 to 3 minutes as needed. Indications: OPIATE-INDUCED RESPIRATORY DEPRESSION, Opioid Toxicity 2 Each 0  
 mometasone (NASONEX) 50 mcg/actuation nasal spray USE 2 SPRAYS INTO EACH NOSTRIL ONCE DAILY 1 Container 3  
 lidocaine (LIDODERM) 5 % by TransDERmal route every twenty-four (24) hours. Apply patch to the affected area for 12 hours a day and remove for 12 hours a day.  levothyroxine (SYNTHROID) 125 mcg tablet Take 1 Tab by mouth Daily (before breakfast). 30 Tab 1  
 ergocalciferol (ERGOCALCIFEROL) 50,000 unit capsule Take 1 Cap by mouth every seven (7) days. 4 Cap 11  
 montelukast (SINGULAIR) 10 mg tablet TAKE ONE TABLET BY MOUTH ONCE DAILY 90 Tab 3  cholecalciferol (VITAMIN D3) 1,000 unit cap Take 1,000 Units by mouth daily.  sucralfate (CARAFATE) 100 mg/mL suspension Take 1 tsp by mouth four (4) times daily.  b complex vitamins tablet Take 1 tablet by mouth daily.  Multivit-Iron-Min-Folic Acid (CENTRUM COMPLETE) 18-0.4 mg tab Take 1 Tab by mouth daily.  diclofenac (VOLTAREN) 1 % topical gel Apply 4 g to affected area four (4) times daily.  polyethylene glycol (MIRALAX) 17 gram packet Take 17 g by mouth daily.  pregabalin (LYRICA) 75 mg capsule Take  by mouth.  nystatin (MYCOSTATIN) 100,000 unit/mL suspension 5 ml QID po,  swish and swallow  Indications: oral candidiasis 120 mL 0  
 diazePAM (VALIUM) 5 mg tablet 1 bid prn to help with muscle spasms, as needed. 60 Tab 1 No current facility-administered medications on file prior to visit. Allergies Allergen Reactions  Breo Ellipta [Fluticasone-Vilanterol] Hoarseness Yeast problem w/Powdered MDIs  Ace Inhibitors Cough  Aspirin Other (comments) GI bleeding & pain  Nsaids (Non-Steroidal Anti-Inflammatory Drug) Other (comments) Makes her stomach bleed Social History Social History  Marital status:  Spouse name: N/A  
 Number of children: N/A  
 Years of education: N/A Occupational History   Not Employed Social History Main Topics  Smoking status: Former Smoker Packs/day: 0.50 Years: 18.00 Types: Cigarettes Quit date: 8/27/1990  Smokeless tobacco: Never Used Comment: per patient she has not smoked in over 30 years but significant second hand smoke exposure at home  Alcohol use 0.0 oz/week  
  0 Standard drinks or equivalent per week Comment: rare  Drug use: No  
 Sexual activity: Yes  
  Partners: Male Birth control/ protection: None, Surgical  
 
Other Topics Concern  Not on file Social History Narrative Blood pressure 160/90, pulse 67, temperature 98.2 °F (36.8 °C), temperature source Oral, resp. rate 21, height 5' 2\" (1.575 m), weight 83.5 kg (184 lb), SpO2 98 %. Physical Exam  
Constitutional: She is oriented to person, place, and time. She appears well-developed. No distress. Overweight HENT:  
Head: Normocephalic and atraumatic. Nose: Nose normal.  
Mouth/Throat: Oropharynx is clear and moist. No oropharyngeal exudate. No oral thrush Eyes: Conjunctivae are normal. Pupils are equal, round, and reactive to light. Right eye exhibits no discharge. Left eye exhibits no discharge. No scleral icterus. Neck: No JVD present. No tracheal deviation present. No thyromegaly present. Cardiovascular: Normal rate, regular rhythm, normal heart sounds and intact distal pulses. Exam reveals no gallop. No murmur heard. Pulmonary/Chest: Effort normal and breath sounds normal. No stridor. No respiratory distress. She has no wheezes. She has no rales. She exhibits no tenderness. Abdominal: Soft. She exhibits no mass. There is no tenderness. Musculoskeletal: She exhibits no edema or tenderness. Old L spine surgical scar Lymphadenopathy:  
  She has no cervical adenopathy. Neurological: She is alert and oriented to person, place, and time. Skin: Skin is warm and dry. Rash: sparse maculopapular hyperpigmented rash in distal LE and arms. She is not diaphoretic. No erythema. Psychiatric: She has a normal mood and affect. Her behavior is normal. Judgment and thought content normal.  
 
PFT: normal FEV1, reduced ration. Much improved from study done in 2017 ASSESSMENT and PLAN Encounter Diagnoses Name Primary?  Chronic obstructive pulmonary disease, unspecified COPD type ([address] Yes  Moderate persistent reactive airway disease without complication  Type 2 diabetes mellitus with microalbuminuria, without long-term current use of insulin ([address]  Obesity (BMI 30.0-34. 9)  Other chronic pain  Encounter for immunization Pt with good control on Dulera, will monitor control with this for now. Continue rescue Albuterol prn. Discussed need for continued trigger avoidance. Reviewed Spirometry with pt. Flu vaccination today RTC 6 months Over 50% of this 40 minute visit was spent in face to face counseling

## 2018-10-08 NOTE — PROGRESS NOTES
Verbal Order with read back per Suleman Willams MD  For PFT smart panel. AMB POC PFT complete w/ bronchodilator AMB POC PFT complete w/o bronchodilator Dr. Tesfaye Ramirez MD will co-sign the orders.

## 2018-10-08 NOTE — PROGRESS NOTES
Chief Complaint Patient presents with  COPD  
  follow up 2/15/2018  Asthma  Lung Nodule 1. Have you been to the ER, urgent care clinic since your last visit? Hospitalized since your last visit? Yes Where: [facility]-August 2018 2. Have you seen or consulted any other health care providers outside of the [facility] since your last visit? Include any pap smears or colon screening. Yes Where: [facility] [place] Madhu Mathis is a 58 y.o. female who presents for routine immunizations. She denies any symptoms , reactions or allergies that would exclude them from being immunized today. Risks and adverse reactions were discussed and the VIS was given to them. All questions were addressed. She was observed for 30 min post injection. There were no reactions observed.  
 
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

## 2018-10-08 NOTE — MR AVS SNAPSHOT
[address] [facility], [address] [address] 
[phone] Patient: Abelardo Keller MRN: [id number] Visit Information Date & Time Provider Department Dept. Phone Encounter #  
 10/8/2018  9:45 AM Caroline Maria MD [facility] [place] [phone] [id number] Follow-up Instructions Return in about 6 months (around 4/8/2019). Your Appointments 10/8/2018  9:45 AM  
Follow Up with Caroline Maria MD  
[address] ([facility]) Appt Note: Pt rescheduled 8/30/18 appt, was admitted to the hospital - SP @ 9; 10/5/18 LM AM TO [facility], [facility]. AND [facility] 10/8/18 APPT  
 [address] [place] [address], [address] [address]  
[phone]  
  
   
 [address] [place] [address], [address] [place] [address],[address] [place] [address] Upcoming Health Maintenance Date Due Shingrix Vaccine Age 50> (1 of 2) 12/8/2005 BREAST CANCER SCRN MAMMOGRAM 6/29/2017 EYE EXAM RETINAL OR DILATED Q1 1/10/2018 FOOT EXAM Q1 1/12/2018 HEMOGLOBIN A1C Q6M 5/7/2018 MEDICARE YEARLY EXAM 6/7/2018 Influenza Age 5 to Adult 8/1/2018 MICROALBUMIN Q1 11/7/2018 LIPID PANEL Q1 11/7/2018 COLONOSCOPY 3/2/2019 DTaP/Tdap/Td series (2 - Td) 6/9/2026 Allergies as of 10/8/2018  Review Complete On: 10/8/2018 By: Caroline Maria MD  
  
 Severity Noted Reaction Type Reactions Breo Ellipta [Fluticasone-vilanterol] Medium 07/25/2018   Systemic Hoarseness Yeast problem w/Powdered MDIs Ace Inhibitors  12/14/2010    Cough Aspirin  01/20/2010    Other (comments) GI bleeding & pain  
 Nsaids (Non-steroidal Anti-inflammatory Drug)  09/26/2012    Other (comments) Makes her stomach bleed Current Immunizations  Reviewed on 6/6/2017 Name Date Influenza Vaccine 11/13/2014, 1/7/2014 Influenza Vaccine (Quad) PF  Incomplete, 11/9/2017, 1/12/2017, 10/14/2015 Pneumococcal Vaccine (Unspecified Type) 11/14/2014, 10/1/2011 Not reviewed this visit You Were Diagnosed With   
  
 Codes Comments Chronic obstructive pulmonary disease, unspecified COPD type ([facility] 75.)    -  Primary ICD-10-CM: J44.9 ICD-9-CM: 741 Moderate persistent reactive airway disease without complication     [facility]-99-GF: J45.40 ICD-9-CM: 493.90 Type 2 diabetes mellitus with microalbuminuria, without long-term current use of insulin (HCC)     ICD-10-CM: E11.29, R80.9 ICD-9-CM: 250.40, 791.0 Obesity (BMI 30.0-34.9)     ICD-10-CM: G42.3 ICD-9-CM: 278.00 Other chronic pain     ICD-10-CM: G89.29 ICD-9-CM: 338.29 Encounter for immunization     ICD-10-CM: H90 ICD-9-CM: V03.89 Vitals BP Pulse Temp Resp Height(growth percentile) Weight(growth percentile) 160/90 (BP 1 Location: Left arm, BP Patient Position: At rest) 67 98.2 °F (36.8 °C) (Oral) 21 5' 2\" (1.575 m) 184 lb (83.5 kg) SpO2 BMI OB Status Smoking Status 98% 33.65 kg/m2 Hysterectomy Former Smoker BMI and BSA Data Body Mass Index Body Surface Area  
 33.65 kg/m 2 1.91 m 2 Preferred Pharmacy Pharmacy Name [facility], [address] [place] [address],[address] [place] [address] [phone] Your Updated Medication List  
  
   
This list is accurate as of 10/8/18  9:43 AM.  Always use your most recent med list.  
  
  
  
  
 albuterol 90 mcg/actuation inhaler Commonly known as:  VENTOLIN HFA INHALE 2 PUFFS EVERY 4 HOURS IF NEEDED FOR WHEEZING OR  FOR SHORTNESS OF BREATH  
  
 albuterol-ipratropium 2.5 mg-0.5 mg/3 ml Nebu Commonly known as:  DUO-NEB  
USE 1 VIAL WITH HAND HELD NEBULIZER EVERY 6 HOURS AS NEEDED  
  
 amLODIPine 10 mg tablet Commonly known as:  Joyce Soler Take 1 Tab by mouth daily. b complex vitamins tablet Take 1 tablet by mouth daily. candesartan-hydroCHLOROthiazide 32-12.5 mg per tablet Commonly known as:  ATACAND HCT  
 TAKE ONE TABLET BY MOUTH ONCE DAILY CARAFATE 100 mg/mL suspension Generic drug:  sucralfate Take 1 tsp by mouth four (4) times daily. CENTRUM COMPLETE 18-0.4 mg Tab Generic drug:  Multivit-Iron-Min-Folic Acid Take 1 Tab by mouth daily. cholecalciferol 1,000 unit Cap Commonly known as:  VITAMIN D3 Take 1,000 Units by mouth daily. diazePAM 5 mg tablet Commonly known as:  VALIUM  
1 bid prn to help with muscle spasms, as needed. ergocalciferol 50,000 unit capsule Commonly known as:  ERGOCALCIFEROL Take 1 Cap by mouth every seven (7) days. levothyroxine 125 mcg tablet Commonly known as:  SYNTHROID Take 1 Tab by mouth Daily (before breakfast). LIDODERM 5 % Generic drug:  lidocaine  
by TransDERmal route every twenty-four (24) hours. Apply patch to the affected area for 12 hours a day and remove for 12 hours a day. LYRICA 75 mg capsule Generic drug:  pregabalin Take  by mouth. MIRALAX 17 gram packet Generic drug:  polyethylene glycol Take 17 g by mouth daily. mometasone 50 mcg/actuation nasal spray Commonly known as:  NASONEX  
USE 2 SPRAYS INTO EACH NOSTRIL ONCE DAILY  
  
 mometasone-formoterol 100-5 mcg/actuation HFA inhaler Commonly known as:  Solomon Murrieta Take 2 Puffs by inhalation two (2) times a day. montelukast 10 mg tablet Commonly known as:  SINGULAIR  
TAKE ONE TABLET BY MOUTH ONCE DAILY  
  
 naloxone 4 mg/actuation nasal spray Commonly known as:  [employer] Use 1 spray intranasally into 1 nostril. Use a new Narcan nasal spray for subsequent doses and administer into alternating nostrils. May repeat every 2 to 3 minutes as needed. Indications: OPIATE-INDUCED RESPIRATORY DEPRESSION, Opioid Toxicity  
  
 nystatin 100,000 unit/mL suspension Commonly known as:  MYCOSTATIN  
5 ml QID po,  swish and swallow  Indications: oral candidiasis  
  
 oxyCODONE-acetaminophen 5-325 mg per tablet Commonly known as:  PERCOCET Take 1 Tab by mouth every four (4) hours as needed for Pain for up to 20 doses. Max Daily Amount: 6 Tabs. VOLTAREN 1 % Gel Generic drug:  diclofenac Apply 4 g to affected area four (4) times daily. Prescriptions Sent to Pharmacy Refills  
 mometasone-formoterol (DULERA) 100-5 mcg/actuation HFA inhaler 5 Sig: Take 2 Puffs by inhalation two (2) times a day. Class: Normal  
 Pharmacy: [facility] [address] [place] [address] Ph #: [phone] Route: Inhalation  
 albuterol (VENTOLIN HFA) 90 mcg/actuation inhaler 5 Sig: INHALE 2 PUFFS EVERY 4 HOURS IF NEEDED FOR WHEEZING OR  FOR SHORTNESS OF BREATH Class: Normal  
 Pharmacy: [facility] [address] [place] [address] Ph #: [phone] We Performed the Following AMB POC PFT COMPLETE W/BRONCHODILATOR [46889 CPT(R)] INFLUENZA VIRUS VAC QUAD,SPLIT,PRESV FREE SYRINGE IM [id number] CPT(R)] Follow-up Instructions Return in about 6 months (around 4/8/2019). To-Do List   
 10/08/2018 Procedures: AMB POC PFT COMPLETE W/BRONCHODILATOR Patient Instructions Vaccine Information Statement Influenza (Flu) Vaccine (Inactivated or Recombinant): What you need to know Many Vaccine Information Statements are available in Japanese and other languages. See www.immunize.org/vis Hojas de Información Sobre Vacunas están disponibles en Español y en muchos otros idiomas. Visite www.immunize.org/vis 1. Why get vaccinated? Influenza (flu) is a contagious disease that spreads around the [place] every year, usually between October and May. Flu is caused by influenza viruses, and is spread mainly by coughing, sneezing, and close contact. Anyone can get flu. Flu strikes suddenly and can last several days. Symptoms vary by age, but can include: 
 fever/chills  sore throat  muscle aches  fatigue  cough  headache  runny or stuffy nose Flu can also lead to pneumonia and blood infections, and cause diarrhea and seizures in children. If you have a medical condition, such as heart or lung disease, flu can make it worse. Flu is more dangerous for some people. Infants and young children, people 72years of age and older, pregnant women, and people with certain health conditions or a weakened immune system are at greatest risk. Each year thousands of people in the [facility] die from flu, and many more are hospitalized. Flu vaccine can: 
 keep you from getting flu, 
 make flu less severe if you do get it, and 
 keep you from spreading flu to your family and other people. 2. Inactivated and recombinant flu vaccines A dose of flu vaccine is recommended every flu season. Children 6 months through 6years of age may need two doses during the same flu season. Everyone else needs only one dose each flu season. Some inactivated flu vaccines contain a very small amount of a mercury-based preservative called thimerosal. Studies have not shown thimerosal in vaccines to be harmful, but flu vaccines that do not contain thimerosal are available. There is no live flu virus in flu shots. They cannot cause the flu. There are many flu viruses, and they are always changing. Each year a new flu vaccine is made to protect against three or four viruses that are likely to cause disease in the upcoming flu season. But even when the vaccine doesnt exactly match these viruses, it may still provide some protection Flu vaccine cannot prevent: 
 flu that is caused by a virus not covered by the vaccine, or 
 illnesses that look like flu but are not. It takes about 2 weeks for protection to develop after vaccination, and protection lasts through the flu season. 3. Some people should not get this vaccine Tell the person who is giving you the vaccine:  If you have any severe, life-threatening allergies. If you ever had a life-threatening allergic reaction after a dose of flu vaccine, or have a severe allergy to any part of this vaccine, you may be advised not to get vaccinated. Most, but not all, types of flu vaccine contain a small amount of egg protein.  If you ever had Guillain-Barré Syndrome (also called GBS). Some people with a history of GBS should not get this vaccine. This should be discussed with your doctor.  If you are not feeling well. It is usually okay to get flu vaccine when you have a mild illness, but you might be asked to come back when you feel better. 4. Risks of a vaccine reaction With any medicine, including vaccines, there is a chance of reactions. These are usually mild and go away on their own, but serious reactions are also possible. Most people who get a flu shot do not have any problems with it. Minor problems following a flu shot include:  
 soreness, redness, or swelling where the shot was given  hoarseness  sore, red or itchy eyes  cough  fever  aches  headache  itching  fatigue If these problems occur, they usually begin soon after the shot and last 1 or 2 days. More serious problems following a flu shot can include the following:  There may be a small increased risk of Guillain-Barré Syndrome (GBS) after inactivated flu vaccine. This risk has been estimated at 1 or 2 additional cases per million people vaccinated. This is much lower than the risk of severe complications from flu, which can be prevented by flu vaccine.  Young children who get the flu shot along with pneumococcal vaccine (PCV13) and/or DTaP vaccine at the same time might be slightly more likely to have a seizure caused by fever. Ask your doctor for more information. Tell your doctor if a child who is getting flu vaccine has ever had a seizure. Problems that could happen after any injected vaccine:  People sometimes faint after a medical procedure, including vaccination. Sitting or lying down for about 15 minutes can help prevent fainting, and injuries caused by a fall. Tell your doctor if you feel dizzy, or have vision changes or ringing in the ears.  Some people get severe pain in the shoulder and have difficulty moving the arm where a shot was given. This happens very rarely.  Any medication can cause a severe allergic reaction. Such reactions from a vaccine are very rare, estimated at about 1 in a million doses, and would happen within a few minutes to a few hours after the vaccination. As with any medicine, there is a very remote chance of a vaccine causing a serious injury or death. The safety of vaccines is always being monitored. For more information, visit: www.cdc.gov/vaccinesafety/ 
 
 
6. The National Vaccine Injury Compensation Program 
 
The [facility] Vaccine Injury Compensation Program (VICP) is a federal program that was created to compensate people who may have been injured by certain vaccines. Persons who believe they may have been injured by a vaccine can learn about the program and about filing a claim by calling 1-920.307.1020 or visiting the [facility] [employer] website at www.[facility].gov/vaccinecompensation. There is a time limit to file a claim for compensation. 7. How can I learn more?  Ask your healthcare provider. He or she can give you the vaccine package insert or suggest other sources of information.  Call your local or state health department.  Contact the Centers for Disease Control and Prevention (CDC): 
- Call 4-730.599.4829 (0-512-DRF-INFO) or 
- Visit CDCs website at www.cdc.gov/flu Vaccine Information Statement Inactivated Influenza Vaccine 8/7/2015 
42 PITO Pérez [id number] [facility] [employer] Centers for Disease Control and Prevention Office Use Only [facility]! Dear Roxy Curtis: Thank you for requesting a [employer] account. Our records indicate that you already have an active [employer] account. You can access your account anytime at [email or web address] Did you know that you can access your hospital and ER discharge instructions at any time in [employer]? You can also review all of your test results from your hospital stay or ER visit. Additional Information If you have questions, please visit the Frequently Asked Questions section of the [employer] website at [email or web address] Remember, [employer] is NOT to be used for urgent needs. For medical emergencies, dial 911. Now available from your iPhone and Android! Please provide this summary of care documentation to your next provider. Your primary care clinician is listed as [employer]. If you have any questions after today's visit, please call [phone].

## 2018-10-08 NOTE — PATIENT INSTRUCTIONS
Vaccine Information Statement Influenza (Flu) Vaccine (Inactivated or Recombinant): What you need to know Many Vaccine Information Statements are available in Armenian and other languages. See www.immunize.org/vis Hojas de Información Sobre Vacunas están disponibles en Español y en muchos otros idiomas. Visite www.immunize.org/vis 1. Why get vaccinated? Influenza (flu) is a contagious disease that spreads around the [place] every year, usually between October and May. Flu is caused by influenza viruses, and is spread mainly by coughing, sneezing, and close contact. Anyone can get flu. Flu strikes suddenly and can last several days. Symptoms vary by age, but can include: 
 fever/chills  sore throat  muscle aches  fatigue  cough  headache  runny or stuffy nose Flu can also lead to pneumonia and blood infections, and cause diarrhea and seizures in children. If you have a medical condition, such as heart or lung disease, flu can make it worse. Flu is more dangerous for some people. Infants and young children, people 72years of age and older, pregnant women, and people with certain health conditions or a weakened immune system are at greatest risk. Each year thousands of people in the [facility] die from flu, and many more are hospitalized. Flu vaccine can: 
 keep you from getting flu, 
 make flu less severe if you do get it, and 
 keep you from spreading flu to your family and other people. 2. Inactivated and recombinant flu vaccines A dose of flu vaccine is recommended every flu season. Children 6 months through 6years of age may need two doses during the same flu season. Everyone else needs only one dose each flu season.   
 
 
Some inactivated flu vaccines contain a very small amount of a mercury-based preservative called thimerosal. Studies have not shown thimerosal in vaccines to be harmful, but flu vaccines that do not contain thimerosal are available. There is no live flu virus in flu shots. They cannot cause the flu. There are many flu viruses, and they are always changing. Each year a new flu vaccine is made to protect against three or four viruses that are likely to cause disease in the upcoming flu season. But even when the vaccine doesnt exactly match these viruses, it may still provide some protection Flu vaccine cannot prevent: 
 flu that is caused by a virus not covered by the vaccine, or 
 illnesses that look like flu but are not. It takes about 2 weeks for protection to develop after vaccination, and protection lasts through the flu season. 3. Some people should not get this vaccine Tell the person who is giving you the vaccine:  If you have any severe, life-threatening allergies. If you ever had a life-threatening allergic reaction after a dose of flu vaccine, or have a severe allergy to any part of this vaccine, you may be advised not to get vaccinated. Most, but not all, types of flu vaccine contain a small amount of egg protein.  If you ever had Guillain-Barré Syndrome (also called GBS). Some people with a history of GBS should not get this vaccine. This should be discussed with your doctor.  If you are not feeling well. It is usually okay to get flu vaccine when you have a mild illness, but you might be asked to come back when you feel better. 4. Risks of a vaccine reaction With any medicine, including vaccines, there is a chance of reactions. These are usually mild and go away on their own, but serious reactions are also possible. Most people who get a flu shot do not have any problems with it. Minor problems following a flu shot include:  
 soreness, redness, or swelling where the shot was given  hoarseness  sore, red or itchy eyes  cough  fever  aches  headache  itching  fatigue If these problems occur, they usually begin soon after the shot and last 1 or 2 days. More serious problems following a flu shot can include the following:  There may be a small increased risk of Guillain-Barré Syndrome (GBS) after inactivated flu vaccine. This risk has been estimated at 1 or 2 additional cases per million people vaccinated. This is much lower than the risk of severe complications from flu, which can be prevented by flu vaccine.  Young children who get the flu shot along with pneumococcal vaccine (PCV13) and/or DTaP vaccine at the same time might be slightly more likely to have a seizure caused by fever. Ask your doctor for more information. Tell your doctor if a child who is getting flu vaccine has ever had a seizure. Problems that could happen after any injected vaccine:  People sometimes faint after a medical procedure, including vaccination. Sitting or lying down for about 15 minutes can help prevent fainting, and injuries caused by a fall. Tell your doctor if you feel dizzy, or have vision changes or ringing in the ears.  Some people get severe pain in the shoulder and have difficulty moving the arm where a shot was given. This happens very rarely.  Any medication can cause a severe allergic reaction. Such reactions from a vaccine are very rare, estimated at about 1 in a million doses, and would happen within a few minutes to a few hours after the vaccination. As with any medicine, there is a very remote chance of a vaccine causing a serious injury or death. The safety of vaccines is always being monitored. For more information, visit: www.cdc.gov/vaccinesafety/ 
 
5. What if there is a serious reaction? What should I look for?  Look for anything that concerns you, such as signs of a severe allergic reaction, very high fever, or unusual behavior.  
 
Signs of a severe allergic reaction can include hives, swelling of the face and throat, difficulty breathing, a fast heartbeat, dizziness, and weakness  usually within a few minutes to a few hours after the vaccination. What should I do?  If you think it is a severe allergic reaction or other emergency that cant wait, call 9-1-1 and get the person to the nearest hospital. Otherwise, call your doctor.  Reactions should be reported to the Vaccine Adverse Event Reporting System (VAERS). Your doctor should file this report, or you can do it yourself through  the VAERS web site at www.vaers. [facility].gov, or by calling 6-807.387.8338. VAERS does not give medical advice. 6. The National Vaccine Injury Compensation Program 
 
The [facility] Vaccine Injury Compensation Program (VICP) is a federal program that was created to compensate people who may have been injured by certain vaccines. Persons who believe they may have been injured by a vaccine can learn about the program and about filing a claim by calling 6-979.555.6003 or visiting the [address] [place] [employer] website at www.[facility].gov/vaccinecompensation. There is a time limit to file a claim for compensation. 7. How can I learn more?  Ask your healthcare provider. He or she can give you the vaccine package insert or suggest other sources of information.  Call your local or state health department.  Contact the Centers for Disease Control and Prevention (CDC): 
- Call 7-482.429.7316 (1-800-CDC-INFO) or 
- Visit CDCs website at www.cdc.gov/flu Vaccine Information Statement Inactivated Influenza Vaccine 8/7/2015 
42 PITO Betancur 209MV-[id number] Department of Health and [employer] Online Centers for Disease Control and Prevention Office Use Only

## 2018-10-23 ENCOUNTER — TELEPHONE (OUTPATIENT)
Dept: PULMONOLOGY | Age: 63
End: 2018-10-23

## 2018-10-23 NOTE — TELEPHONE ENCOUNTER
Pt states she thinks she has thrush from her inhalers. Can  call in [facility] for her?

## 2018-10-23 NOTE — TELEPHONE ENCOUNTER
Spoke with pt. She is taking Budesonide 2 x per day. She started with thrush in month last week and has gotten worse.  [facility]

## 2018-10-24 RX ORDER — CLOTRIMAZOLE 10 MG/1
10 LOZENGE ORAL; TOPICAL
Qty: 50 TAB | Refills: 0 | Status: SHIPPED | OUTPATIENT
Start: 2018-10-24 | End: 2018-11-03

## 2018-11-15 NOTE — TELEPHONE ENCOUNTER
PT CALLED([phone]). NEEDS REFILL FOR NEBULIZER MEDS SENT TO [facility]. SHE IS OUT. PLEASE CALL BACK. No

## 2018-11-15 NOTE — TELEPHONE ENCOUNTER
[facility] no longer takes rx.  Pt request to [facility]

## 2018-11-16 RX ORDER — IPRATROPIUM BROMIDE AND ALBUTEROL SULFATE 2.5; .5 MG/3ML; MG/3ML
SOLUTION RESPIRATORY (INHALATION)
Qty: 48 NEBULE | Refills: 5 | Status: SHIPPED | OUTPATIENT
Start: 2018-11-16 | End: 2019-03-05 | Stop reason: SDUPTHER

## 2018-11-26 ENCOUNTER — HOSPITAL ENCOUNTER (EMERGENCY)
Age: 63
Discharge: HOME | End: 2018-11-26
Attending: EMERGENCY MEDICINE
Payer: MEDICARE

## 2018-11-26 ENCOUNTER — APPOINTMENT (OUTPATIENT)
Dept: GENERAL RADIOLOGY | Age: 63
End: 2018-11-26
Attending: EMERGENCY MEDICINE
Payer: MEDICARE

## 2018-11-26 VITALS
HEART RATE: 75 BPM | TEMPERATURE: 97.8 F | OXYGEN SATURATION: 95 % | SYSTOLIC BLOOD PRESSURE: 186 MMHG | RESPIRATION RATE: 18 BRPM | DIASTOLIC BLOOD PRESSURE: 109 MMHG

## 2018-11-26 DIAGNOSIS — J18.9 COMMUNITY ACQUIRED PNEUMONIA OF RIGHT MIDDLE LOBE OF LUNG: Primary | ICD-10-CM

## 2018-11-26 LAB
ALBUMIN SERPL-MCNC: 3.4 G/DL (ref 3.4–5)
ALBUMIN/GLOB SERPL: 1.1 {RATIO} (ref 0.8–1.7)
ALP SERPL-CCNC: 115 U/L (ref 45–117)
ALT SERPL-CCNC: 25 U/L (ref 13–56)
ANION GAP SERPL CALC-SCNC: 4 MMOL/L (ref 3–18)
AST SERPL-CCNC: 19 U/L (ref 15–37)
BASOPHILS # BLD: 0 K/UL (ref 0–0.1)
BASOPHILS NFR BLD: 1 % (ref 0–2)
BILIRUB SERPL-MCNC: 0.2 MG/DL (ref 0.2–1)
BUN SERPL-MCNC: 10 MG/DL (ref 7–18)
BUN/CREAT SERPL: 12 (ref 12–20)
CALCIUM SERPL-MCNC: 8.8 MG/DL (ref 8.5–10.1)
CHLORIDE SERPL-SCNC: 104 MMOL/L (ref 100–108)
CO2 SERPL-SCNC: 30 MMOL/L (ref 21–32)
CREAT SERPL-MCNC: 0.83 MG/DL (ref 0.6–1.3)
DIFFERENTIAL METHOD BLD: ABNORMAL
EOSINOPHIL # BLD: 0.3 K/UL (ref 0–0.4)
EOSINOPHIL NFR BLD: 6 % (ref 0–5)
ERYTHROCYTE [DISTWIDTH] IN BLOOD BY AUTOMATED COUNT: 17.2 % (ref 11.6–14.5)
GLOBULIN SER CALC-MCNC: 3.1 G/DL (ref 2–4)
GLUCOSE SERPL-MCNC: 108 MG/DL (ref 74–99)
HCT VFR BLD AUTO: 41.6 % (ref 35–45)
HGB BLD-MCNC: 13.9 G/DL (ref 12–16)
LYMPHOCYTES # BLD: 1 K/UL (ref 0.9–3.6)
LYMPHOCYTES NFR BLD: 20 % (ref 21–52)
MCH RBC QN AUTO: 32 PG (ref 24–34)
MCHC RBC AUTO-ENTMCNC: 33.4 G/DL (ref 31–37)
MCV RBC AUTO: 95.6 FL (ref 74–97)
MONOCYTES # BLD: 0.8 K/UL (ref 0.05–1.2)
MONOCYTES NFR BLD: 17 % (ref 3–10)
NEUTS SEG # BLD: 2.8 K/UL (ref 1.8–8)
NEUTS SEG NFR BLD: 56 % (ref 40–73)
PLATELET # BLD AUTO: 262 K/UL (ref 135–420)
PMV BLD AUTO: 9.8 FL (ref 9.2–11.8)
POTASSIUM SERPL-SCNC: 4.7 MMOL/L (ref 3.5–5.5)
PROT SERPL-MCNC: 6.5 G/DL (ref 6.4–8.2)
RBC # BLD AUTO: 4.35 M/UL (ref 4.2–5.3)
SODIUM SERPL-SCNC: 138 MMOL/L (ref 136–145)
WBC # BLD AUTO: 4.9 K/UL (ref 4.6–13.2)

## 2018-11-26 PROCEDURE — A9270 NON-COVERED ITEM OR SERVICE: HCPCS | Performed by: EMERGENCY MEDICINE

## 2018-11-26 PROCEDURE — 74011636637 HC RX REV CODE- 636/637: Performed by: EMERGENCY MEDICINE

## 2018-11-26 PROCEDURE — 85025 COMPLETE CBC W/AUTO DIFF WBC: CPT

## 2018-11-26 PROCEDURE — 94640 AIRWAY INHALATION TREATMENT: CPT

## 2018-11-26 PROCEDURE — 74011250637 HC RX REV CODE- 250/637: Performed by: EMERGENCY MEDICINE

## 2018-11-26 PROCEDURE — 74011000250 HC RX REV CODE- 250: Performed by: EMERGENCY MEDICINE

## 2018-11-26 PROCEDURE — 71046 X-RAY EXAM CHEST 2 VIEWS: CPT

## 2018-11-26 PROCEDURE — 93005 ELECTROCARDIOGRAM TRACING: CPT

## 2018-11-26 PROCEDURE — 80053 COMPREHEN METABOLIC PANEL: CPT

## 2018-11-26 PROCEDURE — 99283 EMERGENCY DEPT VISIT LOW MDM: CPT

## 2018-11-26 RX ORDER — PREDNISONE 50 MG/1
50 TABLET ORAL DAILY
Qty: 5 TAB | Refills: 0 | Status: SHIPPED | OUTPATIENT
Start: 2018-11-26 | End: 2018-11-27 | Stop reason: SDUPTHER

## 2018-11-26 RX ORDER — GUAIFENESIN/DEXTROMETHORPHAN 100-10MG/5
10 SYRUP ORAL
Status: COMPLETED | OUTPATIENT
Start: 2018-11-26 | End: 2018-11-26

## 2018-11-26 RX ORDER — ALBUTEROL SULFATE 0.83 MG/ML
5 SOLUTION RESPIRATORY (INHALATION)
Status: COMPLETED | OUTPATIENT
Start: 2018-11-26 | End: 2018-11-26

## 2018-11-26 RX ORDER — DOXYCYCLINE 100 MG/1
100 CAPSULE ORAL 2 TIMES DAILY
Qty: 20 CAP | Refills: 0 | Status: SHIPPED | OUTPATIENT
Start: 2018-11-26 | End: 2018-12-06

## 2018-11-26 RX ORDER — PREDNISONE 20 MG/1
60 TABLET ORAL
Status: COMPLETED | OUTPATIENT
Start: 2018-11-26 | End: 2018-11-26

## 2018-11-26 RX ORDER — ALBUTEROL SULFATE 90 UG/1
1 AEROSOL, METERED RESPIRATORY (INHALATION)
Qty: 1 INHALER | Refills: 0 | Status: SHIPPED | OUTPATIENT
Start: 2018-11-26 | End: 2020-03-30 | Stop reason: SDUPTHER

## 2018-11-26 RX ORDER — IPRATROPIUM BROMIDE AND ALBUTEROL SULFATE 2.5; .5 MG/3ML; MG/3ML
3 SOLUTION RESPIRATORY (INHALATION) ONCE
Status: COMPLETED | OUTPATIENT
Start: 2018-11-26 | End: 2018-11-26

## 2018-11-26 RX ADMIN — IPRATROPIUM BROMIDE AND ALBUTEROL SULFATE 3 ML: .5; 3 SOLUTION RESPIRATORY (INHALATION) at 10:34

## 2018-11-26 RX ADMIN — ALBUTEROL SULFATE 5 MG: 2.5 SOLUTION RESPIRATORY (INHALATION) at 10:22

## 2018-11-26 RX ADMIN — ALBUTEROL SULFATE 5 MG: 2.5 SOLUTION RESPIRATORY (INHALATION) at 10:20

## 2018-11-26 RX ADMIN — PREDNISONE 60 MG: 20 TABLET ORAL at 10:18

## 2018-11-26 RX ADMIN — GUAIFENESIN AND DEXTROMETHORPHAN 10 ML: 100; 10 SYRUP ORAL at 10:18

## 2018-11-26 NOTE — ED TRIAGE NOTES
Pt with c/o chest tightness and hemoptysis. Pt reports that she has been using her duonebs every 30 min and feels like she is wheezing.

## 2018-11-26 NOTE — ED PROVIDER NOTES
EMERGENCY DEPARTMENT HISTORY AND PHYSICAL EXAM 
 
Date: 11/26/2018 Patient Name: Patricia López History of Presenting Illness Chief Complaint Patient presents with  Wheezing History Provided By: Patient Chief Complaint: wheezing, SOB Duration: 4 Days Timing:  Acute Location: chest 
Quality: Tightness Severity: Moderate Modifying Factors:  smokes 3 packs daily Associated Symptoms: cough Additional History (Context): Patricia López is a 58 y.o. female with COPD who presents with cough, SOB, wheezing. Denies fever, n/v. 
 
PCP: Hoda Estevez MD 
 
Current Outpatient Medications Medication Sig Dispense Refill  doxycycline (MONODOX) 100 mg capsule Take 1 Cap by mouth two (2) times a day for 10 days. 20 Cap 0  
 dextromethorphan-guaiFENesin (ROBITUSSIN-DM)  mg/5 mL syrup Take 10 mL by mouth every six (6) hours as needed for Cough. 240 mL 0  
 predniSONE (DELTASONE) 50 mg tablet Take 1 Tab by mouth daily for 5 days. 5 Tab 0  
 albuterol (PROVENTIL HFA, VENTOLIN HFA, PROAIR HFA) 90 mcg/actuation inhaler Take 1 Puff by inhalation every four (4) hours as needed for Wheezing. 1 Inhaler 0  
 albuterol-ipratropium (DUO-NEB) 2.5 mg-0.5 mg/3 ml nebu USE 1 VIAL WITH HAND HELD NEBULIZER EVERY 6 HOURS AS NEEDED 48 Nebule 5  
 mometasone-formoterol (DULERA) 100-5 mcg/actuation HFA inhaler Take 2 Puffs by inhalation two (2) times a day. 1 Inhaler 5  
 albuterol (VENTOLIN HFA) 90 mcg/actuation inhaler INHALE 2 PUFFS EVERY 4 HOURS IF NEEDED FOR WHEEZING OR  FOR SHORTNESS OF BREATH 1 Inhaler 5  
 oxyCODONE-acetaminophen (PERCOCET) 5-325 mg per tablet Take 1 Tab by mouth every four (4) hours as needed for Pain for up to 20 doses. Max Daily Amount: 6 Tabs. 15 Tab 0  
 amLODIPine (NORVASC) 10 mg tablet Take 1 Tab by mouth daily.  7 Tab 0  
 candesartan-hydroCHLOROthiazide (ATACAND HCT) 32-12.5 mg per tablet TAKE ONE TABLET BY MOUTH ONCE DAILY 7 Tab 0  
  naloxone (NARCAN) 4 mg/actuation nasal spray Use 1 spray intranasally into 1 nostril. Use a new Narcan nasal spray for subsequent doses and administer into alternating nostrils. May repeat every 2 to 3 minutes as needed. Indications: OPIATE-INDUCED RESPIRATORY DEPRESSION, Opioid Toxicity 2 Each 0  
 mometasone (NASONEX) 50 mcg/actuation nasal spray USE 2 SPRAYS INTO EACH NOSTRIL ONCE DAILY 1 Container 3  pregabalin (LYRICA) 75 mg capsule Take  by mouth.  lidocaine (LIDODERM) 5 % by TransDERmal route every twenty-four (24) hours. Apply patch to the affected area for 12 hours a day and remove for 12 hours a day.  levothyroxine (SYNTHROID) 125 mcg tablet Take 1 Tab by mouth Daily (before breakfast). 30 Tab 1  
 ergocalciferol (ERGOCALCIFEROL) 50,000 unit capsule Take 1 Cap by mouth every seven (7) days. 4 Cap 11  
 diazePAM (VALIUM) 5 mg tablet 1 bid prn to help with muscle spasms, as needed. 60 Tab 1  
 montelukast (SINGULAIR) 10 mg tablet TAKE ONE TABLET BY MOUTH ONCE DAILY 90 Tab 3  cholecalciferol (VITAMIN D3) 1,000 unit cap Take 1,000 Units by mouth daily.  sucralfate (CARAFATE) 100 mg/mL suspension Take 1 tsp by mouth four (4) times daily.  b complex vitamins tablet Take 1 tablet by mouth daily.  Multivit-Iron-Min-Folic Acid (CENTRUM COMPLETE) 18-0.4 mg tab Take 1 Tab by mouth daily.  diclofenac (VOLTAREN) 1 % topical gel Apply 4 g to affected area four (4) times daily.  polyethylene glycol (MIRALAX) 17 gram packet Take 17 g by mouth daily. Past History Past Medical History: 
Past Medical History:  
Diagnosis Date  Abdominal pain  Arthritis  Asthma 1/20/2010 Dr. Marium Yo  [facility]  Chemotherapy  Chronic low back pain 7/12/2010  Chronic lung disease  COPD [facility]chronic obstructive pulmonary disease) [facility] 1/20/2010 Dr. Marium Yo  Depression  FH: colon cancer 1/20/2010  GERD (gastroesophageal reflux disease)  Gout  H/O colon cancer, stage II   
 s/p resection, last colonoscopy - q3 yrs  History of multiple pulmonary nodules 2010  
 Hoarse 2015  
 candida, sees ENT  HTN (hypertension) 2010  Hyperlipemia 2010  Hypothyroid 2010  
 IBS (irritable bowel syndrome)  Liver disease   
 cysts on liver  Mammogram declined  Multinodular thyroid   
 sees ENT. Due for repeat U/S with ENT in 2017  Nephropathy, membranous 2010  Neuropathy 2010  Nicotine use disorder 2010  Pancreatitis 2018  Prediabetes  PUD (peptic ulcer disease)  Pulmonary nodule   
 sees pulmonary  Rectocele 2010  S/P cataract extraction 1/3/2011  Thyroid disease   
 hypothyroid  Unspecified sleep apnea   
 does not use CPAP  Vitamin D deficiency 2010 Past Surgical History: 
Past Surgical History:  
Procedure Laterality Date  ABDOMEN SURGERY PROC UNLISTED    
 colon resection 2005- Dr. Sarah Kurtz- colon ca  ABDOMEN SURGERY PROC UNLISTED    
 gastric bypass 09  HX GASTRIC BYPASS  HX GI    
 HX GYN    
 hysterectomy and BSO in 2006  HX HEENT    
 cataract sx tee  HX HYSTERECTOMY [place] LUMBAR FUSION  2013 L4 to L 5  
 HX TONSILLECTOMY Family History: 
Family History Problem Relation Age of Onset  Asthma Mother  Diabetes Mother  Hypertension Mother  Elevated Lipids Mother  Elevated Lipids Father  Hypertension Father  Heart Disease Father   
     chf  
 Heart Disease Sister   
     older Social History: 
Social History Tobacco Use  Smoking status: Former Smoker Packs/day: 0.50 Years: 18.00 Pack years: 9.00 Types: Cigarettes Last attempt to quit: 1990 Years since quittin.2  Smokeless tobacco: Never Used  Tobacco comment: per patient she has not smoked in over 30 years but significant second hand smoke exposure at home Substance Use Topics  Alcohol use: Yes Alcohol/week: 0.0 oz  
  Comment: rare  Drug use: No  
 
 
Allergies: Allergies Allergen Reactions  Breo Ellipta [Fluticasone-Vilanterol] Hoarseness Yeast problem w/Powdered MDIs  Ace Inhibitors Cough  Aspirin Other (comments) GI bleeding & pain  Nsaids (Non-Steroidal Anti-Inflammatory Drug) Other (comments) Makes her stomach bleed Review of Systems Review of Systems All Other Systems Negative Physical Exam  
 
Vitals:  
 11/26/18 2174 BP: (!) 186/109 Pulse: 75 Resp: 18 Temp: 97.8 °F (36.6 °C) SpO2: 95% Physical Exam  
Constitutional: She is oriented to person, place, and time. She appears well-developed. She appears distressed. HENT:  
Head: Normocephalic and atraumatic. Eyes: Pupils are equal, round, and reactive to light. Neck: No JVD present. No tracheal deviation present. No thyromegaly present. Cardiovascular: Normal rate, regular rhythm and normal heart sounds. Exam reveals no gallop and no friction rub. No murmur heard. Pulmonary/Chest: No stridor. No respiratory distress. She has wheezes. She has rales. She exhibits no tenderness. Increased WOB Abdominal: Soft. She exhibits no distension and no mass. There is no tenderness. There is no rebound and no guarding. Musculoskeletal: She exhibits no edema or tenderness. Lymphadenopathy:  
  She has no cervical adenopathy. Neurological: She is alert and oriented to person, place, and time. Skin: Skin is warm and dry. No rash noted. She is not diaphoretic. No erythema. No pallor. Psychiatric: She has a normal mood and affect. Her behavior is normal. Thought content normal.  
Nursing note and vitals reviewed. Diagnostic Study Results Labs - Recent Results (from the past 12 hour(s)) EKG, 12 LEAD, INITIAL Collection Time: 11/26/18  9:30 AM  
Result Value Ref Range Ventricular Rate 74 BPM  
 Atrial Rate 74 BPM  
 P-R Interval 142 ms QRS Duration 98 ms Q-T Interval 406 ms QTC Calculation (Bezet) 450 ms Calculated P Axis 18 degrees Calculated R Axis -50 degrees Calculated T Axis 14 degrees Diagnosis Normal sinus rhythm Left anterior fascicular block Voltage criteria for left ventricular hypertrophy Abnormal ECG When compared with ECG of 29-AUG-2018 19:05, 
T wave amplitude has decreased in Lateral leads CBC WITH AUTOMATED DIFF Collection Time: 11/26/18  9:45 AM  
Result Value Ref Range WBC 4.9 4.6 - 13.2 K/uL  
 RBC 4.35 4.20 - 5.30 M/uL  
 HGB 13.9 12.0 - 16.0 g/dL HCT 41.6 35.0 - 45.0 % MCV 95.6 74.0 - 97.0 FL  
 MCH 32.0 24.0 - 34.0 PG  
 MCHC 33.4 31.0 - 37.0 g/dL  
 RDW 17.2 (H) 11.6 - 14.5 % PLATELET 721 322 - 093 K/uL MPV 9.8 9.2 - 11.8 FL  
 NEUTROPHILS 56 40 - 73 % LYMPHOCYTES 20 (L) 21 - 52 % MONOCYTES 17 (H) 3 - 10 % EOSINOPHILS 6 (H) 0 - 5 % BASOPHILS 1 0 - 2 %  
 ABS. NEUTROPHILS 2.8 1.8 - 8.0 K/UL  
 ABS. LYMPHOCYTES 1.0 0.9 - 3.6 K/UL  
 ABS. MONOCYTES 0.8 0.05 - 1.2 K/UL  
 ABS. EOSINOPHILS 0.3 0.0 - 0.4 K/UL  
 ABS. BASOPHILS 0.0 0.0 - 0.1 K/UL  
 DF AUTOMATED METABOLIC PANEL, COMPREHENSIVE Collection Time: 11/26/18  9:45 AM  
Result Value Ref Range Sodium 138 136 - 145 mmol/L Potassium 4.7 3.5 - 5.5 mmol/L Chloride 104 100 - 108 mmol/L  
 CO2 30 21 - 32 mmol/L Anion gap 4 3.0 - 18 mmol/L Glucose 108 (H) 74 - 99 mg/dL BUN 10 7.0 - 18 MG/DL Creatinine 0.83 0.6 - 1.3 MG/DL  
 BUN/Creatinine ratio 12 12 - 20 GFR est AA >60 >60 ml/min/1.73m2 GFR est non-AA >60 >60 ml/min/1.73m2 Calcium 8.8 8.5 - 10.1 MG/DL Bilirubin, total 0.2 0.2 - 1.0 MG/DL  
 ALT (SGPT) 25 13 - 56 U/L  
 AST (SGOT) 19 15 - 37 U/L Alk. phosphatase 115 45 - 117 U/L Protein, total 6.5 6.4 - 8.2 g/dL Albumin 3.4 3.4 - 5.0 g/dL Globulin 3.1 2.0 - 4.0 g/dL A-G Ratio 1.1 0.8 - 1.7 Radiologic Studies -  
XR CHEST PA LAT Final Result CT Results  (Last 48 hours) None CXR Results  (Last 48 hours)  
          
 11/26/18 1001  XR CHEST PA LAT Final result Impression:  IMPRESSION:  
   
New right middle lobe streaky densities. Suggestive of developing pneumonia vs.  
less likely atelectatic changes. Narrative:  EXAM:  Chest Frontal and Lateral.  
   
INDICATION:  Chest tightness, shortness of breath, hemoptysis COMPARISON:  8/29/18 TECHNIQUE:  PA and lateral views. FINDINGS:   
   
- New subtle right middle lobe streaky densities, best appreciated on the  
lateral view. - No pleural effusion or pneumothorax is detected. - The cardiac silhouette, mediastinum and hilar regions are unremarkable. Medical Decision Making I am the first provider for this patient. I reviewed the vital signs, available nursing notes, past medical history, past surgical history, family history and social history. Vital Signs-Reviewed the patient's vital signs. Records Reviewed: Nursing Notes Procedures: 
Procedures Provider Notes (Medical Decision Making): treat PNA and RAD. MED RECONCILIATION: 
No current facility-administered medications for this encounter. Current Outpatient Medications Medication Sig  
 doxycycline (MONODOX) 100 mg capsule Take 1 Cap by mouth two (2) times a day for 10 days.  dextromethorphan-guaiFENesin (ROBITUSSIN-DM)  mg/5 mL syrup Take 10 mL by mouth every six (6) hours as needed for Cough.  predniSONE (DELTASONE) 50 mg tablet Take 1 Tab by mouth daily for 5 days.  albuterol (PROVENTIL HFA, VENTOLIN HFA, PROAIR HFA) 90 mcg/actuation inhaler Take 1 Puff by inhalation every four (4) hours as needed for Wheezing.   
 albuterol-ipratropium (DUO-NEB) 2.5 mg-0.5 mg/3 ml nebu USE 1 VIAL WITH HAND HELD NEBULIZER EVERY 6 HOURS AS NEEDED  
  mometasone-formoterol (DULERA) 100-5 mcg/actuation HFA inhaler Take 2 Puffs by inhalation two (2) times a day.  albuterol (VENTOLIN HFA) 90 mcg/actuation inhaler INHALE 2 PUFFS EVERY 4 HOURS IF NEEDED FOR WHEEZING OR  FOR SHORTNESS OF BREATH  
 oxyCODONE-acetaminophen (PERCOCET) 5-325 mg per tablet Take 1 Tab by mouth every four (4) hours as needed for Pain for up to 20 doses. Max Daily Amount: 6 Tabs.  amLODIPine (NORVASC) 10 mg tablet Take 1 Tab by mouth daily.  candesartan-hydroCHLOROthiazide (ATACAND HCT) 32-12.5 mg per tablet TAKE ONE TABLET BY MOUTH ONCE DAILY  naloxone (NARCAN) 4 mg/actuation nasal spray Use 1 spray intranasally into 1 nostril. Use a new Narcan nasal spray for subsequent doses and administer into alternating nostrils. May repeat every 2 to 3 minutes as needed. Indications: OPIATE-INDUCED RESPIRATORY DEPRESSION, Opioid Toxicity  mometasone (NASONEX) 50 mcg/actuation nasal spray USE 2 SPRAYS INTO EACH NOSTRIL ONCE DAILY  pregabalin (LYRICA) 75 mg capsule Take  by mouth.  lidocaine (LIDODERM) 5 % by TransDERmal route every twenty-four (24) hours. Apply patch to the affected area for 12 hours a day and remove for 12 hours a day.  levothyroxine (SYNTHROID) 125 mcg tablet Take 1 Tab by mouth Daily (before breakfast).  ergocalciferol (ERGOCALCIFEROL) 50,000 unit capsule Take 1 Cap by mouth every seven (7) days.  diazePAM (VALIUM) 5 mg tablet 1 bid prn to help with muscle spasms, as needed.  montelukast (SINGULAIR) 10 mg tablet TAKE ONE TABLET BY MOUTH ONCE DAILY  cholecalciferol (VITAMIN D3) 1,000 unit cap Take 1,000 Units by mouth daily.  sucralfate (CARAFATE) 100 mg/mL suspension Take 1 tsp by mouth four (4) times daily.  b complex vitamins tablet Take 1 tablet by mouth daily.  Multivit-Iron-Min-Folic Acid (CENTRUM COMPLETE) 18-0.4 mg tab Take 1 Tab by mouth daily.   
 diclofenac (VOLTAREN) 1 % topical gel Apply 4 g to affected area four (4) times daily.  polyethylene glycol (MIRALAX) 17 gram packet Take 17 g by mouth daily. Disposition: 
home DISCHARGE NOTE:  
10:34 AM 
 
Pt has been reexamined. Patient has no new complaints, changes, or physical findings. Care plan outlined and precautions discussed. Results of cxr, labs were reviewed with the patient. All medications were reviewed with the patient; will d/c home with prednisone, anti-tussive, ABX. All of pt's questions and concerns were addressed. Patient was instructed and agrees to follow up with PCP, as well as to return to the ED upon further deterioration. Patient is ready to go home. Follow-up Information Follow up With Specialties Details Why Contact Info Daryl Young MD Rheumatology Schedule an appointment as soon as possible for a visit in 1 day  [address] [place] [address] Rosario Santos [id number] 
[phone] [facility] EMERGENCY DEPT Emergency Medicine  If symptoms worsen return immediately [facility] [address] Current Discharge Medication List  
  
START taking these medications Details  
doxycycline (MONODOX) 100 mg capsule Take 1 Cap by mouth two (2) times a day for 10 days. Qty: 20 Cap, Refills: 0  
  
dextromethorphan-guaiFENesin (ROBITUSSIN-DM)  mg/5 mL syrup Take 10 mL by mouth every six (6) hours as needed for Cough. Qty: 240 mL, Refills: 0  
  
predniSONE (DELTASONE) 50 mg tablet Take 1 Tab by mouth daily for 5 days. Qty: 5 Tab, Refills: 0  
  
!! albuterol (PROVENTIL HFA, VENTOLIN HFA, PROAIR HFA) 90 mcg/actuation inhaler Take 1 Puff by inhalation every four (4) hours as needed for Wheezing. Qty: 1 Inhaler, Refills: 0  
  
 !! - Potential duplicate medications found. Please discuss with provider. CONTINUE these medications which have NOT CHANGED  Details  
!! albuterol (VENTOLIN HFA) 90 mcg/actuation inhaler INHALE 2 PUFFS EVERY 4 HOURS IF NEEDED FOR WHEEZING OR  FOR SHORTNESS OF BREATH Qty: 1 Inhaler, Refills: 5  
  
 !! - Potential duplicate medications found. Please discuss with provider. Diagnosis Clinical Impression: 1. Community acquired pneumonia of right middle lobe of lung ([facility]

## 2018-11-26 NOTE — ED NOTES
EMERGENCY DEPARTMENT HISTORY AND PHYSICAL EXAM 
 
Date: 11/26/2018 Patient Name: Leif Prado History of Presenting Illness Chief Complaint Patient presents with  Wheezing History Provided By: Patient Chief Complaint: wheezing Duration: 5 Days Timing:  Acute Location: chest 
Quality: Tightness Severity: Moderate Modifying Factors: using nebs at home w/o relief Associated Symptoms: cough, SOB Additional History (Context): Leif Prado is a 58 y.o. female with hypertension, COPD and asthma who presents with wheezing, sob, cough x 5d. Denies fever. +productive cough w/bloody tinged sputum today. PCP: Nohemy Matos MD 
 
Current Outpatient Medications Medication Sig Dispense Refill  doxycycline (MONODOX) 100 mg capsule Take 1 Cap by mouth two (2) times a day for 10 days. 20 Cap 0  
 dextromethorphan-guaiFENesin (ROBITUSSIN-DM)  mg/5 mL syrup Take 10 mL by mouth every six (6) hours as needed for Cough. 240 mL 0  
 albuterol (PROVENTIL HFA, VENTOLIN HFA, PROAIR HFA) 90 mcg/actuation inhaler Take 1 Puff by inhalation every four (4) hours as needed for Wheezing. 1 Inhaler 0  predniSONE (DELTASONE) 50 mg tablet Take 1 Tab by mouth daily. 5 Tab 0  predniSONE (DELTASONE) 50 mg tablet Take 1 Tab by mouth daily for 5 days. 5 Tab 0  
 albuterol-ipratropium (DUO-NEB) 2.5 mg-0.5 mg/3 ml nebu USE 1 VIAL WITH HAND HELD NEBULIZER EVERY 6 HOURS AS NEEDED 48 Nebule 5  
 mometasone-formoterol (DULERA) 100-5 mcg/actuation HFA inhaler Take 2 Puffs by inhalation two (2) times a day. 1 Inhaler 5  
 albuterol (VENTOLIN HFA) 90 mcg/actuation inhaler INHALE 2 PUFFS EVERY 4 HOURS IF NEEDED FOR WHEEZING OR  FOR SHORTNESS OF BREATH 1 Inhaler 5  
 oxyCODONE-acetaminophen (PERCOCET) 5-325 mg per tablet Take 1 Tab by mouth every four (4) hours as needed for Pain for up to 20 doses. Max Daily Amount: 6 Tabs.  15 Tab 0  
  amLODIPine (NORVASC) 10 mg tablet Take 1 Tab by mouth daily. 7 Tab 0  
 candesartan-hydroCHLOROthiazide (ATACAND HCT) 32-12.5 mg per tablet TAKE ONE TABLET BY MOUTH ONCE DAILY 7 Tab 0  
 naloxone (NARCAN) 4 mg/actuation nasal spray Use 1 spray intranasally into 1 nostril. Use a new Narcan nasal spray for subsequent doses and administer into alternating nostrils. May repeat every 2 to 3 minutes as needed. Indications: OPIATE-INDUCED RESPIRATORY DEPRESSION, Opioid Toxicity 2 Each 0  
 mometasone (NASONEX) 50 mcg/actuation nasal spray USE 2 SPRAYS INTO EACH NOSTRIL ONCE DAILY 1 Container 3  pregabalin (LYRICA) 75 mg capsule Take  by mouth.  lidocaine (LIDODERM) 5 % by TransDERmal route every twenty-four (24) hours. Apply patch to the affected area for 12 hours a day and remove for 12 hours a day.  levothyroxine (SYNTHROID) 125 mcg tablet Take 1 Tab by mouth Daily (before breakfast). 30 Tab 1  
 ergocalciferol (ERGOCALCIFEROL) 50,000 unit capsule Take 1 Cap by mouth every seven (7) days. 4 Cap 11  
 diazePAM (VALIUM) 5 mg tablet 1 bid prn to help with muscle spasms, as needed. 60 Tab 1  
 montelukast (SINGULAIR) 10 mg tablet TAKE ONE TABLET BY MOUTH ONCE DAILY 90 Tab 3  cholecalciferol (VITAMIN D3) 1,000 unit cap Take 1,000 Units by mouth daily.  sucralfate (CARAFATE) 100 mg/mL suspension Take 1 tsp by mouth four (4) times daily.  b complex vitamins tablet Take 1 tablet by mouth daily.  Multivit-Iron-Min-Folic Acid (CENTRUM COMPLETE) 18-0.4 mg tab Take 1 Tab by mouth daily.  diclofenac (VOLTAREN) 1 % topical gel Apply 4 g to affected area four (4) times daily.  polyethylene glycol (MIRALAX) 17 gram packet Take 17 g by mouth daily. Past History Past Medical History: 
Past Medical History:  
Diagnosis Date  Abdominal pain  Arthritis  Asthma 1/20/2010 Dr. Romie Palacios  Cancer ([facility] [address] stage 2 colon ca  Chemotherapy  Chronic low back pain 7/12/2010  Chronic lung disease  COPD (chronic obstructive pulmonary disease) ([facility] [address] 1/20/2010 Dr. Aissatou Yanez  Depression  FH: colon cancer 1/20/2010  GERD (gastroesophageal reflux disease)  Gout  H/O colon cancer, stage II   
 s/p resection, last colonoscopy 11/11- q3 yrs  History of multiple pulmonary nodules 1/20/2010  
 Hoarse 2015  
 candida, sees ENT  HTN (hypertension) 1/20/2010  Hyperlipemia 1/20/2010  Hypothyroid 1/20/2010  
 IBS (irritable bowel syndrome)  Liver disease   
 cysts on liver  Mammogram declined  Multinodular thyroid   
 sees ENT. Due for repeat U/S with ENT in Nov 2017  Nephropathy, membranous 1/20/2010  Neuropathy 1/20/2010  Nicotine use disorder 1/20/2010  Pancreatitis 08/2018  Prediabetes  PUD (peptic ulcer disease)  Pulmonary nodule   
 sees pulmonary  Rectocele 1/20/2010  S/P cataract extraction 1/3/2011  Thyroid disease   
 hypothyroid  Unspecified sleep apnea   
 does not use CPAP  Vitamin D deficiency 1/20/2010 Past Surgical History: 
Past Surgical History:  
Procedure Laterality Date  ABDOMEN SURGERY PROC UNLISTED    
 colon resection 8/2005- Dr. Chandler Vigil- colon ca  ABDOMEN SURGERY PROC UNLISTED    
 gastric bypass 12/2/09  HX GASTRIC BYPASS  HX GI    
 HX GYN    
 hysterectomy and BSO in 11/2006  HX HEENT    
 cataract sx tee  HX HYSTERECTOMY Rozanna Fothergill LUMBAR FUSION  June 2013 L4 to L 5  
 HX TONSILLECTOMY Family History: 
Family History Problem Relation Age of Onset  Asthma Mother  Diabetes Mother  Hypertension Mother  Elevated Lipids Mother  Elevated Lipids Father  Hypertension Father  Heart Disease Father   
     chf  
 Heart Disease Sister   
     older Social History: 
Social History Tobacco Use  Smoking status: Former Smoker Packs/day: 0.50 Years: 18.00   Pack years: 9.00  
 Types: Cigarettes Last attempt to quit: 1990 Years since quittin.2  Smokeless tobacco: Never Used  Tobacco comment: per patient she has not smoked in over 30 years but significant second hand smoke exposure at home Substance Use Topics  Alcohol use: Yes Alcohol/week: 0.0 oz  
  Comment: rare  Drug use: No  
 
 
Allergies: Allergies Allergen Reactions  Breo Ellipta [Fluticasone-Vilanterol] Hoarseness Yeast problem w/Powdered MDIs  Ace Inhibitors Cough  Aspirin Other (comments) GI bleeding & pain  Nsaids (Non-Steroidal Anti-Inflammatory Drug) Other (comments) Makes her stomach bleed Review of Systems Review of Systems Constitutional: Negative for fever. Respiratory: Positive for cough, chest tightness, shortness of breath and wheezing. Cardiovascular: Negative for chest pain. All other systems reviewed and are negative. All Other Systems Negative Physical Exam  
 
Vitals:  
 18 9188 BP: (!) 186/109 Pulse: 75 Resp: 18 Temp: 97.8 °F (36.6 °C) SpO2: 95% Physical Exam  
Constitutional: She is oriented to person, place, and time. She appears well-developed and well-nourished. She appears distressed. HENT:  
Head: Normocephalic and atraumatic. Eyes: Pupils are equal, round, and reactive to light. Neck: No JVD present. No tracheal deviation present. No thyromegaly present. Cardiovascular: Normal rate, regular rhythm and normal heart sounds. Exam reveals no gallop and no friction rub. No murmur heard. Pulmonary/Chest: Effort normal. No stridor. No respiratory distress. She has wheezes. She has no rales. She exhibits no tenderness. Abdominal: Soft. She exhibits no distension and no mass. There is no tenderness. There is no rebound and no guarding. Musculoskeletal: She exhibits no edema or tenderness. Lymphadenopathy:  
  She has no cervical adenopathy. Neurological: She is alert and oriented to person, place, and time. Skin: Skin is warm and dry. No rash noted. She is not diaphoretic. No erythema. No pallor. Psychiatric: She has a normal mood and affect. Her behavior is normal. Thought content normal.  
Nursing note and vitals reviewed. Diagnostic Study Results Labs - No results found for this or any previous visit (from the past 12 hour(s)). Radiologic Studies -  
XR CHEST PA LAT Final Result CT Results  (Last 48 hours) None CXR Results  (Last 48 hours) None Medical Decision Making I am the first provider for this patient. I reviewed the vital signs, available nursing notes, past medical history, past surgical history, family history and social history. Vital Signs-Reviewed the patient's vital signs. Records Reviewed: Nursing Notes Procedures: 
Procedures Provider Notes (Medical Decision Making): get CXR, treat, reassess. MED RECONCILIATION: 
No current facility-administered medications for this encounter. Current Outpatient Medications Medication Sig  
 doxycycline (MONODOX) 100 mg capsule Take 1 Cap by mouth two (2) times a day for 10 days.  dextromethorphan-guaiFENesin (ROBITUSSIN-DM)  mg/5 mL syrup Take 10 mL by mouth every six (6) hours as needed for Cough.  albuterol (PROVENTIL HFA, VENTOLIN HFA, PROAIR HFA) 90 mcg/actuation inhaler Take 1 Puff by inhalation every four (4) hours as needed for Wheezing.  predniSONE (DELTASONE) 50 mg tablet Take 1 Tab by mouth daily.  predniSONE (DELTASONE) 50 mg tablet Take 1 Tab by mouth daily for 5 days.  albuterol-ipratropium (DUO-NEB) 2.5 mg-0.5 mg/3 ml nebu USE 1 VIAL WITH HAND HELD NEBULIZER EVERY 6 HOURS AS NEEDED  
 mometasone-formoterol (DULERA) 100-5 mcg/actuation HFA inhaler Take 2 Puffs by inhalation two (2) times a day.   
 albuterol (VENTOLIN HFA) 90 mcg/actuation inhaler INHALE 2 PUFFS EVERY 4 HOURS IF NEEDED FOR WHEEZING OR  FOR SHORTNESS OF BREATH  
 oxyCODONE-acetaminophen (PERCOCET) 5-325 mg per tablet Take 1 Tab by mouth every four (4) hours as needed for Pain for up to 20 doses. Max Daily Amount: 6 Tabs.  amLODIPine (NORVASC) 10 mg tablet Take 1 Tab by mouth daily.  candesartan-hydroCHLOROthiazide (ATACAND HCT) 32-12.5 mg per tablet TAKE ONE TABLET BY MOUTH ONCE DAILY  naloxone (NARCAN) 4 mg/actuation nasal spray Use 1 spray intranasally into 1 nostril. Use a new Narcan nasal spray for subsequent doses and administer into alternating nostrils. May repeat every 2 to 3 minutes as needed. Indications: OPIATE-INDUCED RESPIRATORY DEPRESSION, Opioid Toxicity  mometasone (NASONEX) 50 mcg/actuation nasal spray USE 2 SPRAYS INTO EACH NOSTRIL ONCE DAILY  pregabalin (LYRICA) 75 mg capsule Take  by mouth.  lidocaine (LIDODERM) 5 % by TransDERmal route every twenty-four (24) hours. Apply patch to the affected area for 12 hours a day and remove for 12 hours a day.  levothyroxine (SYNTHROID) 125 mcg tablet Take 1 Tab by mouth Daily (before breakfast).  ergocalciferol (ERGOCALCIFEROL) 50,000 unit capsule Take 1 Cap by mouth every seven (7) days.  diazePAM (VALIUM) 5 mg tablet 1 bid prn to help with muscle spasms, as needed.  montelukast (SINGULAIR) 10 mg tablet TAKE ONE TABLET BY MOUTH ONCE DAILY  cholecalciferol (VITAMIN D3) 1,000 unit cap Take 1,000 Units by mouth daily.  sucralfate (CARAFATE) 100 mg/mL suspension Take 1 tsp by mouth four (4) times daily.  b complex vitamins tablet Take 1 tablet by mouth daily.  Multivit-Iron-Min-Folic Acid (CENTRUM COMPLETE) 18-0.4 mg tab Take 1 Tab by mouth daily.  diclofenac (VOLTAREN) 1 % topical gel Apply 4 g to affected area four (4) times daily.  polyethylene glycol (MIRALAX) 17 gram packet Take 17 g by mouth daily. Disposition: 
home DISCHARGE NOTE:  
 
 Pt has been reexamined. Patient has no new complaints, changes, or physical findings. Care plan outlined and precautions discussed. Patient was instructed and agrees to follow up with PCP, as well as to return to the ED upon further deterioration. Patient is ready to go home. Follow-up Information Follow up With Specialties Details Why Contact Info Fermin Aguilar MD Rheumatology Schedule an appointment as soon as possible for a visit in 1 day  [address] [place] [address] Sandy Fry MD 
[place] [id number] 
[phone] [facility] EMERGENCY DEPT Emergency Medicine  If symptoms worsen return immediately [address] Discharge Medication List as of 11/26/2018 10:44 AM  
  
START taking these medications Details  
doxycycline (MONODOX) 100 mg capsule Take 1 Cap by mouth two (2) times a day for 10 days. , Print, Disp-20 Cap, R-0  
  
dextromethorphan-guaiFENesin (ROBITUSSIN-DM)  mg/5 mL syrup Take 10 mL by mouth every six (6) hours as needed for Cough. , Print, Disp-240 mL, R-0  
  
!! albuterol (PROVENTIL HFA, VENTOLIN HFA, PROAIR HFA) 90 mcg/actuation inhaler Take 1 Puff by inhalation every four (4) hours as needed for Wheezing., Print, Disp-1 Inhaler, R-0  
  
predniSONE (DELTASONE) 50 mg tablet Take 1 Tab by mouth daily for 5 days. , Print, Disp-5 Tab, R-0  
  
 !! - Potential duplicate medications found. Please discuss with provider. CONTINUE these medications which have NOT CHANGED  Details  
albuterol-ipratropium (DUO-NEB) 2.5 mg-0.5 mg/3 ml nebu USE 1 VIAL WITH HAND HELD NEBULIZER EVERY 6 HOURS AS NEEDED, Normal, Disp-48 Nebule, R-5  
  
mometasone-formoterol (DULERA) 100-5 mcg/actuation HFA inhaler Take 2 Puffs by inhalation two (2) times a day., Normal, Disp-1 Inhaler, R-5  
  
!! albuterol (VENTOLIN HFA) 90 mcg/actuation inhaler INHALE 2 PUFFS EVERY 4 HOURS IF NEEDED FOR WHEEZING OR  FOR SHORTNESS OF BREATH, Normal, Disp-1 Inhaler, R-5  
  
 oxyCODONE-acetaminophen (PERCOCET) 5-325 mg per tablet Take 1 Tab by mouth every four (4) hours as needed for Pain for up to 20 doses. Max Daily Amount: 6 Tabs., Print, Disp-15 Tab, R-0  
  
amLODIPine (NORVASC) 10 mg tablet Take 1 Tab by mouth daily. , Normal, Disp-7 Tab, R-0  
  
candesartan-hydroCHLOROthiazide (ATACAND HCT) 32-12.5 mg per tablet TAKE ONE TABLET BY MOUTH ONCE DAILY, Normal, Disp-7 Tab, R-0  
  
naloxone (NARCAN) 4 mg/actuation nasal spray Use 1 spray intranasally into 1 nostril. Use a new Narcan nasal spray for subsequent doses and administer into alternating nostrils. May repeat every 2 to 3 minutes as needed. Indications: OPIATE-INDUCED RESPIRATORY DEPRESSION, Opioid Toxicity, Print, D isp-2 Each, R-0  
  
mometasone (NASONEX) 50 mcg/actuation nasal spray USE 2 SPRAYS INTO EACH NOSTRIL ONCE DAILY, Normal, Disp-1 Container, R-3  
  
pregabalin (LYRICA) 75 mg capsule Take  by mouth., Historical Med  
  
lidocaine (LIDODERM) 5 % by TransDERmal route every twenty-four (24) hours. Apply patch to the affected area for 12 hours a day and remove for 12 hours a day., Historical Med  
  
levothyroxine (SYNTHROID) 125 mcg tablet Take 1 Tab by mouth Daily (before breakfast). , Normal, Disp-30 Tab, R-1  
  
ergocalciferol (ERGOCALCIFEROL) 50,000 unit capsule Take 1 Cap by mouth every seven (7) days. , Normal, Disp-4 Cap, R-11  
  
diazePAM (VALIUM) 5 mg tablet 1 bid prn to help with muscle spasms, as needed. , Print, Disp-60 Tab, R-1  
  
montelukast (SINGULAIR) 10 mg tablet TAKE ONE TABLET BY MOUTH ONCE DAILY, Normal, Disp-90 Tab, R-3  
  
cholecalciferol (VITAMIN D3) 1,000 unit cap Take 1,000 Units by mouth daily. , Historical Med  
  
sucralfate (CARAFATE) 100 mg/mL suspension Take 1 tsp by mouth four (4) times daily. , Historical Med  
  
b complex vitamins tablet Take 1 tablet by mouth daily. , Historical Med  
  
Multivit-Iron-Min-Folic Acid (CENTRUM COMPLETE) 18-0.4 mg tab Take 1 Tab by mouth daily. , Historical Med  
  
diclofenac (VOLTAREN) 1 % topical gel Apply 4 g to affected area four (4) times daily. , Historical Med  
  
polyethylene glycol (MIRALAX) 17 gram packet Take 17 g by mouth daily. , Historical Med  
  
 !! - Potential duplicate medications found. Please discuss with provider. Diagnosis Clinical Impression: 1. Community acquired pneumonia of right middle lobe of lung ([facility] [address]

## 2018-11-26 NOTE — DISCHARGE INSTRUCTIONS
Pneumonia: Care Instructions  Your Care Instructions    Pneumonia is an infection of the lungs. Most cases are caused by infections from bacteria or viruses. Pneumonia may be mild or very severe. If it is caused by bacteria, you will be treated with antibiotics. It may take a few weeks to a few months to recover fully from pneumonia, depending on how sick you were and whether your overall health is good. Follow-up care is a key part of your treatment and safety. Be sure to make and go to all appointments, and call your doctor if you are having problems. It's also a good idea to know your test results and keep a list of the medicines you take. How can you care for yourself at home? · Take your antibiotics exactly as directed. Do not stop taking the medicine just because you are feeling better. You need to take the full course of antibiotics. · Take your medicines exactly as prescribed. Call your doctor if you think you are having a problem with your medicine. · Get plenty of rest and sleep. You may feel weak and tired for a while, but your energy level will improve with time. · To prevent dehydration, drink plenty of fluids, enough so that your urine is light yellow or clear like water. Choose water and other caffeine-free clear liquids until you feel better. If you have kidney, heart, or liver disease and have to limit fluids, talk with your doctor before you increase the amount of fluids you drink. · Take care of your cough so you can rest. A cough that brings up mucus from your lungs is common with pneumonia. It is one way your body gets rid of the infection. But if coughing keeps you from resting or causes severe fatigue and chest-wall pain, talk to your doctor. He or she may suggest that you take a medicine to reduce the cough. · Use a vaporizer or humidifier to add moisture to your bedroom. Follow the directions for cleaning the machine. · Do not smoke or allow others to smoke around you.  Smoke will make your cough last longer. If you need help quitting, talk to your doctor about stop-smoking programs and medicines. These can increase your chances of quitting for good. · Take an over-the-counter pain medicine, such as acetaminophen (Tylenol), ibuprofen (Advil, Motrin), or naproxen (Aleve). Read and follow all instructions on the label. · Do not take two or more pain medicines at the same time unless the doctor told you to. Many pain medicines have acetaminophen, which is Tylenol. Too much acetaminophen (Tylenol) can be harmful. · If you were given a spirometer to measure how well your lungs are working, use it as instructed. This can help your doctor tell how your recovery is going. · To prevent pneumonia in the future, talk to your doctor about getting a flu vaccine (once a year) and a pneumococcal vaccine (one time only for most people). When should you call for help? Call 911 anytime you think you may need emergency care. For example, call if:    · You have severe trouble breathing.    Call your doctor now or seek immediate medical care if:    · You cough up dark brown or bloody mucus (sputum).     · You have new or worse trouble breathing.     · You are dizzy or lightheaded, or you feel like you may faint.    Watch closely for changes in your health, and be sure to contact your doctor if:    · You have a new or higher fever.     · You are coughing more deeply or more often.     · You are not getting better after 2 days (48 hours).     · You do not get better as expected. Where can you learn more? Go to http://danae-naif.info/. Enter [id number] in the search box to learn more about \"Pneumonia: Care Instructions. \"  Current as of: December 6, 2017  Content Version: 11.8  © 0708-9307 [employer]. Care instructions adapted under license by [employer] (which disclaims liability or warranty for this information).  If you have questions about a medical condition or this instruction, always ask your healthcare professional. [place] [address] any warranty or liability for your use of this information.

## 2018-11-27 ENCOUNTER — TELEPHONE (OUTPATIENT)
Dept: PULMONOLOGY | Age: 63
End: 2018-11-27

## 2018-11-27 RX ORDER — PREDNISONE 50 MG/1
50 TABLET ORAL DAILY
Qty: 5 TAB | Refills: 0 | Status: SHIPPED | OUTPATIENT
Start: 2018-11-27 | End: 2018-12-02

## 2018-11-27 RX ORDER — PREDNISONE 50 MG/1
50 TABLET ORAL DAILY
Qty: 5 TAB | Refills: 0 | Status: SHIPPED | OUTPATIENT
Start: 2018-11-27 | End: 2019-01-31

## 2018-11-27 NOTE — TELEPHONE ENCOUNTER
Pt stated she was in the hospital yesterday for COPD + asthma distress, diagnosed w/ pneumonia. Stated she was given Prednisone and went to take them this morning and accidentally dropped them all in the toilet. Would like a refill sent to  [address] [facility]. Pt very anxious, would like a call from nurse at [phone]. She may be going back to ER due to severe SOB, wheezing.

## 2018-11-28 LAB
ATRIAL RATE: 74 BPM
CALCULATED P AXIS, ECG09: 18 DEGREES
CALCULATED R AXIS, ECG10: -50 DEGREES
CALCULATED T AXIS, ECG11: 14 DEGREES
DIAGNOSIS, 93000: NORMAL
P-R INTERVAL, ECG05: 142 MS
Q-T INTERVAL, ECG07: 406 MS
QRS DURATION, ECG06: 98 MS
QTC CALCULATION (BEZET), ECG08: 450 MS
VENTRICULAR RATE, ECG03: 74 BPM

## 2019-01-07 ENCOUNTER — TELEPHONE (OUTPATIENT)
Dept: PULMONOLOGY | Age: 64
End: 2019-01-07

## 2019-01-07 RX ORDER — PREDNISONE 10 MG/1
TABLET ORAL
Qty: 18 TAB | Refills: 0 | Status: SHIPPED | OUTPATIENT
Start: 2019-01-07 | End: 2019-01-31

## 2019-01-07 NOTE — TELEPHONE ENCOUNTER
Pt states she was referred to Dr. Toshia Ervin by Dr. Valencia Reza and has f/u appt with him tomorrow. Pt c/o cough, congestion and wheezing. Sputum white and cloudy, no fever. Pt states she has been using the nebulizer more than q 4-6 hrs as prescribed and feels she needs Prednisone taper.

## 2019-02-20 NOTE — TELEPHONE ENCOUNTER
Pt stated [id number] [address] advised her she was unable to refill her Ventolin inhaler until 3/2. States she last filled 1/31 and is completely out causing her to use her nebulizer. Please advise [phone].

## 2019-03-05 RX ORDER — IPRATROPIUM BROMIDE AND ALBUTEROL SULFATE 2.5; .5 MG/3ML; MG/3ML
SOLUTION RESPIRATORY (INHALATION)
Qty: 48 NEBULE | Refills: 2 | Status: SHIPPED | OUTPATIENT
Start: 2019-03-05 | End: 2019-05-21 | Stop reason: SDUPTHER

## 2019-03-05 NOTE — TELEPHONE ENCOUNTER
Pt states she needs refills on duoneb asap to be sent to [facility]. Please call pt at [phone] once done.

## 2019-03-16 ENCOUNTER — HOSPITAL ENCOUNTER (EMERGENCY)
Age: 64
Discharge: LEFT BEFORE DISCHARGE | End: 2019-03-17
Attending: EMERGENCY MEDICINE
Payer: MEDICARE

## 2019-03-16 VITALS
TEMPERATURE: 97.7 F | RESPIRATION RATE: 24 BRPM | OXYGEN SATURATION: 95 % | HEART RATE: 68 BPM | DIASTOLIC BLOOD PRESSURE: 81 MMHG | SYSTOLIC BLOOD PRESSURE: 159 MMHG

## 2019-03-16 DIAGNOSIS — J45.41 MODERATE PERSISTENT ASTHMA WITH ACUTE EXACERBATION: Primary | ICD-10-CM

## 2019-03-16 DIAGNOSIS — F10.920 ALCOHOLIC INTOXICATION WITHOUT COMPLICATION (HCC): ICD-10-CM

## 2019-03-16 PROCEDURE — 99281 EMR DPT VST MAYX REQ PHY/QHP: CPT

## 2019-03-16 RX ORDER — ALBUTEROL SULFATE 0.83 MG/ML
2.5 SOLUTION RESPIRATORY (INHALATION)
Status: ACTIVE | OUTPATIENT
Start: 2019-03-16 | End: 2019-03-17

## 2019-03-17 ENCOUNTER — APPOINTMENT (OUTPATIENT)
Dept: GENERAL RADIOLOGY | Age: 64
End: 2019-03-17
Attending: EMERGENCY MEDICINE
Payer: MEDICARE

## 2019-03-17 NOTE — ED NOTES
Pt wanting IV removed and to leave facility. PT very agitated while at desk. IV removed.   Non-emergent police called per provider Request due to pt stating she had consumed alcohol prior to arrival.

## 2019-03-17 NOTE — ED PROVIDER NOTES
EMERGENCY DEPARTMENT HISTORY AND PHYSICAL EXAM    10:55 PM      Date: 3/16/2019  Patient Name: Jorge Luis Villasenor    History of Presenting Illness     Chief Complaint   Patient presents with    Shortness of Breath         History Provided By: Patient      Additional History (Context): Jorge Luis Villasenor is a 61 y.o. female with COPD who presents with wheezing onset tonight with shortness of breath and cough. The pt denies having chest pain, nausea, vomiting, fever, chills, or having any sputum with her cough. She states that she has taken a breathing treatment at home but that it did not really alleviate her symptoms much. She states that she did not want to take prednisone because she did not want to \"gain weight\". She admits to using EtOH tonight and is seen resting comfortably listening to music on her phone in the ED and is not requiring supplemental O2 at this time. The patient presents no further medical complaints or concerns to the ED at this time. PCP: Phil Leonard MD    Chief Complaint: Wheezing  Duration:  Hours  Timing:  Acute on chronic episode  Location: Not applicable  Quality: Not applicable, symptoms do not include pain  Severity: Moderate  Modifying Factors: Not alleviated with albuterol  Associated Symptoms: shortness of breath, cough    Current Outpatient Medications   Medication Sig Dispense Refill    albuterol-ipratropium (DUO-NEB) 2.5 mg-0.5 mg/3 ml nebu USE 1 VIAL WITH HAND HELD NEBULIZER EVERY 6 HOURS AS NEEDED 48 Nebule 2    gabapentin (NEURONTIN) 300 mg capsule Take 300 mg by mouth two (2) times a day.  OMEPRAZOLE PO Take  by mouth daily.  albuterol (PROVENTIL HFA, VENTOLIN HFA, PROAIR HFA) 90 mcg/actuation inhaler Take 1 Puff by inhalation every four (4) hours as needed for Wheezing.  1 Inhaler 0    albuterol (VENTOLIN HFA) 90 mcg/actuation inhaler INHALE 2 PUFFS EVERY 4 HOURS IF NEEDED FOR WHEEZING OR  FOR SHORTNESS OF BREATH 1 Inhaler 5    amLODIPine (NORVASC) 10 mg tablet Take 1 Tab by mouth daily. 7 Tab 0    candesartan-hydroCHLOROthiazide (ATACAND HCT) 32-12.5 mg per tablet TAKE ONE TABLET BY MOUTH ONCE DAILY 7 Tab 0    naloxone (NARCAN) 4 mg/actuation nasal spray Use 1 spray intranasally into 1 nostril. Use a new Narcan nasal spray for subsequent doses and administer into alternating nostrils. May repeat every 2 to 3 minutes as needed. Indications: OPIATE-INDUCED RESPIRATORY DEPRESSION, Opioid Toxicity 2 Each 0    mometasone (NASONEX) 50 mcg/actuation nasal spray USE 2 SPRAYS INTO EACH NOSTRIL ONCE DAILY 1 Container 3    lidocaine (LIDODERM) 5 % by TransDERmal route every twenty-four (24) hours. Apply patch to the affected area for 12 hours a day and remove for 12 hours a day.  levothyroxine (SYNTHROID) 125 mcg tablet Take 1 Tab by mouth Daily (before breakfast). 30 Tab 1    ergocalciferol (ERGOCALCIFEROL) 50,000 unit capsule Take 1 Cap by mouth every seven (7) days. 4 Cap 11    montelukast (SINGULAIR) 10 mg tablet TAKE ONE TABLET BY MOUTH ONCE DAILY 90 Tab 3    cholecalciferol (VITAMIN D3) 1,000 unit cap Take 1,000 Units by mouth daily.  b complex vitamins tablet Take 1 tablet by mouth daily.  Multivit-Iron-Min-Folic Acid (CENTRUM COMPLETE) 18-0.4 mg tab Take 1 Tab by mouth daily.  diclofenac (VOLTAREN) 1 % topical gel Apply 4 g to affected area four (4) times daily.  polyethylene glycol (MIRALAX) 17 gram packet Take 17 g by mouth daily.          Past History     Past Medical History:  Past Medical History:   Diagnosis Date    Abdominal pain     Arthritis     Asthma 1/20/2010    Dr. Helen Livingston [facility])     stage 2 colon ca    Chemotherapy     Chronic low back pain 7/12/2010    Chronic lung disease     Chronic pain     Back    COPD (chronic obstructive pulmonary disease) ([facility] [address] 1/20/2010    Dr. Obinna Collado    Depression     Anxiety    FH: colon cancer 1/20/2010    GERD (gastroesophageal reflux disease)     Gout     H/O colon cancer, stage II     s/p resection, last colonoscopy - q3 yrs    History of multiple pulmonary nodules 2010    Hoarse 2015    candida, sees ENT    HTN (hypertension) 2010    Hyperlipemia 2010    Hypothyroid 2010    IBS (irritable bowel syndrome)     Liver disease     cysts on liver    Mammogram declined     Multinodular thyroid     sees ENT.   Due for repeat U/S with ENT in 2017    Nephropathy, membranous 2010    Neuropathy 2010    Nicotine use disorder 2010    Pancreatitis 2018    Prediabetes     PUD (peptic ulcer disease)     Pulmonary nodule     sees pulmonary    Rectocele 2010    S/P cataract extraction 1/3/2011    Thyroid disease     hypothyroid    Unspecified sleep apnea     does not use CPAP    Vitamin D deficiency 2010       Past Surgical History:  Past Surgical History:   Procedure Laterality Date    ABDOMEN SURGERY PROC UNLISTED      colon resection 2005- Dr. Gabriel Castano- colon ca   [address]      gastric bypass 09    HX GASTRIC BYPASS      HX GI      HX GYN      hysterectomy and BSO in 2006    HX HEENT      cataract sx tee    HX HYSTERECTOMY      Denies-patient states had ovaries removed during colon resection    HX LUMBAR FUSION  2013    L4 to L 5    HX TONSILLECTOMY         Family History:  Family History   Problem Relation Age of Onset    Asthma Mother     Diabetes Mother     Hypertension Mother     Elevated Lipids Mother     Elevated Lipids Father     Hypertension Father     Heart Disease Father         chf    Heart Disease Sister         older       Social History:  Social History     Tobacco Use    Smoking status: Former Smoker     Packs/day: 0.50     Years: 18.00     Pack years: 9.00     Types: Cigarettes     Last attempt to quit: 1990     Years since quittin.5    Smokeless tobacco: Never Used    Tobacco comment: per patient she has not smoked in over 30 years but significant second hand smoke exposure at home   Substance Use Topics    Alcohol use: Yes     Alcohol/week: 0.0 oz     Comment: rare    Drug use: No       Allergies: Allergies   Allergen Reactions    Breo Ellipta [Fluticasone Furoate-Vilanterol] Hoarseness     Yeast problem w/Powdered MDIs    Ace Inhibitors Cough    Aspirin Other (comments)     GI bleeding & pain    Nsaids (Non-Steroidal Anti-Inflammatory Drug) Other (comments)     Makes her stomach bleed           Review of Systems     Review of Systems   Constitutional: Negative for activity change, chills, diaphoresis, fatigue and fever. HENT: Negative for congestion and rhinorrhea. Eyes: Negative for visual disturbance. Respiratory: Positive for cough (no sputum), shortness of breath and wheezing. Cardiovascular: Negative for chest pain and palpitations. Gastrointestinal: Negative for abdominal pain, diarrhea, nausea and vomiting. Genitourinary: Negative for dysuria and hematuria. Musculoskeletal: Negative for back pain. Skin: Negative for rash. Neurological: Negative for dizziness, weakness and light-headedness. All other systems reviewed and are negative. Physical Exam     Visit Vitals  /81 (BP 1 Location: Left arm, BP Patient Position: At rest)   Pulse 68   Temp 97.7 °F (36.5 °C)   Resp 24   SpO2 95%         Physical Exam   Constitutional: She is oriented to person, place, and time. She appears well-developed and well-nourished. No distress. HENT:   Head: Normocephalic and atraumatic. Right Ear: External ear normal.   Left Ear: External ear normal.   Nose: Nose normal.   Mouth/Throat: Oropharynx is clear and moist.   Eyes: Conjunctivae and EOM are normal. Pupils are equal, round, and reactive to light. No scleral icterus. Neck: Normal range of motion. Neck supple. No tracheal deviation present. Cardiovascular: Normal rate, regular rhythm and intact distal pulses.    Pulmonary/Chest: Effort normal. She has wheezes. She exhibits no tenderness. Abdominal: Soft. Bowel sounds are normal. She exhibits no distension. There is no tenderness. There is no rebound and no guarding. Musculoskeletal: Normal range of motion. She exhibits no tenderness. Neurological: She is alert and oriented to person, place, and time. No cranial nerve deficit. Coordination normal.   Skin: Skin is warm and dry. Psychiatric: She has a normal mood and affect. Her behavior is normal. Judgment and thought content normal.   Nursing note and vitals reviewed. Appears Intoxicated      Diagnostic Study Results     Labs -    Radiologic Studies -   XR CHEST PA LAT    (Results Pending)         Medical Decision Making     It should be noted that I, Chloe Marti, DO will be the provider of record for this patient. I reviewed the vital signs, available nursing notes, past medical history, past surgical history, family history and social history. Vital Signs-Reviewed the patient's vital signs. Pulse Oximetry Analysis -  95% on room air (Interpretation), wnl    Cardiac Monitor:  Rate: 68 bpm  Rhythm:  Normal Sinus Rhythm         Records Reviewed: Nursing Notes (Time of Review: 10:55 PM)    ED Course: Progress Notes, Reevaluation, and Consults:  Patient became irate that she has not gotten a breathing treatment in the 40 minutes that she had been here so far. Stated that she wanted her IV out and that she was leaving. Long enforcement was called because clinically patient appears intoxicated and was going to be driving. They are aware. Of note, patient had a very slight expiratory wheeze in no respiratory distress. Patient was yelling in the hallway for quite some time before she left. Provider Notes (Medical Decision Making):   Patient left before receiving nebulizer treatment and steroids. No evidence for pneumonia on exam.  Ambulates easily with no respiratory distress. Diagnosis     Clinical Impression:   1.  Moderate persistent asthma with acute exacerbation    2. Alcoholic intoxication without complication ([facility] [address]        Disposition: Eloped    Follow-up Information    None             Medication List      ASK your doctor about these medications    * albuterol 90 mcg/actuation inhaler  Commonly known as:  VENTOLIN HFA  INHALE 2 PUFFS EVERY 4 HOURS IF NEEDED FOR WHEEZING OR  FOR SHORTNESS OF BREATH     * albuterol 90 mcg/actuation inhaler  Commonly known as:  PROVENTIL HFA, VENTOLIN HFA, PROAIR HFA  Take 1 Puff by inhalation every four (4) hours as needed for Wheezing. albuterol-ipratropium 2.5 mg-0.5 mg/3 ml Nebu  Commonly known as:  DUO-NEB  USE 1 VIAL WITH HAND HELD NEBULIZER EVERY 6 HOURS AS NEEDED     amLODIPine 10 mg tablet  Commonly known as:  NORVASC  Take 1 Tab by mouth daily. b complex vitamins tablet     candesartan-hydroCHLOROthiazide 32-12.5 mg per tablet  Commonly known as:  ATACAND HCT  TAKE ONE TABLET BY MOUTH ONCE DAILY     CENTRUM COMPLETE 18-0.4 mg Tab  Generic drug:  Multivit-Iron-Min-Folic Acid     cholecalciferol 1,000 unit Cap  Commonly known as:  VITAMIN D3     ergocalciferol 50,000 unit capsule  Commonly known as:  ERGOCALCIFEROL  Take 1 Cap by mouth every seven (7) days. gabapentin 300 mg capsule  Commonly known as:  NEURONTIN     levothyroxine 125 mcg tablet  Commonly known as:  SYNTHROID  Take 1 Tab by mouth Daily (before breakfast). LIDODERM 5 %  Generic drug:  lidocaine     MIRALAX 17 gram packet  Generic drug:  polyethylene glycol     mometasone 50 mcg/actuation nasal spray  Commonly known as:  NASONEX  USE 2 SPRAYS INTO EACH NOSTRIL ONCE DAILY     montelukast 10 mg tablet  Commonly known as:  SINGULAIR  TAKE ONE TABLET BY MOUTH ONCE DAILY     naloxone 4 mg/actuation nasal spray  Commonly known as:  NARCAN  Use 1 spray intranasally into 1 nostril. Use a new Narcan nasal spray for subsequent doses and administer into alternating nostrils. May repeat every 2 to 3 minutes as needed. Indications: OPIATE-INDUCED RESPIRATORY DEPRESSION, Opioid Toxicity     OMEPRAZOLE PO     VOLTAREN 1 % Gel  Generic drug:  diclofenac         * This list has 2 medication(s) that are the same as other medications prescribed for you. Read the directions carefully, and ask your doctor or other care provider to review them with you.              _______________________________       [id number] Jha acting as a scribe for and in the presence of Yuvonne Nageotte, DO      March 16, 2019 at 10:55 PM       Provider Attestation:      I personally performed the services described in the documentation, reviewed the documentation, as recorded by the scribe in my presence, and it accurately and completely records my words and actions.  March 16, 2019 at 10:55 PM - Yuvonne Nageotte A, DO        _______________________________

## 2019-03-18 ENCOUNTER — TELEPHONE (OUTPATIENT)
Dept: PULMONOLOGY | Age: 64
End: 2019-03-18

## 2019-03-18 NOTE — TELEPHONE ENCOUNTER
Per pt she is wheezing a lot. She states her inhaler and breathing treatments are not helping at all. Apt was made for Thursday with NP, but pt would still like to talk with nurse about symptoms. Please call [phone].

## 2019-03-18 NOTE — TELEPHONE ENCOUNTER
Pt c/o wheezing only. No cough, congestion or fever. Pt using neb tx. Appt has already been made for Thursday.  Pt advised if sxs worsen to go to ER

## 2019-03-20 ENCOUNTER — TELEPHONE (OUTPATIENT)
Dept: PULMONOLOGY | Age: 64
End: 2019-03-20

## 2019-03-20 RX ORDER — PREDNISONE 10 MG/1
TABLET ORAL
Qty: 18 TAB | Refills: 0 | Status: SHIPPED | OUTPATIENT
Start: 2019-03-20 | End: 2019-04-15 | Stop reason: ALTCHOICE

## 2019-03-20 NOTE — TELEPHONE ENCOUNTER
Pt was sched for sick visit 3/21 w/ NP Cinthya Cheung. Ofc cxd appt due to NP being out. Advised pt we would need to speak w/ LZ regarding new appt due to pt wanting to be seen asap. Pt wanting to go over symptoms w/ nurse & request antibiotics. Please advise at [phone], if not able to reach please call [phone].

## 2019-03-20 NOTE — TELEPHONE ENCOUNTER
Pt states she has had coughing and wheezing with some sob x 5 days. No congestion, no sputum, no fever. Pt feels a taper course of Prednisone would help. She had appt with Lucian Murphy NP but we had to cancell due to NP not being in office. Pt understands if sxs worsen to go to Urgent care or ER.

## 2019-04-15 ENCOUNTER — OFFICE VISIT (OUTPATIENT)
Dept: PULMONOLOGY | Age: 64
End: 2019-04-15

## 2019-04-15 ENCOUNTER — TELEPHONE (OUTPATIENT)
Dept: PULMONOLOGY | Age: 64
End: 2019-04-15

## 2019-04-15 VITALS
TEMPERATURE: 97 F | SYSTOLIC BLOOD PRESSURE: 154 MMHG | OXYGEN SATURATION: 96 % | BODY MASS INDEX: 37.73 KG/M2 | WEIGHT: 205 LBS | RESPIRATION RATE: 20 BRPM | HEIGHT: 62 IN | DIASTOLIC BLOOD PRESSURE: 90 MMHG | HEART RATE: 79 BPM

## 2019-04-15 DIAGNOSIS — J45.909 ASTHMA, UNSPECIFIED ASTHMA SEVERITY, UNSPECIFIED WHETHER COMPLICATED, UNSPECIFIED WHETHER PERSISTENT: Primary | ICD-10-CM

## 2019-04-15 DIAGNOSIS — J30.9 ALLERGIC RHINITIS, UNSPECIFIED SEASONALITY, UNSPECIFIED TRIGGER: ICD-10-CM

## 2019-04-15 DIAGNOSIS — E66.9 OBESITY (BMI 30-39.9): ICD-10-CM

## 2019-04-15 DIAGNOSIS — I10 HYPERTENSION, UNSPECIFIED TYPE: ICD-10-CM

## 2019-04-15 DIAGNOSIS — J44.1 COPD EXACERBATION (HCC): ICD-10-CM

## 2019-04-15 PROBLEM — E66.01 SEVERE OBESITY (HCC): Status: ACTIVE | Noted: 2019-04-15

## 2019-04-15 RX ORDER — MOMETASONE FUROATE 50 UG/1
2 SPRAY, METERED NASAL DAILY
Qty: 1 CONTAINER | Refills: 3 | Status: SHIPPED | OUTPATIENT
Start: 2019-04-15 | End: 2019-05-15

## 2019-04-15 RX ORDER — MONTELUKAST SODIUM 10 MG/1
TABLET ORAL
Qty: 90 TAB | Refills: 3 | Status: SHIPPED | OUTPATIENT
Start: 2019-04-15

## 2019-04-15 RX ORDER — BUDESONIDE AND FORMOTEROL FUMARATE DIHYDRATE 80; 4.5 UG/1; UG/1
2 AEROSOL RESPIRATORY (INHALATION) 2 TIMES DAILY
Qty: 1 INHALER | Refills: 0 | Status: SHIPPED | COMMUNITY
Start: 2019-04-15 | End: 2019-04-26 | Stop reason: ALTCHOICE

## 2019-04-15 NOTE — PROGRESS NOTES
Martinez German presents today for   Chief Complaint   Patient presents with    Asthma     CXR done 11/26    COPD    Lung Nodule       Is someone accompanying this pt? No     Is the patient using any DME equipment during OV? No     -DME Company n/a    Depression Screening:  3 most recent PHQ Screens 4/15/2019   PHQ Not Done -   Little interest or pleasure in doing things Not at all   Feeling down, depressed, irritable, or hopeless Not at all   Total Score PHQ 2 0       Learning Assessment:  Learning Assessment 3/29/2018   PRIMARY LEARNER Patient   HIGHEST LEVEL OF EDUCATION - PRIMARY LEARNER  2 Eleanor LEARNER -   CO-LEARNER CAREGIVER No   PRIMARY LANGUAGE ENGLISH   LEARNER PREFERENCE PRIMARY READING   ANSWERED BY patient   RELATIONSHIP SELF       Abuse Screening:  Abuse Screening Questionnaire 3/29/2018   Do you ever feel afraid of your partner? N   Are you in a relationship with someone who physically or mentally threatens you? N   Is it safe for you to go home? Y       Fall Risk  Fall Risk Assessment, last 12 mths 3/29/2018   Able to walk? Yes   Fall in past 12 months? No         Coordination of Care:  1. Have you been to the ER, urgent care clinic since your last visit? Hospitalized since your last visit? No    2. Have you seen or consulted any other health care providers outside of the [address] [place] [address] since your last visit? Include any pap smears or colon screening.  Dr Ana Lilia Castellanos PCP    Medication variance in dosage/sig per patient as follows: No changes required per pt

## 2019-04-15 NOTE — PATIENT INSTRUCTIONS
Start symbicort 2 puffs twice daily, rinse mouth after use    Continue nasonex as needed for nasal congestion  / drainage    Take singulair 10 mg once daily    Albuterol nebs or rescue inhaler every 4-6 hours as needed for increased wheezing, Shortness of breath or cough    Increase PO fluids, use mucinex as needed    Return 2 weeks or sooner with worsening of symptoms.

## 2019-04-15 NOTE — TELEPHONE ENCOUNTER
PT CALLED([phone]). SHE HAD AN APPOINTMENT TODAY WITH Omar Jensen. [employer] HAS NOT BEEN WORKING FOR HER AND SHE WANTS TO KNOW IF SHE CAN BE SENT SOMETHING STRONGER. PLEASE CALL BACK.

## 2019-04-15 NOTE — PROGRESS NOTES
[facility]  Pulmonary, Critical Care, and Sleep Medicine      Pulmonary Office Progress Notes    Name: Stella Mullins     : 1955     Date: 4/15/2019        Subjective:     4/15/2019          HPI    Stella Mullins  is a 61 y.o. female with PMH of HTN, GERD, asthma and COPD who presents for evaluation of increased SOB, cough, and nasal congestion / drainage for one month. She was last seen here on 10/8/18 by Dr. Ele Hale for frequent exacerbation of symptoms and was managed with prednisone and albuterol MDI / nebs. She had previous intolerence to Breo (hoarseness ) and has not been on ICS /LABA. Today she appears comfortable, in no distress but complains of increased SOB with exertion and daily productive cough of scant white mucous. She also reports nasal congestion / drainage but denies sinus pressure or pain. Symptoms began about one month ago. She is using her albuterol MDI about twice per day and has run out of her singulair. Although she has had intolerence to [place] she reports that she has used advair HFA in the past which has helped her symptoms. She denies chest pain or hemoptysis. No fever, chills or orthopnea; no leg / calf pain or swelling and no decreased appetite or weight loss. Last PFT's were 10/8/18 demonstrating mild obstructive defect.                        Past Medical History:   Diagnosis Date    Abdominal pain     Arthritis     Asthma 2010    Dr. Lisa Park [facility])     stage 2 colon ca    Chemotherapy     Chronic low back pain 2010    Chronic lung disease     Chronic pain     Back    COPD (chronic obstructive pulmonary disease) ([facility] [address] 2010    Dr. Vidhi Bianchi    Depression     Anxiety    FH: colon cancer 2010    GERD (gastroesophageal reflux disease)     Gout     H/O colon cancer, stage II     s/p resection, last colonoscopy - q3 yrs    History of multiple pulmonary nodules 2010    Hoarse 2015    candida, sees ENT    HTN (hypertension) 1/20/2010    Hyperlipemia 1/20/2010    Hypothyroid 1/20/2010    IBS (irritable bowel syndrome)     Liver disease     cysts on liver    Mammogram declined     Multinodular thyroid     sees ENT. Due for repeat U/S with ENT in Nov 2017    Nephropathy, membranous 1/20/2010    Neuropathy 1/20/2010    Nicotine use disorder 1/20/2010    Pancreatitis 08/2018    Prediabetes     PUD (peptic ulcer disease)     Pulmonary nodule     sees pulmonary    Rectocele 1/20/2010    S/P cataract extraction 1/3/2011    Thyroid disease     hypothyroid    Unspecified sleep apnea     does not use CPAP    Vitamin D deficiency 1/20/2010       Allergies   Allergen Reactions    Breo Ellipta [Fluticasone Furoate-Vilanterol] Hoarseness     Yeast problem w/Powdered MDIs    Ace Inhibitors Cough    Aspirin Other (comments)     GI bleeding & pain    Nsaids (Non-Steroidal Anti-Inflammatory Drug) Other (comments)     Makes her stomach bleed         Current Outpatient Medications   Medication Sig Dispense Refill    montelukast (SINGULAIR) 10 mg tablet TAKE ONE TABLET BY MOUTH ONCE DAILY 90 Tab 3    mometasone (NASONEX) 50 mcg/actuation nasal spray 2 Sprays by Both Nostrils route daily for 30 days. 1 Container 3    albuterol-ipratropium (DUO-NEB) 2.5 mg-0.5 mg/3 ml nebu USE 1 VIAL WITH HAND HELD NEBULIZER EVERY 6 HOURS AS NEEDED 48 Nebule 2    gabapentin (NEURONTIN) 300 mg capsule Take 300 mg by mouth two (2) times a day.  OMEPRAZOLE PO Take  by mouth daily.  albuterol (PROVENTIL HFA, VENTOLIN HFA, PROAIR HFA) 90 mcg/actuation inhaler Take 1 Puff by inhalation every four (4) hours as needed for Wheezing. 1 Inhaler 0    albuterol (VENTOLIN HFA) 90 mcg/actuation inhaler INHALE 2 PUFFS EVERY 4 HOURS IF NEEDED FOR WHEEZING OR  FOR SHORTNESS OF BREATH 1 Inhaler 5    amLODIPine (NORVASC) 10 mg tablet Take 1 Tab by mouth daily.  7 Tab 0    candesartan-hydroCHLOROthiazide (ATACAND HCT) 32-12.5 mg per tablet TAKE ONE TABLET BY MOUTH ONCE DAILY 7 Tab 0    naloxone (NARCAN) 4 mg/actuation nasal spray Use 1 spray intranasally into 1 nostril. Use a new Narcan nasal spray for subsequent doses and administer into alternating nostrils. May repeat every 2 to 3 minutes as needed. Indications: OPIATE-INDUCED RESPIRATORY DEPRESSION, Opioid Toxicity 2 Each 0    lidocaine (LIDODERM) 5 % by TransDERmal route every twenty-four (24) hours. Apply patch to the affected area for 12 hours a day and remove for 12 hours a day.  levothyroxine (SYNTHROID) 125 mcg tablet Take 1 Tab by mouth Daily (before breakfast). 30 Tab 1    ergocalciferol (ERGOCALCIFEROL) 50,000 unit capsule Take 1 Cap by mouth every seven (7) days. 4 Cap 11    cholecalciferol (VITAMIN D3) 1,000 unit cap Take 1,000 Units by mouth daily.  b complex vitamins tablet Take 1 tablet by mouth daily.  Multivit-Iron-Min-Folic Acid (CENTRUM COMPLETE) 18-0.4 mg tab Take 1 Tab by mouth daily.  diclofenac (VOLTAREN) 1 % topical gel Apply 4 g to affected area four (4) times daily.  polyethylene glycol (MIRALAX) 17 gram packet Take 17 g by mouth daily. Review of Systems:    HEENT: No epistaxis. Nasal congestion / drainage one month, controlled with nasonex. No difficulty in swallowing, no redness in eyes  Respiratory: As stated above in HPI  Cardiovascular: no chest pain, no palpitations, no chronic leg edema, no syncope  Gastrointestinal: no abd pain, no vomiting, no diarrhea, no bleeding symptoms  Genitourinary: No urinary symptoms or hematuria  Integument/breast: No ulcers or rashes  Musculoskeletal: No leg / calf pain  Neurological: No focal weakness, no seizures, no headaches  Behvioral/Psych: No anxiety, no depression  Constitutional: No fever, chills or night sweats.   No decreased appetite or weight loss     Objective:     Visit Vitals  /90 (BP 1 Location: Left arm, BP Patient Position: Sitting)   Pulse 79   Temp 97 °F (36.1 °C) (Oral)   Resp 20   Ht 5' 2\" (1.575 m)   Wt 93 kg (205 lb)   SpO2 96%   BMI 37.49 kg/m²        PHYSICAL EXAM      General: Oriented to person, place, and time. Well-developed, well-nourished, and in no distress      Head:   Normocephalic, without obvious abnormality, atraumatic       Eyes:   Pupils reactive, conjunctivae / corneas clear. EOM's intact, no scleral icterus       Nose:   Nares normal, no drainage. Throat:    Lips, mucosa and tongue normal. Teeth and gums normal       Neck:   Supple, symmetrical, trachea midline. No adenopathy or thyroid swelling; no carotid bruit or JVD. CVS:    Regular rate and rhythm. S1S2 normal,  no murmurs       RS:      Symmetrical chest rise, good AE bilaterally. Mild diffuse wheezing throughout lung fields. No rales or rhonchi, no accessory muscle use      Abd:     Soft, non-tender.   No hepatosplenomegaly                                                     Neuro:   non focal, awake, alert and oriented to person, place, time and situation    Extrm:   no leg edema,  clubbing or cyanosis       Skin:   no rash    Data review:     Admission on 11/26/2018, Discharged on 11/26/2018   Component Date Value Ref Range Status    Ventricular Rate 11/26/2018 74  BPM Final    Atrial Rate 11/26/2018 74  BPM Final    P-R Interval 11/26/2018 142  ms Final    QRS Duration 11/26/2018 98  ms Final    Q-T Interval 11/26/2018 406  ms Final    QTC Calculation (Bezet) 11/26/2018 450  ms Final    Calculated P Axis 11/26/2018 18  degrees Final    Calculated R Axis 11/26/2018 -50  degrees Final    Calculated T Axis 11/26/2018 14  degrees Final    Diagnosis 11/26/2018    Final                    Value:Normal sinus rhythm  Left anterior fascicular block  Voltage criteria for left ventricular hypertrophy  Abnormal ECG  When compared with ECG of 29-AUG-2018 19:05,  T wave amplitude has decreased in Lateral leads  Confirmed by Alicja Morillo [id number]) on 11/28/2018 7:02:02 AM      WBC 11/26/2018 4.9  4.6 - 13.2 K/uL Final    RBC 11/26/2018 4.35  4.20 - 5.30 M/uL Final    HGB 11/26/2018 13.9  12.0 - 16.0 g/dL Final    HCT 11/26/2018 41.6  35.0 - 45.0 % Final    MCV 11/26/2018 95.6  74.0 - 97.0 FL Final    MCH 11/26/2018 32.0  24.0 - 34.0 PG Final    MCHC 11/26/2018 33.4  31.0 - 37.0 g/dL Final    RDW 11/26/2018 17.2* 11.6 - 14.5 % Final    PLATELET 54/48/2876 663  135 - 420 K/uL Final    MPV 11/26/2018 9.8  9.2 - 11.8 FL Final    NEUTROPHILS 11/26/2018 56  40 - 73 % Final    LYMPHOCYTES 11/26/2018 20* 21 - 52 % Final    MONOCYTES 11/26/2018 17* 3 - 10 % Final    EOSINOPHILS 11/26/2018 6* 0 - 5 % Final    BASOPHILS 11/26/2018 1  0 - 2 % Final    ABS. NEUTROPHILS 11/26/2018 2.8  1.8 - 8.0 K/UL Final    ABS. LYMPHOCYTES 11/26/2018 1.0  0.9 - 3.6 K/UL Final    ABS. MONOCYTES 11/26/2018 0.8  0.05 - 1.2 K/UL Final    ABS. EOSINOPHILS 11/26/2018 0.3  0.0 - 0.4 K/UL Final    ABS. BASOPHILS 11/26/2018 0.0  0.0 - 0.1 K/UL Final    DF 11/26/2018 AUTOMATED    Final    Sodium 11/26/2018 138  136 - 145 mmol/L Final    Potassium 11/26/2018 4.7  3.5 - 5.5 mmol/L Final    Chloride 11/26/2018 104  100 - 108 mmol/L Final    CO2 11/26/2018 30  21 - 32 mmol/L Final    Anion gap 11/26/2018 4  3.0 - 18 mmol/L Final    Glucose 11/26/2018 108* 74 - 99 mg/dL Final    BUN 11/26/2018 10  7.0 - 18 MG/DL Final    Creatinine 11/26/2018 0.83  0.6 - 1.3 MG/DL Final    BUN/Creatinine ratio 11/26/2018 12  12 - 20   Final    GFR est AA 11/26/2018 >60  >60 ml/min/1.73m2 Final    GFR est non-AA 11/26/2018 >60  >60 ml/min/1.73m2 Final    Comment: (NOTE)  Estimated GFR is calculated using the Modification of Diet in Renal   Disease (MDRD) Study equation, reported for both  Americans   (GFRAA) and non- Americans (GFRNA), and normalized to 1.73m2   body surface area. The physician must decide which value applies to   the patient.  The MDRD study equation should only be used in   individuals age 25 or older. It has not been validated for the   following: pregnant women, patients with serious comorbid conditions,   or on certain medications, or persons with extremes of body size,   muscle mass, or nutritional status.  Calcium 11/26/2018 8.8  8.5 - 10.1 MG/DL Final    Bilirubin, total 11/26/2018 0.2  0.2 - 1.0 MG/DL Final    ALT (SGPT) 11/26/2018 25  13 - 56 U/L Final    AST (SGOT) 11/26/2018 19  15 - 37 U/L Final    Alk. phosphatase 11/26/2018 115  45 - 117 U/L Final    Protein, total 11/26/2018 6.5  6.4 - 8.2 g/dL Final    Albumin 11/26/2018 3.4  3.4 - 5.0 g/dL Final    Globulin 11/26/2018 3.1  2.0 - 4.0 g/dL Final    A-G Ratio 11/26/2018 1.1  0.8 - 1.7   Final                   PULMONARY FUNCTION TESTS    Date FVC FEV1  FEV1/FVC DWI53-31 TLC RV RV/TLC VC DLCO   10/8/18 116% 96% 65 55%                                                  Imaging:  I have personally reviewed the patients radiographs and have reviewed the reports:  XR Results (most recent):  Results from Hospital Encounter encounter on 11/26/18   XR CHEST PA LAT    Narrative EXAM:  Chest Frontal and Lateral.    INDICATION:  Chest tightness, shortness of breath, hemoptysis     COMPARISON:  8/29/18     TECHNIQUE:  PA and lateral views. FINDINGS:      - New subtle right middle lobe streaky densities, best appreciated on the  lateral view. - No pleural effusion or pneumothorax is detected. - The cardiac silhouette, mediastinum and hilar regions are unremarkable. Impression IMPRESSION:    New right middle lobe streaky densities. Suggestive of developing pneumonia vs.  less likely atelectatic changes. CT Results (most recent):  Results from Hospital Encounter encounter on 08/29/18   CT ABD PELV W CONT    Narrative CT of abdomen and pelvis with contrast    History: Patient's stomach is burning and it hurts.  Lightheaded and dizzy    Comparison: August 17, 2012    Technique: Helical scan of the abdomen and pelvis was obtained  from the  diaphragm to the symphysis after  cc Omnipaque 350 contrast  administration. All CT scans at this facility are performed using dose  optimization technique as appropriate to a performed exam, to include on  automated exposure control, adjustment of the mA and/or KV according to  patient's size (including appropriate matching for site-specific examinations),  or use of iterative reconstruction technique. Findings:     Stable 5 mm left lung base nodule on axial image 10 dating back to August 2012  CT. Aristides Franklin Visualized heart is unremarkable. Postsurgical changes of the stomach,  colon and small bowel are noted. Several small hypodensities in the liver are  unchanged from 2012 abdomen CT and likely represent cysts. Liver is otherwise  unremarkable. Gallbladder is distended. The spleen, adrenal glands, kidneys  demonstrate no significant abnormality. There is mild pancreatic duct  prominence, unchanged from 2012 abdomen CT. No abdominal aortic aneurysm or  dissection. Bladder is nondistended. Appendix is within normal limits. No bowel  obstruction or ascites. Posterior fusion from L4 to S1. There is L3/L4 facet  arthropathy. No ascites. No bowel obstruction. Impression Impression:    No acute intra-abdominal abnormality noted. Postsurgical changes of the stomach, small bowel and colon are noted. No  evidence to suggest bowel obstruction.         Patient Active Problem List   Diagnosis Code    HTN (hypertension) I10    COPD (chronic obstructive pulmonary disease) ([facility] [address] J44.9    Neuropathy G62.9    Vitamin D deficiency E55.9    Nephropathy, membranous     History of multiple pulmonary nodules Z87.898    FH: colon cancer Z80.0    Rectocele N81.6    Chronic low back pain M54.5, G89.29    S/P cataract extraction Z98.49    Vitamin B12 deficiency E53.8    H/O colon cancer, stage II Z85.038    DDD (degenerative disc disease), lumbosacral M51.37    Spondylolisthesis, grade 1 M43.10    Encounter for long-term (current) use of other medications Z79.899    H/O lumbosacral spine surgery Z98.890    Chronic pain syndrome G89.4    DJD (degenerative joint disease), lumbar M47.816    Scar tissue L90.5    History of gastric bypass Z98.84    CAP (community acquired pneumonia) J18.9    Asthma exacerbation J45. [address]    CAIO (generalized anxiety disorder) F41.1    Lumbosacral neuritis M54.17    Cervicalgia M54.2    Shoulder pain, right M25.511    Cervical spondylosis M47.812    Advanced care planning/counseling discussion Z71.89    Right arm pain M79.601    Type 2 diabetes mellitus with microalbuminuria, without long-term current use of insulin ([facility]) E11.29, R80.9    Type 2 diabetes mellitus with diabetic neuropathy ([facility]) E11.40    Severe obesity ([facility]) E66.01       IMPRESSION:   · COPD / asthma- mild obstructive defect (PFT 10/8/18), with currentl exacerbation.   Not currently on ICS / LAMA  · SOB with exertion  · Cough - daily, productive of white / clear mucous      RECOMMENDATIONS:     Will trial symbicort 2 puffs twice daily, rinse mouth after use (no samples of advair HFA available) Discussed appropriate use of maintenance inhalers    Proposed course of prednisone - patient declined at this time due to increased fluid retention    Continue nasonex as needed for nasal congestion  / drainage    Take singulair 10 mg once daily- discussed benefits for allergy control, encouraged avoidance of triggers    Albuterol nebs or rescue inhaler every 4-6 hours as needed for increased wheezing, Shortness of breath or cough- discussed appropriate use of rescue inhalers    Increase PO fluids, use mucinex as needed, discussed bronchial hygiene with patient, patient verbalized understanding    Return 2 weeks or sooner with worsening of symptoms - will evaluate response to [address] NP

## 2019-04-18 ENCOUNTER — TELEPHONE (OUTPATIENT)
Dept: PULMONOLOGY | Age: 64
End: 2019-04-18

## 2019-04-18 NOTE — TELEPHONE ENCOUNTER
Pt states she has head congestion. She is using nasanex or Mucinex without relief.  Wants to know if you can suggest something else for the head congestion

## 2019-04-18 NOTE — TELEPHONE ENCOUNTER
Pt states the nasonex is not helping her head congestion. It is dripping in her chest and causing her to wheeze. Please clal [phone].

## 2019-04-26 ENCOUNTER — TELEPHONE (OUTPATIENT)
Dept: PULMONOLOGY | Age: 64
End: 2019-04-26

## 2019-04-26 NOTE — TELEPHONE ENCOUNTER
Pt states she talked to [facility] today and she thought that she was going to get a swish and swash med called in. Please call her at [phone].

## 2019-04-26 NOTE — TELEPHONE ENCOUNTER
Spoke with pt. She states she does not feel it is thrush and expressed that to Praneeth Shi NP. No thrush med sent in, only Advair. Patient verbalizes understand.  Pt sxs persist or worsens she will call back

## 2019-04-26 NOTE — TELEPHONE ENCOUNTER
Patient called with concerns regarding symbicort  side effects -she states her mouth is sore although she rinses mouth out after use. She reported that she does not believe it is thrush however she is requesting alternate therapy. She is requesting Advair HFA  instead and states that she has had previous good response. I informed patient that I will rx advair to her pharmacy. Patient has follow up visit in 2 weeks.

## 2019-04-30 ENCOUNTER — TELEPHONE (OUTPATIENT)
Dept: PULMONOLOGY | Age: 64
End: 2019-04-30

## 2019-04-30 NOTE — TELEPHONE ENCOUNTER
Pt wants to speak only with Dr. Jeanne Fournier. She states she does not want to see Vigrie anymore. She said there was some errors in meds and she is very sick. Please call asap to [phone].

## 2019-04-30 NOTE — TELEPHONE ENCOUNTER
Pt calling c/o \"not feeling well still\". Pt has been seen by Sam Rangel NP 4/15 and also 5 days ago by [facility] ER. Pt c/o sinus drainage, wheezing, cough with sputum tinged of blood and greenish in color. No fever. Was given Prednisone 50mg x 5 days and Zpak by ER. Finished yesterday for both. Pt insist on being seen by Dr. Jamil Cardona asap and will not see or talk with Sam Rangel NP. Pt was switched by powder form of inhaler due to thrush to Advair HFA but scared to start it. Is using Albuterol for wheezing, Mucinex for congestion but nothing helping.    [facility]

## 2019-05-01 NOTE — TELEPHONE ENCOUNTER
Spoke with pt and scheduled June appt with Dr. Jamil Cardona.  advised if any cancellations with Dr. Jamil Cardona to call pt to move to sooner appt.

## 2019-05-21 RX ORDER — IPRATROPIUM BROMIDE AND ALBUTEROL SULFATE 2.5; .5 MG/3ML; MG/3ML
SOLUTION RESPIRATORY (INHALATION)
Qty: 24 NEBULE | Refills: 5 | Status: SHIPPED | OUTPATIENT
Start: 2019-05-21

## 2019-06-12 ENCOUNTER — OFFICE VISIT (OUTPATIENT)
Dept: PULMONOLOGY | Age: 64
End: 2019-06-12

## 2019-06-12 VITALS
HEART RATE: 69 BPM | BODY MASS INDEX: 38.13 KG/M2 | WEIGHT: 207.2 LBS | TEMPERATURE: 97.5 F | DIASTOLIC BLOOD PRESSURE: 74 MMHG | HEIGHT: 62 IN | SYSTOLIC BLOOD PRESSURE: 120 MMHG | OXYGEN SATURATION: 98 % | RESPIRATION RATE: 16 BRPM

## 2019-06-12 DIAGNOSIS — J30.9 ALLERGIC RHINITIS, UNSPECIFIED SEASONALITY, UNSPECIFIED TRIGGER: ICD-10-CM

## 2019-06-12 DIAGNOSIS — J44.9 ASTHMA WITH COPD (HCC): Primary | ICD-10-CM

## 2019-06-12 DIAGNOSIS — E66.9 OBESITY (BMI 30-39.9): ICD-10-CM

## 2019-06-12 DIAGNOSIS — Z87.891 FORMER SMOKER: ICD-10-CM

## 2019-06-12 RX ORDER — PREGABALIN 25 MG/1
25 CAPSULE ORAL 2 TIMES DAILY
COMMUNITY
End: 2019-09-09 | Stop reason: SDUPTHER

## 2019-06-12 RX ORDER — TRAMADOL HYDROCHLORIDE 50 MG/1
50 TABLET ORAL
COMMUNITY
Start: 2019-05-17

## 2019-06-12 NOTE — PROGRESS NOTES
[place]  presents today for   Chief Complaint   Patient presents with    Follow-up     per patient request     Asthma     last seen 10/8/2018 (Dr. Dorita Hill) 4/15/2019 (CHERYL Zhang); CXR 4/25/2019    Allergic Rhinitis    Other     Reactive Airway Disease    COPD       Is someone accompanying this pt? No    Is the patient using any DME equipment during OV? Yes. nebulizer    -DME Company N/A    Depression Screening:  3 most recent PHQ Screens 6/12/2019   PHQ Not Done -   Little interest or pleasure in doing things Not at all   Feeling down, depressed, irritable, or hopeless Not at all   Total Score PHQ 2 0       Learning Assessment:  Learning Assessment 3/29/2018   PRIMARY LEARNER Patient   HIGHEST LEVEL OF EDUCATION - PRIMARY LEARNER  2 [place] LEARNER -   CO-LEARNER CAREGIVER No   PRIMARY LANGUAGE ENGLISH   LEARNER PREFERENCE PRIMARY READING   ANSWERED BY patient   RELATIONSHIP SELF       Abuse Screening:  Abuse Screening Questionnaire 3/29/2018   Do you ever feel afraid of your partner? N   Are you in a relationship with someone who physically or mentally threatens you? N   Is it safe for you to go home? Y       Fall Risk  Fall Risk Assessment, last 12 mths 3/29/2018   Able to walk? Yes   Fall in past 12 months? No         Coordination of Care:  1. Have you been to the ER, urgent care clinic since your last visit? Hospitalized since your last visit? Yes; [facility], When: 11/26/2018, 3/16/2019, & 4/26/2019-Right middle lobe pneumonia, asthma exacerbation & cough/SOB    2. Have you seen or consulted any other health care providers outside of the [address] [place] [address] since your last visit? Include any pap smears or colon screening. YesToshia Hutson    Medication variance in dosage/sig per patient as follows: None.

## 2019-06-12 NOTE — PROGRESS NOTES
HISTORY OF PRESENT ILLNESS  Everette Bowles is a 61 y.o. female. Pt with mild persistent asthma previously with frequent exacerbations requiring sick calls and Prednisone tapers which pt associates with living in a jessica house with a heavy smoker who is also a hoarder. Home situation has improved since then however pt with persistent thrush prompting discontinuation of inhaled steroids. Despite this, Asthma control has been good. Pt notes dramatic improvement in SOB and wheezing after  from her  who is a heavy smoker and hoarder. Pt's main complaint is nasal congestion with post nasal drip and cough due to this. She has Flonase Rx but uses this prn. No hemoptysis or chest pain. No fever. COPD   Pertinent negatives include no chest pain, no abdominal pain and no headaches. Shortness of breath: rare. Asthma   The history is provided by the patient. This is a chronic problem. The problem occurs daily. Progression since onset: recurring. Pertinent negatives include no chest pain, no abdominal pain and no headaches. Shortness of breath: rare. Nothing aggravates the symptoms. The symptoms are relieved by medications. Treatments tried: Albuterol. The treatment provided significant relief. Follow-up   Pertinent negatives include no chest pain, no abdominal pain and no headaches. Shortness of breath: rare. Allergic Rhinitis   Pertinent negatives include no chest pain, no abdominal pain and no headaches. Shortness of breath: rare. Other   Pertinent negatives include no chest pain, no abdominal pain and no headaches. Shortness of breath: rare. Review of Systems   Constitutional: Negative for chills, diaphoresis, fever, malaise/fatigue and weight loss. HENT: Negative for congestion, ear discharge, ear pain, hearing loss, nosebleeds, sinus pain, sore throat and tinnitus. Odynophagia   Eyes: Negative for blurred vision, double vision, photophobia, pain, discharge and redness. Respiratory: Positive for cough. Negative for hemoptysis, sputum production and stridor. Shortness of breath: rare. Wheezing: rare. Cardiovascular: Negative for chest pain, palpitations, orthopnea, claudication, leg swelling and PND. Gastrointestinal: Negative for abdominal pain, blood in stool, constipation, diarrhea, heartburn, melena, nausea and vomiting. Genitourinary: Negative for dysuria, flank pain, frequency, hematuria and urgency. Musculoskeletal: Negative for back pain, falls, joint pain, myalgias and neck pain. Skin: Negative for itching and rash. Neurological: Negative for dizziness, tingling, tremors, sensory change, speech change, focal weakness, seizures, loss of consciousness, weakness and headaches. Endo/Heme/Allergies: Negative for environmental allergies and polydipsia. Does not bruise/bleed easily. Psychiatric/Behavioral: Negative for depression, hallucinations, memory loss, substance abuse and suicidal ideas. The patient is not nervous/anxious and does not have insomnia. Past Medical History:   Diagnosis Date    Abdominal pain     Arthritis     Asthma 1/20/2010    Dr. Boo [facility])     stage 2 colon ca    Chemotherapy     Chronic low back pain 7/12/2010    Chronic lung disease     Chronic pain     Back    COPD (chronic obstructive pulmonary disease) ([facility] [address] 1/20/2010    Dr. Garcia July [facility]: colon cancer 1/20/2010    GERD (gastroesophageal reflux disease)     Gout     H/O colon cancer, stage II     s/p resection, last colonoscopy 11/11- q3 yrs    History of multiple pulmonary nodules 1/20/2010    Hoarse 2015    candida, sees ENT    HTN (hypertension) 1/20/2010    Hyperlipemia 1/20/2010    Hypothyroid 1/20/2010    IBS (irritable bowel syndrome)     Liver disease     cysts on liver    Mammogram declined     Multinodular thyroid     sees ENT.   Due for repeat U/S with ENT in Nov 2017    Nephropathy, membranous 1/20/2010    Neuropathy 1/20/2010    Nicotine use disorder 1/20/2010    Pancreatitis 08/2018    Prediabetes     PUD (peptic ulcer disease)     Pulmonary nodule     sees pulmonary    Rectocele 1/20/2010    S/P cataract extraction 1/3/2011    Thyroid disease     hypothyroid    Unspecified sleep apnea     does not use CPAP    Vitamin D deficiency 1/20/2010     Past Surgical History:   Procedure Laterality Date    ABDOMEN SURGERY PROC UNLISTED      colon resection 8/2005- Dr. Eunice Gordon- colon ca   Cyndie Manner      gastric bypass 12/2/09    HX GASTRIC BYPASS      HX GI      HX GYN      hysterectomy and BSO in 11/2006    HX HEENT      cataract sx tee    HX HYSTERECTOMY      Denies-patient states had ovaries removed during colon resection    HX LUMBAR FUSION  June 2013    L4 to L 5    HX TONSILLECTOMY       Current Outpatient Medications on File Prior to Visit   Medication Sig Dispense Refill    pregabalin (LYRICA) 25 mg capsule Take 25 mg by mouth two (2) times a day.  traMADol (ULTRAM) 50 mg tablet Take 50 mg by mouth every six (6) hours as needed.  albuterol-ipratropium (DUO-NEB) 2.5 mg-0.5 mg/3 ml nebu USE 1 VIAL WITH HAND HELD NEBULIZER EVERY 6 HOURS AS NEEDED 24 Nebule 5    montelukast (SINGULAIR) 10 mg tablet TAKE ONE TABLET BY MOUTH ONCE DAILY 90 Tab 3    OMEPRAZOLE PO Take 1 Tab by mouth daily.  albuterol (VENTOLIN HFA) 90 mcg/actuation inhaler INHALE 2 PUFFS EVERY 4 HOURS IF NEEDED FOR WHEEZING OR  FOR SHORTNESS OF BREATH 1 Inhaler 5    amLODIPine (NORVASC) 10 mg tablet Take 1 Tab by mouth daily. 7 Tab 0    candesartan-hydroCHLOROthiazide (ATACAND HCT) 32-12.5 mg per tablet TAKE ONE TABLET BY MOUTH ONCE DAILY 7 Tab 0    naloxone (NARCAN) 4 mg/actuation nasal spray Use 1 spray intranasally into 1 nostril. Use a new Narcan nasal spray for subsequent doses and administer into alternating nostrils. May repeat every 2 to 3 minutes as needed.   Indications: OPIATE-INDUCED RESPIRATORY DEPRESSION, Opioid Toxicity 2 Each 0    lidocaine (LIDODERM) 5 % by TransDERmal route every twenty-four (24) hours. Apply patch to the affected area for 12 hours a day and remove for 12 hours a day.  levothyroxine (SYNTHROID) 125 mcg tablet Take 1 Tab by mouth Daily (before breakfast). 30 Tab 1    ergocalciferol (ERGOCALCIFEROL) 50,000 unit capsule Take 1 Cap by mouth every seven (7) days. 4 Cap 11    cholecalciferol (VITAMIN D3) 1,000 unit cap Take 1,000 Units by mouth daily.  b complex vitamins tablet Take 1 tablet by mouth daily.  Multivit-Iron-Min-Folic Acid (CENTRUM COMPLETE) 18-0.4 mg tab Take 1 Tab by mouth daily.  diclofenac (VOLTAREN) 1 % topical gel Apply 4 g to affected area four (4) times daily.  polyethylene glycol (MIRALAX) 17 gram packet Take 17 g by mouth daily.  gabapentin (NEURONTIN) 300 mg capsule Take 300 mg by mouth two (2) times a day.  albuterol (PROVENTIL HFA, VENTOLIN HFA, PROAIR HFA) 90 mcg/actuation inhaler Take 1 Puff by inhalation every four (4) hours as needed for Wheezing. 1 Inhaler 0     No current facility-administered medications on file prior to visit.       Allergies   Allergen Reactions    Breo Ellipta [Fluticasone Furoate-Vilanterol] Hoarseness     Yeast problem w/Powdered MDIs    Ace Inhibitors Cough    Aspirin Other (comments)     GI bleeding & pain    Nsaids (Non-Steroidal Anti-Inflammatory Drug) Other (comments)     Makes her stomach bleed       Social History     Socioeconomic History    Marital status:      Spouse name: Not on file    Number of children: Not on file    Years of education: Not on file    Highest education level: Not on file   Occupational History    Occupation:      Employer: NOT EMPLOYED   Social Needs    Financial resource strain: Not on file    Food insecurity:     Worry: Not on file     Inability: Not on file    Transportation needs:     Medical: Not on file Non-medical: Not on file   Tobacco Use    Smoking status: Former Smoker     Packs/day: 0.50     Years: 18.00     Pack years: 9.00     Types: Cigarettes     Last attempt to quit: 1990     Years since quittin.8    Smokeless tobacco: Never Used    Tobacco comment: per patient she has not smoked in over 30 years but significant second hand smoke exposure at home   Substance and Sexual Activity    Alcohol use: Yes     Alcohol/week: 0.0 oz     Comment: rare    Drug use: No    Sexual activity: Yes     Partners: Male     Birth control/protection: None, Surgical   Lifestyle    Physical activity:     Days per week: Not on file     Minutes per session: Not on file    Stress: Not on file   Relationships    Social connections:     Talks on phone: Not on file     Gets together: Not on file     Attends Ca[employer] service: Not on file     Active member of club or organization: Not on file     Attends meetings of clubs or organizations: Not on file     Relationship status: Not on file    Intimate partner violence:     Fear of current or ex partner: Not on file     Emotionally abused: Not on file     Physically abused: Not on file     Forced sexual activity: Not on file   Other Topics Concern    Not on file   Social History Narrative    Not on file     Blood pressure 120/74, pulse 69, temperature 97.5 °F (36.4 °C), temperature source Oral, resp. rate 16, height 5' 2\" (1.575 m), weight 94 kg (207 lb 3.2 oz), SpO2 98 %. Physical Exam   Constitutional: She is oriented to person, place, and time. She appears well-developed. No distress. Overweight    HENT:   Head: Normocephalic and atraumatic. Nose: Nose normal.   Mouth/Throat: Oropharynx is clear and moist. No oropharyngeal exudate. No oral thrush, left greater than right nasal mucosal erythema and swelling   Eyes: Pupils are equal, round, and reactive to light. Conjunctivae are normal. Right eye exhibits no discharge. Left eye exhibits no discharge.  No scleral icterus. Neck: No JVD present. No tracheal deviation present. No thyromegaly present. Cardiovascular: Normal rate, regular rhythm, normal heart sounds and intact distal pulses. Exam reveals no gallop and no friction rub. No murmur heard. Pulmonary/Chest: Effort normal and breath sounds normal. No stridor. No respiratory distress. She has no wheezes. She has no rales. She exhibits no tenderness. Abdominal: Soft. She exhibits no mass. There is no tenderness. There is no rebound. Musculoskeletal: She exhibits no edema, tenderness or deformity. Old L spine surgical scar   Lymphadenopathy:     She has no cervical adenopathy. Neurological: She is alert and oriented to person, place, and time. Coordination normal.   Skin: Skin is warm and dry. No rash noted. She is not diaphoretic. No erythema. No pallor. Psychiatric: She has a normal mood and affect. Her behavior is normal. Judgment and thought content normal.     PFT: normal FEV1, reduced ration. Much improved from study done in 2017  ASSESSMENT and PLAN  Encounter Diagnoses   Name Primary?  Asthma with COPD ([facility] [address] Yes    Former smoker     Obesity (BMI 30-39. 9)     Allergic rhinitis, unspecified seasonality, unspecified trigger      Pt with good control of Asthma despite having to discontinue maintenance medications. Continue rescue Albuterol prn. Discussed need for continued trigger avoidance. Start nasal saline rinses bid. Pt given samples and instructions on use. Resume Flonase spray. Pt instructed on proper technique.   RTC 3 months

## 2019-06-27 ENCOUNTER — OFFICE VISIT (OUTPATIENT)
Dept: PAIN MANAGEMENT | Age: 64
End: 2019-06-27

## 2019-06-27 ENCOUNTER — HOSPITAL ENCOUNTER (OUTPATIENT)
Dept: GENERAL RADIOLOGY | Age: 64
Discharge: HOME | End: 2019-06-27
Payer: MEDICARE

## 2019-06-27 VITALS
DIASTOLIC BLOOD PRESSURE: 79 MMHG | TEMPERATURE: 97.2 F | HEIGHT: 62 IN | OXYGEN SATURATION: 96 % | SYSTOLIC BLOOD PRESSURE: 144 MMHG | HEART RATE: 68 BPM | RESPIRATION RATE: 20 BRPM | WEIGHT: 207 LBS | BODY MASS INDEX: 38.09 KG/M2

## 2019-06-27 DIAGNOSIS — G62.9 PERIPHERAL POLYNEUROPATHY: ICD-10-CM

## 2019-06-27 DIAGNOSIS — Z98.1 HISTORY OF LUMBAR FUSION: ICD-10-CM

## 2019-06-27 DIAGNOSIS — M46.1 BILATERAL SACROILIITIS (HCC): ICD-10-CM

## 2019-06-27 DIAGNOSIS — M47.816 LUMBAR SPONDYLOSIS: ICD-10-CM

## 2019-06-27 DIAGNOSIS — M47.897 OTHER OSTEOARTHRITIS OF SPINE, LUMBOSACRAL REGION: Primary | ICD-10-CM

## 2019-06-27 DIAGNOSIS — M47.897 OTHER OSTEOARTHRITIS OF SPINE, LUMBOSACRAL REGION: ICD-10-CM

## 2019-06-27 PROCEDURE — 72110 X-RAY EXAM L-2 SPINE 4/>VWS: CPT

## 2019-06-27 NOTE — PROGRESS NOTES
Nursing Notes    Patient presents to the office today for a new pt consult with Dr. Jesica Ovalles for her peripheral neuropathy and chronic lower back pain. Patient rates her pain at 9/10 on the numerical pain scale.  reviewed YES  Any aberrancies noted on  NO  Last opioid agreement N/A  Last urine drug screen N/A    Comments:     POC UDS was not performed in office today. Any new labs or imaging since last appointment? YES. Pt reports that she had some labs done with her pcp. Results not in pt's chart. Have you been to an emergency room (ER) or urgent care clinic since your last visit? NO            Have you been hospitalized since your last visit? NO     If yes, where, when, and reason for visit? Have you seen or consulted any other health care providers outside of the [address] [place] [address]  since your last visit? YES    If yes, where, when, and reason for visit? pulmonology  Ms. Cris Chen has a reminder for a \"due or due soon\" health maintenance. I have asked that she contact her primary care provider for follow-up on this health maintenance.     3 most recent PHQ Screens 6/27/2019   PHQ Not Done -   Little interest or pleasure in doing things Not at all   Feeling down, depressed, irritable, or hopeless Not at all   Total Score PHQ 2 0   Trouble falling or staying asleep, or sleeping too much Not at all   Feeling tired or having little energy Not at all   Poor appetite, weight loss, or overeating Not at all   Feeling bad about yourself - or that you are a failure or have let yourself or your family down Not at all   Trouble concentrating on things such as school, work, reading, or watching TV Not at all   Moving or speaking so slowly that other people could have noticed; or the opposite being so fidgety that others notice Not at all   Thoughts of being better off dead, or hurting yourself in some way Not at all   PHQ 9 Score 0   How difficult have these problems made it for you to do your work, take care of your home and get along with others Not difficult at all     Fall Risk Assessment, last 12 mths 6/27/2019   Able to walk? Yes   Fall in past 12 months? No     Abuse Screening Questionnaire 6/27/2019   Do you ever feel afraid of your partner? N   Are you in a relationship with someone who physically or mentally threatens you? N   Is it safe for you to go home?  Lucila Ruby

## 2019-06-27 NOTE — PATIENT INSTRUCTIONS
Piriformis Syndrome: Exercises Your Care Instructions Here are some examples of typical rehabilitation exercises for your condition. Start each exercise slowly. Ease off the exercise if you start to have pain. Your doctor or physical therapist will tell you when you can start these exercises and which ones will work best for you. How to do the exercises Hip rotator stretch 1. Lie on your back with both knees bent and your feet flat on the floor. 2. Put the ankle of your affected leg on your opposite thigh near your knee. 3. Use your hand to gently push your knee (on your affected leg) away from your body until you feel a gentle stretch around your hip. 4. Hold the stretch for 15 to 30 seconds. 5. Repeat 2 to 4 times. 6. Switch legs and repeat steps 1 through 5. Piriformis stretch 1. Lie on your back with your legs straight. 2. Lift your affected leg and bend your knee. With your opposite hand, reach across your body, and then gently pull your knee toward your opposite shoulder. 3. Hold the stretch for 15 to 30 seconds. 4. Repeat with your other leg. 5. Repeat 2 to 4 times on each side. Lower abdominal strengthening 1. Lie on your back with your knees bent and your feet flat on the floor. 2. Tighten your belly muscles by pulling your belly button in toward your spine. 3. Lift one foot off the floor and bring your knee toward your chest, so that your knee is straight above your hip and your leg is bent like the letter \"L. \" 
4. Lift the other knee up to the same position. 5. Lower one leg at a time to the starting position. 6. Keep alternating legs until you have lifted each leg 8 to 12 times. 7. Be sure to keep your belly muscles tight and your back still as you are moving your legs. Be sure to breathe normally. Follow-up care is a key part of your treatment and safety.  Be sure to make and go to all appointments, and call your doctor if you are having problems. It's also a good idea to know your test results and keep a list of the medicines you take. Current as of: September 20, 2018 Content Version: 11.9 © 8186-5496 [employer]. Care instructions adapted under license by [employer] (which disclaims liability or warranty for this information). If you have questions about a medical condition or this instruction, always ask your healthcare professional. [place] [address] any warranty or liability for your use of this information.

## 2019-06-27 NOTE — PROGRESS NOTES
RENEE -66%  COMM--15    HPI:  Rahat Schmitt is a 61 y.o. female here for initial visit referred by Dr. Lola Barger, PCP, for evaluation of peripheral neuropathy and chronic lower back pain. Pain is currently rated as 9/10. The bilateral upper extremity neuropathic pain began with insidious onset with unknown mechanism at the same time around 1999. The symptoms of numbness with pins-and-needles and stabbing pain have been progressively worsening since onset. The symptoms are located at the palmar surface of digits 2 through 5 bilaterally from the MCP crease distally. There are no symptoms on the dorsal side of the hand or in the palm. She does have intermittent, waxing and waning bilateral wrist weakness. She also states that sometimes the fingers go numb and go dead and she has drop attacks. She reports positive bilateral flick sign. She feels like there is intense electricity in bilateral hands. The symptoms are worse at night and when she is sleeping and with prolonged fine motor activity of the hands. Symptoms improve with Lyrica and Neurontin. She reports no perceived benefit from diclofenac gel. She had no change in her symptoms following her chemotherapy treatment for colon cancer approximately 12 years ago. She has had no use of orthotics for the symptoms no physical therapy or occupational therapy, and no procedures done to aid the peripheral neuropathy. She denies any trials of topical agents to help with the neuropathy in the hands. She reports that electrodiagnostic studies were done in the upper extremities twice greater than 15 years ago. The reports are not available for review but the patient reports right greater than left involvement of the upper and lower extremity but abnormalities in all 4 extremities. --------------------------------  She reports insidious onset with unknown mechanism of lumbar pain approximately 10 years ago.   This eventually led to an L4-S1 instrumented fusion done in 2014 by Dr. Little Casarez. The fusion gave significant relief for about 6 months prior to return of the previous symptoms but with more intensity. Her last follow-up with Dr. Little Casarez was in April 2019. The pain is located bilaterally and in midline across the lumbar region from upper lumbar to the lumbosacral junction. The pain ranges from 8-10 over 10. The pain is described as a constant aching to stabbing pain with intermittent burning. Her symptoms are worsened with supine to sit to stand transfers, movement in all planes of the lumbar spine, and with land-based physical therapy. Her symptoms improve with a hot bath, aquatic physical therapy which was last attempted about 3 years ago, tramadol, oxycodone, TENS unit (which is currently broken), and diazepam.  She reports no perceived benefit from use of Lyrica. --7/6/2015 and again on 6-15 bilateral L5-S1 transforaminal epidural steroid injections. Patient states that these worsened her symptoms are providing no relief at all. --Previous procedures done at Dr. Trinidad Pelletier office were better tolerated and provided significant and prolonged relief. This appears to be bilateral sacroiliac joint injections done in 2013. She has a history of colon cancer treated with surgery and chemotherapy. Current/recent pain related medications include:   Allergy to NSAIDs and aspirin. (Not a true allergy but patient has history of peptic ulcer disease and gastric bypass)   Diclofenac gel is helpful for her back pain   Lidoderm patches are helpful for her back pain   Narcan intranasal rescue spray   MiraLAX for constipation   Lyrica 25 mg twice daily   Tramadol 50 mg up to 3 times daily as needed from primary care provides partial relief of her primary lower back pain.     ROS:Review of systems is negative for fever, chills, nausea, vomiting, diarrhea, constipation, chest pain, shortness of breath, abdominal pain, weakness, trouble swallowing, acute changes in vision, acute changes in hearing, falls, dizziness, bladder incontinence, bowel incontinence, depression,  suicidal ideation, homicidal ideation  Review of systems positive for anxiety, alcohol use. Opioid specific risk: Obesity, peptic ulcer disease, history of colon cancer with history of partial colectomy, COPD, depression, GERD, history of rectocele, hypothyroid, sleep apnea, history of gastric bypass, diabetes, anxiety, alcohol use. Chronic benzodiazepine use    Opioid specific history: The last 2 years she was received tramadol and oxycodone consistently concurrently with repeated prescriptions for diazepam      Imaging: Review of numerous abdominal CTs revealed the presence of instrumented fusion from L4-S1 with L5 laminectomy. Vitals:    06/27/19 1047   BP: 144/79   Pulse: 68   Resp: 20   Temp: 97.2 °F (36.2 °C)   TempSrc: Oral   SpO2: 96%   Weight: 93.9 kg (207 lb)   Height: 5' 2\" (1.575 m)   PainSc:   9   PainLoc: Back        PE:  AFVSS, no acute distress, endomorphic body habitus. A&OXs 3.  normocephalic, atraumatic. Conjugate gaze, clear sclerae. Breathing is nonlabored  Speech is clear and appropriate. Mood is pleasant and appropriate. Despite current pain being reported as 9/10. Patient is cooperative. AROM for lumbar flexion is reduced by 25 %  without  reproduction of the primary pain. AROM for lumbar extension is reduced by 50 %without reproduction of the primary pain. Positive lumbar facet loading bilaterally. Tenderness to palpation along the lumbar spine and midline, bilateral lumbar paraspinals, lumbosacral junction, bilateral sacroiliac joint regions, bilateral piriformis muscles, bilateral trochanters. Strength for right upper extremity is 5/5 for  elbow flexion, wrist extension, elbow extension, finger abduction, flexor digitorum profundus to digits 2 through 5.    Strength for left upper extremity is 4/5 elbow flexion, 4/5 wrist extension, 5/5 elbow extension, 5/5 finger abduction, 4/5 flexor digitorum profundus to digits 2 through 5. No hand atrophy is noted bilaterally. Strength for right lower extremity is 5/5 for hip flexion, knee extension, dorsiflexion, extensor hallucis longus, plantar flexion. Strength for left lower extremity is 5/5 for hip flexion, knee extension, dorsiflexion, extensor hallucis longus, plantar flexion. Neg Tinnel's bilat wrists and left elbow, positive at right elbow. Muscle Stretch reflexes for right lower extremity are 1+ for L4,  S1.   Muscle Stretch reflexes for left  lower extremity are 1+ for L4, S1.   Negative ankle clonus on the right and the left. .   Negative Hoffmans on the right and the left. Negative seated straight leg raise bilaterally. Negative FABERs on the right and the left. Negative Stinchfield's on the right and the left. Gait is within functional limits with antalgia. Balance is within functional limits. Sensation to light touch is intact for left C4-T1 and right C4-T1 and right L2-S2 and left L2-S2.       Primary Care Physician  [address] [place] [address]  [phone]      PHQ -- .  3 most recent PHQ Screens 6/27/2019   PHQ Not Done -   Little interest or pleasure in doing things Not at all   Feeling down, depressed, irritable, or hopeless Not at all   Total Score PHQ 2 0   Trouble falling or staying asleep, or sleeping too much Not at all   Feeling tired or having little energy Not at all   Poor appetite, weight loss, or overeating Not at all   Feeling bad about yourself - or that you are a failure or have let yourself or your family down Not at all   Trouble concentrating on things such as school, work, reading, or watching TV Not at all   Moving or speaking so slowly that other people could have noticed; or the opposite being so fidgety that others notice Not at all   Thoughts of being better off dead, or hurting yourself in some way Not at all PHQ 9 Score 0   How difficult have these problems made it for you to do your work, take care of your home and get along with others Not difficult at all         Assessment/Plan:     ICD-10-CM ICD-9-CM    1. Other osteoarthritis of spine, lumbosacral region M47.897 721.3 AMB SUPPLY ORDER      CANCELED: XR SPINE LUMBAR BEND/FLEXION EXTENSION   2. Bilateral sacroiliitis (HCC) M46.1 720.2    3. History of lumbar fusion Z98.1 V45.4 AMB SUPPLY ORDER      CANCELED: XR SPINE LUMBAR BEND/FLEXION EXTENSION   4. Lumbar spondylosis M47.816 721.3 AMB SUPPLY ORDER      CANCELED: XR SPINE LUMBAR BEND/FLEXION EXTENSION   5. Peripheral polyneuropathy G62.9 356.9         --I do not recommend long-term opioid therapy for this person's chronic pain. I believe the risks outweigh any potential benefits. She has multiple risk factors associated with long-term opioid therapy. Please see the list above. The patient also has a history of bouncing from pain clinic the pain clinic. She was at [address] [place] [address] for pain management around 2005, transferred to Dr. Daniella Vazquez service, transferred back to [facility], transferred to [place] [address], transferred to [facility] pain care, and now is attempting to resume treatment at [facility].    --Continue diclofenac gel as needed   Continue Lyrica 25 mg twice daily and consider upward titration to effect and tolerance.  Continue Lidoderm patches as needed.  Resume over-the-counter Tylenol 500 to 1000 mg up to 3 times daily with a maximum of 3000 mg daily. --Continue ice and heat as needed   We will arrange for trial of H wave therapy. --For her chronic lower back pain with history of L4-S1 instrumented fusion we will obtain lumbar flexion-extension films and Alfonso views. In reviewing images of the lumbar spine through a CT of the abdomen and pelvis there was potentially evidence of a lumbosacral transitional vertebrae.   Corinne Maggie view will help us to identify if pseudoarticulations are present and if so we should provide the patient with diagnostic injections into the pseudoarticulations prior to other procedures. If no pseudoarticulations are present I would recommend bilateral medial branch blocks/radiofrequency ablation at the L3-L4 facet joints. First the patient would like to repeat the procedure last done at Dr. Elba Montes office which upon further review of extensive medical records is revealed to be bilateral sacroiliac joint injections. --If medial branch blocks and/or injections into the pseudoarticulations failed to provide significant relief of her primary lumbosacral pain, she will be referred to [facility] pain clinic to be evaluated for targeted drug delivery via intrathecal pump. She is far too many risk factors to consider long-term opioid therapy as a viable option for managing her chronic pain. An intrathecal pump may provide improved safety profile for her health and her behaviors while helping her to better control her chronic pain, improve her function, and improve her quality of life. --She reports the previous injections with Dr. Elba fuentes were the most helpful of the procedures that she has had done to her lumbosacral region. In review of the records on encounter from February 10, 2014 Dr. Makeda Lewis stated that the patient reported ongoing 50% relief from bilateral sacroiliac joint injections done on November 13, 2013. We can repeat bilateral sacroiliac joint injections for the patient as needed. Patient also had successful bilateral greater trochanteric bursa injections done in Dr. Elba fuentes which can be repeated here as needed. --Patient to maintain very close follow-up with her orthopedic surgeon. --Regarding the bilateral upper extremity peripheral neuropathy, we will consider repeating the electrodiagnostic studies. Pt will return in 2 months for f/u with Pilar/David.     GOALS:  To establish complementary and integrative plan of care to address chronic pain issues while minimizing pharmaceuticals to maximize patient's function improve quality of life. A total of 45 minutes were spent with the patient, of which more than half of the time was spent counseling and/or coordinating care. F/u:. Follow-up and Dispositions    · Return in about 2 months (around 2019) for 30 min. Social History     Socioeconomic History    Marital status:      Spouse name: Not on file    Number of children: Not on file    Years of education: Not on file    Highest education level: Not on file   Occupational History    Occupation:      Employer: NOT EMPLOYED   Social Needs    Financial resource strain: Not on file    Food insecurity:     Worry: Not on file     Inability: Not on file    Transportation needs:     Medical: Not on file     Non-medical: Not on file   Tobacco Use    Smoking status: Former Smoker     Packs/day: 0.50     Years: 18.00     Pack years: 9.00     Types: Cigarettes     Last attempt to quit: 1990     Years since quittin.8    Smokeless tobacco: Never Used    Tobacco comment: per patient she has not smoked in over 30 years but significant second hand smoke exposure at home   Substance and Sexual Activity    Alcohol use:  Yes     Alcohol/week: 0.0 oz     Comment: rare    Drug use: No    Sexual activity: Yes     Partners: Male     Birth control/protection: None, Surgical   Lifestyle    Physical activity:     Days per week: Not on file     Minutes per session: Not on file    Stress: Not on file   Relationships    Social connections:     Talks on phone: Not on file     Gets together: Not on file     Attends Scientologist service: Not on file     Active member of club or organization: Not on file     Attends meetings of clubs or organizations: Not on file     Relationship status: Not on file    Intimate partner violence:     Fear of current or ex partner: Not on file     Emotionally abused: Not on file     Physically abused: Not on file     Forced sexual activity: Not on file   Other Topics Concern    Not on file   Social History Narrative    Not on file     Family History   Problem Relation Age of Onset    Asthma Mother     Diabetes Mother     Hypertension Mother    [facility] Elevated Lipids Mother     Elevated Lipids Father     Hypertension Father     Heart Disease Father         chf    Heart Disease Sister         older     Allergies   Allergen Reactions    Olaf Newsome [Fluticasone Furoate-Vilanterol] Hoarseness     Yeast problem w/Powdered MDIs    Ace Inhibitors Cough    Aspirin Other (comments)     GI bleeding & pain    Nsaids (Non-Steroidal Anti-Inflammatory Drug) Other (comments)     Makes her stomach bleed       Past Medical History:   Diagnosis Date    Abdominal pain     Arthritis     Asthma 1/20/2010    Dr. Ott Amel [facility])     stage 2 colon ca    Chemotherapy     Chronic low back pain 7/12/2010    Chronic lung disease     Chronic pain     Back    [facility]ch[facility] 1/20/2010    Dr. Serjio Martel [facility]: colon cancer 1/20/2010    GERD (gastroesophageal reflux disease)     Gout     H/O colon cancer, stage II     s/p resection, last colonoscopy 11/11- q3 yrs    History of multiple pulmonary nodules 1/20/2010    Hoarse 2015    candida, sees ENT    HTN (hypertension) 1/20/2010    Hyperlipemia 1/20/2010    Hypothyroid 1/20/2010    IBS (irritable bowel syndrome)     Liver disease     cysts on liver    Mammogram declined     Multinodular thyroid     sees ENT.   Due for repeat U/S with ENT in Nov 2017    Nephropathy, membranous 1/20/2010    Neuropathy 1/20/2010    Nicotine use disorder 1/20/2010    Pancreatitis 08/2018    Prediabetes     PUD (peptic ulcer disease)     Pulmonary nodule     sees pulmonary    Rectocele 1/20/2010    S/P cataract extraction 1/3/2011    Thyroid disease     hypothyroid    Unspecified sleep apnea     does not use CPAP    Vitamin D deficiency 1/20/2010     Past Surgical History:   Procedure Laterality Date    ABDOMEN SURGERY PROC UNLISTED      colon resection 8/2005- Dr. Yg De Los Santos- colon ca   Екатерина Doroteo      gastric bypass 12/2/09    HX GASTRIC BYPASS      HX GI      HX GYN      hysterectomy and BSO in 11/2006    HX HEENT      cataract sx tee    HX HYSTERECTOMY      Denies-patient states had ovaries removed during colon resection    HX LUMBAR FUSION  June 2013    L4 to L 5    HX TONSILLECTOMY       Current Outpatient Medications on File Prior to Visit   Medication Sig    pregabalin (LYRICA) 25 mg capsule Take 25 mg by mouth two (2) times a day.  traMADol (ULTRAM) 50 mg tablet Take 50 mg by mouth every eight (8) hours as needed.  albuterol-ipratropium (DUO-NEB) 2.5 mg-0.5 mg/3 ml nebu USE 1 VIAL WITH HAND HELD NEBULIZER EVERY 6 HOURS AS NEEDED    montelukast (SINGULAIR) 10 mg tablet TAKE ONE TABLET BY MOUTH ONCE DAILY    OMEPRAZOLE PO Take 1 Tab by mouth daily.  albuterol (PROVENTIL HFA, VENTOLIN HFA, PROAIR HFA) 90 mcg/actuation inhaler Take 1 Puff by inhalation every four (4) hours as needed for Wheezing.  albuterol (VENTOLIN HFA) 90 mcg/actuation inhaler INHALE 2 PUFFS EVERY 4 HOURS IF NEEDED FOR WHEEZING OR  FOR SHORTNESS OF BREATH    amLODIPine (NORVASC) 10 mg tablet Take 1 Tab by mouth daily.  lidocaine (LIDODERM) 5 % by TransDERmal route every twenty-four (24) hours. Apply patch to the affected area for 12 hours a day and remove for 12 hours a day.  levothyroxine (SYNTHROID) 125 mcg tablet Take 1 Tab by mouth Daily (before breakfast).  ergocalciferol (ERGOCALCIFEROL) 50,000 unit capsule Take 1 Cap by mouth every seven (7) days.  cholecalciferol (VITAMIN D3) 1,000 unit cap Take 1,000 Units by mouth daily.  b complex vitamins tablet Take 1 tablet by mouth daily.     Multivit-Iron-Min-Folic Acid (CENTRUM COMPLETE) 18-0.4 mg tab Take 1 Tab by mouth daily.  polyethylene glycol (MIRALAX) 17 gram packet Take 17 g by mouth daily.  gabapentin (NEURONTIN) 300 mg capsule Take 300 mg by mouth two (2) times a day.  candesartan-hydroCHLOROthiazide (ATACAND HCT) 32-12.5 mg per tablet TAKE ONE TABLET BY MOUTH ONCE DAILY    naloxone (NARCAN) 4 mg/actuation nasal spray Use 1 spray intranasally into 1 nostril. Use a new Narcan nasal spray for subsequent doses and administer into alternating nostrils. May repeat every 2 to 3 minutes as needed. Indications: OPIATE-INDUCED RESPIRATORY DEPRESSION, Opioid Toxicity    diclofenac (VOLTAREN) 1 % topical gel Apply 4 g to affected area four (4) times daily. No current facility-administered medications on file prior to visit.

## 2019-06-28 NOTE — PROGRESS NOTES
Reviewed. Will review with patient at next office  Visit. We will proceed with bilateral sacroiliac joint injections.

## 2019-07-01 ENCOUNTER — DOCUMENTATION ONLY (OUTPATIENT)
Dept: PAIN MANAGEMENT | Age: 64
End: 2019-07-01

## 2019-07-01 NOTE — PROGRESS NOTES
The pt's order for H-wave, last office note and demographics were faxed over to -[facility] for review. A fax confirmation was received and they will contact the pt.

## 2019-07-02 ENCOUNTER — TELEPHONE (OUTPATIENT)
Dept: PAIN MANAGEMENT | Age: 64
End: 2019-07-02

## 2019-07-02 NOTE — TELEPHONE ENCOUNTER
Patient called the office today about a device that Conner Sheridan ordered for her. Patient wanted to know if Jenaro Junior can give her a call back.

## 2019-07-02 NOTE — TELEPHONE ENCOUNTER
The pt was contacted about her message. She states that she was contacted by the [employer] representative. She was told that her insurance will not cover the H-wave and will cost her 180.00 a month. She is on a fixed income and will not be able to afford this machine. Ms. Saima Ge was informed that the provider is out of the office for 2 weeks. She verbalized understanding and stated that she just wanted to make him aware. She is still waiting to be scheduled for injections. Pt was made aware that she will be called once the provider has had a chance to review the message and if there are any new recommendations. No questions from the pt at this time.

## 2019-07-05 ENCOUNTER — TELEPHONE (OUTPATIENT)
Dept: PULMONOLOGY | Age: 64
End: 2019-07-05

## 2019-07-05 RX ORDER — PREDNISONE 10 MG/1
TABLET ORAL
Qty: 18 TAB | Refills: 0 | Status: SHIPPED | OUTPATIENT
Start: 2019-07-05 | End: 2020-03-30 | Stop reason: ALTCHOICE

## 2019-07-05 NOTE — TELEPHONE ENCOUNTER
The pt. C/o URI x 3 days and worsening. Chest congestion with prod. Of beige sputum. SOB scale 6/10; on no prednisone but, requesting some. Denies wheezing. Denies elevated temp. [facility] Dr. Baltazar Mackey placed a Prednisone taper to the [address]. The pt. Is notified of same.

## 2019-07-05 NOTE — TELEPHONE ENCOUNTER
Pt requesting Prednisone due to chest congestion and a productive cough. [facility]. Please advise [phone].

## 2019-07-31 ENCOUNTER — HOSPITAL ENCOUNTER (OUTPATIENT)
Age: 64
Setting detail: OUTPATIENT SURGERY
Discharge: HOME | End: 2019-07-31
Attending: PHYSICAL MEDICINE & REHABILITATION | Admitting: PHYSICAL MEDICINE & REHABILITATION
Payer: MEDICARE

## 2019-07-31 ENCOUNTER — APPOINTMENT (OUTPATIENT)
Dept: GENERAL RADIOLOGY | Age: 64
End: 2019-07-31
Attending: PHYSICAL MEDICINE & REHABILITATION
Payer: MEDICARE

## 2019-07-31 VITALS
RESPIRATION RATE: 18 BRPM | DIASTOLIC BLOOD PRESSURE: 96 MMHG | WEIGHT: 207 LBS | OXYGEN SATURATION: 100 % | HEIGHT: 62 IN | SYSTOLIC BLOOD PRESSURE: 185 MMHG | TEMPERATURE: 97.7 F | HEART RATE: 64 BPM | BODY MASS INDEX: 38.09 KG/M2

## 2019-07-31 PROCEDURE — 77030003666 HC NDL SPINAL BD -A: Performed by: PHYSICAL MEDICINE & REHABILITATION

## 2019-07-31 PROCEDURE — 76010000009 HC PAIN MGT 0 TO 30 MIN PROC: Performed by: PHYSICAL MEDICINE & REHABILITATION

## 2019-07-31 PROCEDURE — 74011636320 HC RX REV CODE- 636/320: Performed by: PHYSICAL MEDICINE & REHABILITATION

## 2019-07-31 PROCEDURE — 77030003672 HC NDL SPN HALY -A: Performed by: PHYSICAL MEDICINE & REHABILITATION

## 2019-07-31 PROCEDURE — 74011250636 HC RX REV CODE- 250/636: Performed by: PHYSICAL MEDICINE & REHABILITATION

## 2019-07-31 RX ORDER — TRIAMCINOLONE ACETONIDE 40 MG/ML
INJECTION, SUSPENSION INTRA-ARTICULAR; INTRAMUSCULAR AS NEEDED
Status: DISCONTINUED | OUTPATIENT
Start: 2019-07-31 | End: 2019-07-31 | Stop reason: HOSPADM

## 2019-07-31 RX ORDER — LIDOCAINE HYDROCHLORIDE 10 MG/ML
INJECTION, SOLUTION EPIDURAL; INFILTRATION; INTRACAUDAL; PERINEURAL AS NEEDED
Status: DISCONTINUED | OUTPATIENT
Start: 2019-07-31 | End: 2019-07-31 | Stop reason: HOSPADM

## 2019-07-31 NOTE — PROCEDURES
[address] [place] [address] for Pain Management  Brief Pre-Procedure History & Physical    PATIENT NAME:  Janalyn Mcardle OF BIRTH:  1955  DATE OF SERVICE:  7/31/2019      CHIEF COMPLAINT:  Pain    HISTORY OF PRESENT ILLNESS:  Azra Arenas presents today for a previously diagnosed problem contributing to some or all of this patients pain. The location and pattern of the pain has not changed substantially since the last visit in our office. No other significant medical changes have occurred in the last 30 days. PAST MEDICAL HISTORY:  The patient  has a past medical history of Abdominal pain, Arthritis, Asthma (1/20/2010), Cancer ([facility] [address] Chemotherapy, Chronic low back pain (7/12/2010), Chronic lung disease, Chronic pain, COPD (chronic obstructive pulmonary disease) [facility] (1/20/2010), Depression, FH: colon cancer (1/20/2010), GERD (gastroesophageal reflux disease), Gout, H/O colon cancer, stage II, History of multiple pulmonary nodules (1/20/2010), Hoarse (2015), HTN (hypertension) (1/20/2010), Hyperlipemia (1/20/2010), Hypothyroid (1/20/2010), IBS (irritable bowel syndrome), Liver disease, Mammogram declined, Multinodular thyroid, Nephropathy, membranous (1/20/2010), Neuropathy (1/20/2010), Nicotine use disorder (1/20/2010), Pancreatitis (08/2018), Prediabetes, PUD (peptic ulcer disease), Pulmonary nodule, Rectocele (1/20/2010), S/P cataract extraction (1/3/2011), Thyroid disease, Unspecified sleep apnea, and Vitamin D deficiency (1/20/2010). PAST SURGICAL HISTORY:  The patient  has a past surgical history that includes hx gi; hx heent; hx tonsillectomy; hx lumbar fusion (June 2013); hx gyn; hx hysterectomy; pr abdomen surgery proc unlisted; pr abdomen surgery proc unlisted; and hx gastric bypass. CURRENT MEDICATIONS:  See Medication Administration Record Formerly named [facility]) in the patient's electronic record.     ALLERGIES:    Allergies   Allergen Reactions    Breo Ellipta [Fluticasone Furoate-Vilanterol] Hoarseness     Yeast problem w/Powdered MDIs    Ace Inhibitors Cough    Aspirin Other (comments)     GI bleeding & pain    Nsaids (Non-Steroidal Anti-Inflammatory Drug) Other (comments)     Makes her stomach bleed         FAMILY HISTORY:  The patient family history includes Asthma in her mother; Diabetes in her mother; Elevated Lipids in her father and mother; Heart Disease in her father and sister; Hypertension in her father and mother. SOCIAL HISTORY:  The patient  reports that she quit smoking about 28 years ago. Her smoking use included cigarettes. She has a 9.00 pack-year smoking history. She has never used smokeless tobacco. The patient  reports that she drinks alcohol. She  reports that she does not use drugs. REVIEW OF SYSTEMS:  Tita Moreland denies any fever, chills, unexplained weight loss, use of antibiotics for recent infection or bleeding abnormalities. PHYSICAL EXAM:  VS:   Visit Vitals  BP (!) 178/94 (BP 1 Location: Left arm, BP Patient Position: At rest)   Pulse 66   Temp 97.7 °F (36.5 °C)   Resp 18   Ht 5' 2\" (1.575 m)   Wt 93.9 kg (207 lb)   SpO2 94%   BMI 37.86 kg/m²     Gen: Well-developed. Body habitus consistent with recorded height and weight and the calculated BMI. No apparent distress. Head: Normocephalic, atraumatic. Skin: No obvious rashes, lesions or infection. Pulm: Respirations are even and unlabored. Psych:    Mood, affect and speech  Appropriate. Tenderness to palpation at bilateral sacroiliac joints. ASSESSMENT:   1. Stable for bilateral sacroiliac joint injection as discussed. PLAN:  Proceed with scheduled procedure.      Angela Butler DO 7/31/2019 Time: 26 [address] FOR PAIN MANAGEMENT    SACROILIAC JOINT INJECTION PROCEDURE REPORT      PATIENT:  Tita Moreland  YOB: 1955  DATE OF SERVICE:  7/31/2019  SITE:  [id number] [facility]    PRE-PROCEDURE DIAGNOSIS:  Sacroiliitis    POST-PROCEDURE DIAGNOSIS:  Same as Above                PROCEDURE:    1. Bilateral sacroiliac joint injection (95789, 50)    ANESTHESIA:  See Medication Administration Record    COMPLICATIONS: None. PHYSICIAN:  Lakhwinder Baires DO    PRE-PROCEDURE NOTE:  Pre-procedural assessment of the patient was performed including a limited history and physical examination. The details of the procedure were discussed with the patient, including the risks, benefits and alternative options and an informed consent was obtained. The patients NPO status and availability of a responsible adult to escort the patient following the procedure were confirmed. PROCEDURE NOTE:  The patient was brought to the procedure suite and positioned on the fluoroscopy table in the prone position. Physiologic monitors were applied and supplemental oxygen was administered via nasal cannula. The skin was prepped in the standard surgical fashion and sterile drapes were applied over the procedure site. Please refer to the Flowsheet for documentation of the patients vital signs and the Medication Administration Record for any oral and/or intravenous sedation administered prior to or during the procedure. 1% Lidocaine was utilized for local anesthesia. Under slight contralateral oblique fluoroscopic guidance, a 25-gauge, 3.5-inch short bevel spinal needle was advanced into the SI joint from the posteriomedial approach. After negative aspiration 0.5-1.0 mL of contrast dye solution was injected to ensure intra-articular uptake and nonvascular spread. Following this, 3 mL of a solution comprised of a mixture of triamcinolone (40 mg/mL) and 1% lidocaine was injected after negative aspiration of blood, air, or fluid. The needle was flushed and removed intact. The procedure was repeated on the contralateral side in the same fashion.      The area was thoroughly cleaned and sterile bandages applied as necessary. The patient tolerated the procedure well and vital signs remained stable throughout the procedure. POST-PROCEDURE COURSE:   The patient was escorted from the procedure suite in satisfactory condition and recovered per facility protocol based on the type of procedure performed and/or the sedation utilized. The patient did not experience any adverse events and remained hemodynamically stable during the post-procedure period. Postprocedure pain relief was greater than 50% with ambulation. DISCHARGE NOTE:  Upon discharge, the patient was able to tolerate fluids and was in no acute distress. The patient was oriented to person, place and time and vital signs were stable. Appropriate post-procedure instructions were provided and explained to the patient in detail and all questions were answered.     Twila Celestin DO 7/31/2019 [id number]

## 2019-07-31 NOTE — DISCHARGE INSTRUCTIONS
[address] [place] [address] for Pain Management      Post Procedures Instructions    *Resume Diet and Activity as tolerated. Rest for the remainder of the day. *You may fell worse before you feel better as the numbing medications wear off before the steroids take effect if used for your procedures. *Do not use affected extremity until numbness or loss of sensation has completely resolved without assistance. *DO NOT DRIVE, operate machinery/heavey equipment for 24 hours. *DO NOT DRINK ALCOHOL for 24 hours as it may interact with the sedation if you received it and also thins your blood and may cause you to bleed. *WAIT 24 hours before starting back ANY Blood thinning medications:   (Heparin, Coumadin, Warfarin, Lovenox, Plavix, Aggrenox)    *Resume Pre-Procedure Medications as prescribed except Blood Thinners unless directed by your Physician or Cardiologist.     *Avoid Hot tubs and Heating pad for 24 hours to prevent dissipation of medications, you may shower to remove bandages and remaining prep residue on the skin. * If you develop a Headache, drink plenty of fluids including beverages with caffeine (Coffee, Mt. Dew etc.) and rest.  If the headache persists longer than 24 hoursor intensifies - Please call [facility] [phone]    * If you are DIABETIC, check your blood sugar three times a day for the next three days, the steroids will increase your blood sugar. If your blood sugar is greater than 400 have someone drive you to the nearest [address]. * If you experience any of the following problems, call the [facility] [phone] between 8:00 am - 4:30pm or After Hours [phone].     Shortness of breath    Fever of 101 F or higher    Nausea / Vomiting (not normal to you)    Increasing stiffness in the neck    Weakness or numbness in the arms or legs that is not resolving    Prolonged and increasing pain > than 4 days    ANYTHING OUT of the ORDINARY TO YOU    If YOU are experiencing a severe reaction / complication that you have never had before post procedure, call 911 or go to the nearest emergency room! All patients must have a  for transportation [place] regardless if you do or do not receive sedation. DISCHARGE SUMMARY from Nurse      PATIENT INSTRUCTIONS:    After Oral  or intravenous sedation, for 24 hours or while taking prescription Narcotics:  · Limit your activities  · Do not drive and operate hazardous machinery  · Do not make important personal or business decisions  · Do  not drink alcoholic beverages  · If you have not urinated within 8 hours after discharge, please contact your surgeon on call. Report the following to your surgeon:  · Excessive pain, swelling, redness or odor of or around the surgical area  · Temperature over 101  · Nausea and vomiting lasting longer than 4 hours or if unable to take medications  · Any signs of decreased circulation or nerve impairment to extremity: change in color, persistent  numbness, tingling, coldness or increase pain  · Any questions        What to do at Home:  Recommended activity: Activity as tolerated, NO DRIVING FOR 24 Hours post injection          *  Please give a list of your current medications to your Primary Care Provider. *  Please update this list whenever your medications are discontinued, doses are      changed, or new medications (including over-the-counter products) are added. *  Please carry medication information at all times in case of emergency situations. These are general instructions for a healthy lifestyle:    No smoking/ No tobacco products/ Avoid exposure to second hand smoke    Surgeon General's Warning:  Quitting smoking now greatly reduces serious risk to your health.     Obesity, smoking, and sedentary lifestyle greatly increases your risk for illness    A healthy diet, regular physical exercise & weight monitoring are important for maintaining a healthy lifestyle    You may be retaining fluid if you have a history of heart failure or if you experience any of the following symptoms:  Weight gain of 3 pounds or more overnight or 5 pounds in a week, increased swelling in our hands or feet or shortness of breath while lying flat in bed. Please call your doctor as soon as you notice any of these symptoms; do not wait until your next office visit. Recognize signs and symptoms of STROKE:    F-face looks uneven    A-arms unable to move or move unevenly    S-speech slurred or non-existent    T-time-call 911 as soon as signs and symptoms begin-DO NOT go       Back to bed or wait to see if you get better-TIME IS BRAIN.

## 2019-08-14 ENCOUNTER — TELEPHONE (OUTPATIENT)
Dept: PAIN MANAGEMENT | Age: 64
End: 2019-08-14

## 2019-08-14 NOTE — TELEPHONE ENCOUNTER
The pt called the office and requested to speak to Dr. Sumeet Camilo nurse about her pain. I called and spoke to the pt. The pt was identified using 2 pt identifiers. She states that the shot she got wore off after 3 weeks and she is in pain again. She was asking about using CBD oil for her pain. She was encouraged to talk to the provider when she comes in for her 08/26/19 appt. Pt was made aware that her message will be routed to the provider for review. She will be called back if there are any other recommendations. The pt verbalized understanding and has no questions at this time. She wanted the provider to be aware that she was not able to afford the H-wave device. It would have cost her 200.00 a month.

## 2019-08-22 ENCOUNTER — TELEPHONE (OUTPATIENT)
Dept: PAIN MANAGEMENT | Age: 64
End: 2019-08-22

## 2019-08-22 NOTE — TELEPHONE ENCOUNTER
Called patient, Patient identified using two patient identifiers (name and ). I called asking the patient if they would like to reschedule their appointment from Monday to tomorrow. Patient stated that they would like to, and I then proceeded to schedule the patient for tomorrow morning at 0900. Patient verbalized understanding and stated that they will be here tomorrow at designated time.

## 2019-08-23 ENCOUNTER — OFFICE VISIT (OUTPATIENT)
Dept: PAIN MANAGEMENT | Age: 64
End: 2019-08-23

## 2019-08-23 VITALS
HEART RATE: 66 BPM | SYSTOLIC BLOOD PRESSURE: 143 MMHG | HEIGHT: 62 IN | TEMPERATURE: 97.4 F | DIASTOLIC BLOOD PRESSURE: 87 MMHG | WEIGHT: 207 LBS | OXYGEN SATURATION: 98 % | BODY MASS INDEX: 38.09 KG/M2 | RESPIRATION RATE: 16 BRPM

## 2019-08-23 DIAGNOSIS — M47.816 LUMBAR SPONDYLOSIS: ICD-10-CM

## 2019-08-23 DIAGNOSIS — G62.9 PERIPHERAL POLYNEUROPATHY: ICD-10-CM

## 2019-08-23 DIAGNOSIS — M47.897 OTHER OSTEOARTHRITIS OF SPINE, LUMBOSACRAL REGION: Primary | ICD-10-CM

## 2019-08-23 DIAGNOSIS — Z98.890 H/O LUMBOSACRAL SPINE SURGERY: ICD-10-CM

## 2019-08-23 RX ORDER — DICLOFENAC SODIUM 10 MG/G
4 GEL TOPICAL 4 TIMES DAILY
Qty: 100 G | Refills: 4 | Status: SHIPPED | OUTPATIENT
Start: 2019-08-23

## 2019-08-23 NOTE — PROGRESS NOTES
Referral date 6/19, source Dr. Fatimah Dowling PCP and for peripheral neuropathy and chronic low back pain. Today   Last Visit  Prior Visit(s)  ORT -        PGIC - 2 and 8       RENEE - 62%  66%     COMM - 9  15         HPI:  Carlos Kenny is a 61 y.o. female here for f/u visit for ongoing evaluation of peripheral neuropathy and chronic low back pain. Pt was last seen here on 6/27/2019. Pt denies interval changes on the character or distribution of pain. Pain is located across lumbosacral beltline and bilateral hips. The pain is described as aching/burning/stabbing. Pain at its best is 8/10. Pain at its worse is 8/10. The pain is worsened by land-based physical therapy, lying to sitting up and with sitting up to standing transfers, movement of all planes of the lumbar spine. Symptoms are improved by aquatic physical therapy (last attempted approximately 3 years ago), hot bath, tramadol, oxycodone, TENS unit which patient previously reported was broken, diazepam. Pt has tried Lyrica with no perceived benefit. Pt has never tried spinal cord stimulator trial, implantable pain pump. Patient has a history of colorectal cancer treated with chemotherapy and surgery. Patient's peripheral neuropathy is worse at night when she is sleeping, with prolonged fine motor activity of the hands. Pain is located bilateral hands, the pain is described as numbness. Pain at its best is 8/10. Pain at its worst is 8/10. symptoms are improved with Lyrica and Neurontin. Patient reports no perceived benefit from diclofenac gel. Patient previously reported no change in her symptoms following her chemotherapy treatment for colon cancer approximately 12 years ago. Reports she has had no use of orthotics, physical therapy, occupational therapy, no procedures done to aid with peripheral neuropathy. Patient has not tried topical agents to help with the neuropathy in her hands.   Previously reported like to her diagnostic studies of upper extremities more than 15 years ago. The studies are not available for review. Since last visit, patient reports she would like to move forward with implantable pain pump. Current/recent pain related medications:  --Diclofenac gel is helpful for back pain  --Lidocaine patches are helpful for her back pain  --MiraLAX for constipation  --Lyrica 25 mg twice daily  --Tramadol 50 mg up to 3 times daily as needed from PCP which previously reported partial relief of her primary back pain. Interventional Pain Procedures:  7/31/2019 bilateral SIJ injections postprocedure pain relief was greater than 50% with ambulation. 7/6/2015 and 6/15 bilateral L5-S1 transforaminal epidural steroid injections. Patient previously reported these worsened her symptoms and provided no relief. 2013 bilateral sacroiliac joint injections with Dr. Rolan Charlton office provided significant and prolonged relief. ROS:  Denies fever, chills, nausea, vomiting, diarrhea, constipation, abdominal pain, chest pain, shortness or breath/trouble breathing, weakness, trouble swallowing, changes in vision, changes in hearing, falls, dizziness, bladder incontinence, bowel incontinence, depression, anxiety, suicidal ideations, homicidal ideations or alcohol use. Opioid specific risk: Obesity, peptic ulcer disease, history of colon cancer with history of partial colectomy, COPD, depression, GERD, history of rectocele, hypothyroid, sleep apnea, history of gastric bypass, diabetes, anxiety, alcohol use. Chronic benzodiazepine use     Opioid specific history:  The last 2 years she was received tramadol and oxycodone consistently concurrently with repeated prescriptions for diazepam.    Vitals:    08/23/19 0836   BP: 143/87   Pulse: 66   Resp: 16   Temp: 97.4 °F (36.3 °C)   SpO2: 98%   Weight: 93.9 kg (207 lb)   Height: 5' 2\" (1.575 m)   PainSc:   8   PainLoc: Back        Imaging:  \"\" 6/27/2019 x-ray spine lumbar minimum 4 view  IMPRESSION:     Normal alignment with disc space narrowing at L4-5 and L5-S1. No fracture or traumatic subluxation. Prior laminectomy at L4-5 with pedicle screws and bracing bars from L4 to S1. Oscar Gonzales \"\"\"      Physical exam:  Constitutional  -  AFVS elevated blood pressure, patient asymptomatic, no acute distress, overweight body habitus. A&OXs 3. Speech is clear and appropriate. HEENT -  Normocephalic, atraumatic. Conjugate gaze, clear sclerae. EOM intact. Neuro/Psych -  Mood is appropriate and patient is cooperative. Gait is within functional limits. Balance is within functional limits. Respiratory -  Non-labored breathing. Even rise of chest wall. MS LE -  patient declines lumbar flexion and extension due to increased    Pain.   lumbosacral spine, bilateral paraspinals not tender to palpation. Primary Care Physician  [address] [place] [address]  [phone]      PHQ -- .  3 most recent PHQ Screens 8/23/2019   PHQ Not Done -   Little interest or pleasure in doing things Not at all   Feeling down, depressed, irritable, or hopeless Not at all   Total Score PHQ 2 0   Trouble falling or staying asleep, or sleeping too much -   Feeling tired or having little energy -   Poor appetite, weight loss, or overeating -   Feeling bad about yourself - or that you are a failure or have let yourself or your family down -   Trouble concentrating on things such as school, work, reading, or watching TV -   Moving or speaking so slowly that other people could have noticed; or the opposite being so fidgety that others notice -   Thoughts of being better off dead, or hurting yourself in some way -   PHQ 9 Score -   How difficult have these problems made it for you to do your work, take care of your home and get along with others -       Assessment/Plan:     ICD-10-CM ICD-9-CM    1.  Other osteoarthritis of spine, lumbosacral region M47.897 721.3 REFERRAL TO PAIN MANAGEMENT diclofenac (VOLTAREN) 1 % gel   2. Lumbar spondylosis M47.816 721.3 REFERRAL TO PAIN MANAGEMENT   3. H/O lumbosacral spine surgery Z98.890 V15.29 REFERRAL TO PAIN MANAGEMENT   4. Peripheral polyneuropathy G62.9 356.9 REFERRAL TO PAIN MANAGEMENT      --Patient to maintain  close follow-up with her orthopedic surgeon.     --Regarding the bilateral upper extremity peripheral neuropathy, and next office visit please consider repeating the electrodiagnostic studies. 1) Medications (opiod and non-opiod)  --I do not recommend long term opioid therapy for this patient at this time for their chronic pain; the risks outweigh the potential benefits. Patient has a history of going from pain clinic to the pain clinic. She was at the [facility] for pain management approximately in 2005 and transferred to Dr. Quinton Yates service and transferred back to the [facility] for pain management followed by transferring to Dr. Yamilex Hassan office, then transferred to [facility] pain care and is now attempting to resume treatment at the [facility] for pain management. --Refill diclofenac gel as needed    - Continue Lyrica 25 mg twice daily and consider upward titration to effect and tolerance. - Continue Lidoderm patches as needed. - Patient reports not taking tylenol due to \"\"lesions on her liver\"\"    2) Restorative Therapies  --Continue ice and heat as needed    --Continue TENS unit as needed     - Patient reports she could not obtain H wave due to cost, this was ordered last office visit. 3) Interventional Procedures  --Referral will be placed for evaluation of implantable pain pump. --Patient given handout educational information on implantable pain pump and spinal cord stimulator for neuropathy. --Per Dr. Anand Rodney note \"\"recommend bilateral medial branch blocks/radiofrequency ablation at the L3-L4 facet joints.   Patient also had successful bilateral greater trochanteric bursa injections done in Dr. Jose Varghese office which can be repeated here as needed. \"\"  Patient would like to move forward with implantable pain pump. 4) Behavorial Health Approaches  --Patient may benefit from pain pschyology in the furture. 5) Complementary & Integrative Health  --Continue to independently investigate yoga for persons with chronic pain. --Follow up ongoing assessment and ongoing development of integrative and comprehensive plan of care for chronic pain. Goals: To establish complementary and integrative plan of care to address chronic pain issues while minimizing pharmaceuticals to maximize patient's function improve quality of life. Education:  Handouts given regarding sleep health. Follow-up and Dispositions    · Return in about 4 months (around 2019), or if symptoms worsen or fail to improve. Social History     Socioeconomic History    Marital status:      Spouse name: Not on file    Number of children: Not on file    Years of education: Not on file    Highest education level: Not on file   Occupational History    Occupation:      Employer: NOT EMPLOYED   Social Needs    Financial resource strain: Not on file    Food insecurity:     Worry: Not on file     Inability: Not on file    Transportation needs:     Medical: Not on file     Non-medical: Not on file   Tobacco Use    Smoking status: Former Smoker     Packs/day: 0.50     Years: 18.00     Pack years: 9.00     Types: Cigarettes     Last attempt to quit: 1990     Years since quittin.0    Smokeless tobacco: Never Used    Tobacco comment: per patient she has not smoked in over 30 years but significant second hand smoke exposure at home   Substance and Sexual Activity    Alcohol use:  Yes     Alcohol/week: 0.0 standard drinks     Comment: rare    Drug use: No    Sexual activity: Yes     Partners: Male     Birth control/protection: None, Surgical   Lifestyle    Physical activity:     Days per week: Not on file Minutes per session: Not on file    Stress: Not on file   Relationships    Social connections:     Talks on phone: Not on file     Gets together: Not on file     Attends Yarsanism service: Not on file     Active member of club or organization: Not on file     Attends meetings of clubs or organizations: Not on file     Relationship status: Not on file    Intimate partner violence:     Fear of current or ex partner: Not on file     Emotionally abused: Not on file     Physically abused: Not on file     Forced sexual activity: Not on file   Other Topics Concern    Not on file   Social History Narrative    Not on file     Family History   Problem Relation Age of Onset    Asthma Mother     Diabetes Mother     Hypertension Mother    [facility] Elevated Lipids Mother     Elevated Lipids Father     Hypertension Father     Heart Disease Father         chf    Heart Disease Sister         older     Allergies   Allergen Reactions    Olam Spruce [Fluticasone Furoate-Vilanterol] Hoarseness     Yeast problem w/Powdered MDIs    Ace Inhibitors Cough    Aspirin Other (comments)     GI bleeding & pain    Nsaids (Non-Steroidal Anti-Inflammatory Drug) Other (comments)     Makes her stomach bleed       Past Medical History:   Diagnosis Date    Abdominal pain     Arthritis     Asthma 1/20/2010    Dr. Nahomy [facility])     stage 2 colon ca    Chemotherapy     Chronic low back pain 7/12/2010    Chronic lung disease     Chronic pain     Back    COPD (chronic obstructive pulmonary disease) ([facility] [address] 1/20/2010    Dr. Mima Mishra    Depression     Anxiety    FH: colon cancer 1/20/2010    GERD (gastroesophageal reflux disease)     Gout     H/O colon cancer, stage II     s/p resection, last colonoscopy 11/11- q3 yrs    History of multiple pulmonary nodules 1/20/2010    Hoarse 2015    candida, sees ENT    HTN (hypertension) 1/20/2010    Hyperlipemia 1/20/2010    Hypothyroid 1/20/2010    IBS (irritable bowel syndrome)     Liver disease     cysts on liver    Mammogram declined     Multinodular thyroid     sees ENT. Due for repeat U/S with ENT in Nov 2017    Nephropathy, membranous 1/20/2010    Neuropathy 1/20/2010    Nicotine use disorder 1/20/2010    Pancreatitis 08/2018    Prediabetes     PUD (peptic ulcer disease)     Pulmonary nodule     sees pulmonary    Rectocele 1/20/2010    S/P cataract extraction 1/3/2011    Thyroid disease     hypothyroid    Unspecified sleep apnea     does not use CPAP    Vitamin D deficiency 1/20/2010     Past Surgical History:   Procedure Laterality Date    ABDOMEN SURGERY PROC UNLISTED      colon resection 8/2005- Dr. Jeannie Pearson- colon ca   [facility] End[facility]      gastric bypass 12/2/09    HX GASTRIC BYPASS      HX GI      HX GYN      hysterectomy and BSO in 11/2006    HX HEENT      cataract sx tee    HX HYSTERECTOMY      Denies-patient states had ovaries removed during colon resection    HX LUMBAR FUSION  June 2013    L4 to L 5    HX TONSILLECTOMY       Current Outpatient Medications on File Prior to Visit   Medication Sig    predniSONE (DELTASONE) 10 mg tablet 30 mg po dailyx 3 days 20 mg po daily x 3 days 10 mg po daily x3 days    pregabalin (LYRICA) 25 mg capsule Take 25 mg by mouth two (2) times a day.  traMADol (ULTRAM) 50 mg tablet Take 50 mg by mouth every eight (8) hours as needed.  albuterol-ipratropium (DUO-NEB) 2.5 mg-0.5 mg/3 ml nebu USE 1 VIAL WITH HAND HELD NEBULIZER EVERY 6 HOURS AS NEEDED    montelukast (SINGULAIR) 10 mg tablet TAKE ONE TABLET BY MOUTH ONCE DAILY    gabapentin (NEURONTIN) 300 mg capsule Take 300 mg by mouth two (2) times a day.  OMEPRAZOLE PO Take 1 Tab by mouth daily.  albuterol (PROVENTIL HFA, VENTOLIN HFA, PROAIR HFA) 90 mcg/actuation inhaler Take 1 Puff by inhalation every four (4) hours as needed for Wheezing.     albuterol (VENTOLIN HFA) 90 mcg/actuation inhaler INHALE 2 PUFFS EVERY 4 HOURS IF NEEDED FOR WHEEZING OR FOR SHORTNESS OF BREATH    amLODIPine (NORVASC) 10 mg tablet Take 1 Tab by mouth daily.  candesartan-hydroCHLOROthiazide (ATACAND HCT) 32-12.5 mg per tablet TAKE ONE TABLET BY MOUTH ONCE DAILY    lidocaine (LIDODERM) 5 % by TransDERmal route every twenty-four (24) hours. Apply patch to the affected area for 12 hours a day and remove for 12 hours a day.  levothyroxine (SYNTHROID) 125 mcg tablet Take 1 Tab by mouth Daily (before breakfast).  ergocalciferol (ERGOCALCIFEROL) 50,000 unit capsule Take 1 Cap by mouth every seven (7) days.  cholecalciferol (VITAMIN D3) 1,000 unit cap Take 1,000 Units by mouth daily.  b complex vitamins tablet Take 1 tablet by mouth daily.  Multivit-Iron-Min-Folic Acid (CENTRUM COMPLETE) 18-0.4 mg tab Take 1 Tab by mouth daily.  polyethylene glycol (MIRALAX) 17 gram packet Take 17 g by mouth daily.  naloxone (NARCAN) 4 mg/actuation nasal spray Use 1 spray intranasally into 1 nostril. Use a new Narcan nasal spray for subsequent doses and administer into alternating nostrils. May repeat every 2 to 3 minutes as needed. Indications: OPIATE-INDUCED RESPIRATORY DEPRESSION, Opioid Toxicity     No current facility-administered medications on file prior to visit. [address] was used for portions of this report. Unintended errors may occur.

## 2019-08-23 NOTE — PROGRESS NOTES
Nursing Notes    Patient presents to the office today in follow-up. Patient rates her pain at 8/10 on the numerical pain scale.  reviewed NO  Any aberrancies noted on  NO  Last opioid agreement NO Opioids  Last urine drug screen No Opioids    Comments:  Patient is here today for a follow up appt today for her chronic low back pain. She states her pain level today is an 8  PHQ 2 was done patient denies any depression. She states she has seen her pcm. She is unable to afford the H wave    POC UDS was not performed in office today    Any new labs or imaging since last appointment? NO    Have you been to an emergency room (ER) or urgent care clinic since your last visit? NO            Have you been hospitalized since your last visit? NO     If yes, where, when, and reason for visit? Have you seen or consulted any other health care providers outside of the [address] [place] [address]  since your last visit? YES   [facility]  If yes, where, when, and reason for visit? Ms. Saima Ge has a reminder for a \"due or due soon\" health maintenance. I have asked that she contact her primary care provider for follow-up on this health maintenance.

## 2019-08-23 NOTE — PATIENT INSTRUCTIONS

## 2019-09-06 ENCOUNTER — DOCUMENTATION ONLY (OUTPATIENT)
Dept: PAIN MANAGEMENT | Age: 64
End: 2019-09-06

## 2019-09-06 NOTE — PROGRESS NOTES
The pt's referral, last office note and demographics were faxed over to [facility] for review. A fax confirmation was received and they will contact the pt to schedule an appt.

## 2019-09-09 DIAGNOSIS — G62.9 PERIPHERAL POLYNEUROPATHY: Primary | ICD-10-CM

## 2019-09-09 RX ORDER — PREGABALIN 25 MG/1
25 CAPSULE ORAL 2 TIMES DAILY
Qty: 60 CAP | Refills: 2 | Status: SHIPPED | OUTPATIENT
Start: 2019-09-09 | End: 2019-10-09

## 2019-09-09 NOTE — TELEPHONE ENCOUNTER
[facility] and spoke with David Palmer.  I performed a verbal order for patient's Lyrica 25 mg cap, for patient to take 1 capsule BID, with 2 refills. Pharmacist RBV'd prescription. I then called the patient, Patient identified using two patient identifiers (name and ). I informed the patient that the Rx was called into their pharmacy, and should be ready sometime today. Patient then stated that she called [facility] regarding referral for SCS. She stated that [facility] told her today, that they have not received a referral from us. I told her a nurse from our office faxed it last week and received confirmation, but veronica look into the matter, and contact them when I received a response. Patient verbalized understanding.

## 2019-09-09 NOTE — TELEPHONE ENCOUNTER
Attempted to call [facility] regarding patient's referral for their SCS, but had to leave a message with answering service.

## 2019-09-11 ENCOUNTER — TELEPHONE (OUTPATIENT)
Dept: PAIN MANAGEMENT | Age: 64
End: 2019-09-11

## 2019-09-11 DIAGNOSIS — Z98.890 H/O LUMBOSACRAL SPINE SURGERY: ICD-10-CM

## 2019-09-11 DIAGNOSIS — M47.816 LUMBAR SPONDYLOSIS: ICD-10-CM

## 2019-09-11 DIAGNOSIS — M47.897 OTHER OSTEOARTHRITIS OF SPINE, LUMBOSACRAL REGION: Primary | ICD-10-CM

## 2019-09-11 NOTE — TELEPHONE ENCOUNTER
The pt called the office to ask about her pain pump referral. She states that she has not heard anything yet. She is also questioning the dose of the lyrica. She was used to taking 50 mg BID and was prescribed lyrica 25 mg BID. Her last fill for the 50 mg lyrica on 07/28/19. The pt is also requesting tramadol to be prescribed in conjunction with the lyrica because this combination was helping her pain. I called and spoke to Ms. Corrinne Harries. The pt was identified using 2 pt identifiers. She was reminded that I had re-faxed the referral for the pain pump to [facility] on 09/06/19. She replied that she had just spoken to someone at the office a few minutes ago and they told her that because she was discharged from the practice 10-12 years ago she would not be allowed back into the practice. The pt asked if she can appeal the decision because she was only discharged for getting anesthesia for a procedure that she had and she can prove to them that this was not an outside medication. She was told that she can appeal and will try to get the records for this sent to them. Pt was made aware that this information will be sent over to provider Jewel De Anda NP to see if there are any other providers that may be able to help her with this referral. She was ok with this and verbalized understanding. Per the provider's last office note, she was only comfortable prescribing the pt lyrica 25 mg BID and will not be continuing the tramadol that was being prescribed by her pcp. This information was relayed to the pt and she verbalized understanding. She is going to contact her pcp and see if they will continue these medications at her doses for her. The pt was advised that she can do this. Her pcp informed her that all controlled medications needed to come from this office. Ms. Corrinne Harries was made aware that this is not true. She verbalized understanding and has no further questions at this time.

## 2019-11-18 ENCOUNTER — TELEPHONE (OUTPATIENT)
Dept: PAIN MANAGEMENT | Age: 64
End: 2019-11-18

## 2019-11-18 NOTE — TELEPHONE ENCOUNTER
Called patient, Patient identified using two patient identifiers (name and ). I informed the patient that the clinic will be closed as of 2019, and therefore their upcoming appt will have to be cancelled. I also informed the patient that if they need a referral to another pain management facility and/or if they need a refill to let us know. Finally advised patient to f/u with their PCP to discuss pain management options. Patient verbalized understanding, and stated that she would like to be referred to the provider her neighbor sees, but would need to call us back with the name. No other questions were had by patient.

## 2019-11-19 NOTE — TELEPHONE ENCOUNTER
03:40 pm The patient called today to provide with the new provider's name and fax number. Dr. Juancho Aguirre. Phone # [phone]  Fax # [phone] [address] [facility]. [address] [place] [address] [phone]    Please send referral, Demographic sheet, copy of insurance card and at least 3 office visits note.

## 2019-11-20 DIAGNOSIS — Z98.890 H/O LUMBOSACRAL SPINE SURGERY: ICD-10-CM

## 2019-11-20 DIAGNOSIS — G62.9 PERIPHERAL POLYNEUROPATHY: ICD-10-CM

## 2019-11-20 DIAGNOSIS — M47.816 LUMBAR SPONDYLOSIS: ICD-10-CM

## 2019-11-20 DIAGNOSIS — M47.897 OTHER OSTEOARTHRITIS OF SPINE, LUMBOSACRAL REGION: Primary | ICD-10-CM

## 2020-01-13 RX ORDER — FLUTICASONE PROPIONATE AND SALMETEROL XINAFOATE 115; 21 UG/1; UG/1
AEROSOL, METERED RESPIRATORY (INHALATION)
Qty: 12 INHALER | Refills: 2 | Status: SHIPPED | OUTPATIENT
Start: 2020-01-13 | End: 2020-03-30 | Stop reason: SDUPTHER

## 2020-01-16 ENCOUNTER — HOSPITAL ENCOUNTER (EMERGENCY)
Age: 65
Discharge: LEFT BEFORE DISCHARGE | End: 2020-01-16
Payer: MEDICARE

## 2020-01-16 PROCEDURE — 75810000275 HC EMERGENCY DEPT VISIT NO LEVEL OF CARE

## 2020-03-25 NOTE — PROGRESS NOTES
Pt was called and informed of provider's recommendations. She was made aware that someone from the office will call her to schedule the ordered procedure once approval has been obtained from insurance. She wanted to make the provider aware that she is unable to do the ordered stretches. She is looking forward to getting the injections. Statement Selected

## 2020-03-30 ENCOUNTER — VIRTUAL VISIT (OUTPATIENT)
Dept: SURGERY | Age: 65
End: 2020-03-30

## 2020-03-30 VITALS
BODY MASS INDEX: 41.77 KG/M2 | SYSTOLIC BLOOD PRESSURE: 128 MMHG | HEIGHT: 62 IN | HEART RATE: 78 BPM | DIASTOLIC BLOOD PRESSURE: 88 MMHG | WEIGHT: 227 LBS

## 2020-03-30 DIAGNOSIS — E11.40 TYPE 2 DIABETES MELLITUS WITH DIABETIC NEUROPATHY, UNSPECIFIED WHETHER LONG TERM INSULIN USE (HCC): ICD-10-CM

## 2020-03-30 DIAGNOSIS — G62.9 NEUROPATHY: ICD-10-CM

## 2020-03-30 DIAGNOSIS — Z87.891 SMOKING HISTORY: ICD-10-CM

## 2020-03-30 DIAGNOSIS — Z98.84 STATUS POST GASTRIC BYPASS FOR OBESITY: ICD-10-CM

## 2020-03-30 DIAGNOSIS — G89.29 CHRONIC LOW BACK PAIN, UNSPECIFIED BACK PAIN LATERALITY, UNSPECIFIED WHETHER SCIATICA PRESENT: ICD-10-CM

## 2020-03-30 DIAGNOSIS — I10 ESSENTIAL HYPERTENSION: ICD-10-CM

## 2020-03-30 DIAGNOSIS — E66.01 SEVERE OBESITY (BMI 35.0-35.9 WITH COMORBIDITY) (HCC): Primary | ICD-10-CM

## 2020-03-30 DIAGNOSIS — M54.50 CHRONIC LOW BACK PAIN, UNSPECIFIED BACK PAIN LATERALITY, UNSPECIFIED WHETHER SCIATICA PRESENT: ICD-10-CM

## 2020-03-30 DIAGNOSIS — Z80.0 FH: COLON CANCER: ICD-10-CM

## 2020-03-30 DIAGNOSIS — J44.9 CHRONIC OBSTRUCTIVE PULMONARY DISEASE, UNSPECIFIED COPD TYPE (HCC): ICD-10-CM

## 2020-03-30 NOTE — PATIENT INSTRUCTIONS
Patient Instructions 1. Continue to monitor carbohydrate and protein intake- remember to keep your           total  carbohydrates to 50 grams or less per day for best results. 2. Remember hydration goals - usually 48 to 64 ounces of liquids per day 3. Continue to work towards exercise goals - minimum 3 days per week of 45          minutes to  1 hour at a time. 4. Remember to take vitamins as directed Supplement Resource Guide Importance of Protein:  
Maintains lean body mass, produces antibodies to fight off infections, heals wounds, minimizes hair loss, helps to give you energy, helps with satiety, and keeping you full between meals. Importance of Calcium: 
Needed for healthy bones and teeth, normal blood clotting, and nervous system functioning, higher risk of osteoporosis and bone disease with non-compliance. Importance of Multivitamins: Many functions. Supply you with extra nutrients that you may be missing from food. May lead to iron deficiency anemia, weakness, fatigue, and many other symptoms with non-compliance. Importance of B Vitamins: 
Important for red blood cell formation, metabolism, energy, and helps to maintain a healthy nervous system. Protein Supplement Find one you like now. Use immediately after surgery. Look for: 
35-50g protein each day from your protein supplement once you reach the progression diet. 0-3 g fat per serving 0-3 g sugar per serving Protein drinks should be split in separate dosages. Recommend: Lifelong 1 year + Calcium Supplement:  
 
Start taking within a month after surgery. Look for: Calcium Citrate Plus D (1500 mg per day) Recommend: Citracal 
 
 . Avoid chocolate chewable calcium. Can use chewable bariatric or [employer] brand or similar chewable. The body cannot absorb more than 500-600 mg @ a time. Take for Life Multi-vitamin Supplement: 1st Month After Surgery: Any complete chewable, such as: Ormas Complete chewables. Avoid Orma sours or gummies. They lack iron and other important nutrients and also have added sugar. Continue with chewable vitamin or change to adult complete multivitamin one month after surgery. Menstruating women can take a prenatal vitamin. Make sure has at least 18 mg iron and 016-656 mcg folic acid): Vitamin B12, B Complex Vitamin, and Biotin Start taking within a month after surgery. Vitamin B12:  1000 mcg of Vitamin B12 three times weekly Must take sublingually (meaning you take it under your tongue) or in a liquid drop form for easy absorption. B Complex Vitamin: Take a pill or liquid drop form once daily. Biotin: This vitamin can help prevent hair loss. Recommend 5mg  
(5000 mcg) a day Biotin is Optional

## 2020-03-30 NOTE — PROGRESS NOTES
Revision Surgery Consultation    Subjective: The patient is a 59 y.o. obese female with a Body mass index is 41.52 kg/m². .  The patient had a Lap gastric bypass procedure done approximatly 11 years ago in [place] by Dr. Brady Blackwood.  her starting weight prior to surgery was 235. she ultimately lost approximately 100 lbs with a subsequent weight regain of 100lbs. her peak EBWL  out from surgery was 90% and now her current EBWL is 0%. her last bariatric follow-up was many years ago with Dr. Brady Blackwood. Everette Bowles notes that she had no issues in the immediate post-op phase and had no hospital readmissions in the remote post-op phase. she currently is having the following issues related to his health:weight regain. she is here today to discuss a possible revision of her prior  because of weight. All of their prior evaluations available by both their PCP's and specialists physicians have been reviewed today either in the Care Everywhere portal or scanned under the media tab. I have spent a large portion of my initial consultation today reviewing the patients current dietary habits which have contributed to their health issues, weight regain and  their current obesity. They understand that generally speaking,  weight regain is  a function of resuming less that ideal dietary habits instead of being a procedural issue. They understand that older procedures are more likely to be associated with a less that perfect procedural result, such as a prior vertical banded gastroplasty or non divided gastric bypass. These procedures are more likely to result in staple line failures with resultant weight regain. This has been explained to the patient via diagrams of these older procedures and given to the patient. I have suggested to them personally a dietary regimen that they can initiate now to help with their status as it pertains to their weight.   They understand that the most important aspect of their journey through their weight loss endeavor will be their adherence to a new lifestyle of healthy eating behavior. They also understand that an adherence to an exercise program will not only help with weight loss but is ultimately important in weight maintenance.       Patient Active Problem List    Diagnosis Date Noted    Smoking history     [facility] 04/15/2019    Type 2 diabetes mellitus with diabetic neuropathy ([facility] 01/03/2018    Type 2 diabetes mellitus with microalbuminuria, without long-term current use of insulin ([facility] 11/09/2017    Right arm pain 05/17/2017    Advanced care planning/counseling discussion 06/10/2016    Cervicalgia 07/28/2015    Shoulder pain, right 07/28/2015    Cervical spondylosis 07/28/2015    Lumbosacral neuritis 04/30/2015    CAIO (generalized anxiety disorder) 02/04/2015    CAP (community acquired pneumonia) 11/10/2014    Asthma exacerbation 11/10/2014    Encounter for long-term (current) use of other medications 10/15/2014    H/O lumbosacral spine surgery 10/15/2014    Chronic pain syndrome 10/15/2014    DJD (degenerative joint disease), lumbar 10/15/2014    Scar tissue 10/15/2014    History of gastric bypass 10/15/2014    DDD (degenerative disc disease), lumbosacral 06/11/2013    Spondylolisthesis, grade 1 06/11/2013    H/O colon cancer, stage II     Vitamin B12 deficiency 05/14/2012    S/P cataract extraction 01/03/2011    Chronic low back pain 07/12/2010    HTN (hypertension) 01/20/2010    COPD (chronic obstructive pulmonary disease) ([facility] 01/20/2010    Neuropathy 01/20/2010    Vitamin D deficiency 01/20/2010    Nephropathy, membranous 01/20/2010    History of multiple pulmonary nodules 01/20/2010    FH: colon cancer 01/20/2010    Rectocele 01/20/2010    Status post gastric bypass for obesity 2009      Past Surgical History:   Procedure Laterality Date    HX CATARACT REMOVAL Bilateral     HX COLECTOMY 2005 - Dr. Leo Ren - for colon cancer    HX GASTRIC BYPASS  2009    pre-op wt = 250 lbs . ... lowest wt = 134 lbs    HX HYSTERECTOMY      HX LUMBAR FUSION  2013    L4 to L 5    HX TONSILLECTOMY        Social History     Tobacco Use    Smoking status: Former Smoker     Packs/day: 0.50     Years: 18.00     Pack years: 9.00     Types: Cigarettes     Last attempt to quit: 1990     Years since quittin.6    Smokeless tobacco: Never Used    Tobacco comment: per patient she has not smoked in over 30 years but significant second hand smoke exposure at home   Substance Use Topics    Alcohol use: Yes     Alcohol/week: 0.0 standard drinks     Comment: rare      Family History   Problem Relation Age of Onset    Asthma Mother     Diabetes Mother     Hypertension Mother    [facility] Elevated Lipids Mother     Elevated Lipids Father     Hypertension Father     Heart Disease Father         chf    Heart Disease Sister         older      Current Outpatient Medications   Medication Sig Dispense Refill    diclofenac (VOLTAREN) 1 % gel Apply 4 g to affected area four (4) times daily. 100 g 4    traMADol (ULTRAM) 50 mg tablet Take 50 mg by mouth every eight (8) hours as needed.  albuterol-ipratropium (DUO-NEB) 2.5 mg-0.5 mg/3 ml nebu USE 1 VIAL WITH HAND HELD NEBULIZER EVERY 6 HOURS AS NEEDED 24 Nebule 5    montelukast (SINGULAIR) 10 mg tablet TAKE ONE TABLET BY MOUTH ONCE DAILY 90 Tab 3    OMEPRAZOLE PO Take 1 Tab by mouth daily.  albuterol (VENTOLIN HFA) 90 mcg/actuation inhaler INHALE 2 PUFFS EVERY 4 HOURS IF NEEDED FOR WHEEZING OR  FOR SHORTNESS OF BREATH 1 Inhaler 5    amLODIPine (NORVASC) 10 mg tablet Take 1 Tab by mouth daily. 7 Tab 0    candesartan-hydroCHLOROthiazide (ATACAND HCT) 32-12.5 mg per tablet TAKE ONE TABLET BY MOUTH ONCE DAILY 7 Tab 0    lidocaine (LIDODERM) 5 % by TransDERmal route every twenty-four (24) hours.  Apply patch to the affected area for 12 hours a day and remove for 12 hours a day.  levothyroxine (SYNTHROID) 125 mcg tablet Take 1 Tab by mouth Daily (before breakfast). 30 Tab 1    ergocalciferol (ERGOCALCIFEROL) 50,000 unit capsule Take 1 Cap by mouth every seven (7) days. 4 Cap 11    cholecalciferol (VITAMIN D3) 1,000 unit cap Take 1,000 Units by mouth daily.  b complex vitamins tablet Take 1 tablet by mouth daily.  Multivit-Iron-Min-Folic Acid (CENTRUM COMPLETE) 18-0.4 mg tab Take 1 Tab by mouth daily.  polyethylene glycol (MIRALAX) 17 gram packet Take 17 g by mouth daily.        Allergies   Allergen Reactions    Breo Ellipta [Fluticasone Furoate-Vilanterol] Hoarseness     Yeast problem w/Powdered MDIs    Ace Inhibitors Cough    Aspirin Other (comments)     GI bleeding & pain    Nsaids (Non-Steroidal Anti-Inflammatory Drug) Other (comments)     Makes her stomach bleed            Review of Systems:            General - No history or complaints of unexpected fever, chills, or weight loss  Head/Neck - No history or complaints of headache, diplopia, dysphagia, hearing loss  Cardiac - No history or complaints of chest pain, palpitations, murmur, or shortness of breath  Pulmonary - No history or complaints of shortness of breath, productive cough, hemoptysis  Gastrointestinal - No history or complaints of reflux,  abdominal pain, obstipation/constipation, blood per rectum  Genitourinary - No history or complaints of hematuria/dysuria, stress urinary incontinence symptoms, or renal lithiasis  Musculoskeletal - No history or complaints of joint pain or muscular weakness  Hematologic - No history or complaints of bleeding disorders, blood transfusions, sickle cell anemia  Neurologic - No history or complaints of  migraine headaches, seizure activity, syncopal episodes, TIA or stroke  Integumentary - No history or complaints of rashes, abnormal nevi, skin cancer  Gynecological - n/a           Objective:     Visit Vitals  /88   Pulse 78   Ht 5' 2\" (1.575 m) Wt 103 kg (227 lb)   BMI 41.52 kg/m²       Physical Examination:       Physical Examination: General appearance - alert, well appearing, and in no distress and oriented to person, place, and time  Mental status - alert, oriented to person, place, and time, normal mood, behavior, speech, dress, motor activity, and thought processes  Eyes - pupils equal and reactive, extraocular eye movements intact, sclera anicteric, left eye normal, right eye normal  Ears - right ear normal, left ear normal  Nose - normal and patent, no erythema or discharge  Chest - good air movement  Heart - N/A  Abdomen - no obvious distension, scars as noted: all scars laparoscopic  Neurological - alert, oriented, normal speech, no focal findings or movement disorder noted  Musculoskeletal - no swelling noted  Extremities - normal movement    Labs / Old Records: Old operative reports reviewed if available and are scanned under the media tab or reviewed under Care Everywhere        Assessment:     Morbid obesity status post Lap gastric bypass procedure approximately 11 years ago with complaint of weight regain. Plan: 1. Weight regain-Today in our office I had a lengthy discussion with Sharon Rojas regarding the nature of their prior procedure. We discussed the anatomical changes to their anatomy and how this relates to  contributing weight regain. Our office will continue to attempt to obtain any medical records, if not obtained or available already ,related to their procedure. It was also discussed today that before any decisions can be made regarding a possible revision of their initial  procedure that an upper GI swallow study must be obtained to evaluate their post surgical anatomy. They understand that in patients with a prior open gastric bypass, those patients are not revision candidates due to adhesive disease usually, and the fact that post surgical anatomy  usually can not be improved upon.   They understand if their gastric bypass was performed laparoscopically, then this situation might be more amendable to gastric band placement over their prior gastric bypass. They understand, as I have explained today, that the adhesive disease associated with prior  procedures is at times a rate limiting factor. This precludes our ability to perform a revision procedure safely. The factors that contribute to this are increased risk such as age, health issues and increased risk from a procedural standpoint and have been discussed today. We will proceed with the UGI swallow study as described above. The patient understands all of the above and wishes to proceed with the study. 2.Nutrition-  I have discussed in detail the pitfalls in diet that have contributed to their weight regain and the importance of adhering to a lifelong regimen of dietary goals and proper eating habits. I have discussed the proper lifelong bariatric diet  in detail spending in excess of 20 minutes discussing this. We will schedule them for a dietary consultation with our nutritionist and urge them to continue on a regular follow-up schedule with her. 3.Maintenance vitamins- Today we have discussed the importance of vitamins as it pertains to their procedure and we will obtain appropriate lab to check all levels. They have been provided a handout regarding this today. Total time spent with the patient reviewing their complex history of bariatric surgery,diet, and plan is in excess of 60 minutes. At this juncture given the patient's symptoms that she still feels full with a complex meal, my guess is that her gastric bypass pouch will be of appropriate size and not amenable to a revision. She would however be a candidate for likely lap band placement and is very familiar with this surgeons procedures and usually we are able to place a lap band to re-create the sensation of fullness.   We will have the patient start a dietary program with our nutritionist and schedule her for an upper GI study to assess her gastric pouch when the time is appropriate and hospitals are functioning more normally. Secondary Diagnoses:       Dietary Intervention  - The patient is currently scheduled to see or has been followed by a bariatric nutritionist for an attempt at preoperative weight loss as has been dictated by their insurance carrier. They will be assessed at various times during their follow up to evaluate their progress depending on the length of time that is required once again by their carrier. I have explained the importance of   preoperative weight loss and the benefits regarding lower surgical risk and also assisting the patient in reaching their weight loss goal. They understand also that they should participate in an  exercise program to assist in this weight loss. Finally they understand there is a physiologic benefit from the standpoint of hepatic volume reduction and reduction of central visceral adiposity   preoperatively. I have reiterated the importance of a low carbohydrate and high protein regimen to achieve their   stated goal. I have reviewed their current eating behavior prior to this encounter and explained to them in an exhaustive fashion the appropriate diet that they should adhere to. They have been   encouraged to loose weight pre operatively and understand it is our prerogative to cancel surgery or postpone their procedure in the event of significant weight gain. Hypertension - The patient has a clear understanding of how weight loss improves hypertension as a whole, but also they understand that there is a significant genetic component   to this disease process. We will monitor the patients blood pressure while in the hospital and the plan would be to continue those medications postoperatively.   If a diuretic is being   used we will stop them on discharge to prevent dehydration particularly with the sleeve gastrectomy and the gastric bypass procedures. They will be instructed to monitor their blood   pressure postoperatively while at home and notify their primary care physician in the event of any significantly high or uncharacteristic readings. They understand that surgery may be  cancelled if their blood pressure is deemed out of control the day of surgery either by myself or the anesthesia department. For this reason they must take their medications as directed   and monitor their blood pressure regularly working up until their surgical date. Weight Related Arthritis -The patient understands the benefits that weight loss surgery can have on their arthritis but also understands that weight loss is not a guaranteed cure and relief of symptoms is often dependent on the severity of the underlying disease.  The patient also understands that traditional pharmaceutical treatments for this diagnosis are usually unavailable to post-operative weight loss patients due to the effects on the gastrointestinal tract particularly with the gastric bypass and to a lesser effect with the sleeve gastrectomy.  Any changes to the patients medication treatment will ultimately be made the patients PCP with input by our office. Adult Onset Diabetes - The patient has lisa given a very low carbohydrate diet preoperatively along with instructions to monitor their blood sugars on a regular   daily basis. When  their surgery is performed  we will be monitoring the patient with sliding scale insulin and accuchecks. Based on those values we will determine   whether the patient needs a reduction of those medications postoperatively or total removal of those medications on discharge. We will have the patient continue   accuchecks postoperatively while at home also and report to me or their family physician for appropriate adjustments as needed.   The patient also understands   that in the event of uncontrolled blood sugar preoperatively that we may choose to postpone their surgery. Restrictive Airway Disease - We will continue all of their pulmonary medications in the form  of oral pills and inhalers in both the perioperative and postoperative period. They understand that   their symptoms should improve with weight loss. Any further testing related to this will be turned over   to their family physician or pulmonologist. The patient  understands that if they require oral or IV steroids in the future that they will notify us. This is particularly important for gastric bypass patients at all times and both sleeve  gastrectomy and gastric bypass patients in the 1 month pre op and 1 month post operative period. They understand that inhaled steroids are exempt from this. Signed By: Kathleen Jeffers MD     March 30, 2020         This visit with Martha Silveira was performed under virtual telemedicine guidelines during the coronavirus (ZIPKP-13) public health emergency on 3/30/2020 in an interactive   fashion using [employer] me. They understand that this telemedicine encounter is a billable service, with coverage determined by their insurance carrier. They are aware that   they may receive a bill and have provided verbal consent for this virtual visit. This visit was performed with the patient in their home environment and provider was   present at [facility]. I have spent over 60 minutes on this visit  both prior to the visits reviewing the patients chart and with the patient face to face. I have reviewed their medical history, performed a telemedicine physical examination, and discussed the plan of action to date. They understand that they will be asked   to come to the office when our office is allowed normal patient interaction, as dictated by public health officials, for a face-to-face visit to rediscuss all of the things we  have talked about today.   During this visit we discussed the varieties of surgeries that we perform, how they would impact the patient from a weight loss standpoint   considering their medical issues and prior surgeries, and also the restrictions that the patient would have long-term with the operation that they have chosen.     Nirav Barnes M.D.  4/2/2020

## 2020-04-06 ENCOUNTER — OFFICE VISIT (OUTPATIENT)
Dept: SURGERY | Age: 65
End: 2020-04-06

## 2020-04-06 VITALS — HEIGHT: 62 IN | BODY MASS INDEX: 41.77 KG/M2 | WEIGHT: 227 LBS

## 2020-04-06 DIAGNOSIS — Z98.84 STATUS POST GASTRIC BYPASS FOR OBESITY: Primary | ICD-10-CM

## 2020-04-06 NOTE — PROGRESS NOTES
Medical Weight Loss Multi-Disciplinary Program    Name: Karina Wyatt   : 1955    Session# 1  Date: 2020     Height: 5' 2\" (157.5 cm)    Weight: 103 kg (227 lb) lbs. Body mass index is 41.52 kg/m². Dietitian: Olimpia Gatica is a 59 y.o. female who present for a pre-op evaluation. Visit Vitals  Ht 5' 2\" (1.575 m)   Wt 103 kg (227 lb)   BMI 41.52 kg/m²     Past Medical History:   Diagnosis Date    Arthritis     Chronic pain     Back    COPD (chronic obstructive pulmonary disease) (HCC)     Dr. Rossana Mathew    Depression     GERD (gastroesophageal reflux disease)     Gout     H/O colon cancer, stage II     resection by Dr. Paige Ferreira History of multiple pulmonary nodules     Hyperlipemia     Hypertension     Hypothyroid     IBS (irritable bowel syndrome)     Liver cyst     Multinodular thyroid     Nephropathy, membranous     Pancreatitis     Prediabetes     Severe obesity with body mass index (BMI) of 35.0 to 39.9 with comorbidity ([address]     Smoking history     quit     Status post gastric bypass for obesity     [facility] - pre-op wt = 250 lbs . ... lowest wt = 134 lbs    Unspecified sleep apnea     does not use CPAP           Procedure:  Considering revision of laparoscopic gastric bypass surgery     Reasons for Surgery:  BMI > 40 with one or more medically significant comorbidities, NIDDM and HTN    Summary:  Pt given brief pre/post-op diet ed and diet hx reviewed. Pt instructed to follow a low calorie, low carbohydrate, high protein diet of about 5786-4435 calories daily. Pt set several goals. See below. What have you done in the past to try to lose weight?  Patient has had previous weight loss surgery, calorie controlled diet, exercise programs    Why didn't you lose weight or keep the weight off?: Patient notes worsening COPD resulted in utilizing steroids more long term    Why do you think having weight loss surgery will make it possible for you to lose weight and keep it off? Patient wants to improve total health and control weight     Dietary Instructions    Nutritional Hx: What is the number of meals you eat per day? 2  Comment: Eats first meal at 12 pm, skips breakfast    Do you eat between meals / snack? no  Patient avoids snacking     How fast do you eat your meals? slow    How often do you eat fast food? occasionally    How many sodas/sugared beverages do you drink per day? avoids sugar sweetened - utilizes equal     How many caffeinated drinks do you have per day? Coffee (not daily)     How much milk and/or juice do you drink per day? Rarely     How much water do you drink per day? (6 -20 oz bottles of water per day)    How often do you consume alcohol? Wine or beer every 3-4 months     Current Vitamins: Vitamin B12, Vitamin D, Multivitamin- [place] chewable     Diet History:    Typical intake is as follows:  Breakfast: SKIPS due to low appetite   Lunch: 1 hard boiled egg with 1 link sausage -  -avoids snacking between lunch and dinner  Dinner: meatloaf with ground turkey with turnips greens   Fluids: Water  (6 -20 oz bottles of water per day),     Reviewed intake  Understanding low carbohydrates, low sugar, higher protein meals  Understanding proper portions  Instruction given for personal dietary changes  Discussed perceived compliance  Comments: Pt given brief pre/post-op diet ed and diet hx reviewed.      Patient Education and Materials Provided:  Supplement [facility], B Vitamin Information, MVI Recommendations, Calcium Citrate Information, Bariatric Supplement Companies, Protein Supplement Information, Fluid Requirements, No Caffeine or Carbonation, No Alcohol for One Year Post Op, 3 Balanced Meals a Day, Food Group Guide, Good Choices Dining Out, No Snacks, No Concentrated Sweets, Support System at Home, Exercising, Support Group Information and Addressed Current Habits / Changes to make  Physical Activity/Exercise    Discussed Perceived Compliance  Reasonable Goals Set  Motivation  Comments: none    Exercise:  Do you currently have an exercise routine? no and reinforced the importance of consistentcy activity - provided activity log to track exercise    Goals:    1. Work to increase to 3-4 small meals per day, with planned snacks as needed. Recommend following plate method for meal planning - focusing on lean protein, non-starchy vegetables, and measured amounts of starch. - Goal of  g protein and  g carbohydrate per day. - Recommend continuing protein supplement as meal replacement at least 1x/day  2. Continue non caloric fluid to 64 oz per day. Eliminate caffeine, added sugar, carbonation, and straws.               -Continue to work to decrease sugar sweetened beverages - goal of calorie free beverages only              -Must eliminate caffeine prior to surgery and avoid for ~6-8 weeks  3. Start activity regimen, work to increase ADL              -Try to start walking for at least 20-30  minutes 5 days per week- if unable to   4. Start calcium citrate      Candidate for surgery (per RD): PENDING   Carmela Gatica, CAMILA  04/06/20

## 2020-05-06 ENCOUNTER — CLINICAL SUPPORT (OUTPATIENT)
Dept: SURGERY | Age: 65
End: 2020-05-06

## 2020-05-06 VITALS — BODY MASS INDEX: 41.77 KG/M2 | HEIGHT: 62 IN | WEIGHT: 227 LBS

## 2020-05-06 DIAGNOSIS — Z98.84 STATUS POST GASTRIC BYPASS FOR OBESITY: Primary | ICD-10-CM

## 2020-05-06 NOTE — PROGRESS NOTES
Medical Weight Loss Multi-Disciplinary Program    Name: Torito    : 1955    Session# 2  Date: 2020    Visit Vitals  Ht 5' 2\" (1.575 m)   Wt 103 kg (227 lb)   BMI 41.52 kg/m²     Dietary Instructions    Reviewed intake  Understanding low carbohydrates, low sugar, higher protein meals  Instruction given for personal dietary changes  Discussed perceived compliance  Comments: Diet hx reviewed and personal dietary changes discussed. Reviewed recommendation to follow [id number] calorie diet, working to reduce total carbohydrate intake to 50-65 g or less per day and increasing protein intake to  g per day, compared current intake to recommendations. Typical intake is as follows:  Breakfast: 1 medium banana   Lunch: 1 hard boiled egg with 1 link sausage -  -avoids snacking between lunch and dinner  Dinner: meatloaf with ground turkey with turnips greens OR baked chicken with cooked greens   Fluids: Water  (6 -20 oz bottles of water per day),       Nutritional Hx: What is the number of meals you eat per day? 2-3  Comment: Eats first meal at 12 pm, skips breakfast    Do you eat between meals / snack? no  Patient avoids snacking     How fast do you eat your meals? slow    How often do you eat fast food? occasionally    How many sodas/sugared beverages do you drink per day? avoids sugar sweetened - utilizes equal     How many caffeinated drinks do you have per day? Coffee (not daily)     How much milk and/or juice do you drink per day? Rarely     How much water do you drink per day? (6 -20 oz bottles of water per day)    How often do you consume alcohol? Wine or beer every 3-4 months     Current Vitamins: Vitamin B12, Vitamin D, Multivitamin- [place] chewable     Physical Activity/Exercise    Discussed Perceived Compliance  Motivation    Patient has  started physical activity regimen, reinforced the importance of regular physical activity for total health and weight management.   Patient has been tracking her activity with log, she has been utilizing stationary bike pedal and this helps with preventing back pain       Behavior Modification    Identify obstacles to trigger change  Achieving/Rewarding goals met  Positive attitude  Comments: Reinforced importance continuing to modify lifestyle patterns and behaviors to promote weight loss and long term weight maintenance       Goals:   1. Work to increase to 3-4 small meals per day, with planned snacks as needed. Recommend following plate method for meal planning - focusing on lean protein, non-starchy vegetables, and measured amounts of starch. - Goal of  g protein and  g carbohydrate per day. - Recommend continuing protein supplement as meal replacement at least 1x/day OR as high protein snack option  2. Increase non caloric fluid to 64 oz per day. Eliminate caffeine, added sugar, carbonation, and straws.               -Continue to work to decrease sugar sweetened beverages - goal of calorie free beverages only              -Must eliminate caffeine prior to surgery and avoid for ~6-8 weeks   -Practice 30:30 rule,  food and flood   3. Start activity regimen, work to increase ADL  4. Start Complete MVI    Candidate for surgery (per RD):  PENDING    Toni Reilly    Dietitian: Toni Reilly RD

## 2020-05-20 ENCOUNTER — HOSPITAL ENCOUNTER (OUTPATIENT)
Age: 65
Setting detail: OUTPATIENT SURGERY
Discharge: HOME | End: 2020-05-20
Attending: SPECIALIST | Admitting: SPECIALIST
Payer: MEDICARE

## 2020-05-20 ENCOUNTER — APPOINTMENT (OUTPATIENT)
Dept: GENERAL RADIOLOGY | Age: 65
End: 2020-05-20
Attending: SPECIALIST
Payer: MEDICARE

## 2020-05-20 ENCOUNTER — VIRTUAL VISIT (OUTPATIENT)
Dept: SURGERY | Age: 65
End: 2020-05-20

## 2020-05-20 VITALS — WEIGHT: 227 LBS | BODY MASS INDEX: 41.77 KG/M2 | HEIGHT: 62 IN

## 2020-05-20 VITALS
SYSTOLIC BLOOD PRESSURE: 149 MMHG | RESPIRATION RATE: 19 BRPM | HEIGHT: 62 IN | WEIGHT: 228.3 LBS | DIASTOLIC BLOOD PRESSURE: 86 MMHG | BODY MASS INDEX: 42.01 KG/M2 | TEMPERATURE: 97.7 F | HEART RATE: 87 BPM | OXYGEN SATURATION: 99 %

## 2020-05-20 DIAGNOSIS — E11.40 TYPE 2 DIABETES MELLITUS WITH DIABETIC NEUROPATHY, UNSPECIFIED WHETHER LONG TERM INSULIN USE (HCC): ICD-10-CM

## 2020-05-20 DIAGNOSIS — G89.4 CHRONIC PAIN SYNDROME: ICD-10-CM

## 2020-05-20 DIAGNOSIS — G62.9 NEUROPATHY: ICD-10-CM

## 2020-05-20 DIAGNOSIS — Z98.84 STATUS POST GASTRIC BYPASS FOR OBESITY: ICD-10-CM

## 2020-05-20 DIAGNOSIS — Z87.891 SMOKING HISTORY: ICD-10-CM

## 2020-05-20 DIAGNOSIS — J44.9 CHRONIC OBSTRUCTIVE PULMONARY DISEASE, UNSPECIFIED COPD TYPE (HCC): ICD-10-CM

## 2020-05-20 DIAGNOSIS — I10 ESSENTIAL HYPERTENSION: ICD-10-CM

## 2020-05-20 DIAGNOSIS — E66.01 MORBID OBESITY (HCC): ICD-10-CM

## 2020-05-20 DIAGNOSIS — M51.37 DDD (DEGENERATIVE DISC DISEASE), LUMBOSACRAL: ICD-10-CM

## 2020-05-20 DIAGNOSIS — M54.50 CHRONIC LOW BACK PAIN, UNSPECIFIED BACK PAIN LATERALITY, UNSPECIFIED WHETHER SCIATICA PRESENT: ICD-10-CM

## 2020-05-20 DIAGNOSIS — E66.01 MORBID OBESITY (HCC): Primary | ICD-10-CM

## 2020-05-20 DIAGNOSIS — G89.29 CHRONIC LOW BACK PAIN, UNSPECIFIED BACK PAIN LATERALITY, UNSPECIFIED WHETHER SCIATICA PRESENT: ICD-10-CM

## 2020-05-20 PROCEDURE — 77030040361 HC SLV COMPR DVT MDII -B: Performed by: SPECIALIST

## 2020-05-20 PROCEDURE — 76040000019: Performed by: SPECIALIST

## 2020-05-20 PROCEDURE — 74011000255 HC RX REV CODE- 255: Performed by: SPECIALIST

## 2020-05-20 PROCEDURE — 74240 X-RAY XM UPR GI TRC 1CNTRST: CPT

## 2020-05-20 NOTE — PATIENT INSTRUCTIONS
Patient Instructions 1. Continue to monitor carbohydrate and protein intake- remember to keep your           total  carbohydrates to 50 grams or less per day for best results. 2. Remember hydration goals - usually 48 to 64 ounces of liquids per day 3. Continue to work towards exercise goals - minimum 3 days per week of 45          minutes to  1 hour at a time. 4. Remember to take vitamins as directed Supplement Resource Guide Importance of Protein:  
Maintains lean body mass, produces antibodies to fight off infections, heals wounds, minimizes hair loss, helps to give you energy, helps with satiety, and keeping you full between meals. Importance of Calcium: 
Needed for healthy bones and teeth, normal blood clotting, and nervous system functioning, higher risk of osteoporosis and bone disease with non-compliance. Importance of Multivitamins: Many functions. Supply you with extra nutrients that you may be missing from food. May lead to iron deficiency anemia, weakness, fatigue, and many other symptoms with non-compliance. Importance of B Vitamins: 
Important for red blood cell formation, metabolism, energy, and helps to maintain a healthy nervous system. Protein Supplement Find one you like now. Use immediately after surgery. Look for: 
35-50g protein each day from your protein supplement once you reach the progression diet. 0-3 g fat per serving 0-3 g sugar per serving Protein drinks should be split in separate dosages. Recommend: Lifelong 1 year + Calcium Supplement:  
 
Start taking within a month after surgery. Look for: Calcium Citrate Plus D (1500 mg per day) Recommend: Citracal 
 
 . Avoid chocolate chewable calcium. Can use chewable bariatric or [employer] brand or similar chewable. The body cannot absorb more than 500-600 mg @ a time. Take for Life Multi-vitamin Supplement: 1st Month After Surgery: Any complete chewable, such as: Bowies Complete chewables. Avoid Bowie sours or gummies. They lack iron and other important nutrients and also have added sugar. Continue with chewable vitamin or change to adult complete multivitamin one month after surgery. Menstruating women can take a prenatal vitamin. Make sure has at least 18 mg iron and 669-750 mcg folic acid): Vitamin B12, B Complex Vitamin, and Biotin Start taking within a month after surgery. Vitamin B12:  1000 mcg of Vitamin B12 three times weekly Must take sublingually (meaning you take it under your tongue) or in a liquid drop form for easy absorption. B Complex Vitamin: Take a pill or liquid drop form once daily. Biotin: This vitamin can help prevent hair loss. Recommend 5mg  
(5000 mcg) a day Biotin is Optional

## 2020-05-20 NOTE — PROCEDURES
2009 Dr. Yue Williamson gastric bypass seeking revision with BMI of 42. UGI is normal.  Not a candidate for revision but possible band over bypass.   See dication

## 2020-05-20 NOTE — PROGRESS NOTES
Bariatric Surgery Consultation    Subjective:     Merrick Cummins is a 59 y.o. obese female with a Body mass index is 41.52 kg/m². Brian Nassar she desires surgery at this time because of health issues and quality of life issues. Merrick Cummins has been seen by a bariatric nutritionist and has been placed on an appropriate low carbohydrate diet. The patient desires laparoscopic adjustable gastric band surgery for surgical weight loss, however she is here today to review their workup to date. Merrick Cummins is here also today to check progress with weight loss / evaluate nutritional status and review all subspecialty clearances in hopes of proceeding to the operating room.      Patient Active Problem List    Diagnosis Date Noted    Smoking history     Severe obesity ([facility] [address] 04/15/2019    Type 2 diabetes mellitus with diabetic neuropathy ([facility] [address] 01/03/2018    Type 2 diabetes mellitus with microalbuminuria, without long-term current use of insulin ([facility] [address] 11/09/2017    Right arm pain 05/17/2017    Advanced care planning/counseling discussion 06/10/2016    Cervicalgia 07/28/2015    Shoulder pain, right 07/28/2015    Cervical spondylosis 07/28/2015    Lumbosacral neuritis 04/30/2015    CAIO (generalized anxiety disorder) 02/04/2015    CAP (community acquired pneumonia) 11/10/2014    Asthma exacerbation 11/10/2014    Encounter for long-term (current) use of other medications 10/15/2014    H/O lumbosacral spine surgery 10/15/2014    Chronic pain syndrome 10/15/2014    DJD (degenerative joint disease), lumbar 10/15/2014    Scar tissue 10/15/2014    History of gastric bypass 10/15/2014    DDD (degenerative disc disease), lumbosacral 06/11/2013    Spondylolisthesis, grade 1 06/11/2013    H/O colon cancer, stage II     Vitamin B12 deficiency 05/14/2012    S/P cataract extraction 01/03/2011    Chronic low back pain 07/12/2010    HTN (hypertension) 01/20/2010    COPD (chronic obstructive pulmonary disease) ([facility] [address] 2010    Neuropathy 2010    Vitamin D deficiency 2010    Nephropathy, membranous 2010    History of multiple pulmonary nodules 2010    FH: colon cancer 2010    Rectocele 2010    Status post gastric bypass for obesity       Past Surgical History:   Procedure Laterality Date    HX CATARACT REMOVAL Bilateral     HX COLECTOMY      2005 - Dr. Mac - Sigmoid colectomy laparoscopic    HX GASTRIC BYPASS  2009    pre-op wt = 250 lbs . ... lowest wt = 134 lbs    HX HYSTERECTOMY      HX LUMBAR FUSION  2013    L4 to L 5    HX TONSILLECTOMY        Social History     Tobacco Use    Smoking status: Former Smoker     Packs/day: 0.50     Years: 18.00     Pack years: 9.00     Types: Cigarettes     Last attempt to quit: 1990     Years since quittin.7    Smokeless tobacco: Never Used    Tobacco comment: per patient she has not smoked in over 30 years but significant second hand smoke exposure at home   Substance Use Topics    Alcohol use: Yes     Alcohol/week: 0.0 standard drinks     Comment: rare      Family History   Problem Relation Age of Onset    Asthma Mother     Diabetes Mother     Hypertension Mother    Nerissa Solum Elevated Lipids Mother     Elevated Lipids Father     Hypertension Father     Heart Disease Father         chf    Heart Disease Sister         older      Current Outpatient Medications   Medication Sig Dispense Refill    diclofenac (VOLTAREN) 1 % gel Apply 4 g to affected area four (4) times daily. 100 g 4    traMADol (ULTRAM) 50 mg tablet Take 50 mg by mouth every eight (8) hours as needed.  albuterol-ipratropium (DUO-NEB) 2.5 mg-0.5 mg/3 ml nebu USE 1 VIAL WITH HAND HELD NEBULIZER EVERY 6 HOURS AS NEEDED 24 Nebule 5    montelukast (SINGULAIR) 10 mg tablet TAKE ONE TABLET BY MOUTH ONCE DAILY 90 Tab 3    OMEPRAZOLE PO Take 1 Tab by mouth daily.       albuterol (VENTOLIN HFA) 90 mcg/actuation inhaler INHALE 2 PUFFS EVERY 4 HOURS IF NEEDED FOR WHEEZING OR  FOR SHORTNESS OF BREATH 1 Inhaler 5    amLODIPine (NORVASC) 10 mg tablet Take 1 Tab by mouth daily. 7 Tab 0    candesartan-hydroCHLOROthiazide (ATACAND HCT) 32-12.5 mg per tablet TAKE ONE TABLET BY MOUTH ONCE DAILY 7 Tab 0    lidocaine (LIDODERM) 5 % by TransDERmal route every twenty-four (24) hours. Apply patch to the affected area for 12 hours a day and remove for 12 hours a day.  levothyroxine (SYNTHROID) 125 mcg tablet Take 1 Tab by mouth Daily (before breakfast). 30 Tab 1    ergocalciferol (ERGOCALCIFEROL) 50,000 unit capsule Take 1 Cap by mouth every seven (7) days. 4 Cap 11    cholecalciferol (VITAMIN D3) 1,000 unit cap Take 1,000 Units by mouth daily.  b complex vitamins tablet Take 1 tablet by mouth daily.  Multivit-Iron-Min-Folic Acid (CENTRUM COMPLETE) 18-0.4 mg tab Take 1 Tab by mouth daily.  polyethylene glycol (MIRALAX) 17 gram packet Take 17 g by mouth daily.        Allergies   Allergen Reactions    Breo Ellipta [Fluticasone Furoate-Vilanterol] Hoarseness     Yeast problem w/Powdered MDIs    Ace Inhibitors Cough    Aspirin Other (comments)     GI bleeding & pain    Nsaids (Non-Steroidal Anti-Inflammatory Drug) Other (comments)     Makes her stomach bleed            Review of Systems:            General - No history or complaints of unexpected fever, chills, or weight loss  Head/Neck - No history or complaints of headache, diplopia, dysphagia, hearing loss  Cardiac - No history or complaints of chest pain, palpitations, murmur, or shortness of breath  Pulmonary - No history or complaints of shortness of breath, productive cough, hemoptysis  Gastrointestinal - No history or complaints of reflux,  abdominal pain, obstipation/constipation, blood per rectum  Genitourinary - No history or complaints of hematuria/dysuria, stress urinary incontinence symptoms, or renal lithiasis  Musculoskeletal - No history or complaints of joint pain or muscular weakness  Hematologic - No history or complaints of bleeding disorders, blood transfusions, sickle cell anemia  Neurologic - No history or complaints of  migraine headaches, seizure activity, syncopal episodes, TIA or stroke  Integumentary - No history or complaints of rashes, abnormal nevi, skin cancer  Gynecological - n/a           Objective:     Visit Vitals  Ht 5' 2\" (1.575 m)   Wt 103 kg (227 lb)   BMI 41.52 kg/m²       Physical Examination:       Physical Examination: General appearance - alert, well appearing, and in no distress and oriented to person, place, and time  Mental status - alert, oriented to person, place, and time, normal mood, behavior, speech, dress, motor activity, and thought processes  Eyes - pupils equal and reactive, extraocular eye movements intact, sclera anicteric, left eye normal, right eye normal  Ears - right ear normal, left ear normal  Neck- good extension and flexion, no obvious swelling  Chest - good air movement  Heart - N/A  Abdomen - no obvious distension. Neurological - alert, oriented, normal speech, no focal findings or movement disorder noted  Musculoskeletal - no swelling noted  Extremities - normal movement    Labs:     No results found for this or any previous visit (from the past 2016 hour(s)). Assessment:     Morbid obesity with associated comorbidity      Plan:     Continuation of Pre-Operative evaluation / clearance. Janet Ventura has returned to the office today to discuss her status as a surgical candidate.    her progress has been noted and reviewed. We will continue the pre-operative process and work towards goals as outlined. she has 0 more pounds to lose before proceeding to the OR.  (0 pounds lost since last visit)  she has 4 more nutritional visits to complete before proceeding to the OR  she has a cardiac clearance to review before proceeding to the OR. Adriana Diamond understand the rationales for all the above.   It has been discussed that given her condition   that the best surgical option for this patient would likely be the laparoscopic adjustable gastric band surgery. Nando Israel   agrees with the surgical choice and has been educated in it's; risks, benefits, and alternatives. We will continue   with the pre-operative evaluation as needed to check progress. Her UGI is scheduled for this afternoon to assess her gastric pouch. Secondary Diagnoses:         Signed By: Tono Azevedo MD     May 20, 2020         This visit with Nando Israel was performed under virtual telemedicine guidelines during the coronavirus (JHLUR-98) public health emergency on 5/20/2020 in an interactive   fashion using [employer]. They understand that this telemedicine encounter is a billable service, with coverage determined by their insurance carrier. They are aware that   they may receive a bill and have provided verbal consent for this virtual visit. This visit was performed with the patient in their home environment and provider was   present at [facility]. I have spent over 30 minutes on this visit  both prior to the visits reviewing the patients chart and with the patient face to face. I have reviewed their medical history, performed a telemedicine physical examination, and discussed the plan of action to date. They understand that they will be asked   to come to the office when our office is allowed normal patient interaction, as dictated by public health officials, for a face-to-face visit to rediscuss all of the things we  have talked about today. During this visit we discussed the varieties of surgeries that we perform, how they would impact the patient from a weight loss standpoint   considering their medical issues and prior surgeries, and also the restrictions that the patient would have long-term with the operation that they have chosen.     Ale Walters M.D.  5/20/2020

## 2020-05-21 NOTE — OP NOTES
[facility]  OPER[facility]IVE RE[facility]    Name:  Charly Jacques  MR#:   [id number]  :  1955  ACCOUNT #:  [de-identified]  DATE OF SERVICE:  2020    PREOPERATIVE DIAGNOSIS:  The patient is status post gastric bypass procedure in  by Dr. Joanna Reed, with persistent morbid obesity. POSTOPERATIVE DIAGNOSIS:  The patient is status post gastric bypass procedure in  by Dr. Joanna Reed, with persistent morbid obesity. PROCEDURE PERFORMED:  Upper gastrointestinal study with barium. SURGEON:  Billy Toro MD    ASSISTANT:  None. ANESTHESIA:  None. COMPLICATIONS:  None. SPECIMENS REMOVED:  None. IMPLANTS:  None. ESTIMATED BLOOD LOSS:  None. STATEMENT OF MEDICAL NECESSITY:  The patient is a 61-year-old female, who in  underwent a successful gastric bypass procedure by Dr. Joanna Reed. She lost an appropriate amount of weight, but as she has got older, she developed significant orthopedic issues causing her to be quite debilitated. This caused her weight to rise to a BMI of 42, and she is here today to assess if she is a candidate for revision. PROCEDURE:  The patient was brought to the fluoro unit where she was given thin barium. On swallowing barium, she was noted to have normal peristalsis of her esophagus. She had prompt filling of the distal esophagus, with tapering into and through the gastroesophageal junction. Contrast then filled a vertically-oriented gastric pouch which was quite long. Contrast then readily flowed in the gastrojejunal anastomosis in a normal fashion. There was no  extravasation of contrast noted. The pouch was of perfect size and dimension. At this juncture, I explained to the patient she is not a candidate for revision of her gastric bypass procedure, but would be a candidate for Lap-Band placement.   She will complete a medical weight loss program.  At the completion of that, if she wished to proceed, we will go ahead and do so.      Charles Ortiz MD      [id number]  D:  05/21/2020 6:52  T:  05/21/2020 9:30  JOB #:  [id number]

## 2020-06-17 ENCOUNTER — OFFICE VISIT (OUTPATIENT)
Dept: SURGERY | Age: 65
End: 2020-06-17

## 2020-06-17 VITALS — HEIGHT: 62 IN | WEIGHT: 228 LBS | BODY MASS INDEX: 41.96 KG/M2

## 2020-06-17 DIAGNOSIS — E66.01 MORBID OBESITY (HCC): Primary | ICD-10-CM

## 2020-06-17 NOTE — PROGRESS NOTES
Medical Weight Loss Multi-Disciplinary Program    Name: Tiffanie De La Cruz   : 1955    Session# 3  Date: 2020    Visit Vitals  Ht 5' 2\" (1.575 m)   Wt 103.4 kg (228 lb)   BMI 41.70 kg/m²     Dietary Instructions    Reviewed intake  Understanding low carbohydrates, low sugar, higher protein meals  Instruction given for personal dietary changes  Discussed perceived compliance  Comments: Diet hx reviewed and personal dietary changes discussed. Reviewed recommendation to follow [id number] calorie diet, working to reduce total carbohydrate intake to 50-65 g or less per day and increasing protein intake to  g per day, compared current intake to recommendations. Typical intake is as follows:  Breakfast: 10-11 am Boost protein shakes (high protein shake)   Lunch:3:00 pm - Beef tips with cabbage and potatoes  OR  1 hard boiled egg with 1 link sausage -  -avoids snacking between lunch and dinner  Dinner: meatloaf with ground turkey with turnips greens OR baked chicken with cooked greens   Fluids: Water  (6 -20 oz bottles of water per day),       Nutritional Hx: What is the number of meals you eat per day? 2-3  Comment: Eats first meal at 12 pm, skips breakfast    Do you eat between meals / snack? no  Patient avoids snacking     How fast do you eat your meals? slow    How often do you eat fast food? occasionally    How many sodas/sugared beverages do you drink per day? avoids sugar sweetened - utilizes equal     How many caffeinated drinks do you have per day? Coffee (not daily)     How much milk and/or juice do you drink per day? Rarely     How much water do you drink per day? (6 -20 oz bottles of water per day)    How often do you consume alcohol?  Wine or beer every 3-4 months     Current Vitamins: Vitamin B12, Vitamin D, Multivitamin- [place] chewable     Physical Activity/Exercise    Discussed Perceived Compliance  Motivation    Patient has  started physical activity regimen, reinforced the importance of regular physical activity for total health and weight management. Patient has been tracking her activity with log, she has been utilizing stationary bike pedal and this helps with preventing back pain       Behavior Modification    Identify obstacles to trigger change  Achieving/Rewarding goals met  Positive attitude  Comments: Reinforced importance continuing to modify lifestyle patterns and behaviors to promote weight loss and long term weight maintenance       Goals:   1. Work to increase to 3-4 small meals per day, with planned snacks as needed. Recommend following plate method for meal planning - focusing on lean protein, non-starchy vegetables, and measured amounts of starch. - Goal of  g protein and  g carbohydrate per day. - Recommend continuing protein supplement as meal replacement at least 1x/day OR as high protein snack option  2. Increase non caloric fluid to 64 oz per day. Eliminate caffeine, added sugar, carbonation, and straws.               -Continue to work to decrease sugar sweetened beverages - goal of calorie free beverages only              -Must eliminate caffeine prior to surgery and avoid for ~6-8 weeks   -Practice 30:30 rule,  food and flood   3. Start activity regimen, work to increase ADL  4. Start Complete MVI    Candidate for surgery (per RD):  PENDING    Jonathan Pedro    Dietitian: Jonathan Pedro RD

## 2020-07-22 ENCOUNTER — CLINICAL SUPPORT (OUTPATIENT)
Dept: SURGERY | Age: 65
End: 2020-07-22

## 2020-07-22 VITALS — HEIGHT: 62 IN | WEIGHT: 228 LBS | BODY MASS INDEX: 41.96 KG/M2

## 2020-07-22 DIAGNOSIS — E66.01 MORBID OBESITY (HCC): Primary | ICD-10-CM

## 2020-07-22 NOTE — PROGRESS NOTES
Medical Weight Loss Multi-Disciplinary Program    Name: Xander Sutton   : 1955    Session# 4  Date: 2020    Visit Vitals  Ht 5' 2\" (1.575 m)   Wt 103.4 kg (228 lb)   BMI 41.70 kg/m²     Dietary Instructions    Reviewed intake  Understanding low carbohydrates, low sugar, higher protein meals  Instruction given for personal dietary changes  Discussed perceived compliance  Comments: Diet hx reviewed and personal dietary changes discussed. Reviewed recommendation to follow 2349-5855 calorie diet, working to reduce total carbohydrate intake to 50-65 g or less per day and increasing protein intake to  g per day, compared current intake to recommendations. Today we spent majority of session reviewing key diet principles and beginning to discuss post operative diet advancement guidelines. Reinforcing the importance of adequate hydration and protein intake - emphasizing the role of protein supplements in the post operative diet. Discussed vitamin and mineral supplementation forever following surgery. Encouraged patient to review key diet principles of surgery and we will discuss individual questions or concerns. Patient was receptive to education and we will continue to review and reinforce in our follow-up next month. Typical intake is as follows:  Breakfast: 10-11 am  Boost protein shakes (high protein shake) OR 1 hard boiled egg- with sausage link with  Ice tea with equal   Lunch:3:00 pm -Pork chop with steamed broccoli OR  1 hard boiled egg with 1 link sausage -  -avoids snacking between lunch and dinner- did resume watermelon   Dinner: meatloaf with ground turkey with turnips greens OR baked chicken with cooked greens   Fluids: Water  (6 -20 oz bottles of water per day),       Nutritional Hx:   What is the number of meals you eat per day? 2-3  Comment: has been working to avoid meal skipping    Do you eat between meals / snack? no  Patient avoids snacking     How fast do you eat your meals? slow    How often do you eat fast food? occasionally    How many sodas/sugared beverages do you drink per day? avoids sugar sweetened - utilizes equal     How many caffeinated drinks do you have per day? Coffee (not daily)     How much milk and/or juice do you drink per day? Rarely     How much water do you drink per day? (6 -20 oz bottles of water per day)    How often do you consume alcohol? Wine or beer every 3-4 months     Current Vitamins: Vitamin B12, Vitamin D, Multivitamin- [place] chewable     Physical Activity/Exercise    Discussed Perceived Compliance  Motivation    Patient has  started physical activity regimen, reinforced the importance of regular physical activity for total health and weight management. Patient has been tracking her activity with log, she has been utilizing stationary bike pedal and taking the stairs at her home;  and this helps with preventing back pain       Behavior Modification    Identify obstacles to trigger change  Achieving/Rewarding goals met  Positive attitude  Comments: Reinforced importance continuing to modify lifestyle patterns and behaviors to promote weight loss and long term weight maintenance       Goals:   1. Work to increase to 3-4 small meals per day, with planned snacks as needed. Recommend following plate method for meal planning - focusing on lean protein, non-starchy vegetables, and measured amounts of starch. - Goal of  g protein and  g carbohydrate per day. - Recommend continuing protein supplement as meal replacement at least 1x/day OR as high protein snack option  2. Increase non caloric fluid to 64 oz per day. Eliminate caffeine, added sugar, carbonation, and straws.               -Continue to work to decrease sugar sweetened beverages - goal of calorie free beverages only              -Must eliminate caffeine prior to surgery and avoid for ~6-8 weeks   -Practice 30:30 rule,  food and flood   3.  Start activity regimen, work to increase ADL  4. Start Complete MVI    Candidate for surgery (per RD):  PENDING    Mohsen Odonnell    Dietitian: Mohsen Odonnell RD

## 2020-08-25 ENCOUNTER — CLINICAL SUPPORT (OUTPATIENT)
Dept: SURGERY | Age: 65
End: 2020-08-25

## 2020-08-25 VITALS — BODY MASS INDEX: 41.22 KG/M2 | WEIGHT: 224 LBS | HEIGHT: 62 IN

## 2020-08-25 DIAGNOSIS — E66.01 MORBID OBESITY (HCC): Primary | ICD-10-CM

## 2020-08-25 NOTE — PROGRESS NOTES
Medical Weight Loss Multi-Disciplinary Program    Name: Anita Xavier   : 1955    Session# 5  Date: 2020    Visit Vitals  Ht 5' 2\" (1.575 m)   Wt 101.6 kg (224 lb)   BMI 40.97 kg/m²     Dietary Instructions    Reviewed intake  Understanding low carbohydrates, low sugar, higher protein meals  Instruction given for personal dietary changes  Discussed perceived compliance  Comments: Diet hx reviewed and personal dietary changes discussed. Reviewed recommendation to follow [id number] calorie diet, working to reduce total carbohydrate intake to 50-65 g or less per day and increasing protein intake to  g per day, compared current intake to recommendations. Patient notes due to mouth sores she has not been able to tolerate usual intake choices and has been supplementing with protein shakes OR meal replacement shakes. She reports eliminating most carbohydrates, still occasionally consuming melon or small piece of fruit. Still not following consistent intake regimen. Today we spent majority of session reviewing key diet principles and beginning to discuss post operative diet advancement guidelines. Reinforcing the importance of adequate hydration and protein intake - emphasizing the role of protein supplements in the post operative diet. Today we spent majority of session reviewing key diet principles and beginning to discuss post operative diet advancement guidelines.       Typical intake is as follows:  Breakfast: 10-11 am  Boost protein shakes (high protein shake- 7 g CHO, 20 g protein) OR 1 Slim fast meal replacement shake OR  1 hard boiled egg- with sausage link with  Ice tea with equal   Lunch:3:00 pm -Pork chop with steamed broccoli OR  1 hard boiled egg with 1 link sausage - OR Salad green with tuna salad   -avoids snacking between lunch and dinner- did resume watermelon   Dinner: meatloaf with ground turkey with turnips greens OR baked chicken with cooked greens OR baked fish with green beans   Fluids: Water  (6 -20 oz bottles of water per day),       Nutritional Hx: What is the number of meals you eat per day? 2-3  Comment: has been working to avoid meal skipping    Do you eat between meals / snack? no  Patient avoids snacking     How fast do you eat your meals? slow    How often do you eat fast food? occasionally    How many sodas/sugared beverages do you drink per day? avoids sugar sweetened - utilizes equal     How many caffeinated drinks do you have per day? Coffee (not daily)     How much milk and/or juice do you drink per day? Rarely     How much water do you drink per day? (6 -20 oz bottles of water per day)    How often do you consume alcohol? Wine or beer every 3-4 months     Current Vitamins: Vitamin B12, Vitamin D, Multivitamin- [place] chewable     Physical Activity/Exercise    Discussed Perceived Compliance  Motivation    Patient has  started physical activity regimen, reinforced the importance of regular physical activity for total health and weight management. Patient has been tracking her activity with log, she has been utilizing stationary bike pedal and taking the stairs at her home;  and this helps with preventing back pain       Behavior Modification    Identify obstacles to trigger change  Achieving/Rewarding goals met  Positive attitude  Comments: Reinforced importance continuing to modify lifestyle patterns and behaviors to promote weight loss and long term weight maintenance       Goals:   1. Work to increase to 3-4 small meals per day, with planned snacks as needed. Recommend following plate method for meal planning - focusing on lean protein, non-starchy vegetables, and measured amounts of starch. - Goal of  g protein and  g carbohydrate per day. - Recommend continuing protein supplement as meal replacement at least 1x/day OR as high protein snack option  2. Increase non caloric fluid to 64 oz per day.   Eliminate caffeine, added sugar, carbonation, and straws.               -Continue to work to decrease sugar sweetened beverages - goal of calorie free beverages only              -Must eliminate caffeine prior to surgery and avoid for ~6-8 weeks   -Practice 30:30 rule,  food and flood   3. Start activity regimen, work to increase ADL  4. Start Complete MVI    Candidate for surgery (per RD):  PENDING    Monica Kumari    Dietitian: Monica Kumari RD

## 2020-09-30 ENCOUNTER — CLINICAL SUPPORT (OUTPATIENT)
Dept: SURGERY | Age: 65
End: 2020-09-30

## 2020-09-30 VITALS — BODY MASS INDEX: 42.33 KG/M2 | WEIGHT: 230 LBS | HEIGHT: 62 IN

## 2020-09-30 DIAGNOSIS — E66.01 MORBID OBESITY (HCC): Primary | ICD-10-CM

## 2020-09-30 NOTE — PROGRESS NOTES
Medical Weight Loss Multi-Disciplinary Program    Name: Nathanael Medrano   : 1955    Session# 6- revision   Date: 2020    Visit Vitals  Ht 5' 2\" (1.575 m)   Wt 104.3 kg (230 lb)   BMI 42.07 kg/m²     Dietary Instructions    Reviewed intake  Understanding low carbohydrates, low sugar, higher protein meals  Instruction given for personal dietary changes  Discussed perceived compliance  Comments: Diet hx reviewed and personal dietary changes discussed. Reviewed recommendation to follow [id number] calorie diet, working to reduce total carbohydrate intake to 50-65 g or less per day and increasing protein intake to  g per day, compared current intake to recommendations. Patient notes due to mouth sores she has not been able to tolerate usual intake choices and has been supplementing with protein shakes OR meal replacement shakes. She reports eliminating most carbohydrates, still occasionally consuming melon or small piece of fruit. Still not following consistent intake regimen. Patient exhibits very limited ability to recall key diet principles, identify carbohydrate or understand the role dietary changes make in regards to weight management. She report that she feels she needs a secondary surgery due to long term steroid use, and reports not eating any carbs - despite reporting potatoes, croissants and fruit in her 24 hour recall. Patient is still frequently consuming alcohol, which I have encouraged her to eliminate this month Today we spent majority of session reviewing key diet principles and beginning to discuss post operative diet advancement guidelines. Reinforcing the importance of adequate hydration and protein intake - emphasizing the role of protein supplements in the post operative diet. Today we spent majority of session reviewing key diet principles and beginning to discuss post operative diet advancement guidelines.       Typical intake is as follows:  Breakfast: 10-11 am  1 egg with sausage and croissant   Lunch:3:00 pm -Pork chop with steamed broccoli OR  1 hard boiled egg with 1 link sausage - OR Salad green with tuna salad   -avoids snacking between lunch and dinner- did resume watermelon   Dinner: meatloaf with ground turkey with turnips greens OR baked chicken with cooked greens OR baked fish with green beans   Fluids: Water  (6 -20 oz bottles of water per day),       Nutritional Hx: What is the number of meals you eat per day? 2-3  Comment: has been working to avoid meal skipping    Do you eat between meals / snack? no  Patient avoids snacking     How fast do you eat your meals? slow    How often do you eat fast food? occasionally    How many sodas/sugared beverages do you drink per day? avoids sugar sweetened - utilizes equal     How many caffeinated drinks do you have per day? Coffee (not daily)     How much milk and/or juice do you drink per day? Rarely     How much water do you drink per day? (6 -20 oz bottles of water per day)    How often do you consume alcohol? Wine or beer every 3-4 months     Current Vitamins: Vitamin B12, Vitamin D, Multivitamin- [place] chewable     Physical Activity/Exercise    Discussed Perceived Compliance  Motivation    Patient has  started physical activity regimen, reinforced the importance of regular physical activity for total health and weight management. Patient has been tracking her activity with log, she has been utilizing stationary bike pedal and taking the stairs at her home;  and this helps with preventing back pain       Behavior Modification    Identify obstacles to trigger change  Achieving/Rewarding goals met  Positive attitude  Comments: Reinforced importance continuing to modify lifestyle patterns and behaviors to promote weight loss and long term weight maintenance       Goals:   1. Work to increase to 3-4 small meals per day, with planned snacks as needed.   Recommend following plate method for meal planning - focusing on lean protein, non-starchy vegetables, and measured amounts of starch. - Goal of  g protein and  g carbohydrate per day. - Recommend continuing protein supplement as meal replacement at least 1x/day OR as high protein snack option  2. Increase non caloric fluid to 64 oz per day. Eliminate caffeine, added sugar, carbonation, and straws.               -Continue to work to decrease sugar sweetened beverages - goal of calorie free beverages only              -Must eliminate caffeine prior to surgery and avoid for ~6-8 weeks   -Practice 30:30 rule,  food and flood   3. Start activity regimen, work to increase ADL  4. Start Complete MVI    Candidate for surgery (per RD): Patient has not yet started to consistently implement positive behavior change to promote weight loss,this RD feels that patient has limited understanding of post operative diet changes, long term effects of surgery and impact on health if guidelines are not followed. We discussed and reviewed these topics with patient today. Patient will likely need continued reinforcement and could benefit from knowledge assessment of key diet principles prior to being considered a fit candidate. Provided patient carbohydrate worksheet and bariatric knowledge quiz to complete with a follow-up scheduled next month.         Danyel Batista    Dietitian: Danyel Batista, CAMILA

## 2021-01-04 ENCOUNTER — TRANSCRIBE ORDER (OUTPATIENT)
Dept: SCHEDULING | Age: 66
End: 2021-01-04

## 2021-01-04 DIAGNOSIS — R39.11 URINARY HESITANCY: Primary | ICD-10-CM

## 2021-01-05 ENCOUNTER — TRANSCRIBE ORDER (OUTPATIENT)
Dept: SCHEDULING | Age: 66
End: 2021-01-05

## 2021-01-05 DIAGNOSIS — R39.11 HESITANCY: Primary | ICD-10-CM

## 2021-01-19 ENCOUNTER — HOSPITAL ENCOUNTER (OUTPATIENT)
Dept: ULTRASOUND IMAGING | Age: 66
Discharge: HOME | End: 2021-01-19
Attending: FAMILY MEDICINE
Payer: MEDICARE

## 2021-01-19 DIAGNOSIS — R39.11 HESITANCY: ICD-10-CM

## 2021-01-19 PROCEDURE — 76857 US EXAM PELVIC LIMITED: CPT

## 2021-01-26 ENCOUNTER — OFFICE VISIT (OUTPATIENT)
Dept: ORTHOPEDIC SURGERY | Age: 66
End: 2021-01-26
Payer: MEDICARE

## 2021-01-26 VITALS
BODY MASS INDEX: 38.28 KG/M2 | HEIGHT: 62 IN | TEMPERATURE: 97 F | SYSTOLIC BLOOD PRESSURE: 142 MMHG | WEIGHT: 208 LBS | HEART RATE: 84 BPM | OXYGEN SATURATION: 96 % | DIASTOLIC BLOOD PRESSURE: 88 MMHG | RESPIRATION RATE: 20 BRPM

## 2021-01-26 DIAGNOSIS — Z98.1 S/P LUMBAR FUSION: ICD-10-CM

## 2021-01-26 DIAGNOSIS — M47.816 LUMBAR FACET ARTHROPATHY: ICD-10-CM

## 2021-01-26 DIAGNOSIS — M54.59 MECHANICAL LOW BACK PAIN: ICD-10-CM

## 2021-01-26 DIAGNOSIS — G89.29 CHRONIC LOW BACK PAIN, UNSPECIFIED BACK PAIN LATERALITY, UNSPECIFIED WHETHER SCIATICA PRESENT: Primary | ICD-10-CM

## 2021-01-26 DIAGNOSIS — G89.29 CHRONIC PRIMARY MUSCULOSKELETAL PAIN: ICD-10-CM

## 2021-01-26 DIAGNOSIS — M79.18 CHRONIC PRIMARY MUSCULOSKELETAL PAIN: ICD-10-CM

## 2021-01-26 DIAGNOSIS — M54.50 CHRONIC LOW BACK PAIN, UNSPECIFIED BACK PAIN LATERALITY, UNSPECIFIED WHETHER SCIATICA PRESENT: Primary | ICD-10-CM

## 2021-01-26 DIAGNOSIS — M79.18 MYOFASCIAL PAIN: ICD-10-CM

## 2021-01-26 DIAGNOSIS — M48.061 SPINAL STENOSIS OF LUMBAR REGION WITHOUT NEUROGENIC CLAUDICATION: ICD-10-CM

## 2021-01-26 PROCEDURE — G8419 CALC BMI OUT NRM PARAM NOF/U: HCPCS | Performed by: PHYSICAL MEDICINE & REHABILITATION

## 2021-01-26 PROCEDURE — 1101F PT FALLS ASSESS-DOCD LE1/YR: CPT | Performed by: PHYSICAL MEDICINE & REHABILITATION

## 2021-01-26 PROCEDURE — G8400 PT W/DXA NO RESULTS DOC: HCPCS | Performed by: PHYSICAL MEDICINE & REHABILITATION

## 2021-01-26 PROCEDURE — G8754 DIAS BP LESS 90: HCPCS | Performed by: PHYSICAL MEDICINE & REHABILITATION

## 2021-01-26 PROCEDURE — 99204 OFFICE O/P NEW MOD 45 MIN: CPT | Performed by: PHYSICAL MEDICINE & REHABILITATION

## 2021-01-26 PROCEDURE — G8536 NO DOC ELDER MAL SCRN: HCPCS | Performed by: PHYSICAL MEDICINE & REHABILITATION

## 2021-01-26 PROCEDURE — G9711 PT HX TOT COL OR COLON CA: HCPCS | Performed by: PHYSICAL MEDICINE & REHABILITATION

## 2021-01-26 PROCEDURE — G8432 DEP SCR NOT DOC, RNG: HCPCS | Performed by: PHYSICAL MEDICINE & REHABILITATION

## 2021-01-26 PROCEDURE — 72110 X-RAY EXAM L-2 SPINE 4/>VWS: CPT | Performed by: PHYSICAL MEDICINE & REHABILITATION

## 2021-01-26 PROCEDURE — 1090F PRES/ABSN URINE INCON ASSESS: CPT | Performed by: PHYSICAL MEDICINE & REHABILITATION

## 2021-01-26 PROCEDURE — G8753 SYS BP > OR = 140: HCPCS | Performed by: PHYSICAL MEDICINE & REHABILITATION

## 2021-01-26 PROCEDURE — G8427 DOCREV CUR MEDS BY ELIG CLIN: HCPCS | Performed by: PHYSICAL MEDICINE & REHABILITATION

## 2021-01-26 RX ORDER — PREGABALIN 75 MG/1
75 CAPSULE ORAL 2 TIMES DAILY
COMMUNITY
End: 2021-01-26 | Stop reason: DRUGHIGH

## 2021-01-26 RX ORDER — PREGABALIN 150 MG/1
150 CAPSULE ORAL 2 TIMES DAILY
Qty: 60 CAP | Refills: 2 | Status: SHIPPED | OUTPATIENT
Start: 2021-01-26

## 2021-01-26 NOTE — PROGRESS NOTES
Kvng Tapan presents today for   Chief Complaint   Patient presents with    Back Pain       Is someone accompanying this pt? no    Is the patient using any DME equipment during OV? no    Depression Screening:  3 most recent PHQ Screens 5/20/2020   PHQ Not Done -   Little interest or pleasure in doing things Not at all   Feeling down, depressed, irritable, or hopeless Not at all   Total Score PHQ 2 0   Trouble falling or staying asleep, or sleeping too much -   Feeling tired or having little energy -   Poor appetite, weight loss, or overeating -   Feeling bad about yourself - or that you are a failure or have let yourself or your family down -   Trouble concentrating on things such as school, work, reading, or watching TV -   Moving or speaking so slowly that other people could have noticed; or the opposite being so fidgety that others notice -   Thoughts of being better off dead, or hurting yourself in some way -   PHQ 9 Score -   How difficult have these problems made it for you to do your work, take care of your home and get along with others -       Learning Assessment:  Learning Assessment 8/23/2019   PRIMARY LEARNER Patient   HIGHEST LEVEL OF EDUCATION - PRIMARY LEARNER  2 YEARS OF COLLEGE   BARRIERS PRIMARY LEARNER NONE   CO-LEARNER CAREGIVER -   PRIMARY LANGUAGE ENGLISH   LEARNER PREFERENCE PRIMARY READING   ANSWERED BY patient   RELATIONSHIP SELF       Abuse Screening:  Abuse Screening Questionnaire 5/20/2020   Do you ever feel afraid of your partner? N   Are you in a relationship with someone who physically or mentally threatens you? N   Is it safe for you to go home? Y       Fall Risk  Fall Risk Assessment, last 12 mths 6/27/2019   Able to walk? Yes   Fall in past 12 months? No       Coordination of Care:  1. Have you been to the ER, urgent care clinic since your last visit? no  Hospitalized since your last visit? no    2.  Have you seen or consulted any other health care providers outside of the [address] [place] [address] since your last visit? Yes, pcp Include any pap smears or colon screening.  no

## 2021-01-26 NOTE — PATIENT INSTRUCTIONS
High Blood Pressure: Care Instructions Overview It's normal for blood pressure to go up and down throughout the day. But if it stays up, you have high blood pressure. Another name for high blood pressure is hypertension. Despite what a lot of people think, high blood pressure usually doesn't cause headaches or make you feel dizzy or lightheaded. It usually has no symptoms. But it does increase your risk of stroke, heart attack, and other problems. You and your doctor will talk about your risks of these problems based on your blood pressure. Your doctor will give you a goal for your blood pressure. Your goal will be based on your health and your age. Lifestyle changes, such as eating healthy and being active, are always important to help lower blood pressure. You might also take medicine to reach your blood pressure goal. 
Follow-up care is a key part of your treatment and safety. Be sure to make and go to all appointments, and call your doctor if you are having problems. It's also a good idea to know your test results and keep a list of the medicines you take. How can you care for yourself at home? Medical treatment · If you stop taking your medicine, your blood pressure will go back up. You may take one or more types of medicine to lower your blood pressure. Be safe with medicines. Take your medicine exactly as prescribed. Call your doctor if you think you are having a problem with your medicine. · Talk to your doctor before you start taking aspirin every day. Aspirin can help certain people lower their risk of a heart attack or stroke. But taking aspirin isn't right for everyone, because it can cause serious bleeding. · See your doctor regularly. You may need to see the doctor more often at first or until your blood pressure comes down. · If you are taking blood pressure medicine, talk to your doctor before you take decongestants or anti-inflammatory medicine, such as ibuprofen. Some of these medicines can raise blood pressure. · Learn how to check your blood pressure at home. Lifestyle changes · Stay at a healthy weight. This is especially important if you put on weight around the waist. Losing even 10 pounds can help you lower your blood pressure. · If your doctor recommends it, get more exercise. Walking is a good choice. Bit by bit, increase the amount you walk every day. Try for at least 30 minutes on most days of the week. You also may want to swim, bike, or do other activities. · Avoid or limit alcohol. Talk to your doctor about whether you can drink any alcohol. · Try to limit how much sodium you eat to less than 2,300 milligrams (mg) a day. Your doctor may ask you to try to eat less than 1,500 mg a day. · Eat plenty of fruits (such as bananas and oranges), vegetables, legumes, whole grains, and low-fat dairy products. · Lower the amount of saturated fat in your diet. Saturated fat is found in animal products such as milk, cheese, and meat. Limiting these foods may help you lose weight and also lower your risk for heart disease. · Do not smoke. Smoking increases your risk for heart attack and stroke. If you need help quitting, talk to your doctor about stop-smoking programs and medicines. These can increase your chances of quitting for good. When should you call for help? Call  911 anytime you think you may need emergency care. This may mean having symptoms that suggest that your blood pressure is causing a serious heart or blood vessel problem. Your blood pressure may be over 180/120. For example, call 911 if: 
  · You have symptoms of a heart attack. These may include: 
? Chest pain or pressure, or a strange feeling in the chest. 
? Sweating. ? Shortness of breath. ? Nausea or vomiting. ? Pain, pressure, or a strange feeling in the back, neck, jaw, or upper belly or in one or both shoulders or arms. ? Lightheadedness or sudden weakness. ? A fast or irregular heartbeat.  
  · You have symptoms of a stroke. These may include: 
? Sudden numbness, tingling, weakness, or loss of movement in your face, arm, or leg, especially on only one side of your body. ? Sudden vision changes. ? Sudden trouble speaking. ? Sudden confusion or trouble understanding simple statements. ? Sudden problems with walking or balance. ? A sudden, severe headache that is different from past headaches.  
  · You have severe back or belly pain. Do not wait until your blood pressure comes down on its own. Get help right away. Call your doctor now or seek immediate care if: 
  · Your blood pressure is much higher than normal (such as 180/120 or higher), but you don't have symptoms.  
  · You think high blood pressure is causing symptoms, such as: 
? Severe headache. 
? Blurry vision. Watch closely for changes in your health, and be sure to contact your doctor if: 
  · Your blood pressure measures higher than your doctor recommends at least 2 times. That means the top number is higher or the bottom number is higher, or both.  
  · You think you may be having side effects from your blood pressure medicine. Where can you learn more? Go to http://www.gray.com/ Enter H667 in the search box to learn more about \"High Blood Pressure: Care Instructions. \" Current as of: December 16, 2019               Content Version: 12.6 © 0777-0288 [employer]. Care instructions adapted under license by [employer] (which disclaims liability or warranty for this information). If you have questions about a medical condition or this instruction, always ask your healthcare professional. [address] any warranty or liability for your use of this information. Low Back Pain: Exercises Introduction Here are some examples of exercises for you to try. The exercises may be suggested for a condition or for rehabilitation. Start each exercise slowly. Ease off the exercises if you start to have pain. You will be told when to start these exercises and which ones will work best for you. How to do the exercises Press-up 1. Lie on your stomach, supporting your body with your forearms. 2. Press your elbows down into the floor to raise your upper back. As you do this, relax your stomach muscles and allow your back to arch without using your back muscles. As your press up, do not let your hips or pelvis come off the floor. 3. Hold for 15 to 30 seconds, then relax. 4. Repeat 2 to 4 times. Alternate arm and leg (bird dog) exercise Do this exercise slowly. Try to keep your body straight at all times, and do not let one hip drop lower than the other. 1. Start on the floor, on your hands and knees. 2. Tighten your belly muscles. 3. Raise one leg off the floor, and hold it straight out behind you. Be careful not to let your hip drop down, because that will twist your trunk. 4. Hold for about 6 seconds, then lower your leg and switch to the other leg. 5. Repeat 8 to 12 times on each leg. 6. Over time, work up to holding for 10 to 30 seconds each time. 7. If you feel stable and secure with your leg raised, try raising the opposite arm straight out in front of you at the same time. Knee-to-chest exercise 1. Lie on your back with your knees bent and your feet flat on the floor. 2. Bring one knee to your chest, keeping the other foot flat on the floor (or keeping the other leg straight, whichever feels better on your lower back). 3. Keep your lower back pressed to the floor. Hold for at least 15 to 30 seconds. 4. Relax, and lower the knee to the starting position. 5. Repeat with the other leg. Repeat 2 to 4 times with each leg. 6. To get more stretch, put your other leg flat on the floor while pulling your knee to your chest.   
Curl-ups 1. Lie on the floor on your back with your knees bent at a 90-degree angle. Your feet should be flat on the floor, about 12 inches from your buttocks. 2. Cross your arms over your chest. If this bothers your neck, try putting your hands behind your neck (not your head), with your elbows spread apart. 3. Slowly tighten your belly muscles and raise your shoulder blades off the floor. 4. Keep your head in line with your body, and do not press your chin to your chest. 
5. Hold this position for 1 or 2 seconds, then slowly lower yourself back down to the floor. 6. Repeat 8 to 12 times. Pelvic tilt exercise 1. Lie on your back with your knees bent. 2. \"Brace\" your stomach. This means to tighten your muscles by pulling in and imagining your belly button moving toward your spine. You should feel like your back is pressing to the floor and your hips and pelvis are rocking back. 3. Hold for about 6 seconds while you breathe smoothly. 4. Repeat 8 to 12 times. Heel dig bridging 1. Lie on your back with both knees bent and your ankles bent so that only your heels are digging into the floor. Your knees should be bent about 90 degrees. 2. Then push your heels into the floor, squeeze your buttocks, and lift your hips off the floor until your shoulders, hips, and knees are all in a straight line. 3. Hold for about 6 seconds as you continue to breathe normally, and then slowly lower your hips back down to the floor and rest for up to 10 seconds. 4. Do 8 to 12 repetitions. Hamstring stretch in doorway 1. Lie on your back in a doorway, with one leg through the open door. 2. Slide your leg up the wall to straighten your knee. You should feel a gentle stretch down the back of your leg. 3. Hold the stretch for at least 15 to 30 seconds. Do not arch your back, point your toes, or bend either knee. Keep one heel touching the floor and the other heel touching the wall. 4. Repeat with your other leg. 5. Do 2 to 4 times for each leg. Hip flexor stretch 1. Kneel on the floor with one knee bent and one leg behind you. Place your forward knee over your foot. Keep your other knee touching the floor. 2. Slowly push your hips forward until you feel a stretch in the upper thigh of your rear leg. 3. Hold the stretch for at least 15 to 30 seconds. Repeat with your other leg. 4. Do 2 to 4 times on each side. Wall sit 1. Stand with your back 10 to 12 inches away from a wall. 2. Lean into the wall until your back is flat against it. 3. Slowly slide down until your knees are slightly bent, pressing your lower back into the wall. 4. Hold for about 6 seconds, then slide back up the wall. 5. Repeat 8 to 12 times. Follow-up care is a key part of your treatment and safety. Be sure to make and go to all appointments, and call your doctor if you are having problems. It's also a good idea to know your test results and keep a list of the medicines you take. Where can you learn more? Go to http://www.gray.com/ Enter Q094 in the search box to learn more about \"Low Back Pain: Exercises. \" Current as of: March 2, 2020               Content Version: 12.6 © 2006-2020 [employer]. Care instructions adapted under license by [employer] (which disclaims liability or warranty for this information). If you have questions about a medical condition or this instruction, always ask your healthcare professional. [address] any warranty or liability for your use of this information.

## 2021-01-26 NOTE — PROGRESS NOTES
[address]  [address], [address]  [place] [address]  Phone: [phone]  Fax: [phone]        Alberto Zayas  : 1955  PCP: Shelley Ha, Not On File  2021    NEW PATIENT      HISTORY OF PRESENT ILLNESS  Jeanie Ramírez is a 72 y.o. female c/o chronic low back pain that is worse with activity. She was prescribed Tramadol by her PCP, but has not been following with pain management. She was last in pain management with Dr. Moi Molina in 2019. She denies radicular symptoms. She is s/p lumbar fusion L4-S1 by Dr. Car Ndiaye in [facility]) in ~. Pt notes that she is unable to take NSAIDs due to GI upset. Lumbar spine MRI dated 16 reviewed. Per report, L4-5: PLIF and posterior metal fixation. Circumferential epidural scar. L5-S1: Bilateral laminectomy. Scar within the lateral recesses. L3-4: Severe central stenosis in part due to epidural fat. Pain Score: 8/10. Treatments patient has tried:  Physical therapy:Unknown  Doing HEP: Unknown  Non-opioid medications: Yes  Spinal injections: Unknown  Spinal surgery- Yes. Last Lumbar MRI:     PmHx: s/p lumbar fusion L4-S1 (~, Dr. Car Ndiaye); unable to take NSAIDs due to GI upset, HTN, COPD, idiopathic neuropathy per Pt    ASSESSMENT  This is a 72year-old female who is s/p lumbar fusion L4-S1 with c/o chronic low back pain. Her pain is likely multifactorial post-surgical chronic myofascial pain with a component of facet arthropathy. She has radiologic evidence of lumbar spinal stenosis at L3-4. PLAN  1. Lumbar MRI - chronic low back pain, s/p lumbar fusion, evaluate progression of lumbar spinal stenosis seen on prior MRI  2. Referral to pain management. 3. Increase Lyrica to 150 mg BID. 4. Provided exercises to add to HEP. Pt will f/u after MRI or sooner if needed. Diagnoses and all orders for this visit:    1.  Chronic low back pain, unspecified back pain laterality, unspecified whether sciatica present  -     AMB POC XRAY, SPINE, LUMBOSACRAL; 4+  -     REFERRAL TO PAIN MANAGEMENT  -     MRI LUMB SPINE W WO CONT; Future    2. Chronic primary musculoskeletal pain  -     REFERRAL TO PAIN MANAGEMENT  -     MRI LUMB SPINE W WO CONT; Future    3. Mechanical low back pain  -     REFERRAL TO PAIN MANAGEMENT  -     MRI LUMB SPINE W WO CONT; Future    4. Myofascial pain  -     REFERRAL TO PAIN MANAGEMENT  -     MRI LUMB SPINE W WO CONT; Future    5. Lumbar facet arthropathy  -     REFERRAL TO PAIN MANAGEMENT  -     MRI LUMB SPINE W WO CONT; Future    6. S/P lumbar fusion  -     REFERRAL TO PAIN MANAGEMENT  -     MRI LUMB SPINE W WO CONT; Future    7. Spinal stenosis of lumbar region without neurogenic claudication  -     REFERRAL TO PAIN MANAGEMENT  -     MRI LUMB SPINE W WO CONT; Future  -     pregabalin (Lyrica) 150 mg capsule; Take 1 Cap by mouth two (2) times a day. Max Daily Amount: 300 mg. Deepa Mann is seen today in consultation at the request of [facility], Not On File for complaints of chronic low back pain. PAST MEDICAL HISTORY   Past Medical History:   Diagnosis Date    Arthritis     Chronic pain     Back    COPD (chronic obstructive pulmonary disease) (HCC)     Dr. Gama Stockton    Depression     GERD (gastroesophageal reflux disease)     Gout     H/O colon cancer, stage II     resection by Dr. Ifeoma De Paz History of multiple pulmonary nodules     Hyperlipemia     Hypertension     Hypothyroid     IBS (irritable bowel syndrome)     Liver cyst     Morbid obesity with BMI of 40.0-44.9, adult (HCC)     Multinodular thyroid     Nephropathy, membranous     Pancreatitis     Prediabetes     Smoking history     quit 1995    Status post gastric bypass for obesity 2009    [facility] - pre-op wt = 250 lbs . ... lowest wt = 134 lbs    Unspecified sleep apnea     does not use CPAP       Past Surgical History:   Procedure Laterality Date    HX CATARACT REMOVAL Bilateral     HX COLECTOMY      8/2005 - Dr. Hieu Collins - Sigmoid colectomy laparoscopic    HX GASTRIC BYPASS  12/2009    pre-op wt = 250 lbs . ... lowest wt = 134 lbs    HX HYSTERECTOMY      HX LUMBAR FUSION  June 2013    L4 to L 5    HX TONSILLECTOMY         MEDICATIONS    Current Outpatient Medications   Medication Sig Dispense Refill    diclofenac (VOLTAREN) 1 % gel Apply 4 g to affected area four (4) times daily. 100 g 4    traMADol (ULTRAM) 50 mg tablet Take 50 mg by mouth every eight (8) hours as needed.  albuterol-ipratropium (DUO-NEB) 2.5 mg-0.5 mg/3 ml nebu USE 1 VIAL WITH HAND HELD NEBULIZER EVERY 6 HOURS AS NEEDED 24 Nebule 5    montelukast (SINGULAIR) 10 mg tablet TAKE ONE TABLET BY MOUTH ONCE DAILY 90 Tab 3    OMEPRAZOLE PO Take 1 Tab by mouth daily.  albuterol (VENTOLIN HFA) 90 mcg/actuation inhaler INHALE 2 PUFFS EVERY 4 HOURS IF NEEDED FOR WHEEZING OR  FOR SHORTNESS OF BREATH 1 Inhaler 5    amLODIPine (NORVASC) 10 mg tablet Take 1 Tab by mouth daily. 7 Tab 0    candesartan-hydroCHLOROthiazide (ATACAND HCT) 32-12.5 mg per tablet TAKE ONE TABLET BY MOUTH ONCE DAILY 7 Tab 0    lidocaine (LIDODERM) 5 % by TransDERmal route every twenty-four (24) hours. Apply patch to the affected area for 12 hours a day and remove for 12 hours a day.  levothyroxine (SYNTHROID) 125 mcg tablet Take 1 Tab by mouth Daily (before breakfast). 30 Tab 1    ergocalciferol (ERGOCALCIFEROL) 50,000 unit capsule Take 1 Cap by mouth every seven (7) days. 4 Cap 11    cholecalciferol (VITAMIN D3) 1,000 unit cap Take 1,000 Units by mouth daily.  b complex vitamins tablet Take 1 tablet by mouth daily.  Multivit-Iron-Min-Folic Acid (CENTRUM COMPLETE) 18-0.4 mg tab Take 1 Tab by mouth daily.  polyethylene glycol (MIRALAX) 17 gram packet Take 17 g by mouth daily.          ALLERGIES  Allergies   Allergen Reactions    Breo Ellipta [Fluticasone Furoate-Vilanterol] Hoarseness     Yeast problem w/Powdered MDIs    Ace Inhibitors Cough    Aspirin Other (comments)     GI bleeding & pain    Nsaids (Non-Steroidal Anti-Inflammatory Drug) Other (comments)     Makes her stomach bleed            SOCIAL HISTORY    Social History     Socioeconomic History    Marital status:      Spouse name: Not on file    Number of children: Not on file    Years of education: Not on file    Highest education level: Not on file   Occupational History    Occupation:      Employer: NOT EMPLOYED   Tobacco Use    Smoking status: Former Smoker     Packs/day: 0.50     Years: 18.00     Pack years: 9.00     Types: Cigarettes     Quit date: 1990     Years since quittin.4    Smokeless tobacco: Never Used    Tobacco comment: per patient she has not smoked in over 30 years but significant second hand smoke exposure at home   Substance and Sexual Activity    Alcohol use: Yes     Alcohol/week: 0.0 standard drinks     Comment: rare    Drug use: No    Sexual activity: Yes     Partners: Male     Birth control/protection: None, Surgical       FAMILY HISTORY  Family History   Problem Relation Age of Onset    Asthma Mother     Diabetes Mother     Hypertension Mother     Elevated Lipids Mother     Elevated Lipids Father     Hypertension Father     Heart Disease Father         chf    Heart Disease Sister         older         REVIEW OF SYSTEMS  Review of Systems   Constitutional: Negative for chills, fever and weight loss. Respiratory: Negative for shortness of breath. Cardiovascular: Negative for chest pain. Gastrointestinal: Negative for constipation. Negative for fecal incontinence    Genitourinary: Negative for dysuria. Negative for urinary incontinence   Musculoskeletal: Positive for back pain. Skin: Negative for rash. Neurological: Negative for dizziness, tingling, tremors, focal weakness and headaches. Endo/Heme/Allergies: Does not bruise/bleed easily. Psychiatric/Behavioral: The patient does not have insomnia. PHYSICAL EXAMINATION  Visit Vitals  BP (!) 142/88 (BP 1 Location: Right arm, BP Patient Position: Sitting) Comment: pt asymptomatic, MD aware   Pulse 84   Temp 97 °F (36.1 °C) (Skin)   Resp 20   Ht 5' 2\" (1.575 m)   Wt 208 lb (94.3 kg)   SpO2 96% Comment: RA   BMI 38.04 kg/m²         Pain Assessment  1/26/2021   Location of Pain Back   Severity of Pain 8   Quality of Pain Aching   Duration of Pain Persistent   Frequency of Pain Constant   Aggravating Factors Other (Comment)   Aggravating Factors Comment weather, not being able to take medication because I drive   Limiting Behavior Yes   Relieving Factors Other (Comment); Rest   Relieving Factors Comment tramadol and lyrica   Result of Injury No         Constitutional:  Well developed, well nourished, in no acute distress. Psychiatric: Affect and mood are appropriate. HEENT: Normocephalic, atraumatic. Extraocular movements intact. Integumentary: No rashes or abrasions noted on exposed areas. Cardiovascular: Regular rate and rhythm. Pulmonary: Clear to auscultation bilaterally. SPINE/MUSCULOSKELETAL EXAM    Lumbar spine:  No rash, ecchymosis, or gross obliquity. No fasciculations. No focal atrophy is noted. No pain with hip ROM. Full range of motion. Tenderness to palpation lumbar paraspinals. No tenderness to palpation at the sciatic notch. SI joints non-tender. Trochanters non tender. Sensation in the bilateral legs grossly intact to light touch. Pain with lumbar extension. MOTOR:      Biceps  Triceps Deltoids Wrist Ext Wrist Flex Hand Intrin   Right 5/5 5/5 5/5 5/5 5/5 5/5   Left 5/5 5/5 5/5 5/5 5/5 5/5             Hip Flex  Quads Hamstrings Ankle DF EHL Ankle PF   Right 5/5 5/5 5/5 5/5 5/5 5/5   Left 5/5 5/5 5/5 5/5 5/5 5/5     Negative Straight Leg raise. Squat not tested. No difficulty with tandem gait. Ambulation without assistive device. FWB.      RADIOGRAPHS/DATA  4V (AP/Lat/Flex/Ext) Lumbar XR images taken on 1/26/2021 personally reviewed with patient:  Grade 1 anterolisthesis of L3 on L4  Lateral shift to the right of L3 on L4 in coronal plane  Intact lumbar fusion L4-S1  Lean to the L  Pelvic obliquity with L hip lower than R  Facet sclerosis at L3-4      Lumbar MRI images taken on 2/19/2016 personally reviewed with patient:  Alignment: 2.9 mm anterolisthesis of L4  Anterior column: PLIF at L4-5  Posterior column: Posterior metal fixation at L4 through S1 and bilateral laminectomy at L5. Degenerative disc disease: None. Marrow: No evidence of marrow edema or focal suspicious marrow abnormalities. Vertebral bodies: No compression fractures. Conus: L1. Normal signal intensity/morphology. Lower thoracic spine: Unremarkable. Upper sacrum: Unremarkable. L5-S1: Mild annular bulge. Small amount of scar within the lateral recesses. No evidence of central or lateral recess stenosis. Minimal foraminal narrowing. Mild facet arthropathy. L4-5: Mild annular bulge. Mild circumferential epidural scar including that of the left L5 lateral recess. No evidence of central or lateral recess stenosis. Mild foraminal narrowing. L3-4: Mild annular bulge. Severe central stenosis - 6.1 mm canal in part due to epidural fat. Mild facet arthropathy. Lateral recesses are patent. Minimal foraminal narrowing. L2-3: The posterior and posterolateral disc margins are unremarkable. There is no evidence of central/lateral recess stenosis, moderate/severe foraminal narrowing/facet arthropathy. L1-2: The posterior and posterolateral disc margins are unremarkable. There is no evidence of central/lateral recess stenosis, moderate/severe foraminal narrowing/facet arthropathy. Miscellaneous: Lumbar epidural lipomatosis. Comparison; No significant interval change. No MR evidence of spondylodiskitis or moderate/severe arachnoiditis.    Disc degeneration number refers to modified Pfirrmann grading criteria. IMPRESSION:  L4-5: PLIF and posterior metal fixation. Circumferential epidural scar. L5-S1: Bilateral laminectomy. Scar within the lateral recesses. L3-4: Severe central stenosis in part due to epidural fat.  reviewed    Ms. Halina Antunez has a reminder for a \"due or due soon\" health maintenance. I have asked that she contact her primary care provider for follow-up on this health maintenance. 20 minutes of face-to-face contact were spent with the patient during today's visit extensively discussing symptoms and treatment plan. All questions were answered. More than half of this visit today was spent on counseling. Written by Lindsey Taylor, as dictated by Dr. Michelle Willams. I, Dr. Michelle Willams, confirm that all documentation is accurate.

## 2021-02-02 DIAGNOSIS — M47.816 LUMBAR FACET ARTHROPATHY: ICD-10-CM

## 2021-02-02 DIAGNOSIS — Z98.1 S/P LUMBAR FUSION: ICD-10-CM

## 2021-02-02 DIAGNOSIS — M54.50 CHRONIC LOW BACK PAIN, UNSPECIFIED BACK PAIN LATERALITY, UNSPECIFIED WHETHER SCIATICA PRESENT: ICD-10-CM

## 2021-02-02 DIAGNOSIS — G89.29 CHRONIC LOW BACK PAIN, UNSPECIFIED BACK PAIN LATERALITY, UNSPECIFIED WHETHER SCIATICA PRESENT: ICD-10-CM

## 2021-02-02 DIAGNOSIS — M48.061 SPINAL STENOSIS OF LUMBAR REGION WITHOUT NEUROGENIC CLAUDICATION: ICD-10-CM

## 2021-02-02 DIAGNOSIS — G89.29 CHRONIC PRIMARY MUSCULOSKELETAL PAIN: ICD-10-CM

## 2021-02-02 DIAGNOSIS — M54.59 MECHANICAL LOW BACK PAIN: ICD-10-CM

## 2021-02-02 DIAGNOSIS — M79.18 CHRONIC PRIMARY MUSCULOSKELETAL PAIN: ICD-10-CM

## 2021-02-02 DIAGNOSIS — M79.18 MYOFASCIAL PAIN: ICD-10-CM

## 2021-10-15 ENCOUNTER — OFFICE VISIT (OUTPATIENT)
Dept: ORTHOPEDIC SURGERY | Age: 66
End: 2021-10-15
Payer: MEDICARE

## 2021-10-15 VITALS
HEIGHT: 62 IN | WEIGHT: 199 LBS | BODY MASS INDEX: 36.62 KG/M2 | OXYGEN SATURATION: 98 % | TEMPERATURE: 97.3 F | HEART RATE: 84 BPM

## 2021-10-15 DIAGNOSIS — M25.512 ACUTE PAIN OF LEFT SHOULDER: Primary | ICD-10-CM

## 2021-10-15 DIAGNOSIS — M85.80 OSTEOPENIA, UNSPECIFIED LOCATION: ICD-10-CM

## 2021-10-15 DIAGNOSIS — G58.9 MONONEUROPATHY: ICD-10-CM

## 2021-10-15 DIAGNOSIS — M75.52 CHRONIC BURSITIS OF LEFT SHOULDER: ICD-10-CM

## 2021-10-15 DIAGNOSIS — M12.812 ROTATOR CUFF ARTHROPATHY OF LEFT SHOULDER: ICD-10-CM

## 2021-10-15 PROCEDURE — 1090F PRES/ABSN URINE INCON ASSESS: CPT | Performed by: SPECIALIST

## 2021-10-15 PROCEDURE — 20610 DRAIN/INJ JOINT/BURSA W/O US: CPT | Performed by: SPECIALIST

## 2021-10-15 PROCEDURE — G8756 NO BP MEASURE DOC: HCPCS | Performed by: SPECIALIST

## 2021-10-15 PROCEDURE — G8400 PT W/DXA NO RESULTS DOC: HCPCS | Performed by: SPECIALIST

## 2021-10-15 PROCEDURE — G9711 PT HX TOT COL OR COLON CA: HCPCS | Performed by: SPECIALIST

## 2021-10-15 PROCEDURE — G8417 CALC BMI ABV UP PARAM F/U: HCPCS | Performed by: SPECIALIST

## 2021-10-15 PROCEDURE — 99203 OFFICE O/P NEW LOW 30 MIN: CPT | Performed by: SPECIALIST

## 2021-10-15 PROCEDURE — 73030 X-RAY EXAM OF SHOULDER: CPT | Performed by: SPECIALIST

## 2021-10-15 PROCEDURE — 1101F PT FALLS ASSESS-DOCD LE1/YR: CPT | Performed by: SPECIALIST

## 2021-10-15 PROCEDURE — G8432 DEP SCR NOT DOC, RNG: HCPCS | Performed by: SPECIALIST

## 2021-10-15 PROCEDURE — G8536 NO DOC ELDER MAL SCRN: HCPCS | Performed by: SPECIALIST

## 2021-10-15 PROCEDURE — G8427 DOCREV CUR MEDS BY ELIG CLIN: HCPCS | Performed by: SPECIALIST

## 2021-10-15 RX ORDER — BETAMETHASONE SODIUM PHOSPHATE AND BETAMETHASONE ACETATE 3; 3 MG/ML; MG/ML
3 INJECTION, SUSPENSION INTRA-ARTICULAR; INTRALESIONAL; INTRAMUSCULAR; SOFT TISSUE ONCE
Status: COMPLETED | OUTPATIENT
Start: 2021-10-15 | End: 2021-10-15

## 2021-10-15 RX ADMIN — BETAMETHASONE SODIUM PHOSPHATE AND BETAMETHASONE ACETATE 3 MG: 3; 3 INJECTION, SUSPENSION INTRA-ARTICULAR; INTRALESIONAL; INTRAMUSCULAR; SOFT TISSUE at 09:46

## 2021-10-15 NOTE — PROGRESS NOTES
Patient: Sina Sunshine                MRN: [id number]       SSN: [id number]  YOB: 1955        AGE: 72 y.o. SEX: female    PCP: Brianne, Not On File, MD  10/15/21    Chief Complaint   Patient presents with    Shoulder Pain     Left     HISTORY:  Sina Sunshine is a 72 y.o. female referred by [facility] PCP for left shoulder pain and dysfunction. She has been experiencing left shoulder pain for the past few months. She does not recall any injury. She experiences pain at night and with overhead activities. She says she can't lift her arm overhead anymore. She has difficulty dressing and undressing. She is right handed. She has a h/o peripheral neuropathy. Pain Assessment  10/15/2021   Location of Pain Shoulder   Location Modifiers Left   Severity of Pain 5   Quality of Pain Throbbing   Duration of Pain Persistent   Frequency of Pain Constant   Aggravating Factors Other (Comment)   Aggravating Factors Comment Lifting arm   Limiting Behavior -   Relieving Factors -   Relieving Factors Comment -   Result of Injury No     Occupation, etc:  Ms. Marco Antonio Villareal is retired. 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 She also worked as a  for [employer] for about 5 years. She lives alone in [facility]. She has 2 sons. One is retired from [employer] and another one lives in the area. She has 3 grandsons and 2 granddaughters. Ms. Marco Antonio Villareal weighs 208 lbs and is 5'2\" tall.        Lab Results   Component Value Date/Time    Hemoglobin A1c 6.2 (H) 11/07/2017 08:45 AM     Weight Metrics 10/15/2021 1/26/2021 9/30/2020 8/25/2020 7/22/2020 6/17/2020 5/20/2020   Weight 199 lb 208 lb 230 lb 224 lb 228 lb 228 lb 227 lb   BMI 36.4 kg/m2 38.04 kg/m2 42.07 kg/m2 40.97 kg/m2 41.7 kg/m2 41.7 kg/m2 41.52 kg/m2       Patient Active Problem List   Diagnosis Code    HTN (hypertension) I10    COPD (chronic obstructive pulmonary disease) (HCC) J44.9    Neuropathy G62.9    Vitamin D deficiency E55.9    Nephropathy, membranous     History of multiple pulmonary nodules Z87.898    FH: colon cancer Z80.0    Rectocele N81.6    Chronic low back pain M54.50, G89.29    S/P cataract extraction Z98.49    Vitamin B12 deficiency E53.8    H/O colon cancer, stage II Z85.038    DDD (degenerative disc disease), lumbosacral M51.37    Spondylolisthesis, grade 1 M43.10    Encounter for long-term (current) use of other medications Z79.899    H/O lumbosacral spine surgery Z98.890    Chronic pain syndrome G89.4    DJD (degenerative joint disease), lumbar M47.816    Scar tissue L90.5    History of gastric bypass Z98.84    CAP (community acquired pneumonia) J18.9    Asthma exacerbation J45. 0    CAIO (generalized anxiety disorder) F41.1    Lumbosacral neuritis M54.17    Cervicalgia M54.2    Shoulder pain, right M25.511    Cervical spondylosis M47.812    Advanced care planning/counseling discussion Z71.89    Right arm pain M79.601    Type 2 diabetes mellitus with microalbuminuria, without long-term current use of insulin ([facility]) E11.29, R80.9    Type 2 diabetes mellitus with diabetic neuropathy ([facility]) E11.40    Severe obesity ([facility]) E66.01    Smoking history Z87.891    Status post gastric bypass for obesity Z98.84    Morbid obesity with BMI of 40.0-44.9, adult ([facility]) E66.01, Z68.41     REVIEW OF SYSTEMS:    Constitutional Symptoms: Negative   Eyes: Negative   Ears, Nose, Throat and Mouth: Negative   Cardiovascular: Negative   Respiratory: Negative   Genitourinary: Per HPI   Gastrointestinal: Per HPI   Integumentary (Skin and/or Breast): Negative   Musculoskeletal: Per HPI   Endocrine/Rheumatologic: Negative   Neurological: Per HPI   Hematology/Lymphatic: Negative    Allergic/Immunologic: Negative   Phychiatric: Negative    Social History     Socioeconomic History    Marital status:      Spouse name: Not on file    Number of children: Not on file    Years of education: Not on file    Highest education level: Not on file   Occupational History    Occupation:      Employer: NOT EMPLOYED   Tobacco Use    Smoking status: Former Smoker     Packs/day: 0.50     Years: 18.00     Pack years: 9.00     Types: Cigarettes     Quit date: 1990     Years since quittin.1    Smokeless tobacco: Never Used    Tobacco comment: per patient she has not smoked in over 30 years but significant second hand smoke exposure at home   Substance and Sexual Activity    Alcohol use: Yes     Alcohol/week: 0.0 standard drinks     Comment: rare    Drug use: No    Sexual activity: Yes     Partners: Male     Birth control/protection: None, Surgical   Other Topics Concern    Not on file   Social History Narrative    Not on file     Social Determinants of Health     Financial Resource Strain:     Difficulty of Paying Living Expenses:    Food Insecurity:     Worried About Running Out of Food in the Last Year:     [address] in the Last Year:    Transportation Needs:     Lack of Transportation (Medical):  Lack of Transportation (Non-Medical):    Physical Activity:     Days of Exercise per Week:     Minutes of Exercise per Session:    Stress:     Feeling of Stress :    Social Connections:     Frequency of Communication with Friends and Family:     Frequency of Social Gatherings with Friends and Family:     Attends Christian Services:     Active Member of Clubs or Organizations:     Attends Club or Organization Meetings:     Marital Status:    Intimate Partner Violence:     Fear of Current or Ex-Partner:     Emotionally Abused:     Physically Abused:     Sexually Abused:        Allergies   Allergen Reactions    Breo Ellipta [Fluticasone Furoate-Vilanterol] Hoarseness     Yeast problem w/Powdered MDIs    Ace Inhibitors Cough    Aspirin Other (comments)     GI bleeding & pain    Nsaids (Non-Steroidal Anti-Inflammatory Drug) Other (comments)     Makes her stomach bleed        Current Outpatient Medications   Medication Sig    pregabalin (Lyrica) 150 mg capsule Take 1 Cap by mouth two (2) times a day. Max Daily Amount: 300 mg.    diclofenac (VOLTAREN) 1 % gel Apply 4 g to affected area four (4) times daily.  traMADol (ULTRAM) 50 mg tablet Take 50 mg by mouth every eight (8) hours as needed.  albuterol-ipratropium (DUO-NEB) 2.5 mg-0.5 mg/3 ml nebu USE 1 VIAL WITH HAND HELD NEBULIZER EVERY 6 HOURS AS NEEDED    montelukast (SINGULAIR) 10 mg tablet TAKE ONE TABLET BY MOUTH ONCE DAILY    OMEPRAZOLE PO Take 1 Tab by mouth daily.  albuterol (VENTOLIN HFA) 90 mcg/actuation inhaler INHALE 2 PUFFS EVERY 4 HOURS IF NEEDED FOR WHEEZING OR  FOR SHORTNESS OF BREATH    amLODIPine (NORVASC) 10 mg tablet Take 1 Tab by mouth daily.  candesartan-hydroCHLOROthiazide (ATACAND HCT) 32-12.5 mg per tablet TAKE ONE TABLET BY MOUTH ONCE DAILY    lidocaine (LIDODERM) 5 % by TransDERmal route every twenty-four (24) hours. Apply patch to the affected area for 12 hours a day and remove for 12 hours a day.  levothyroxine (SYNTHROID) 125 mcg tablet Take 1 Tab by mouth Daily (before breakfast).  ergocalciferol (ERGOCALCIFEROL) 50,000 unit capsule Take 1 Cap by mouth every seven (7) days.  cholecalciferol (VITAMIN D3) 1,000 unit cap Take 1,000 Units by mouth daily.  b complex vitamins tablet Take 1 tablet by mouth daily.  Multivit-Iron-Min-Folic Acid (CENTRUM COMPLETE) 18-0.4 mg tab Take 1 Tab by mouth daily.  polyethylene glycol (MIRALAX) 17 gram packet Take 17 g by mouth daily. No current facility-administered medications for this visit.       PHYSICAL EXAMINATION:  Visit Vitals  Pulse 84   Temp 97.3 °F (36.3 °C) (Temporal)   Ht 5' 2\" (1.575 m)   Wt 199 lb (90.3 kg)   SpO2 98%   BMI 36.40 kg/m²      ORTHO EXAMINATION:  Examination Right shoulder Left shoulder   Skin Intact Intact   Effusion - -   Biceps deformity - -   Atrophy - +   AC joint tenderness - -   Acromial tenderness  +   Biceps tenderness - -   Forward flexion/Elevation  160   Active abduction  160   External rotation ROM 30 35   Internal rotation ROM 90 90   Apprehension - -   Impingement - -   Drop Arm Test - +   Neurovascular Intact Intact        TIME OUT:  Chart reviewed for the following:   Ashley Del Real MD, have reviewed the History, Physical and updated the Allergic reactions for Adriana Mccray   TIME OUT performed immediately prior to start of procedure:  Ashley Del Real MD, have performed the following reviews on Jesica Franc prior to the start of the procedure:          * Patient was identified by name and date of birth   * Agreement on procedure being performed was verified  * Risks and Benefits explained to the patient  * Procedure site verified and marked as necessary  * Patient was positioned for comfort  * Consent was obtained     Time: 9:36 AM     Date of procedure: 10/15/2021  Procedure performed by:  Yonathan Alanis MD  Ms. Urbina tolerated the procedure well with no complications. RADIOGRAPHS:  XR LEFT SHOULDER 10/15/21 YAN  IMPRESSION:  Three views - No fractures, no acromioclavicular narrowing, no glenohumeral narrowing, no calcific densities, osteopenia. IMPRESSION:      ICD-10-CM ICD-9-CM    1. Acute pain of left shoulder  M25.512 719.41 AMB POC XRAY, SHOULDER; COMPLETE, 2+      betamethasone (CELESTONE) injection 3 mg      DRAIN/INJECT LARGE JOINT/BURSA   2. Osteopenia, unspecified location  M85.80 733.90    3. Rotator cuff arthropathy of left shoulder  M12.812 716.81 betamethasone (CELESTONE) injection 3 mg      DRAIN/INJECT LARGE JOINT/BURSA   4. Chronic bursitis of left shoulder  M75.52 726.10 betamethasone (CELESTONE) injection 3 mg      DRAIN/INJECT LARGE JOINT/BURSA   5. Mononeuropathy  G58.9 355.9      PLAN:  She will start a brief course of outpatient physical therapy.  After discussing treatment options, patient's left shoulder was injected with 4 cc Marcaine and 1/2 cc Celestone. There is no need for surgery at this time. We discussed a possible need for a left shoulder MR arthrogram in the future if pain continues. She will follow up as needed.       Scribed by Betina Vasquez ([facility]) as dictated by David Mcmahan MD

## 2021-11-22 ENCOUNTER — HOSPITAL ENCOUNTER (OUTPATIENT)
Dept: PHYSICAL THERAPY | Age: 66
End: 2021-11-22

## 2022-03-18 PROBLEM — M79.601 RIGHT ARM PAIN: Status: ACTIVE | Noted: 2017-05-17

## 2022-03-19 PROBLEM — E11.29 TYPE 2 DIABETES MELLITUS WITH MICROALBUMINURIA, WITHOUT LONG-TERM CURRENT USE OF INSULIN (HCC): Status: ACTIVE | Noted: 2017-11-09

## 2022-03-19 PROBLEM — R80.9 TYPE 2 DIABETES MELLITUS WITH MICROALBUMINURIA, WITHOUT LONG-TERM CURRENT USE OF INSULIN (HCC): Status: ACTIVE | Noted: 2017-11-09

## 2022-03-19 PROBLEM — E11.40 TYPE 2 DIABETES MELLITUS WITH DIABETIC NEUROPATHY (HCC): Status: ACTIVE | Noted: 2018-01-03

## 2022-03-20 PROBLEM — E66.01 SEVERE OBESITY (HCC): Status: ACTIVE | Noted: 2019-04-15

## 2022-04-05 ENCOUNTER — TRANSCRIBE ORDER (OUTPATIENT)
Dept: SCHEDULING | Age: 67
End: 2022-04-05

## 2022-04-05 DIAGNOSIS — Z00.00 HEALTHCARE MAINTENANCE: Primary | ICD-10-CM

## 2022-04-05 DIAGNOSIS — Z13.820 OSTEOPOROSIS SCREENING: ICD-10-CM

## 2022-04-05 DIAGNOSIS — Z00.00 ROUTINE HEALTH MAINTENANCE: ICD-10-CM

## 2022-04-05 DIAGNOSIS — Z13.820 OSTEOPOROSIS SCREENING: Primary | ICD-10-CM

## 2022-12-19 ENCOUNTER — APPOINTMENT (OUTPATIENT)
Dept: GENERAL RADIOLOGY | Age: 67
End: 2022-12-19
Attending: EMERGENCY MEDICINE
Payer: MEDICARE

## 2022-12-19 ENCOUNTER — HOSPITAL ENCOUNTER (EMERGENCY)
Age: 67
Discharge: HOME HEALTH | End: 2022-12-19
Attending: EMERGENCY MEDICINE
Payer: MEDICARE

## 2022-12-19 VITALS
SYSTOLIC BLOOD PRESSURE: 135 MMHG | HEART RATE: 73 BPM | DIASTOLIC BLOOD PRESSURE: 85 MMHG | RESPIRATION RATE: 18 BRPM | TEMPERATURE: 97.1 F | OXYGEN SATURATION: 96 %

## 2022-12-19 DIAGNOSIS — J44.1 COPD EXACERBATION (HCC): Primary | ICD-10-CM

## 2022-12-19 LAB
ATRIAL RATE: 74 BPM
CALCULATED P AXIS, ECG09: -2 DEGREES
CALCULATED R AXIS, ECG10: -37 DEGREES
CALCULATED T AXIS, ECG11: 80 DEGREES
DIAGNOSIS, 93000: NORMAL
P-R INTERVAL, ECG05: 144 MS
Q-T INTERVAL, ECG07: 410 MS
QRS DURATION, ECG06: 116 MS
QTC CALCULATION (BEZET), ECG08: 455 MS
VENTRICULAR RATE, ECG03: 74 BPM

## 2022-12-19 PROCEDURE — 99284 EMERGENCY DEPT VISIT MOD MDM: CPT

## 2022-12-19 PROCEDURE — 74011636637 HC RX REV CODE- 636/637: Performed by: PHYSICIAN ASSISTANT

## 2022-12-19 PROCEDURE — 93005 ELECTROCARDIOGRAM TRACING: CPT

## 2022-12-19 PROCEDURE — 94640 AIRWAY INHALATION TREATMENT: CPT

## 2022-12-19 PROCEDURE — 71046 X-RAY EXAM CHEST 2 VIEWS: CPT

## 2022-12-19 PROCEDURE — 74011000250 HC RX REV CODE- 250: Performed by: PHYSICIAN ASSISTANT

## 2022-12-19 RX ORDER — PREDNISONE 20 MG/1
60 TABLET ORAL
Status: COMPLETED | OUTPATIENT
Start: 2022-12-19 | End: 2022-12-19

## 2022-12-19 RX ORDER — IPRATROPIUM BROMIDE AND ALBUTEROL SULFATE 2.5; .5 MG/3ML; MG/3ML
3 SOLUTION RESPIRATORY (INHALATION) ONCE
Status: COMPLETED | OUTPATIENT
Start: 2022-12-19 | End: 2022-12-19

## 2022-12-19 RX ORDER — PREDNISONE 10 MG/1
TABLET ORAL
Qty: 21 TABLET | Refills: 0 | Status: SHIPPED | OUTPATIENT
Start: 2022-12-19

## 2022-12-19 RX ORDER — ALBUTEROL SULFATE 90 UG/1
2 AEROSOL, METERED RESPIRATORY (INHALATION)
Qty: 18 G | Refills: 0 | Status: SHIPPED | OUTPATIENT
Start: 2022-12-19

## 2022-12-19 RX ADMIN — PREDNISONE 60 MG: 20 TABLET ORAL at 10:46

## 2022-12-19 RX ADMIN — IPRATROPIUM BROMIDE AND ALBUTEROL SULFATE 3 ML: .5; 3 SOLUTION RESPIRATORY (INHALATION) at 10:46

## 2022-12-19 NOTE — ED PROVIDER NOTES
[facility]  [facility] EMERGENCY DEPT      79 y.o. female with hx of COPD, former smoker but currently second-hand smoking exposure, prediabetes, and other  PMH noted below, presents to the emergency department c/o SOB and wheezing x 2-3 days, states changes trigger COPD symptoms and she threw away one of her inhalers on accident. She has been using her home neb with some relief. Notes, oral steroids and often helpful as well, no recent steroids. Denies sick contacts. Denies fevers, chills, sore throat, nasal congestion or rhinorrhea, nausea, vomiting. No other complaints. Past Medical History:   Diagnosis Date    Arthritis     Chronic pain     Back    COPD (chronic obstructive pulmonary disease) (HCC)     Dr. Margareth Tillman     GERD (gastroesophageal reflux disease)     Gout     H/O colon cancer, stage II     resection by Dr. Rigoberto Milligan    History of multiple pulmonary nodules     Hyperlipemia     Hypertension     Hypothyroid     IBS (irritable bowel syndrome)     Liver cyst     Morbid obesity with BMI of 40.0-44.9, adult (HCC)     Multinodular thyroid     Nephropathy, membranous     Pancreatitis     Prediabetes     Smoking history     quit 1995    Status post gastric bypass for obesity 2009    [facility] - pre-op wt = 250 lbs . ... lowest wt = 134 lbs    Unspecified sleep apnea     does not use CPAP       Allergies   Allergen Reactions    Breo Ellipta [Fluticasone Furoate-Vilanterol] Hoarseness     Yeast problem w/Powdered MDIs    Ace Inhibitors Cough    Aspirin Other (comments)     GI bleeding & pain    Nsaids (Non-Steroidal Anti-Inflammatory Drug) Other (comments)     Makes her stomach bleed         Social History     Socioeconomic History    Marital status:      Spouse name: Not on file    Number of children: Not on file    Years of education: Not on file    Highest education level: Not on file   Occupational History    Occupation:      Employer: NOT EMPLOYED   Tobacco Use    Smoking status: Former     Packs/day: 0.50     Years: 18.00     Pack years: 9.00     Types: Cigarettes     Quit date: 1990     Years since quittin.3    Smokeless tobacco: Never    Tobacco comments:     per patient she has not smoked in over 30 years but significant second hand smoke exposure at home   Substance and Sexual Activity    Alcohol use: Yes     Alcohol/week: 0.0 standard drinks     Comment: rare    Drug use: No    Sexual activity: Yes     Partners: Male     Birth control/protection: None, Surgical   Other Topics Concern    Not on file   Social History Narrative    Not on file     Social Determinants of Health     Financial Resource Strain: Not on file   Food Insecurity: Not on file   Transportation Needs: Not on file   Physical Activity: Not on file   Stress: Not on file   Social Connections: Not on file   Intimate Partner Violence: Not on file   Housing Stability: Not on file       REVIEW OF SYSTEMS:  Constitutional:  Negative for fever, chills, diaphoresis. HENT:  Negative for congestion, ear pain, sore throat, rhinorrhea, neck pain/stiffness. Respiratory:  + wheezing, chest tightness, dry cough Negative for stridor. Cardiovascular:  Negative for chest pain or palpitations. Gastrointestinal:  Negative for abd pain, nausea, vomiting or diarrhea. Genitourinary:  Negative. Musculoskeletal:  Negative. Skin:  Negative for cyanosis or pallor. Neurological: Negative. All other systems are negative    Visit Vitals  /85   Pulse 73   Temp 97.1 °F (36.2 °C)   Resp 18   SpO2 96%       PHYSICAL EXAM:    Nursing notes and vital signs are reviewed    CONSTITUTIONAL:  Alert, in no apparent distress;  well developed;  well nourished. HEAD:  Normocephalic, atraumatic. EYES:  EOMI. Non-icteric sclera. Normal conjunctiva. ENTM:  Nose:  no rhinorrhea. Throat:  no erythema or exudate, mucous membranes moist. Ears: normal TM's bilaterally  NECK:  No JVD.   Supple, No rigidity, erythema, edema, muscular or midline tenderness   RESPIRATORY:  Expiratory wheezes and rhonchi but good air movement. No rales or rhonchi. No cough on exam, speaking in full sentences  CARDIOVASCULAR:  Regular rate and rhythm. No murmurs, rubs, or gallops. MS:  Grossly normal inspection. No edema, no calf tenderness. Distal pulses intact. NEURO:  Moves all four extremities, and grossly normal motor exam.  SKIN:  No rashes;  Normal for age. PSYCH:  Alert and normal affect. Abnormal lab results from this emergency department encounter:  Labs Reviewed - No data to display    Lab values for this patient within approximately the last 12 hours:  Recent Results (from the past 12 hour(s))   EKG, 12 LEAD, INITIAL    Collection Time: 12/19/22 10:13 AM   Result Value Ref Range    Ventricular Rate 74 BPM    Atrial Rate 74 BPM    P-R Interval 144 ms    QRS Duration 116 ms    Q-T Interval 410 ms    QTC Calculation (Bezet) 455 ms    Calculated P Axis -2 degrees    Calculated R Axis -37 degrees    Calculated T Axis 80 degrees    Diagnosis       Normal sinus rhythm  Left axis deviation  Incomplete right bundle branch block  Minimal voltage criteria for LVH, may be normal variant ( [place] product )  Abnormal ECG  When compared with ECG of 26-NOV-2018 09:30,  Left anterior fascicular block is no longer present  Nonspecific T wave abnormality no longer evident in Inferior leads         Radiologist and cardiologist interpretations if available at time of this note:  XR CHEST PA LAT   Final Result   :      1.   Generation of a small amount of nonspecific atelectasis left lung base                   Medication(s) ordered for patient during this emergency visit encounter:  Medications   predniSONE (DELTASONE) tablet 60 mg (60 mg Oral Given 12/19/22 1046)   albuterol-ipratropium (DUO-NEB) 2.5 MG-0.5 MG/3 ML (3 mL Nebulization Given 12/19/22 1046)       DDx:  viral vs bacterial URI, asthma exacerbation, COPD, bronchitis, PNA, PE, allergies, pneumothorax, bronchospasm, bronchoconstriction     ED COURSE:  Patient's breathing improved and wheezing resolved in the emergency department with the noted albuterol and atrovent HHN treatments. Patient's initial steroid therapy may have been started in the ED as well. Patient is well and can be discharged home. IMPRESSION AND MEDICAL DECISION MAKING:  Based upon the patient's presentation with noted HPI and PE, along with the work up done in the emergency department, I believe that the patient is having an acute COPD exacerbation. No sick contacts, no systemic symptoms. Patient is feeling significantly better after DuoNeb and prednisone administered in ER. Will refill inhaler, Rx steroid taper. Patient will follow-up closely with her PCP to ensure resolution. Will return to ED immediately for any new or worse symptoms. Discussed results, care in ED and further care, f/u and s/s warranting return to ED. Pt understood and agreed to plan. SPECIFIC PATIENT INSTRUCTIONS FROM THE PROVIDER  WHO TREATED YOU IN THE ER TODAY:    Finish oral steroids (like Medrol, Prednisone, Dexamethasone, Sterapred, Prednisolone, Orapred, etc) as directed. Note that steroids may cause temporary increase of blood glucose levels, if you are diabetic, monitor your glucose levels closely. Stop steroids and consult your primary care provider or endocrinologist if your levels increase significantly. Use your albuterol inhaler, if prescribed, and/or home nebulizer machine (if you have one) for wheezing. Avoid triggers if possible. DO NOT smoke  FOLLOW UP APPOINTMENT:  Your primary doctor in 1-2 days. Return if any concerns or worsening of condition(s)      Diagnosis:   1.  COPD exacerbation ([facility] [address]          Disposition: home    Follow-up Information       Follow up With Specialties Details Why Contact Info    Vandana Zambrano MD Family Medicine In 3 days for recheck of current symptoms by your primary care provider [phone] [facility] [address]      [facility] EMERGENCY DEPT Emergency Medicine  As needed, If symptoms worsen [address] [place] [id number]  [phone]            Patient's Medications   Start Taking    ALBUTEROL (PROVENTIL HFA, VENTOLIN HFA, PROAIR HFA) 90 MCG/ACTUATION INHALER    Take 2 Puffs by inhalation every four (4) hours as needed for Wheezing. PREDNISONE (STERAPRED DS) 10 MG DOSE PACK    Follow package instructions. Continue Taking    ALBUTEROL (VENTOLIN HFA) 90 MCG/ACTUATION INHALER    INHALE 2 PUFFS EVERY 4 HOURS IF NEEDED FOR WHEEZING OR  FOR SHORTNESS OF BREATH    ALBUTEROL-IPRATROPIUM (DUO-NEB) 2.5 MG-0.5 MG/3 ML NEBU    USE 1 VIAL WITH HAND HELD NEBULIZER EVERY 6 HOURS AS NEEDED    AMLODIPINE (NORVASC) 10 MG TABLET    Take 1 Tab by mouth daily. B COMPLEX VITAMINS TABLET    Take 1 tablet by mouth daily. CANDESARTAN-HYDROCHLOROTHIAZIDE (ATACAND HCT) 32-12.5 MG PER TABLET    TAKE ONE TABLET BY MOUTH ONCE DAILY    CHOLECALCIFEROL (VITAMIN D3) 1,000 UNIT CAP    Take 1,000 Units by mouth daily. DICLOFENAC (VOLTAREN) 1 % GEL    Apply 4 g to affected area four (4) times daily. ERGOCALCIFEROL (ERGOCALCIFEROL) 50,000 UNIT CAPSULE    Take 1 Cap by mouth every seven (7) days. LEVOTHYROXINE (SYNTHROID) 125 MCG TABLET    Take 1 Tab by mouth Daily (before breakfast). LIDOCAINE (LIDODERM) 5 %    by TransDERmal route every twenty-four (24) hours. Apply patch to the affected area for 12 hours a day and remove for 12 hours a day. MONTELUKAST (SINGULAIR) 10 MG TABLET    TAKE ONE TABLET BY MOUTH ONCE DAILY    MULTIVIT-IRON-MIN-FOLIC ACID (CENTRUM COMPLETE) 18-0.4 MG TAB    Take 1 Tab by mouth daily. OMEPRAZOLE PO    Take 1 Tab by mouth daily. POLYETHYLENE GLYCOL (MIRALAX) 17 GRAM PACKET    Take 17 g by mouth daily. PREGABALIN (LYRICA) 150 MG CAPSULE    Take 1 Cap by mouth two (2) times a day. Max Daily Amount: 300 mg.     TRAMADOL (ULTRAM) 50 MG TABLET Take 50 mg by mouth every eight (8) hours as needed. These Medications have changed    No medications on file   Stop Taking    No medications on file        Dictation disclaimer:  Please note that this dictation was completed with [employer]oc[employer], the computer voice recognition software. Quite often unanticipated grammatical, syntax, homophones, and other interpretive errors are inadvertently transcribed by the computer software. Please disregard these errors. Please excuse any errors that have escaped final proofreading.

## 2022-12-19 NOTE — DISCHARGE INSTRUCTIONS
SPECIFIC PATIENT INSTRUCTIONS FROM THE PROVIDER  WHO TREATED YOU IN THE ER TODAY:    Finish oral steroids (like Medrol, Prednisone, Dexamethasone, Sterapred, Prednisolone, Orapred, etc) as directed. Note that steroids may cause temporary increase of blood glucose levels, if you are diabetic, monitor your glucose levels closely. Stop steroids and consult your primary care provider or endocrinologist if your levels increase significantly. Use your albuterol inhaler, if prescribed, and/or home nebulizer machine (if you have one) for wheezing. Avoid triggers if possible. DO NOT smoke  FOLLOW UP APPOINTMENT:  Your primary doctor in 1-2 days.     Return if any concerns or worsening of condition(s)

## 2022-12-19 NOTE — ED NOTES
Pt to ED for shortness of breath- wheezing noted in triage. Reports she threw away one of her inhalers on accident.  Hx COPD

## 2023-03-14 ENCOUNTER — APPOINTMENT (OUTPATIENT)
Facility: HOSPITAL | Age: 68
End: 2023-03-14
Payer: MEDICARE

## 2023-03-14 ENCOUNTER — HOSPITAL ENCOUNTER (EMERGENCY)
Facility: HOSPITAL | Age: 68
Discharge: HOME | End: 2023-03-14
Attending: EMERGENCY MEDICINE
Payer: MEDICARE

## 2023-03-14 VITALS
RESPIRATION RATE: 20 BRPM | OXYGEN SATURATION: 89 % | DIASTOLIC BLOOD PRESSURE: 81 MMHG | SYSTOLIC BLOOD PRESSURE: 162 MMHG | TEMPERATURE: 97.5 F | HEIGHT: 62 IN | WEIGHT: 230 LBS | BODY MASS INDEX: 42.33 KG/M2 | HEART RATE: 77 BPM

## 2023-03-14 DIAGNOSIS — I10 ESSENTIAL HYPERTENSION: ICD-10-CM

## 2023-03-14 DIAGNOSIS — J44.1 COPD EXACERBATION (HCC): Primary | ICD-10-CM

## 2023-03-14 LAB
FLUAV RNA SPEC QL NAA+PROBE: NOT DETECTED
FLUBV RNA SPEC QL NAA+PROBE: NOT DETECTED
SARS-COV-2 RNA RESP QL NAA+PROBE: NOT DETECTED

## 2023-03-14 PROCEDURE — 71046 X-RAY EXAM CHEST 2 VIEWS: CPT

## 2023-03-14 PROCEDURE — 99285 EMERGENCY DEPT VISIT HI MDM: CPT | Performed by: EMERGENCY MEDICINE

## 2023-03-14 PROCEDURE — 87636 SARSCOV2 & INF A&B AMP PRB: CPT

## 2023-03-14 PROCEDURE — 6370000000 HC RX 637 (ALT 250 FOR IP): Performed by: STUDENT IN AN ORGANIZED HEALTH CARE EDUCATION/TRAINING PROGRAM

## 2023-03-14 PROCEDURE — 93005 ELECTROCARDIOGRAM TRACING: CPT | Performed by: EMERGENCY MEDICINE

## 2023-03-14 RX ORDER — PREDNISONE 20 MG/1
60 TABLET ORAL DAILY
Qty: 12 TABLET | Refills: 0 | Status: SHIPPED | OUTPATIENT
Start: 2023-03-14 | End: 2023-03-18

## 2023-03-14 RX ORDER — PREDNISONE 20 MG/1
60 TABLET ORAL
Status: COMPLETED | OUTPATIENT
Start: 2023-03-14 | End: 2023-03-14

## 2023-03-14 RX ORDER — IPRATROPIUM BROMIDE AND ALBUTEROL SULFATE 2.5; .5 MG/3ML; MG/3ML
1 SOLUTION RESPIRATORY (INHALATION)
Status: COMPLETED | OUTPATIENT
Start: 2023-03-14 | End: 2023-03-14

## 2023-03-14 RX ORDER — ALBUTEROL SULFATE 90 UG/1
2 AEROSOL, METERED RESPIRATORY (INHALATION) EVERY 4 HOURS PRN
Qty: 18 G | Refills: 0 | Status: SHIPPED | OUTPATIENT
Start: 2023-03-14

## 2023-03-14 RX ADMIN — IPRATROPIUM BROMIDE AND ALBUTEROL SULFATE 1 AMPULE: 2.5; .5 SOLUTION RESPIRATORY (INHALATION) at 11:46

## 2023-03-14 RX ADMIN — PREDNISONE 60 MG: 20 TABLET ORAL at 11:18

## 2023-03-14 RX ADMIN — IPRATROPIUM BROMIDE AND ALBUTEROL SULFATE 1 AMPULE: 2.5; .5 SOLUTION RESPIRATORY (INHALATION) at 11:18

## 2023-03-14 RX ADMIN — IPRATROPIUM BROMIDE AND ALBUTEROL SULFATE 1 AMPULE: 2.5; .5 SOLUTION RESPIRATORY (INHALATION) at 11:30

## 2023-03-14 ASSESSMENT — ENCOUNTER SYMPTOMS
COUGH: 1
DIARRHEA: 0
SHORTNESS OF BREATH: 1
CHEST TIGHTNESS: 1
VOMITING: 0
TROUBLE SWALLOWING: 0
ABDOMINAL PAIN: 0
WHEEZING: 1
BLOOD IN STOOL: 0
SORE THROAT: 0
NAUSEA: 0
PHOTOPHOBIA: 0

## 2023-03-14 NOTE — ED TRIAGE NOTES
Pt to triage c/o shortness of breath with exertion, wheezing, and cough that started yesterday. Denies any chest pain. No fever or chills. Reports using albuterol inhaler with no relief.

## 2023-03-14 NOTE — ED NOTES
Pt is seen and discharged by provider. Discharge papers with instructions given. Ambulated to the exit without difficulty.        Tennille Wiley RN  03/14/23 [id number]

## 2023-03-14 NOTE — ED NOTES
Pt came from triage via wheelchair, c/o SOB. Placed on monitor. A&Ox4. Pt has hx of COPD and asthma.      Tho Aguilera RN  03/14/23 [id number]

## 2023-03-14 NOTE — DISCHARGE INSTRUCTIONS
Make sure you follow-up with your primary care doctor as soon as you are able, preferably within the next 2 to 4 days. Use your albuterol inhaler (4 puffs every 4-6 hours) scheduled for the next 2 days. Start taking the prednisone tomorrow as you already got your dose today here in the ER. Return immediately to the ER for severe difficulty breathing, chest pain, episodes of passing out, inability to keep fluids down due to nausea and vomiting, or any other concerns.

## 2023-03-14 NOTE — ED PROVIDER NOTES
[facility]  Emergency Department Treatment Report    Patient: Vonnie Gonsales Age: 67 y.o. Sex: female    YOB: 1955 Admit Date: 3/14/2023 PCP: No primary care provider on file.   MRN: [id number]  CSN: [id number]  Attending:  Stacy Lawton MD   Room: ER12/12 Time Dictated: 2:24 PM        Chief Complaint   Chief Complaint   Patient presents with    Shortness of Breath       History of Present Illness   Vonnie Gonsales is a 67 y.o. female with past medical history of hypertension, asthma/COPD, diabetes, and gastric bypass who presents to the ED via POV, with the chief complaint of worsening shortness of breath since yesterday.  Reports she has had a mildly productive cough that has worsened over the past couple of days, with worsening shortness of breath this morning that did not respond to her albuterol inhaler.  Denies fevers, nausea, vomiting, abdominal pain, or urinary symptoms.  She states she has not been vaccinated against COVID or the flu in the past year.  Denies sick contacts.    Review of Systems   Review of Systems   Constitutional:  Negative for appetite change, fatigue and fever.   HENT:  Negative for congestion, sore throat and trouble swallowing.    Eyes:  Negative for photophobia and visual disturbance.   Respiratory:  Positive for cough, chest tightness, shortness of breath and wheezing.    Cardiovascular:  Negative for chest pain, palpitations and leg swelling.   Gastrointestinal:  Negative for abdominal pain, blood in stool, diarrhea, nausea and vomiting.   Genitourinary:  Negative for dysuria, frequency and hematuria.   Musculoskeletal:  Negative for neck pain and neck stiffness.   Skin:  Negative for rash.   Neurological:  Negative for syncope, light-headedness and headaches.   Psychiatric/Behavioral:  Negative for behavioral problems.         Past Medical/Surgical History     Past Medical History:   Diagnosis Date    Arthritis     Chronic pain     Back  COPD (chronic obstructive pulmonary disease) (HCC)     Dr. Zoila Elizondo    Depression     GERD (gastroesophageal reflux disease)     Gout     H/O colon cancer, stage II     resection by Dr. Tong Nicolas    History of multiple pulmonary nodules     Hyperlipemia     Hypertension     Hypothyroid     IBS (irritable bowel syndrome)     Liver cyst     Morbid obesity with BMI of 40.0-44.9, adult ([facility] [address]     Multinodular thyroid     Pancreatitis     Prediabetes     Smoking history     quit     Status post gastric bypass for obesity     [facility] - pre-op wt = 250 lbs . ... lowest wt = 134 lbs    Unspecified sleep apnea     does not use CPAP     Past Surgical History:   Procedure Laterality Date    CATARACT REMOVAL Bilateral     GASTRIC BYPASS SURGERY  2009    pre-op wt = 250 lbs . ... lowest wt = 134 lbs    HYSTERECTOMY (CERVIX STATUS UNKNOWN)      LUMBAR FUSION  2013    L4 to L 5    TONSILLECTOMY      TOTAL COLECTOMY      2005 - Dr. Rhona Cohn - Sigmoid colectomy laparoscopic       Social History     Social History     Socioeconomic History    Marital status:      Spouse name: None    Number of children: None    Years of education: None    Highest education level: None   Tobacco Use    Smoking status: Former     Packs/day: 0.50     Types: Cigarettes     Quit date: 1990     Years since quittin.5    Smokeless tobacco: Never    Tobacco comments:     Quit smoking: per patient she has not smoked in over 30 years but significant second hand smoke exposure at home   Substance and Sexual Activity    Alcohol use:  Yes     Alcohol/week: 0.0 standard drinks    Drug use: No       Family History     Family History   Problem Relation Age of Onset    Hypertension Mother     Asthma Mother     Hypertension Father     Elevated Lipids Father     Diabetes Mother     Elevated Lipids Mother     Heart Disease Sister         older    Heart Disease Father         chf       Current Medications     Discharge Medication List as of 3/14/2023  1:18 PM        CONTINUE these medications which have NOT CHANGED    Details   OMEPRAZOLE PO Take 1 tablet by mouth dailyHistorical Med      amLODIPine (NORVASC) 10 MG tablet Take 10 mg by mouth dailyHistorical Med      candesartan-hydroCHLOROthiazide (ATACAND HCT) 32-12.5 MG per tablet TAKE ONE TABLET BY MOUTH ONCE DAILYHistorical Med      vitamin D 25 MCG (1000 UT) CAPS Take 1,000 Units by mouth dailyHistorical Med      diclofenac sodium (VOLTAREN) 1 % GEL Apply 4 g topically 4 times daily, Topical, 4 TIMES DAILY Starting Fri 8/23/2019, Historical Med      ergocalciferol (ERGOCALCIFEROL) 1.25 MG (71873 UT) capsule Take 50,000 Units by mouth every 7 daysHistorical Med      ipratropium-albuterol (DUONEB) 0.5-2.5 (3) MG/3ML SOLN nebulizer solution USE 1 VIAL WITH HAND HELD NEBULIZER EVERY 6 HOURS AS NEEDEDHistorical Med      levothyroxine (SYNTHROID) 125 MCG tablet Take 125 mcg by mouth every morning (before breakfast)Historical Med      lidocaine (LIDODERM) 5 % Place onto the skin every 24 hoursHistorical Med      montelukast (SINGULAIR) 10 MG tablet TAKE ONE TABLET BY MOUTH ONCE DAILYHistorical Med      polyethylene glycol (GLYCOLAX) 17 GM/SCOOP powder Take 17 g by mouth dailyHistorical Med      predniSONE 10 MG (21) TBPK Follow package instructions. Historical Med      pregabalin (LYRICA) 150 MG capsule Take 150 mg by mouth 2 times daily. Historical Med      traMADol (ULTRAM) 50 MG tablet Take 50 mg by mouth every 8 hours as needed. Historical Med              Allergies     Allergies   Allergen Reactions    Fluticasone Furoate-Vilanterol      Other reaction(s): Hoarseness  Yeast problem w/Powdered MDIs    Ace Inhibitors Cough    Aspirin Other (See Comments)     GI bleeding & pain    Nsaids Other (See Comments)     Makes her stomach bleed       Physical Exam     ED Triage Vitals [03/14/23 1035]   BP Temp Temp Source Heart Rate Resp SpO2 Height Weight   138/78 97.5 °F (36.4 °C) Oral 71 20 98 % 5' 2\" (1.575 m) 230 lb (104.3 kg)        Physical Exam  Vitals and nursing note reviewed. Constitutional:       Appearance: She is well-developed. HENT:      Head: Normocephalic and atraumatic. Eyes:      Extraocular Movements: Extraocular movements intact. Cardiovascular:      Rate and Rhythm: Normal rate and regular rhythm. Pulmonary:      Effort: Tachypnea present. Breath sounds: Examination of the right-upper field reveals wheezing. Examination of the left-upper field reveals wheezing. Examination of the right-middle field reveals wheezing. Examination of the left-middle field reveals wheezing. Examination of the right-lower field reveals wheezing. Examination of the left-lower field reveals wheezing. Wheezing present. Chest:      Chest wall: No tenderness. Abdominal:      Palpations: Abdomen is soft. Tenderness: There is no abdominal tenderness. Musculoskeletal:         General: Normal range of motion. Right lower leg: No edema. Left lower leg: No edema. Skin:     General: Skin is warm and dry. Capillary Refill: Capillary refill takes less than 2 seconds. Neurological:      General: No focal deficit present. Mental Status: She is alert and oriented to person, place, and time. Psychiatric:         Mood and Affect: Mood normal.         Behavior: Behavior normal.        Impression and Management Plan   79 y.o. female who presents to the ED with cough and worsening shortness of breath and wheezing since yesterday. Differential diagnosis includes but is not limited to pneumothorax, pneumonia, COPD/asthma exacerbation, viral URI, and bronchitis.     Diagnostic Studies   Lab:   Recent Results (from the past 12 hour(s))   EKG 12 Lead    Collection Time: 03/14/23 10:35 AM   Result Value Ref Range    Ventricular Rate 73 BPM    QRS Duration 100 ms    Q-T Interval 416 ms    QTc Calculation (Bazett) 458 ms    R Axis -51 degrees    T Axis 107 degrees    Diagnosis Atrial fibrillation COVID-19 & Influenza Combo    Collection Time: 03/14/23 11:13 AM    Specimen: Nasopharyngeal   Result Value Ref Range    SARS-CoV-2, PCR Not detected NOTD      Rapid Influenza A By PCR Not detected NOTD      Rapid Influenza B By PCR Not detected NOTD         Imaging:    XR CHEST (2 VW)   Final Result   No radiographic evidence of acute cardiopulmonary process. EKG as interpreted by me shows sinus rhythm with a rate of 73 bpm.  EKG machine reading it is atrial fibrillation, however there is some artifact present but there are clearly P waves before every QRS and the rhythm is regular with possible sinus arrhythmia. OR interval measured at 120 ms. QRS and QTc intervals within normal limits. No ST segment elevations or depressions to suggest acute ischemia or infarction. Incomplete right bundle branch block present. ED Course/Medical Decision Making   79 y.o. female who presents to the ED with history of hypertension and asthma/COPD presenting for concerns of another COPD exacerbation. Patient states the last one which required her to be hospitalized was approximately 2 years ago. On exam, patient with diffuse expiratory wheezing in all lung fields. Satting 92 to 95% on room air. Patient states she has been having increasing sputum production but no fevers, and chest x-ray without consolidation to suggest pneumonia. Patient with improvement in air movement as well as decreasing wheezing after 3 DuoNebs. She states she is feeling a lot better and would like to go home. Patient's O2 sats now 90 to 94% on room air, expected after giving the DuoNebs. ED Course as of 03/14/23 1424   Tue Mar 14, 2023   1223 SARS-CoV-2, PCR: Not detected [HC]   1223 Rapid Influenza A By PCR: Not detected [HC]   3764 Rapid Influenza B By PCR: Not detected [HC]   4746 CXR without acute cardiopulmonary process.   [HC]      ED Course User Index  [HC] Leanna Quezada MD     Plan to discharge home with refill of her albuterol inhaler as well as 4-day course of prednisone. Patient advised to follow-up with her primary care doctor. Patient expressed understanding and agreement with plan. Medications   ipratropium-albuterol (DUONEB) nebulizer solution 1 ampule (1 ampule Inhalation Given 3/14/23 1146)   predniSONE (DELTASONE) tablet 60 mg (60 mg Oral Given 3/14/23 1118)       RECORDS REVIEWED:  I reviewed the patient's previous records here at [facility] and note that patient's last visit here was in December 2022 for a COPD exacerbation. EXTERNAL RECORDS REVIEWED: None. INDEPENDENT HISTORIAN:  History and/or plan development assisted by: Patient unaccompanied. Severe exacerbation or progression of chronic illness: COPD. Threat to body function without evaluation and management: Pulmonary. SOCIAL DETERMINANTS  impacting Evaluation and Management: Patient is a former smoker. Comorbidities impacting Evaluation and Management: Hypertension. Final Diagnosis     1. COPD exacerbation ([address]    2. Essential hypertension          Disposition   Discharge home    Taylor Winston MD  Emergency Medicine PGY-4  [facility]  March 14, 2023  2:24 PM    Patient seen with Attending, Dr. Shalini Sam. Please note that portions of this note were completed with a voice recognition program.  Efforts were made to edit the dictations but occasionally words are mis-transcribed.          Memo Neumann MD  Resident  03/14/23 [id number]

## 2023-03-15 LAB
EKG DIAGNOSIS: NORMAL
EKG Q-T INTERVAL: 416 MS
EKG QRS DURATION: 100 MS
EKG QTC CALCULATION (BAZETT): 458 MS
EKG R AXIS: -51 DEGREES
EKG T AXIS: 107 DEGREES
EKG VENTRICULAR RATE: 73 BPM

## 2023-03-15 PROCEDURE — 93010 ELECTROCARDIOGRAM REPORT: CPT | Performed by: INTERNAL MEDICINE

## 2023-04-03 NOTE — TELEPHONE ENCOUNTER
L/M
PT [facility]SCAR([phone]). SHE IS IN DESPERATE NEED FOR  VENTOLIN. SHE HAS NO MORE LEFT. PLEASE CHECK AND CALL HER BACK. SHE STATES SHE NEEDS IT EVERY FOUR HOURS.
Pt states [facility] she could not fill her Ventolin INH for 90 days. I called [facility] and they state the Rx can be filled in 3 days. Pt understands and is ok now she knows she can get in 3 days.
English

## 2023-08-18 ENCOUNTER — HOSPITAL ENCOUNTER (OUTPATIENT)
Facility: HOSPITAL | Age: 68
Discharge: HOME | End: 2023-08-21
Attending: STUDENT IN AN ORGANIZED HEALTH CARE EDUCATION/TRAINING PROGRAM

## 2023-08-18 DIAGNOSIS — R93.89 ABNORMAL CT SCAN: ICD-10-CM

## 2023-08-18 NOTE — PROGRESS NOTES
Patient unable to tolerate the MRI due to claustrophobia. They will contact their doctor for further instructions.

## 2023-08-25 ENCOUNTER — TRANSCRIBE ORDERS (OUTPATIENT)
Facility: HOSPITAL | Age: 68
End: 2023-08-25

## 2023-08-25 DIAGNOSIS — Z78.0 POSTMENOPAUSAL STATE: Primary | ICD-10-CM

## (undated) DEVICE — GOWN ISOLATN REG BLU POLY UNISX W/ THMB LOOP

## (undated) DEVICE — GARMENT,MEDLINE,DVT,INT,CALF,MED, GEN2: Brand: MEDLINE

## (undated) DEVICE — STERILE POLYISOPRENE POWDER-FREE SURGICAL GLOVES: Brand: PROTEXIS

## (undated) DEVICE — MAJ-1414 SINGLE USE ADPATER BIOPSY VALV: Brand: SINGLE USE ADAPTOR BIOPSY VALVE

## (undated) DEVICE — AVANOS* SHORT BEVEL NEEDLE: Brand: AVANOS

## (undated) DEVICE — NEEDLE SPNL 22GA L3.5IN BLK HUB S STL REG WALL FIT STYL W/

## (undated) DEVICE — MIRAGE SWIFT II PILLOW LGE: Brand: MIRAGE SWIFT II

## (undated) DEVICE — STRAP,POSITIONING,KNEE/BODY,FOAM,4X60": Brand: MEDLINE

## (undated) DEVICE — SYR 10ML CTRL LR LCK NSAF LF --

## (undated) DEVICE — TRAY SUPP STD NO DRUG W EXTENSION SET

## (undated) DEVICE — (D)BNDG ADHESIVE FABRIC 3/4X3 -- DISC BY MFR USE ITEM 357960

## (undated) DEVICE — CUFF BLD PRESSURE MONITORING LNG AD 23-33 CM 1 TUBE MY CUF

## (undated) DEVICE — DRAPE TWL SURG 16X26IN BLU ORB04] ALLCARE INC]

## (undated) DEVICE — Device